# Patient Record
Sex: FEMALE | Race: WHITE | NOT HISPANIC OR LATINO | URBAN - METROPOLITAN AREA
[De-identification: names, ages, dates, MRNs, and addresses within clinical notes are randomized per-mention and may not be internally consistent; named-entity substitution may affect disease eponyms.]

---

## 2018-04-05 ENCOUNTER — INPATIENT (INPATIENT)
Facility: HOSPITAL | Age: 73
LOS: 4 days | Discharge: NURSING, REHAB OR LONG-TERM CARE | DRG: 607 | End: 2018-04-10
Attending: FAMILY MEDICINE | Admitting: INTERNAL MEDICINE
Payer: MEDICARE

## 2018-04-05 VITALS
TEMPERATURE: 98 F | RESPIRATION RATE: 20 BRPM | OXYGEN SATURATION: 98 % | HEART RATE: 61 BPM | DIASTOLIC BLOOD PRESSURE: 87 MMHG | WEIGHT: 274.92 LBS | SYSTOLIC BLOOD PRESSURE: 132 MMHG | HEIGHT: 63 IN

## 2018-04-05 DIAGNOSIS — M54.5 LOW BACK PAIN: ICD-10-CM

## 2018-04-05 DIAGNOSIS — Z29.9 ENCOUNTER FOR PROPHYLACTIC MEASURES, UNSPECIFIED: ICD-10-CM

## 2018-04-05 DIAGNOSIS — I10 ESSENTIAL (PRIMARY) HYPERTENSION: ICD-10-CM

## 2018-04-05 DIAGNOSIS — I89.0 LYMPHEDEMA, NOT ELSEWHERE CLASSIFIED: ICD-10-CM

## 2018-04-05 DIAGNOSIS — E11.8 TYPE 2 DIABETES MELLITUS WITH UNSPECIFIED COMPLICATIONS: ICD-10-CM

## 2018-04-05 DIAGNOSIS — Z98.890 OTHER SPECIFIED POSTPROCEDURAL STATES: Chronic | ICD-10-CM

## 2018-04-05 DIAGNOSIS — E78.5 HYPERLIPIDEMIA, UNSPECIFIED: ICD-10-CM

## 2018-04-05 LAB
ALBUMIN SERPL ELPH-MCNC: 4.2 G/DL — SIGNIFICANT CHANGE UP (ref 3.3–5)
ALP SERPL-CCNC: 92 U/L — SIGNIFICANT CHANGE UP (ref 40–120)
ALT FLD-CCNC: 25 U/L RC — SIGNIFICANT CHANGE UP (ref 10–45)
ANION GAP SERPL CALC-SCNC: 14 MMOL/L — SIGNIFICANT CHANGE UP (ref 5–17)
ANION GAP SERPL CALC-SCNC: 15 MMOL/L — SIGNIFICANT CHANGE UP (ref 5–17)
AST SERPL-CCNC: 38 U/L — SIGNIFICANT CHANGE UP (ref 10–40)
BASOPHILS # BLD AUTO: 0.1 K/UL — SIGNIFICANT CHANGE UP (ref 0–0.2)
BASOPHILS NFR BLD AUTO: 0.9 % — SIGNIFICANT CHANGE UP (ref 0–2)
BILIRUB SERPL-MCNC: 0.5 MG/DL — SIGNIFICANT CHANGE UP (ref 0.2–1.2)
BUN SERPL-MCNC: 28 MG/DL — HIGH (ref 7–23)
BUN SERPL-MCNC: 30 MG/DL — HIGH (ref 7–23)
CALCIUM SERPL-MCNC: 10.3 MG/DL — SIGNIFICANT CHANGE UP (ref 8.4–10.5)
CALCIUM SERPL-MCNC: 10.5 MG/DL — SIGNIFICANT CHANGE UP (ref 8.4–10.5)
CHLORIDE SERPL-SCNC: 101 MMOL/L — SIGNIFICANT CHANGE UP (ref 96–108)
CHLORIDE SERPL-SCNC: 105 MMOL/L — SIGNIFICANT CHANGE UP (ref 96–108)
CO2 SERPL-SCNC: 22 MMOL/L — SIGNIFICANT CHANGE UP (ref 22–31)
CO2 SERPL-SCNC: 24 MMOL/L — SIGNIFICANT CHANGE UP (ref 22–31)
CREAT SERPL-MCNC: 1.09 MG/DL — SIGNIFICANT CHANGE UP (ref 0.5–1.3)
CREAT SERPL-MCNC: 1.18 MG/DL — SIGNIFICANT CHANGE UP (ref 0.5–1.3)
EOSINOPHIL # BLD AUTO: 0.1 K/UL — SIGNIFICANT CHANGE UP (ref 0–0.5)
EOSINOPHIL NFR BLD AUTO: 0.9 % — SIGNIFICANT CHANGE UP (ref 0–6)
GLUCOSE BLDC GLUCOMTR-MCNC: 104 MG/DL — HIGH (ref 70–99)
GLUCOSE SERPL-MCNC: 123 MG/DL — HIGH (ref 70–99)
GLUCOSE SERPL-MCNC: 144 MG/DL — HIGH (ref 70–99)
HCT VFR BLD CALC: 39.7 % — SIGNIFICANT CHANGE UP (ref 34.5–45)
HGB BLD-MCNC: 13.1 G/DL — SIGNIFICANT CHANGE UP (ref 11.5–15.5)
LYMPHOCYTES # BLD AUTO: 2.4 K/UL — SIGNIFICANT CHANGE UP (ref 1–3.3)
LYMPHOCYTES # BLD AUTO: 27 % — SIGNIFICANT CHANGE UP (ref 13–44)
MCHC RBC-ENTMCNC: 30.9 PG — SIGNIFICANT CHANGE UP (ref 27–34)
MCHC RBC-ENTMCNC: 32.9 GM/DL — SIGNIFICANT CHANGE UP (ref 32–36)
MCV RBC AUTO: 93.9 FL — SIGNIFICANT CHANGE UP (ref 80–100)
MONOCYTES # BLD AUTO: 0.7 K/UL — SIGNIFICANT CHANGE UP (ref 0–0.9)
MONOCYTES NFR BLD AUTO: 7.5 % — SIGNIFICANT CHANGE UP (ref 2–14)
NEUTROPHILS # BLD AUTO: 5.6 K/UL — SIGNIFICANT CHANGE UP (ref 1.8–7.4)
NEUTROPHILS NFR BLD AUTO: 63.7 % — SIGNIFICANT CHANGE UP (ref 43–77)
NT-PROBNP SERPL-SCNC: 920 PG/ML — HIGH (ref 0–300)
PLATELET # BLD AUTO: 208 K/UL — SIGNIFICANT CHANGE UP (ref 150–400)
POTASSIUM SERPL-MCNC: 4 MMOL/L — SIGNIFICANT CHANGE UP (ref 3.5–5.3)
POTASSIUM SERPL-MCNC: 7.5 MMOL/L — CRITICAL HIGH (ref 3.5–5.3)
POTASSIUM SERPL-SCNC: 4 MMOL/L — SIGNIFICANT CHANGE UP (ref 3.5–5.3)
POTASSIUM SERPL-SCNC: 7.5 MMOL/L — CRITICAL HIGH (ref 3.5–5.3)
PROT SERPL-MCNC: 9 G/DL — HIGH (ref 6–8.3)
RBC # BLD: 4.23 M/UL — SIGNIFICANT CHANGE UP (ref 3.8–5.2)
RBC # FLD: 13 % — SIGNIFICANT CHANGE UP (ref 10.3–14.5)
SODIUM SERPL-SCNC: 138 MMOL/L — SIGNIFICANT CHANGE UP (ref 135–145)
SODIUM SERPL-SCNC: 143 MMOL/L — SIGNIFICANT CHANGE UP (ref 135–145)
WBC # BLD: 8.9 K/UL — SIGNIFICANT CHANGE UP (ref 3.8–10.5)
WBC # FLD AUTO: 8.9 K/UL — SIGNIFICANT CHANGE UP (ref 3.8–10.5)

## 2018-04-05 PROCEDURE — 71045 X-RAY EXAM CHEST 1 VIEW: CPT | Mod: 26

## 2018-04-05 PROCEDURE — 93010 ELECTROCARDIOGRAM REPORT: CPT

## 2018-04-05 PROCEDURE — 99284 EMERGENCY DEPT VISIT MOD MDM: CPT | Mod: 25

## 2018-04-05 PROCEDURE — 99223 1ST HOSP IP/OBS HIGH 75: CPT

## 2018-04-05 RX ORDER — SODIUM CHLORIDE 9 MG/ML
1000 INJECTION, SOLUTION INTRAVENOUS
Qty: 0 | Refills: 0 | Status: DISCONTINUED | OUTPATIENT
Start: 2018-04-05 | End: 2018-04-10

## 2018-04-05 RX ORDER — TRAMADOL HYDROCHLORIDE 50 MG/1
50 TABLET ORAL
Qty: 0 | Refills: 0 | Status: DISCONTINUED | OUTPATIENT
Start: 2018-04-05 | End: 2018-04-10

## 2018-04-05 RX ORDER — ACETAMINOPHEN 500 MG
500 TABLET ORAL EVERY 8 HOURS
Qty: 0 | Refills: 0 | Status: DISCONTINUED | OUTPATIENT
Start: 2018-04-05 | End: 2018-04-10

## 2018-04-05 RX ORDER — MULTIVIT-MIN/FERROUS GLUCONATE 9 MG/15 ML
1 LIQUID (ML) ORAL DAILY
Qty: 0 | Refills: 0 | Status: DISCONTINUED | OUTPATIENT
Start: 2018-04-05 | End: 2018-04-10

## 2018-04-05 RX ORDER — ATORVASTATIN CALCIUM 80 MG/1
20 TABLET, FILM COATED ORAL AT BEDTIME
Qty: 0 | Refills: 0 | Status: DISCONTINUED | OUTPATIENT
Start: 2018-04-05 | End: 2018-04-10

## 2018-04-05 RX ORDER — INSULIN LISPRO 100/ML
VIAL (ML) SUBCUTANEOUS
Qty: 0 | Refills: 0 | Status: DISCONTINUED | OUTPATIENT
Start: 2018-04-05 | End: 2018-04-10

## 2018-04-05 RX ORDER — DEXTROSE 50 % IN WATER 50 %
1 SYRINGE (ML) INTRAVENOUS ONCE
Qty: 0 | Refills: 0 | Status: DISCONTINUED | OUTPATIENT
Start: 2018-04-05 | End: 2018-04-10

## 2018-04-05 RX ORDER — METFORMIN HYDROCHLORIDE 850 MG/1
0 TABLET ORAL
Qty: 0 | Refills: 0 | COMMUNITY

## 2018-04-05 RX ORDER — DEXTROSE 50 % IN WATER 50 %
25 SYRINGE (ML) INTRAVENOUS ONCE
Qty: 0 | Refills: 0 | Status: DISCONTINUED | OUTPATIENT
Start: 2018-04-05 | End: 2018-04-10

## 2018-04-05 RX ORDER — HYDROCHLOROTHIAZIDE 25 MG
12.5 TABLET ORAL DAILY
Qty: 0 | Refills: 0 | Status: DISCONTINUED | OUTPATIENT
Start: 2018-04-05 | End: 2018-04-06

## 2018-04-05 RX ORDER — DEXTROSE 50 % IN WATER 50 %
12.5 SYRINGE (ML) INTRAVENOUS ONCE
Qty: 0 | Refills: 0 | Status: DISCONTINUED | OUTPATIENT
Start: 2018-04-05 | End: 2018-04-10

## 2018-04-05 RX ORDER — GLUCAGON INJECTION, SOLUTION 0.5 MG/.1ML
1 INJECTION, SOLUTION SUBCUTANEOUS ONCE
Qty: 0 | Refills: 0 | Status: DISCONTINUED | OUTPATIENT
Start: 2018-04-05 | End: 2018-04-10

## 2018-04-05 RX ORDER — OMEGA-3 ACID ETHYL ESTERS 1 G
4 CAPSULE ORAL DAILY
Qty: 0 | Refills: 0 | Status: DISCONTINUED | OUTPATIENT
Start: 2018-04-05 | End: 2018-04-07

## 2018-04-05 RX ORDER — BISOPROLOL FUMARATE 10 MG/1
20 TABLET, FILM COATED ORAL DAILY
Qty: 0 | Refills: 0 | Status: DISCONTINUED | OUTPATIENT
Start: 2018-04-05 | End: 2018-04-10

## 2018-04-05 RX ORDER — TRAMADOL HYDROCHLORIDE 50 MG/1
0 TABLET ORAL
Qty: 0 | Refills: 0 | COMMUNITY

## 2018-04-05 RX ORDER — IBUPROFEN 200 MG
0 TABLET ORAL
Qty: 0 | Refills: 0 | COMMUNITY

## 2018-04-05 RX ORDER — INFLUENZA VIRUS VACCINE 15; 15; 15; 15 UG/.5ML; UG/.5ML; UG/.5ML; UG/.5ML
0.5 SUSPENSION INTRAMUSCULAR ONCE
Qty: 0 | Refills: 0 | Status: DISCONTINUED | OUTPATIENT
Start: 2018-04-05 | End: 2018-04-10

## 2018-04-05 RX ORDER — ATORVASTATIN CALCIUM 80 MG/1
1 TABLET, FILM COATED ORAL
Qty: 0 | Refills: 0 | COMMUNITY

## 2018-04-05 RX ORDER — HEPARIN SODIUM 5000 [USP'U]/ML
5000 INJECTION INTRAVENOUS; SUBCUTANEOUS EVERY 8 HOURS
Qty: 0 | Refills: 0 | Status: DISCONTINUED | OUTPATIENT
Start: 2018-04-05 | End: 2018-04-06

## 2018-04-05 RX ADMIN — HEPARIN SODIUM 5000 UNIT(S): 5000 INJECTION INTRAVENOUS; SUBCUTANEOUS at 21:54

## 2018-04-05 RX ADMIN — ATORVASTATIN CALCIUM 20 MILLIGRAM(S): 80 TABLET, FILM COATED ORAL at 21:54

## 2018-04-05 NOTE — H&P ADULT - PROBLEM SELECTOR PLAN 4
EXAM:

CT ABDOMEN AND PELVIS

 

EXAM DATE: 7/22/2017 02:20 PM.

 

CLINICAL HISTORY: Diffuse abdominal pain.

 

COMPARISONS: None.

 

TECHNIQUE: Routine helical CT imaging was performed through the abdomen and pelvis. IV contrast: 100 
cc Isovue 300. Enteric contrast: No. Reconstructions: Coronal and sagittal.

 

In accordance with CT protocol optimization, one or more of the following dose reduction techniques w
ere utilized for this exam: automated exposure control, adjustment of mA and/or KV based on patient s
ize, or use of iterative reconstructive technique.

 

FINDINGS: 

Lung Bases: Bibasilar small dependent opacity and small left pleural effusion visualized. Coronary ar
anita calcification, aortic valve calcification without cardiomegaly are seen. There is tiny hiatal he
rnia.

 

Liver: No contour changes or masses.

 

Gallbladder/Bile Ducts: Multiple small gallbladder stones without gallbladder wall thickening, abnorm
al enhancement or bile duct dilatation visualized.

 

Spleen: 13.7 cm in the longest dimension without splenic masses.

 

Pancreas: Normal.

 

Adrenal Glands: Normal.

 

Kidneys: There is a stone in the left lower renal pelvis, 3 mm and there is also right main renal pel
vis, 8 x 10 x 7 mm without  hydronephrosis on either side. There are multiple bilateral small renal s
imple cortical cysts. There is a focal cortical indentation containing a tiny calcification in the po
sterior mid cortex of the right kidney, probable chronic scarring versus a tiny obstructing stone in 
the calyx with atrophic parenchyma.

 

Peritoneal Cavity/Bowel: There is colon diverticulosis; there is borderline or minimal colon wall thi
ckening with small davy colon fat stranding in the proximal mid sigmoid, otherwise, No discrete colon
 diverticulitis or pericolon abscess formation seen. No free fluid, free air or adenopathy. No small 
bowel dilatation or acute inflammatory process. The appendix is well visualized and normal.

 

Pelvic Organs: There is Gupta catheter in the bladder; otherwise, the bladder and visualized pelvic o
rgans are within normal limits.

 

Vasculature: No aneurysms or other significant abnormality.

 

Bones: No bony destructive abnormality. Multilevel moderate to severe degenerative disk disease in th
e spine, predominantly lower lumbar spine.

 

Other: There is a small to moderately-sized right inguinal indirect hernia containing fat with minima
l fat stranding.

 

IMPRESSION: 

1. Colon diverticulosis and probable mild diverticulitis in the proximal mid sigmoid colon without dr
ainable abscess. Normal appendix. Negative for bowel obstruction. 

2. Bilateral intrarenal stones, greater in the right tibia without hydronephrosis. 

3. Cholelithiasis without acute cholecystitis or bile duct dilatation. 

4. Borderline to mild splenomegaly without cirrhosis or adenopathy, nonspecific finding. 

5. There is a small to moderately-sized right inguinal indirect hernia containing fat with minimal fa
t stranding. 

6. Small dependent pulmonary opacity in the bilateral lung bases, greater left base with small left p
leural effusion, infiltrate process or pneumonia in the left lower lobe may have similar appearance.

 

[facility]

Referring Provider Line: [phone]

 

SITE ID: [id number]
EXAM:

CT HEAD

 

EXAM DATE: 7/24/2017 02:30 PM.

 

CLINICAL HISTORY: 81-year-old with altered mental status

 

COMPARISON: CT head 1/12/2017.

 

TECHNIQUE: Multiaxial CT images were obtained from the foramen magnum to the vertex. IV contrast: Non
e. Reformats: Coronal.

 

In accordance with CT protocol optimization, one or more of the following dose reduction techniques w
ere utilized for this exam: automated exposure control, adjustment of mA and/or KV based on patient s
ize, or use of iterative reconstructive technique.

 

FINDINGS:

Parenchyma: No definite acute parenchymal hemorrhage, mass, or midline shift. There is mild to modera
te bilateral areas of white matter hypoattenuation that appears similar to 1/12/2017. There is no def
inite new white matter hypoattenuation or loss of gray-white differentiation.

 

Extraaxial Spaces: No abnormal extra axial fluid collection/mass seen.

 

Ventricles: The ventricles and sulci appear prominent but appropriate for extent of volume loss. Vent
ricular size and configuration are unchanged from prior study. Cisterns are patent

 

Sinuses: There is large volume left maxillary sinus and right sphenoid sinus air-fluid levels that ma
y represent acute sinusitis in the proper clinical setting. There is moderate mucosal thickening the 
ethmoid air cells. Mastoid air cells and middle ear cavities are clear.

 

Orbits: Changes of bilateral lens replacement.

 

Bones: No evidence of fracture or calvarial defect.

 

Other: Extensive vascular calcifications of the cavernous ICA segments. Vascular calcifications of th
e intradural vertebral arteries.

 

IMPRESSION: 

 

1. No definite acute infarct, intracranial hemorrhage, mass, or hydrocephalus.

 

2. Mild-to-moderate white matter changes that appear similar to CT 1/12/2017 and may represent sequel
a of chronic vessel ischemic disease. If there is clinical concern for acute stroke or clinical sympt
oms persist an MR brain without contrast can be considered to evaluate for small or subtle infarct.

 

3. Large volume left maxillary sinus and right sphenoid sinus air-fluid levels that may represent acu
te sinusitis in the proper clinical setting. 

 

[facility]

 

The call report notification system was initiated by Dr. Adam Garcia at 14:46 hrs on 7/2
4/17.

 

The above findings  were discussed with Ed Cortez by Dr. Adam Garcia at 14:52 hrs on 7/
24/17.

Referring Provider Line: [phone]

 

SITE ID: [id number]
c/w Lipitor 20mg PO qhs

## 2018-04-05 NOTE — ED ADULT NURSE NOTE - NS ED NURSE REPORT GIVEN TO FT
[facility] 453W report given to nurse geovany. Understands pmh, medications given and plan of care for patient. Patient in stable condition, vital signs updated, has no complaints at this time and has been updated on care plan. Explained to patient that it is change of shift and new nurse is taking over, pt verbalized understanding.

## 2018-04-05 NOTE — H&P ADULT - ASSESSMENT
73F with h/o DMT2, HTN, HLD, Obesity, low back pain, Chronic LE lymphedema x  10 yrs who presents with c/o worsening b/l LE swelling x 2 weeks. Pt reports difficulty with ambulation on account of worsening LE edema. She denies fever/chills, LE tenderness or redness. Physical exam is notable for +4 b/l LE non- pitting edema, non tender to palpation and no warmth or erythema. No calf tenderness. Labs are unremarkable. EKG is NSR and CXR shows no acute process.

## 2018-04-05 NOTE — H&P ADULT - NSHPLABSRESULTS_GEN_ALL_CORE
Labs reviewed : no leukocytosis, no anemia, bnp is wnl, BMP is wnl    CXR  personally reviewed : clear lungs    EKG personally reviewed : sinus with first degree block, LVH. HR 61 bpm

## 2018-04-05 NOTE — ED PROVIDER NOTE - OBJECTIVE STATEMENT
Private Physician Ruslan Heaton  73y female pmh HTN,HLD, DMT2, no habits,cancer,cad,cva, Pt comes to ed complains of "my lymphedema is out of control" Has had judith lower extr/calf lymphedema for 10 years. Treated with  compression stockings, massage. Pt has worsening symptoms over past few weeks now not able to ambulate. No fever chills shortness of breath chest pain.  Spoke to pmd and referred to ed.

## 2018-04-05 NOTE — ED PROVIDER NOTE - PMH
Back pain    Diabetes    HLD (hyperlipidemia)    Hypertension    Lymphedema of both lower extremities

## 2018-04-05 NOTE — H&P ADULT - HISTORY OF PRESENT ILLNESS
73F with h/o DMT2, HTN, HLD, Obesity,  low back pain,, Chronic LE lymphedema x  10 yrs who presents with c/o worsening b/l LE swelling. She reports that leg swelling has gradually worsened over the past 2 weeks such that she is no longer able  to ambulate. She ambulates with a walker at baseline. She denies LE pain, redness and warmth. She denies fever/chills, nausea/vomiting. No CP or SOB. She denies prolonged immobilization or recent travel. She reports that she is no longer able to put on compression stockings.  Of note she used to follow up at a lymphedema clinic ( several years ago) at which time she was managed with massage therapy and ACE wrapping. She reports improvement in symptoms for the past several years  and has not followed up since.    ED course  VS : 132/87  61  20  O2 98% room air T 98.4F  Labs : wbc 8.9 h/h 13/39  plr 208  bnp 920  bun/cr 30/1.0 73F with h/o DMT2, HTN, HLD, Obesity,  low back pain,, Chronic LE lymphedema x  10 yrs who presents with c/o worsening b/l LE swelling. She reports that leg swelling has gradually worsened over the past 2 weeks such that she is no longer able  to ambulate. She ambulates with a walker at baseline. She denies LE pain, redness and warmth. She denies fever/chills, nausea/vomiting. No CP or SOB. She denies prolonged immobilization or recent travel. She reports that she is no longer able to put on compression stockings.  Of note she used to follow up at a lymphedema clinic ( several years ago) at which time she was managed with massage therapy and compression bandaging. She reports improvement in symptoms for the past several years  and has not followed up since.    ED course  VS : 132/87  61  20  O2 98% room air T 98.4F  Labs : wbc 8.9 h/h 13/39  plr 208  bnp 920  bun/cr 30/1.0

## 2018-04-05 NOTE — H&P ADULT - PROBLEM SELECTOR PLAN 1
worsening LE swelling over the past 2 weeks  nontender, no erythema or warmth, no calef tenderness  will check b/l LE doppler US to r/o DVT  ACE wrapping worsening LE swelling over the past 2 weeks  nontender, no erythema or warmth, no calf tenderness  will check b/l LE doppler US to r/o DVT  Compression bandaging with ACE wrap  Vascular surgery consult in AM

## 2018-04-05 NOTE — ED ADULT NURSE NOTE - OBJECTIVE STATEMENT
74 y/o female presents to ed c/o lymphoderma. Pt states she has a hx and for the past two weeks it has become significantly worse. C/o associated weakness. Denies chest pain, sob, ha, n/v/d, abdominal pain, f/c, urinary symptoms, hematuria. A&Ox4, vss, skin warm dry and intact, MAEx4, lungs CTA, abd soft obese and nontender. Pt resting comfortably with VSS, no complaints at this time. Patient's bed in the lowest position, explained plan of care to patient and family members. Will continue to reassess.

## 2018-04-05 NOTE — H&P ADULT - PROBLEM SELECTOR PLAN 3
well controlled   will c/w home medication well controlled on Ziac 10- 6.25 2 tabs qd  will c/w HCTZ 12.5 mg qd and Bisoprolol 20 mg qd   monitor BP

## 2018-04-05 NOTE — ED PROVIDER NOTE - PROGRESS NOTE DETAILS
Discussed with [facility] office. Referred to Marija  Discussed with Dr Dutton will accept  Dr Dutton re [facility] for iniital h&p  Malachi Ramos MD, Facep Discussed with hosptial[facility] tba tonja Ramos MD, Facep

## 2018-04-06 ENCOUNTER — TRANSCRIPTION ENCOUNTER (OUTPATIENT)
Age: 73
End: 2018-04-06

## 2018-04-06 LAB
GLUCOSE BLDC GLUCOMTR-MCNC: 132 MG/DL — HIGH (ref 70–99)
GLUCOSE BLDC GLUCOMTR-MCNC: 137 MG/DL — HIGH (ref 70–99)
GLUCOSE BLDC GLUCOMTR-MCNC: 150 MG/DL — HIGH (ref 70–99)
HBA1C BLD-MCNC: 6.9 % — HIGH (ref 4–5.6)

## 2018-04-06 RX ORDER — HEPARIN SODIUM 5000 [USP'U]/ML
7500 INJECTION INTRAVENOUS; SUBCUTANEOUS EVERY 8 HOURS
Qty: 0 | Refills: 0 | Status: DISCONTINUED | OUTPATIENT
Start: 2018-04-06 | End: 2018-04-08

## 2018-04-06 RX ORDER — HYDRALAZINE HCL 50 MG
10 TABLET ORAL ONCE
Qty: 0 | Refills: 0 | Status: COMPLETED | OUTPATIENT
Start: 2018-04-06 | End: 2018-04-06

## 2018-04-06 RX ORDER — HYDROCHLOROTHIAZIDE 25 MG
25 TABLET ORAL
Qty: 0 | Refills: 0 | Status: DISCONTINUED | OUTPATIENT
Start: 2018-04-06 | End: 2018-04-10

## 2018-04-06 RX ORDER — HYDRALAZINE HCL 50 MG
10 TABLET ORAL EVERY 8 HOURS
Qty: 0 | Refills: 0 | Status: DISCONTINUED | OUTPATIENT
Start: 2018-04-06 | End: 2018-04-06

## 2018-04-06 RX ORDER — HYDRALAZINE HCL 50 MG
5 TABLET ORAL ONCE
Qty: 0 | Refills: 0 | Status: COMPLETED | OUTPATIENT
Start: 2018-04-06 | End: 2018-04-07

## 2018-04-06 RX ORDER — HYDRALAZINE HCL 50 MG
25 TABLET ORAL EVERY 8 HOURS
Qty: 0 | Refills: 0 | Status: DISCONTINUED | OUTPATIENT
Start: 2018-04-06 | End: 2018-04-06

## 2018-04-06 RX ORDER — HYDRALAZINE HCL 50 MG
25 TABLET ORAL EVERY 8 HOURS
Qty: 0 | Refills: 0 | Status: DISCONTINUED | OUTPATIENT
Start: 2018-04-06 | End: 2018-04-07

## 2018-04-06 RX ORDER — IBUPROFEN 200 MG
400 TABLET ORAL ONCE
Qty: 0 | Refills: 0 | Status: COMPLETED | OUTPATIENT
Start: 2018-04-06 | End: 2018-04-06

## 2018-04-06 RX ADMIN — Medication 12.5 MILLIGRAM(S): at 04:52

## 2018-04-06 RX ADMIN — TRAMADOL HYDROCHLORIDE 50 MILLIGRAM(S): 50 TABLET ORAL at 12:09

## 2018-04-06 RX ADMIN — BISOPROLOL FUMARATE 20 MILLIGRAM(S): 10 TABLET, FILM COATED ORAL at 04:52

## 2018-04-06 RX ADMIN — HEPARIN SODIUM 7500 UNIT(S): 5000 INJECTION INTRAVENOUS; SUBCUTANEOUS at 12:13

## 2018-04-06 RX ADMIN — HEPARIN SODIUM 5000 UNIT(S): 5000 INJECTION INTRAVENOUS; SUBCUTANEOUS at 05:50

## 2018-04-06 RX ADMIN — TRAMADOL HYDROCHLORIDE 50 MILLIGRAM(S): 50 TABLET ORAL at 17:12

## 2018-04-06 RX ADMIN — Medication 10 MILLIGRAM(S): at 16:26

## 2018-04-06 RX ADMIN — ATORVASTATIN CALCIUM 20 MILLIGRAM(S): 80 TABLET, FILM COATED ORAL at 22:00

## 2018-04-06 RX ADMIN — Medication 400 MILLIGRAM(S): at 18:54

## 2018-04-06 RX ADMIN — Medication 500 MILLIGRAM(S): at 23:45

## 2018-04-06 RX ADMIN — Medication 25 MILLIGRAM(S): at 18:54

## 2018-04-06 RX ADMIN — Medication 500 MILLIGRAM(S): at 23:17

## 2018-04-06 RX ADMIN — Medication 1 TABLET(S): at 12:08

## 2018-04-06 RX ADMIN — HEPARIN SODIUM 7500 UNIT(S): 5000 INJECTION INTRAVENOUS; SUBCUTANEOUS at 22:00

## 2018-04-06 NOTE — DISCHARGE NOTE ADULT - PATIENT PORTAL LINK FT
You can access the [facility], offered by [facility], by registering with the following website: [email or web address]

## 2018-04-06 NOTE — DISCHARGE NOTE ADULT - ADDITIONAL INSTRUCTIONS
Follow-up with primary care physician and outpatient  Wound Care /Lymphedema Clinic within 1 to 2 weeks. Call for appointment . Follow-up with primary care physician and outpatient  Wound Care /Lymphedema Clinic within 1  week after discharge from  rehab. Call for appointment . Follow-up with primary care physician within 1 week after rehab.   Follow-up with outpatient Lymphedema Clinic [phone]  within 1  week after discharge from  rehab. Call for appointment .

## 2018-04-06 NOTE — DISCHARGE NOTE ADULT - CARE PLAN
Principal Discharge DX:	Lymphedema of both lower extremities  Goal:	Less swelling and pain  Assessment and plan of treatment:	Continue with compression with ace wraps, elevation.  Follow-up with primary care physician and outpatient  Wound Care /Lymphedema Clinic within 1 to 2 weeks. Call for appointment .  Secondary Diagnosis:	Essential hypertension  Assessment and plan of treatment:	Low salt diet  Activity as tolerated.  Take all medication as prescribed.  Follow up with your medical doctor for routine blood pressure monitoring at your next visit.  Notify your doctor if you have any of the following symptoms:   Dizziness, Lightheadedness, Blurry vision, Headache, Chest pain, Shortness of breath  Secondary Diagnosis:	Chronic midline low back pain, with sciatica presence unspecified  Assessment and plan of treatment:	Do not stay in bed. Resting more than 1 or 2 days can delay your recovery.  Do not avoid exercise or work. Your body is made to move. It is not dangerous to be active, even though your back may hurt. Your back will likely heal faster if you return to being active before your pain is gone.   Only take over-the-counter or prescription medicines as directed by your caregiver. Over-the-counter medicines to reduce pain and inflammation are often the most helpful.  Your caregiver may prescribe muscle relaxant drugs. These medicines help dull your pain so you can more quickly return to your normal activities and healthy exercise.  Avoid feeling anxious or stressed. Stress increases muscle tension and can worsen back pain. It is important to recognize when you are anxious or stressed and learn ways to manage it. Exercise is a great option.  SEEK MEDICAL CARE IF:You have pain that is not relieved with rest or medicine.  You have pain that does not improve in 1 week.  You have new symptoms.  You are generally not feeling well.  You have pain that radiates from your back into your legs.  You develop new bowel or bladder control problems.  You have unusual weakness or numbness in your arms or legs.  You develop nausea or vomiting.  You develop abdominal pain.  You feel faint.  Secondary Diagnosis:	Type 2 diabetes mellitus with complication, without long-term current use of insulin  Assessment and plan of treatment:	HgA1C this admission 6.9  Make sure you get your HgA1c checked every three months.  If you take oral diabetes medications, check your blood glucose two times a day.  If you take insulin, check your blood glucose before meals and at bedtime.  It's important not to skip any meals.  Keep a log of your blood glucose results and always take it with you to your doctor appointments.  Keep a list of your current medications including injectables and over the counter medications and bring this medication list with you to all your doctor appointments.  If you have not seen your ophthalmologist this year call for appointment.  Check your feet daily for redness, sores, or openings. Do not self treat. If no improvement in two days call your primary care physician for an appointment.  Low blood sugar (hypoglycemia) is a blood sugar below 70mg/dl. Check your blood sugar if you feel signs/symptoms of hypoglycemia. If your blood sugar is below 70 take 15 grams of carbohydrates (ex 4 oz of apple juice, 3-4 glucose tablets, or 4-6 oz of regular soda) wait 15 minutes and repeat blood sugar to make sure it comes up above 70.  If your blood sugar is above 70 and you are due for a meal, have a meal.  If you are not due for a meal have a snack.  This snack helps keeps your blood sugar at a safe range.  Secondary Diagnosis:	Hyperlipidemia, unspecified hyperlipidemia type  Assessment and plan of treatment:	Continue with your cholesterol medications. Eat a heart healthy diet that is low in saturated fats and salt, and includes whole grains, fruits, vegetables and lean protein; exercise regularly (consult with your physician or cardiologist first); maintain a heart healthy weight; if you smoke - quit (A resource to help you stop smoking is the [facility] – phone number 259-799-9032.). Continue to follow with your primary physician or cardiologist. Principal Discharge DX:	Lymphedema of both lower extremities  Goal:	Less swelling and pain  Assessment and plan of treatment:	Continue with compression with ace wraps, elevation.  Follow-up with primary care physician and outpatient  Wound Care /Lymphedema Clinic within 1 to 2 weeks. Call for appointment .  Secondary Diagnosis:	Essential hypertension  Assessment and plan of treatment:	Low salt diet  Activity as tolerated.  Take all medication as prescribed.  Follow up with your medical doctor for routine blood pressure monitoring at your next visit.  Notify your doctor if you have any of the following symptoms:   Dizziness, Lightheadedness, Blurry vision, Headache, Chest pain, Shortness of breath  Secondary Diagnosis:	Chronic midline low back pain, with sciatica presence unspecified  Assessment and plan of treatment:	Do not stay in bed. Resting more than 1 or 2 days can delay your recovery.  Do not avoid exercise or work. Your body is made to move. It is not dangerous to be active, even though your back may hurt. Your back will likely heal faster if you return to being active before your pain is gone.   Only take over-the-counter or prescription medicines as directed by your caregiver. Over-the-counter medicines to reduce pain and inflammation are often the most helpful.  Your caregiver may prescribe muscle relaxant drugs. These medicines help dull your pain so you can more quickly return to your normal activities and healthy exercise.  Avoid feeling anxious or stressed. Stress increases muscle tension and can worsen back pain. It is important to recognize when you are anxious or stressed and learn ways to manage it. Exercise is a great option.  SEEK MEDICAL CARE IF:You have pain that is not relieved with rest or medicine.  You have pain that does not improve in 1 week.  You have new symptoms.  You are generally not feeling well.  You have pain that radiates from your back into your legs.  You develop new bowel or bladder control problems.  You have unusual weakness or numbness in your arms or legs.  You develop nausea or vomiting.  You develop abdominal pain.  You feel faint.  Secondary Diagnosis:	Type 2 diabetes mellitus with complication, without long-term current use of insulin  Assessment and plan of treatment:	HgA1C this admission 6.9  Make sure you get your HgA1c checked every three months.  If you take oral diabetes medications, check your blood glucose two times a day.  If you take insulin, check your blood glucose before meals and at bedtime.  It's important not to skip any meals.  Keep a log of your blood glucose results and always take it with you to your doctor appointments.  Keep a list of your current medications including injectables and over the counter medications and bring this medication list with you to all your doctor appointments.  If you have not seen your ophthalmologist this year call for appointment.  Check your feet daily for redness, sores, or openings. Do not self treat. If no improvement in two days call your primary care physician for an appointment.  Low blood sugar (hypoglycemia) is a blood sugar below 70mg/dl. Check your blood sugar if you feel signs/symptoms of hypoglycemia. If your blood sugar is below 70 take 15 grams of carbohydrates (ex 4 oz of apple juice, 3-4 glucose tablets, or 4-6 oz of regular soda) wait 15 minutes and repeat blood sugar to make sure it comes up above 70.  If your blood sugar is above 70 and you are due for a meal, have a meal.  If you are not due for a meal have a snack.  This snack helps keeps your blood sugar at a safe range.  Secondary Diagnosis:	Hyperlipidemia, unspecified hyperlipidemia type  Assessment and plan of treatment:	Continue with your cholesterol medications. Eat a heart healthy diet that is low in saturated fats and salt, and includes whole grains, fruits, vegetables and lean protein; exercise regularly (consult with your physician or cardiologist first); maintain a heart healthy weight; if you smoke - quit (A resource to help you stop smoking is the [facility] – phone number 305-171-1145.). Continue to follow with your primary physician or cardiologist.  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  Follow-up with your primary care physician in 1 week after discharge from [facility].

## 2018-04-06 NOTE — DISCHARGE NOTE ADULT - MEDICATION SUMMARY - MEDICATIONS TO STOP TAKING
I will STOP taking the medications listed below when I get home from the hospital:    ibuprofen 600 mg oral tablet  -- 1 tab(s) by mouth 2 times a day    Ziac 10 mg-6.25 mg oral tablet  -- 2 tab(s) by mouth once a day (in the morning)

## 2018-04-06 NOTE — DISCHARGE NOTE ADULT - CARE PROVIDER_API CALL
Ruslan Heaton (DO), Family Medicine  [address] [place] [address]  Phone: [phone]  Fax: [phone] Ruslan Heaton (DO), Family Medicine  [address] [place] [address]  Phone: [phone]  Fax: [phone]    [facility],   [address] [place] [address], [address] Floor  [place], [place] [address]  Phone: [phone]  Fax: (       -

## 2018-04-06 NOTE — DISCHARGE NOTE ADULT - PROVIDER TOKENS
TAVO:'[id number]:MIIS:[id number]' TOKEN:'[id number]:MIIS:[id number]',FREE:[LAST:[[facility]],PHONE:[phone]],FAX:[phone]   [phone]    -],ADDRESS:[[address] [place] [address]]]

## 2018-04-06 NOTE — PROGRESS NOTE ADULT - SUBJECTIVE AND OBJECTIVE BOX
INTERVAL HPI/OVERNIGHT EVENTS:  Pt seen and examined at bedside.     Allergies/Intolerance: No Known Allergies      MEDICATIONS  (STANDING):  atorvastatin 20 milliGRAM(s) Oral at bedtime  bisoprolol   Tablet 20 milliGRAM(s) Oral daily  dextrose 5%. 1000 milliLiter(s) (50 mL/Hr) IV Continuous <Continuous>  dextrose 50% Injectable 12.5 Gram(s) IV Push once  dextrose 50% Injectable 25 Gram(s) IV Push once  dextrose 50% Injectable 25 Gram(s) IV Push once  heparin  Injectable 5000 Unit(s) SubCutaneous every 8 hours  hydrochlorothiazide 12.5 milliGRAM(s) Oral daily  influenza   Vaccine 0.5 milliLiter(s) IntraMuscular once  insulin lispro (HumaLOG) corrective regimen sliding scale   SubCutaneous three times a day before meals  multivitamin/minerals 1 Tablet(s) Oral daily  omega-3-Acid Ethyl Esters 4 Gram(s) Oral daily    MEDICATIONS  (PRN):  acetaminophen   Tablet. 500 milliGRAM(s) Oral every 8 hours PRN Moderate Pain (4 - 6)  dextrose Gel 1 Dose(s) Oral once PRN Blood Glucose LESS THAN 70 milliGRAM(s)/deciliter  glucagon  Injectable 1 milliGRAM(s) IntraMuscular once PRN Glucose LESS THAN 70 milligrams/deciliter  traMADol 50 milliGRAM(s) Oral two times a day PRN Severe Pain (7 - 10)        ROS: all systems reviewed and wnl      PHYSICAL EXAMINATION:  Vital Signs Last 24 Hrs  T(C): 37 (06 Apr 2018 04:26), Max: 37 (05 Apr 2018 17:10)  T(F): 98.6 (06 Apr 2018 04:26), Max: 98.6 (05 Apr 2018 17:10)  HR: 66 (06 Apr 2018 04:26) (61 - 84)  BP: 152/76 (06 Apr 2018 06:00) (132/84 - 191/62)  BP(mean): 101 (05 Apr 2018 18:48) (101 - 101)  RR: 17 (06 Apr 2018 04:26) (17 - 20)  SpO2: 97% (06 Apr 2018 04:26) (97% - 98%)  CAPILLARY BLOOD GLUCOSE      POCT Blood Glucose.: 104 mg/dL (05 Apr 2018 20:56)      04-05 @ 07:01  -  04-06 @ 07:00  --------------------------------------------------------  IN: 0 mL / OUT: 550 mL / NET: -550 mL        GENERAL:   NECK: supple, No JVD  CHEST/LUNG: clear to auscultation bilaterally; no rales, rhonchi, or wheezing b/l  HEART: normal S1, S2  ABDOMEN: BS+, soft, ND, NT   EXTREMITIES:  pulses palpable; no clubbing, cyanosis, or edema b/l LEs  SKIN: no rashes or lesions      LABS:                        13.1   8.9   )-----------( 208      ( 05 Apr 2018 17:26 )             39.7     04-05    143  |  105  |  28<H>  ----------------------------<  123<H>  4.0   |  24  |  1.18    Ca    10.3      05 Apr 2018 19:02    TPro  9.0<H>  /  Alb  4.2  /  TBili  0.5  /  DBili  x   /  AST  38  /  ALT  25  /  AlkPhos  92  04-05 INTERVAL HPI/OVERNIGHT EVENTS:  Pt seen and examined at bedside.     Allergies/Intolerance: No Known Allergies      MEDICATIONS  (STANDING):  atorvastatin 20 milliGRAM(s) Oral at bedtime  bisoprolol   Tablet 20 milliGRAM(s) Oral daily  dextrose 5%. 1000 milliLiter(s) (50 mL/Hr) IV Continuous <Continuous>  dextrose 50% Injectable 12.5 Gram(s) IV Push once  dextrose 50% Injectable 25 Gram(s) IV Push once  dextrose 50% Injectable 25 Gram(s) IV Push once  heparin  Injectable 5000 Unit(s) SubCutaneous every 8 hours  hydrochlorothiazide 12.5 milliGRAM(s) Oral daily  influenza   Vaccine 0.5 milliLiter(s) IntraMuscular once  insulin lispro (HumaLOG) corrective regimen sliding scale   SubCutaneous three times a day before meals  multivitamin/minerals 1 Tablet(s) Oral daily  omega-3-Acid Ethyl Esters 4 Gram(s) Oral daily    MEDICATIONS  (PRN):  acetaminophen   Tablet. 500 milliGRAM(s) Oral every 8 hours PRN Moderate Pain (4 - 6)  dextrose Gel 1 Dose(s) Oral once PRN Blood Glucose LESS THAN 70 milliGRAM(s)/deciliter  glucagon  Injectable 1 milliGRAM(s) IntraMuscular once PRN Glucose LESS THAN 70 milligrams/deciliter  traMADol 50 milliGRAM(s) Oral two times a day PRN Severe Pain (7 - 10)        ROS: all systems reviewed and wnl      PHYSICAL EXAMINATION:  Vital Signs Last 24 Hrs  T(C): 37 (06 Apr 2018 04:26), Max: 37 (05 Apr 2018 17:10)  T(F): 98.6 (06 Apr 2018 04:26), Max: 98.6 (05 Apr 2018 17:10)  HR: 66 (06 Apr 2018 04:26) (61 - 84)  BP: 152/76 (06 Apr 2018 06:00) (132/84 - 191/62)  BP(mean): 101 (05 Apr 2018 18:48) (101 - 101)  RR: 17 (06 Apr 2018 04:26) (17 - 20)  SpO2: 97% (06 Apr 2018 04:26) (97% - 98%)  CAPILLARY BLOOD GLUCOSE      POCT Blood Glucose.: 104 mg/dL (05 Apr 2018 20:56)      04-05 @ 07:01  -  04-06 @ 07:00  --------------------------------------------------------  IN: 0 mL / OUT: 550 mL / NET: -550 mL        GENERAL: Alert, NAD, no fevers, SOB or CP  NECK: supple, No JVD  CHEST/LUNG: clear to auscultation bilaterally; no rales, rhonchi, or wheezing b/l  HEART: normal S1, S2  ABDOMEN: BS+, soft, ND, NT   EXTREMITIES:  pulses palpable; no clubbing, cyanosis, or edema b/l LEs  SKIN: no rashes or lesions      LABS:                        13.1   8.9   )-----------( 208      ( 05 Apr 2018 17:26 )             39.7     04-05    143  |  105  |  28<H>  ----------------------------<  123<H>  4.0   |  24  |  1.18    Ca    10.3      05 Apr 2018 19:02    TPro  9.0<H>  /  Alb  4.2  /  TBili  0.5  /  DBili  x   /  AST  38  /  ALT  25  /  AlkPhos  92  04-05

## 2018-04-06 NOTE — DISCHARGE NOTE ADULT - HOSPITAL COURSE
74 yo female with PMH HTN, HLD, Type 2 DM, CAD, CVA, cancer, chronic midline 74 yo female with PMH HTN, HLD, Type 2 DM, CAD, CVA, cancer, chronic midline backpain who pesented with worsening edema and pain of  lymphedema to  her bilateral lower extremities. US of BLE shows:      . Continue with :    . Uncontrolled HTN , now better controlled after addition of Norvasc and Hydralazine, and increased HCTZ.   Pt is stable for discharge to [facility]. She will follow-up her PMD and outpatient Lymphedema Clinic 72 yo female with PMH HTN, HLD, Type 2 DM, CAD, CVA, cancer, chronic midline backpain who pesented with worsening edema and pain of  lymphedema to  her bilateral lower extremities. US of BLE shows:      No evidence of deep vein thrombosis of either lower extremity.Superficial thrombophlebitis affects the right small saphenous vein in the calf. . Continue with :    .   Uncontrolled HTN , now better controlled after addition of Norvasc and Hydralazine, and increased HCTZ.   Pt is stable for discharge to [facility]. She will follow-up her PMD and outpatient Lymphedema Clinic 74 yo female with PMH HTN, HLD, Type 2 DM, CAD, CVA, cancer, chronic midline backpain who pesented with worsening edema and pain of  lymphedema to  her bilateral lower extremities. US of BLE shows:      No evidence of deep vein thrombosis of either lower extremity.Superficial thrombophlebitis affects the right small saphenous vein in the calf. . Continue with Xarelto for total 3 month course.   Uncontrolled HTN , now better controlled after addition of Norvasc and Hydralazine, and increased HCTZ.   Pt is stable for discharge to [facility]. She will follow-up her PMD and outpatient Lymphedema Clinic.

## 2018-04-06 NOTE — PROGRESS NOTE ADULT - ASSESSMENT
73 year old female PMH DM(II), HTN, HLD, Obesity, low back pain, Chronic LE lymphedema x  10 yrs who presents with c/o worsening b/l LE swelling. She reports that leg swelling has gradually worsened over the past 2 weeks such that she is no longer able  to ambulate. She ambulates with a walker at baseline. She denies LE pain, redness and warmth. She denies fever/chills, nausea/vomiting. No CP or SOB. She denies prolonged immobilization or recent travel. She reports that she is no longer able to put on compression stockings.  Of note she used to follow up at a lymphedema clinic ( several years ago) at which time she was managed with massage therapy and compression bandaging. 73 year old female PMH DM(II), HTN, HLD, Obesity, low back pain, Chronic LE lymphedema x  10 yrs who presents with c/o worsening b/l LE swelling. She reports that leg swelling has gradually worsened over the past 2 weeks such that she is no longer able  to ambulate. She ambulates with a walker at baseline. She denies LE pain, redness and warmth. She denies fever/chills, nausea/vomiting. No CP or SOB. She denies prolonged immobilization or recent travel. She reports that she is no longer able to put on compression stockings.  Of note she used to follow up at a lymphedema clinic ( several years ago) at which time she was managed with massage therapy and compression bandaging.     Needs PT eval, Vascular eval and outpatient referral to [facility]. Continue Lipitor 20 mg/day, Zabeta 20   mg/day and HCTZ 12.5 mg/dy. Leg dopplers pending.

## 2018-04-06 NOTE — DISCHARGE NOTE ADULT - PLAN OF CARE
Less swelling and pain Continue with compression with ace wraps, elevation.  Follow-up with primary care physician and outpatient  Wound Care /Lymphedema Clinic within 1 to 2 weeks. Call for appointment . Low salt diet  Activity as tolerated.  Take all medication as prescribed.  Follow up with your medical doctor for routine blood pressure monitoring at your next visit.  Notify your doctor if you have any of the following symptoms:   Dizziness, Lightheadedness, Blurry vision, Headache, Chest pain, Shortness of breath Do not stay in bed. Resting more than 1 or 2 days can delay your recovery.  Do not avoid exercise or work. Your body is made to move. It is not dangerous to be active, even though your back may hurt. Your back will likely heal faster if you return to being active before your pain is gone.   Only take over-the-counter or prescription medicines as directed by your caregiver. Over-the-counter medicines to reduce pain and inflammation are often the most helpful.  Your caregiver may prescribe muscle relaxant drugs. These medicines help dull your pain so you can more quickly return to your normal activities and healthy exercise.  Avoid feeling anxious or stressed. Stress increases muscle tension and can worsen back pain. It is important to recognize when you are anxious or stressed and learn ways to manage it. Exercise is a great option.  SEEK MEDICAL CARE IF:You have pain that is not relieved with rest or medicine.  You have pain that does not improve in 1 week.  You have new symptoms.  You are generally not feeling well.  You have pain that radiates from your back into your legs.  You develop new bowel or bladder control problems.  You have unusual weakness or numbness in your arms or legs.  You develop nausea or vomiting.  You develop abdominal pain.  You feel faint. HgA1C this admission 6.9  Make sure you get your HgA1c checked every three months.  If you take oral diabetes medications, check your blood glucose two times a day.  If you take insulin, check your blood glucose before meals and at bedtime.  It's important not to skip any meals.  Keep a log of your blood glucose results and always take it with you to your doctor appointments.  Keep a list of your current medications including injectables and over the counter medications and bring this medication list with you to all your doctor appointments.  If you have not seen your ophthalmologist this year call for appointment.  Check your feet daily for redness, sores, or openings. Do not self treat. If no improvement in two days call your primary care physician for an appointment.  Low blood sugar (hypoglycemia) is a blood sugar below 70mg/dl. Check your blood sugar if you feel signs/symptoms of hypoglycemia. If your blood sugar is below 70 take 15 grams of carbohydrates (ex 4 oz of apple juice, 3-4 glucose tablets, or 4-6 oz of regular soda) wait 15 minutes and repeat blood sugar to make sure it comes up above 70.  If your blood sugar is above 70 and you are due for a meal, have a meal.  If you are not due for a meal have a snack.  This snack helps keeps your blood sugar at a safe range. Continue with your cholesterol medications. Eat a heart healthy diet that is low in saturated fats and salt, and includes whole grains, fruits, vegetables and lean protein; exercise regularly (consult with your physician or cardiologist first); maintain a heart healthy weight; if you smoke - quit (A resource to help you stop smoking is the [facility] – phone number 858-870-2372.). Continue to follow with your primary physician or cardiologist. Continue with Xarelto as prescribed for total course of 3 months.  Follow-up with your primary care physician in 1 week after discharge from [facility].

## 2018-04-06 NOTE — DISCHARGE NOTE ADULT - MEDICATION SUMMARY - MEDICATIONS TO TAKE
I will START or STAY ON the medications listed below when I get home from the hospital:    traMADol 50 mg oral tablet  -- 1 tab(s) by mouth 2 times a day  -- Indication: For Chronic midline low back pain, with sciatica presence unspecified    Tylenol Extra Strength 500 mg oral tablet  -- 2 tab(s) by mouth once a day, As Needed  -- Indication: For Chronic midline low back pain, with sciatica presence unspecified    rivaroxaban 15 mg oral tablet  -- 1 tab(s) by mouth 2 times a day until April 29th.   -- Indication: For Thrombophlebitis of superficial veins of right lower extremity    rivaroxaban 20 mg oral tablet  -- 1 tab(s) by mouth every 24 hours starting April 30th. Continue for total 3 month course.   -- Indication: For Thrombophlebitis of superficial veins of right lower extremity    metFORMIN 500 mg oral tablet  -- 1 tab(s) by mouth once a day (in the morning)  -- Indication: For Type 2 diabetes mellitus with complication, without long-term current use of insulin    Lipitor 20 mg oral tablet  -- 1 tab(s) by mouth once a day (at bedtime)  -- Indication: For Hyperlipidemia, unspecified hyperlipidemia type    bisoprolol 5 mg oral tablet  -- 4 tab(s) by mouth once a day  -- Indication: For Essential hypertension    amLODIPine 5 mg oral tablet  -- 1 tab(s) by mouth once a day  -- Indication: For Essential hypertension    diclofenac 2% topical solution  -- Apply on skin to affected area once to 2 times a day, As Needed  -- Indication: For Chronic midline low back pain, with sciatica presence unspecified    hydroCHLOROthiazide 25 mg oral tablet  -- 1 tab(s) by mouth   -- Indication: For Essential hypertension    Omega-3 oral capsule  -- 1 cap(s) by mouth once a day  -- Indication: For Supplement    hydrALAZINE 50 mg oral tablet  -- 1 tab(s) by mouth every 8 hours  -- Indication: For Essential hypertension    Centrum oral tablet  -- 1 tab(s) by mouth once a day  -- Indication: For supplement    Vitamin C 250 mg oral tablet  -- 1 tab(s) by mouth once a day  -- Indication: For supplement

## 2018-04-06 NOTE — DISCHARGE NOTE ADULT - SECONDARY DIAGNOSIS.
Essential hypertension Chronic midline low back pain, with sciatica presence unspecified Type 2 diabetes mellitus with complication, without long-term current use of insulin Hyperlipidemia, unspecified hyperlipidemia type Thrombophlebitis of superficial veins of right lower extremity

## 2018-04-07 LAB
ANION GAP SERPL CALC-SCNC: 13 MMOL/L — SIGNIFICANT CHANGE UP (ref 5–17)
BUN SERPL-MCNC: 17 MG/DL — SIGNIFICANT CHANGE UP (ref 7–23)
CALCIUM SERPL-MCNC: 9.5 MG/DL — SIGNIFICANT CHANGE UP (ref 8.4–10.5)
CHLORIDE SERPL-SCNC: 104 MMOL/L — SIGNIFICANT CHANGE UP (ref 96–108)
CO2 SERPL-SCNC: 25 MMOL/L — SIGNIFICANT CHANGE UP (ref 22–31)
CREAT SERPL-MCNC: 0.97 MG/DL — SIGNIFICANT CHANGE UP (ref 0.5–1.3)
GLUCOSE BLDC GLUCOMTR-MCNC: 128 MG/DL — HIGH (ref 70–99)
GLUCOSE BLDC GLUCOMTR-MCNC: 135 MG/DL — HIGH (ref 70–99)
GLUCOSE BLDC GLUCOMTR-MCNC: 142 MG/DL — HIGH (ref 70–99)
GLUCOSE BLDC GLUCOMTR-MCNC: 148 MG/DL — HIGH (ref 70–99)
GLUCOSE SERPL-MCNC: 164 MG/DL — HIGH (ref 70–99)
MAGNESIUM SERPL-MCNC: 1.6 MG/DL — SIGNIFICANT CHANGE UP (ref 1.6–2.6)
POTASSIUM SERPL-MCNC: 3.4 MMOL/L — LOW (ref 3.5–5.3)
POTASSIUM SERPL-SCNC: 3.4 MMOL/L — LOW (ref 3.5–5.3)
SODIUM SERPL-SCNC: 142 MMOL/L — SIGNIFICANT CHANGE UP (ref 135–145)

## 2018-04-07 RX ORDER — POTASSIUM CHLORIDE 20 MEQ
40 PACKET (EA) ORAL ONCE
Qty: 0 | Refills: 0 | Status: COMPLETED | OUTPATIENT
Start: 2018-04-07 | End: 2018-04-07

## 2018-04-07 RX ORDER — HYDRALAZINE HCL 50 MG
50 TABLET ORAL EVERY 8 HOURS
Qty: 0 | Refills: 0 | Status: DISCONTINUED | OUTPATIENT
Start: 2018-04-07 | End: 2018-04-10

## 2018-04-07 RX ORDER — OMEGA-3 ACID ETHYL ESTERS 1 G
1 CAPSULE ORAL DAILY
Qty: 0 | Refills: 0 | Status: DISCONTINUED | OUTPATIENT
Start: 2018-04-07 | End: 2018-04-10

## 2018-04-07 RX ADMIN — HEPARIN SODIUM 7500 UNIT(S): 5000 INJECTION INTRAVENOUS; SUBCUTANEOUS at 14:17

## 2018-04-07 RX ADMIN — Medication 50 MILLIGRAM(S): at 14:16

## 2018-04-07 RX ADMIN — Medication 5 MILLIGRAM(S): at 04:12

## 2018-04-07 RX ADMIN — Medication 50 MILLIGRAM(S): at 21:28

## 2018-04-07 RX ADMIN — HEPARIN SODIUM 7500 UNIT(S): 5000 INJECTION INTRAVENOUS; SUBCUTANEOUS at 21:29

## 2018-04-07 RX ADMIN — BISOPROLOL FUMARATE 20 MILLIGRAM(S): 10 TABLET, FILM COATED ORAL at 05:30

## 2018-04-07 RX ADMIN — Medication 500 MILLIGRAM(S): at 21:31

## 2018-04-07 RX ADMIN — Medication 25 MILLIGRAM(S): at 05:30

## 2018-04-07 RX ADMIN — Medication 500 MILLIGRAM(S): at 22:30

## 2018-04-07 RX ADMIN — ATORVASTATIN CALCIUM 20 MILLIGRAM(S): 80 TABLET, FILM COATED ORAL at 21:28

## 2018-04-07 RX ADMIN — Medication 1 GRAM(S): at 11:24

## 2018-04-07 RX ADMIN — Medication 40 MILLIEQUIVALENT(S): at 08:46

## 2018-04-07 RX ADMIN — Medication 1 TABLET(S): at 11:24

## 2018-04-07 RX ADMIN — HEPARIN SODIUM 7500 UNIT(S): 5000 INJECTION INTRAVENOUS; SUBCUTANEOUS at 05:30

## 2018-04-07 NOTE — PHYSICAL THERAPY INITIAL EVALUATION ADULT - ACTIVE RANGE OF MOTION EXAMINATION, REHAB EVAL
Right LE Active ROM was WFL (within functional limits)/Right UE Active ROM was WFL (within functional limits)/Left LE Active ROM was WFL (within functional limits)/BUE and BLE WFL, hx lymphedema BLE/Left UE Active ROM was WFL (within functional limits)

## 2018-04-07 NOTE — PHYSICAL THERAPY INITIAL EVALUATION ADULT - PERTINENT HX OF CURRENT PROBLEM, REHAB EVAL
Pt is a 73F admitted to [facility] on 4/5/18 for worsening BL LE swelling. She denies LE pain, redness and warmth. She denies fever/chills, nausea/vomiting. No CP or SOB. She denies prolonged immobilization or recent travel. She reports that she is no longer able to put on compression stockings. Of note she used to follow up at a lymphedema clinic ( several years ago) at which time she was managed with massage therapy and compression bandaging.

## 2018-04-07 NOTE — PHYSICAL THERAPY INITIAL EVALUATION ADULT - PRECAUTIONS/LIMITATIONS, REHAB EVAL
She reports improvement in symptoms for the past several years  and has not followed up since. Pt with h/o DMT2, HTN, HLD, Obesity,  low back pain,, Chronic LE lymphedema x  10 yrs fall precautions/She reports improvement in symptoms for the past several years  and has not followed up since. Pt with h/o DMT2, HTN, HLD, Obesity,  low back pain,, Chronic LE lymphedema x  10 yrs

## 2018-04-07 NOTE — PROGRESS NOTE ADULT - SUBJECTIVE AND OBJECTIVE BOX
no  cp/sob    MEDICATIONS  (STANDING):  atorvastatin 20 milliGRAM(s) Oral at bedtime  bisoprolol   Tablet 20 milliGRAM(s) Oral daily  dextrose 5%. 1000 milliLiter(s) (50 mL/Hr) IV Continuous <Continuous>  dextrose 50% Injectable 12.5 Gram(s) IV Push once  dextrose 50% Injectable 25 Gram(s) IV Push once  dextrose 50% Injectable 25 Gram(s) IV Push once  heparin  Injectable 7500 Unit(s) SubCutaneous every 8 hours  hydrALAZINE 50 milliGRAM(s) Oral every 8 hours  hydrochlorothiazide 25 milliGRAM(s) Oral <User Schedule>  influenza   Vaccine 0.5 milliLiter(s) IntraMuscular once  insulin lispro (HumaLOG) corrective regimen sliding scale   SubCutaneous three times a day before meals  multivitamin/minerals 1 Tablet(s) Oral daily  omega-3-Acid Ethyl Esters 4 Gram(s) Oral daily    MEDICATIONS  (PRN):  acetaminophen   Tablet. 500 milliGRAM(s) Oral every 8 hours PRN Moderate Pain (4 - 6)  dextrose Gel 1 Dose(s) Oral once PRN Blood Glucose LESS THAN 70 milliGRAM(s)/deciliter  glucagon  Injectable 1 milliGRAM(s) IntraMuscular once PRN Glucose LESS THAN 70 milligrams/deciliter  traMADol 50 milliGRAM(s) Oral two times a day PRN Severe Pain (7 - 10)      Vital Signs Last 24 Hrs  T(C): 36.8 (07 Apr 2018 04:06), Max: 36.8 (06 Apr 2018 12:18)  T(F): 98.2 (07 Apr 2018 04:06), Max: 98.2 (06 Apr 2018 12:18)  HR: 78 (07 Apr 2018 05:29) (60 - 78)  BP: 189/89 (07 Apr 2018 05:29) (154/83 - 220/84)  BP(mean): --  RR: 18 (07 Apr 2018 04:06) (17 - 18)  SpO2: 96% (07 Apr 2018 04:06) (93% - 96%)  CAPILLARY BLOOD GLUCOSE      POCT Blood Glucose.: 137 mg/dL (06 Apr 2018 16:58)  POCT Blood Glucose.: 150 mg/dL (06 Apr 2018 11:58)  POCT Blood Glucose.: 132 mg/dL (06 Apr 2018 08:00)    I&O's Summary    06 Apr 2018 07:01  -  07 Apr 2018 07:00  --------------------------------------------------------  IN: 350 mL / OUT: 650 mL / NET: -300 mL        PHYSICAL EXAM:  HEAD:  Atraumatic, Normocephalic  NECK: Supple, No JVD  CHEST/LUNG:   no     rales,     no,    rhonchi  HEART: Regular rate and rhythm;         murmur  ABDOMEN: Soft, Nontender, ;   EXTREMITIES:   lymph   edema  NEUROLOGY:  alert    LABS:                        13.1   8.9   )-----------( 208      ( 05 Apr 2018 17:26 )             39.7     04-07    142  |  104  |  17  ----------------------------<  164<H>  3.4<L>   |  25  |  0.97    Ca    9.5      07 Apr 2018 06:12  Mg     1.6     04-07    TPro  9.0<H>  /  Alb  4.2  /  TBili  0.5  /  DBili  x   /  AST  38  /  ALT  25  /  AlkPhos  92  04-05                  Hemoglobin A1C, Whole Blood: 6.9 % (04-06 @ 07:33)            Consultant(s) Notes Reviewed:      Care Discussed with Consultants/Other Providers:

## 2018-04-07 NOTE — PROGRESS NOTE ADULT - ASSESSMENT
73 year old female PMH DM(II), HTN, HLD, Obesity,   low back pain, Chronic LE lymphedema x  10 yrs   who presents with c/o worsening b/l LE swelling.   She reports that leg swelling has gradually worsened over the past 2 weeks such that she is no longer able  to ambulate.   She ambulates with a walker at baseline.    Needs PT eval,   Vascular eval   on  Lipitor 20 mg/day, Zabeta 20   mg/day and HCTZ 12.5 mg/dy.   Leg dopplers pending.  high bp, meds  adjusted

## 2018-04-07 NOTE — PHYSICAL THERAPY INITIAL EVALUATION ADULT - GENERAL OBSERVATIONS, REHAB EVAL
Pt received supine, +IVL, +hx lymphedema, +bl LE ace bandages for compression. +fearful. Pt is A&Ox4, follows commands, willing to attempt mobility, reports feeling "nervous."

## 2018-04-07 NOTE — PHYSICAL THERAPY INITIAL EVALUATION ADULT - ADDITIONAL COMMENTS
Pt lives at home with spouse, +elevator in home, no stairs to negotiate, PTA amb with rolling walker, incr difficult with ADLs, spouse assists as needed. Went to lymphedema clinic years ago. +fearful

## 2018-04-08 LAB
ANION GAP SERPL CALC-SCNC: 15 MMOL/L — SIGNIFICANT CHANGE UP (ref 5–17)
BUN SERPL-MCNC: 14 MG/DL — SIGNIFICANT CHANGE UP (ref 7–23)
CALCIUM SERPL-MCNC: 10.1 MG/DL — SIGNIFICANT CHANGE UP (ref 8.4–10.5)
CHLORIDE SERPL-SCNC: 102 MMOL/L — SIGNIFICANT CHANGE UP (ref 96–108)
CO2 SERPL-SCNC: 25 MMOL/L — SIGNIFICANT CHANGE UP (ref 22–31)
CREAT SERPL-MCNC: 1.01 MG/DL — SIGNIFICANT CHANGE UP (ref 0.5–1.3)
GLUCOSE BLDC GLUCOMTR-MCNC: 159 MG/DL — HIGH (ref 70–99)
GLUCOSE BLDC GLUCOMTR-MCNC: 160 MG/DL — HIGH (ref 70–99)
GLUCOSE BLDC GLUCOMTR-MCNC: 161 MG/DL — HIGH (ref 70–99)
GLUCOSE SERPL-MCNC: 176 MG/DL — HIGH (ref 70–99)
HCT VFR BLD CALC: 38.6 % — SIGNIFICANT CHANGE UP (ref 34.5–45)
HGB BLD-MCNC: 12.7 G/DL — SIGNIFICANT CHANGE UP (ref 11.5–15.5)
MAGNESIUM SERPL-MCNC: 1.6 MG/DL — SIGNIFICANT CHANGE UP (ref 1.6–2.6)
MCHC RBC-ENTMCNC: 29.5 PG — SIGNIFICANT CHANGE UP (ref 27–34)
MCHC RBC-ENTMCNC: 32.9 GM/DL — SIGNIFICANT CHANGE UP (ref 32–36)
MCV RBC AUTO: 89.8 FL — SIGNIFICANT CHANGE UP (ref 80–100)
PLATELET # BLD AUTO: 213 K/UL — SIGNIFICANT CHANGE UP (ref 150–400)
POTASSIUM SERPL-MCNC: 3.7 MMOL/L — SIGNIFICANT CHANGE UP (ref 3.5–5.3)
POTASSIUM SERPL-SCNC: 3.7 MMOL/L — SIGNIFICANT CHANGE UP (ref 3.5–5.3)
RBC # BLD: 4.3 M/UL — SIGNIFICANT CHANGE UP (ref 3.8–5.2)
RBC # FLD: 15.2 % — HIGH (ref 10.3–14.5)
SODIUM SERPL-SCNC: 142 MMOL/L — SIGNIFICANT CHANGE UP (ref 135–145)
WBC # BLD: 9.33 K/UL — SIGNIFICANT CHANGE UP (ref 3.8–10.5)
WBC # FLD AUTO: 9.33 K/UL — SIGNIFICANT CHANGE UP (ref 3.8–10.5)

## 2018-04-08 RX ORDER — AMLODIPINE BESYLATE 2.5 MG/1
5 TABLET ORAL DAILY
Qty: 0 | Refills: 0 | Status: DISCONTINUED | OUTPATIENT
Start: 2018-04-08 | End: 2018-04-10

## 2018-04-08 RX ORDER — HEPARIN SODIUM 5000 [USP'U]/ML
5000 INJECTION INTRAVENOUS; SUBCUTANEOUS EVERY 8 HOURS
Qty: 0 | Refills: 0 | Status: DISCONTINUED | OUTPATIENT
Start: 2018-04-08 | End: 2018-04-09

## 2018-04-08 RX ADMIN — Medication 50 MILLIGRAM(S): at 05:13

## 2018-04-08 RX ADMIN — BISOPROLOL FUMARATE 20 MILLIGRAM(S): 10 TABLET, FILM COATED ORAL at 05:13

## 2018-04-08 RX ADMIN — Medication 1 TABLET(S): at 12:11

## 2018-04-08 RX ADMIN — Medication 500 MILLIGRAM(S): at 21:54

## 2018-04-08 RX ADMIN — ATORVASTATIN CALCIUM 20 MILLIGRAM(S): 80 TABLET, FILM COATED ORAL at 21:40

## 2018-04-08 RX ADMIN — Medication 50 MILLIGRAM(S): at 13:33

## 2018-04-08 RX ADMIN — Medication 1: at 08:42

## 2018-04-08 RX ADMIN — HEPARIN SODIUM 5000 UNIT(S): 5000 INJECTION INTRAVENOUS; SUBCUTANEOUS at 21:40

## 2018-04-08 RX ADMIN — Medication 50 MILLIGRAM(S): at 21:40

## 2018-04-08 RX ADMIN — HEPARIN SODIUM 5000 UNIT(S): 5000 INJECTION INTRAVENOUS; SUBCUTANEOUS at 13:32

## 2018-04-08 RX ADMIN — HEPARIN SODIUM 7500 UNIT(S): 5000 INJECTION INTRAVENOUS; SUBCUTANEOUS at 05:13

## 2018-04-08 RX ADMIN — Medication 25 MILLIGRAM(S): at 05:13

## 2018-04-08 RX ADMIN — Medication 1 GRAM(S): at 12:11

## 2018-04-08 RX ADMIN — Medication 1: at 12:11

## 2018-04-08 RX ADMIN — Medication 1: at 17:31

## 2018-04-08 RX ADMIN — AMLODIPINE BESYLATE 5 MILLIGRAM(S): 2.5 TABLET ORAL at 10:07

## 2018-04-08 NOTE — PROGRESS NOTE ADULT - SUBJECTIVE AND OBJECTIVE BOX
INTERVAL HPI/OVERNIGHT EVENTS:  Pt seen and examined at bedside.     Allergies/Intolerance: No Known Allergies      MEDICATIONS  (STANDING):  atorvastatin 20 milliGRAM(s) Oral at bedtime  bisoprolol   Tablet 20 milliGRAM(s) Oral daily  dextrose 5%. 1000 milliLiter(s) (50 mL/Hr) IV Continuous <Continuous>  dextrose 50% Injectable 12.5 Gram(s) IV Push once  dextrose 50% Injectable 25 Gram(s) IV Push once  dextrose 50% Injectable 25 Gram(s) IV Push once  heparin  Injectable 7500 Unit(s) SubCutaneous every 8 hours  hydrALAZINE 50 milliGRAM(s) Oral every 8 hours  hydrochlorothiazide 25 milliGRAM(s) Oral <User Schedule>  influenza   Vaccine 0.5 milliLiter(s) IntraMuscular once  insulin lispro (HumaLOG) corrective regimen sliding scale   SubCutaneous three times a day before meals  multivitamin/minerals 1 Tablet(s) Oral daily  omega-3-Acid Ethyl Esters 1 Gram(s) Oral daily    MEDICATIONS  (PRN):  acetaminophen   Tablet. 500 milliGRAM(s) Oral every 8 hours PRN Moderate Pain (4 - 6)  dextrose Gel 1 Dose(s) Oral once PRN Blood Glucose LESS THAN 70 milliGRAM(s)/deciliter  glucagon  Injectable 1 milliGRAM(s) IntraMuscular once PRN Glucose LESS THAN 70 milligrams/deciliter  traMADol 50 milliGRAM(s) Oral two times a day PRN Severe Pain (7 - 10)        ROS: all systems reviewed and wnl      PHYSICAL EXAMINATION:  Vital Signs Last 24 Hrs  T(C): 37 (08 Apr 2018 05:01), Max: 37.1 (07 Apr 2018 12:07)  T(F): 98.6 (08 Apr 2018 05:01), Max: 98.7 (07 Apr 2018 12:07)  HR: 84 (08 Apr 2018 05:01) (70 - 84)  BP: 178/78 (08 Apr 2018 05:01) (158/72 - 183/93)  BP(mean): --  RR: 18 (08 Apr 2018 05:01) (18 - 18)  SpO2: 98% (08 Apr 2018 05:01) (94% - 98%)  CAPILLARY BLOOD GLUCOSE      POCT Blood Glucose.: 160 mg/dL (08 Apr 2018 08:19)  POCT Blood Glucose.: 135 mg/dL (07 Apr 2018 22:15)  POCT Blood Glucose.: 128 mg/dL (07 Apr 2018 16:48)  POCT Blood Glucose.: 142 mg/dL (07 Apr 2018 12:05)      04-07 @ 07:01  -  04-08 @ 07:00  --------------------------------------------------------  IN: 830 mL / OUT: 750 mL / NET: 80 mL        GENERAL:   NECK: supple, No JVD  CHEST/LUNG: clear to auscultation bilaterally; no rales, rhonchi, or wheezing b/l  HEART: normal S1, S2  ABDOMEN: BS+, soft, ND, NT   EXTREMITIES:  pulses palpable; no clubbing, cyanosis, or edema b/l LEs  SKIN: no rashes or lesions      LABS:    04-08    142  |  102  |  14  ----------------------------<  176<H>  3.7   |  25  |  1.01    Ca    10.1      08 Apr 2018 06:41  Mg     1.6     04-08 INTERVAL HPI/OVERNIGHT EVENTS:  Pt seen and examined at bedside.     Allergies/Intolerance: No Known Allergies      MEDICATIONS  (STANDING):  atorvastatin 20 milliGRAM(s) Oral at bedtime  bisoprolol   Tablet 20 milliGRAM(s) Oral daily  dextrose 5%. 1000 milliLiter(s) (50 mL/Hr) IV Continuous <Continuous>  dextrose 50% Injectable 12.5 Gram(s) IV Push once  dextrose 50% Injectable 25 Gram(s) IV Push once  dextrose 50% Injectable 25 Gram(s) IV Push once  heparin  Injectable 7500 Unit(s) SubCutaneous every 8 hours  hydrALAZINE 50 milliGRAM(s) Oral every 8 hours  hydrochlorothiazide 25 milliGRAM(s) Oral <User Schedule>  influenza   Vaccine 0.5 milliLiter(s) IntraMuscular once  insulin lispro (HumaLOG) corrective regimen sliding scale   SubCutaneous three times a day before meals  multivitamin/minerals 1 Tablet(s) Oral daily  omega-3-Acid Ethyl Esters 1 Gram(s) Oral daily    MEDICATIONS  (PRN):  acetaminophen   Tablet. 500 milliGRAM(s) Oral every 8 hours PRN Moderate Pain (4 - 6)  dextrose Gel 1 Dose(s) Oral once PRN Blood Glucose LESS THAN 70 milliGRAM(s)/deciliter  glucagon  Injectable 1 milliGRAM(s) IntraMuscular once PRN Glucose LESS THAN 70 milligrams/deciliter  traMADol 50 milliGRAM(s) Oral two times a day PRN Severe Pain (7 - 10)        ROS: all systems reviewed and wnl      PHYSICAL EXAMINATION:  Vital Signs Last 24 Hrs  T(C): 37 (08 Apr 2018 05:01), Max: 37.1 (07 Apr 2018 12:07)  T(F): 98.6 (08 Apr 2018 05:01), Max: 98.7 (07 Apr 2018 12:07)  HR: 84 (08 Apr 2018 05:01) (70 - 84)  BP: 178/78 (08 Apr 2018 05:01) (158/72 - 183/93)  BP(mean): --  RR: 18 (08 Apr 2018 05:01) (18 - 18)  SpO2: 98% (08 Apr 2018 05:01) (94% - 98%)  CAPILLARY BLOOD GLUCOSE      POCT Blood Glucose.: 160 mg/dL (08 Apr 2018 08:19)  POCT Blood Glucose.: 135 mg/dL (07 Apr 2018 22:15)  POCT Blood Glucose.: 128 mg/dL (07 Apr 2018 16:48)  POCT Blood Glucose.: 142 mg/dL (07 Apr 2018 12:05)      04-07 @ 07:01  -  04-08 @ 07:00  --------------------------------------------------------  IN: 830 mL / OUT: 750 mL / NET: 80 mL        GENERAL: Alert, pleasant, NAD, afebrile, no CP  NECK: supple, No JVD  CHEST/LUNG: clear to auscultation bilaterally; no rales, rhonchi, or wheezing b/l  HEART: normal S1, S2  ABDOMEN: BS+, soft, ND, NT   EXTREMITIES:  pulses palpable; no clubbing, cyanosis, both legs wrapped below the knees  SKIN: no rashes or lesions      LABS:    04-08    142  |  102  |  14  ----------------------------<  176<H>  3.7   |  25  |  1.01    Ca    10.1      08 Apr 2018 06:41  Mg     1.6     04-08

## 2018-04-08 NOTE — PROGRESS NOTE ADULT - ASSESSMENT
73 year old female PMH DM(II), HTN, HLD, Obesity, low back pain, Chronic LE lymphedema x  10 yrs who presents with c/o worsening b/l LE swelling. She reports that leg swelling has gradually worsened over the past 2 weeks such that she is no longer able  to ambulate. She ambulates with a walker at baseline. She denies LE pain, redness and warmth. She denies fever/chills, nausea/vomiting. No CP or SOB. She denies prolonged immobilization or recent travel. She reports that she is no longer able to put on compression stockings.  Of note she used to follow up at a lymphedema clinic ( several years ago) at which time she was managed with massage therapy and compression bandaging.       Plan: Needs PT eval for inpatient rehab. Vascular eval and outpatient referral to [facility] after discharge from rehab. Continue Lipitor 20 mg/day, Zabeta 20 mg/day and HCTZ 12.5 mg/dy. Leg venous dopplers pending.     Added Hydralazine 50 mg tid here and Norvasc 5 mg/day for better BP control.

## 2018-04-09 LAB
GLUCOSE BLDC GLUCOMTR-MCNC: 144 MG/DL — HIGH (ref 70–99)
GLUCOSE BLDC GLUCOMTR-MCNC: 162 MG/DL — HIGH (ref 70–99)
GLUCOSE BLDC GLUCOMTR-MCNC: 181 MG/DL — HIGH (ref 70–99)

## 2018-04-09 PROCEDURE — 93970 EXTREMITY STUDY: CPT | Mod: 26

## 2018-04-09 RX ORDER — HEPARIN SODIUM 5000 [USP'U]/ML
5000 INJECTION INTRAVENOUS; SUBCUTANEOUS
Qty: 0 | Refills: 0 | COMMUNITY
Start: 2018-04-09

## 2018-04-09 RX ORDER — RIVAROXABAN 15 MG-20MG
20 KIT ORAL EVERY 24 HOURS
Qty: 0 | Refills: 0 | Status: CANCELLED | OUTPATIENT
Start: 2018-04-30 | End: 2018-04-10

## 2018-04-09 RX ORDER — RIVAROXABAN 15 MG-20MG
15 KIT ORAL
Qty: 0 | Refills: 0 | Status: DISCONTINUED | OUTPATIENT
Start: 2018-04-09 | End: 2018-04-10

## 2018-04-09 RX ORDER — ASCORBIC ACID 60 MG
0 TABLET,CHEWABLE ORAL
Qty: 0 | Refills: 0 | COMMUNITY

## 2018-04-09 RX ORDER — MULTIVIT-MIN/FERROUS GLUCONATE 9 MG/15 ML
0 LIQUID (ML) ORAL
Qty: 0 | Refills: 0 | COMMUNITY

## 2018-04-09 RX ADMIN — RIVAROXABAN 15 MILLIGRAM(S): KIT at 19:10

## 2018-04-09 RX ADMIN — Medication 50 MILLIGRAM(S): at 07:06

## 2018-04-09 RX ADMIN — Medication 25 MILLIGRAM(S): at 07:06

## 2018-04-09 RX ADMIN — AMLODIPINE BESYLATE 5 MILLIGRAM(S): 2.5 TABLET ORAL at 07:12

## 2018-04-09 RX ADMIN — Medication 1 TABLET(S): at 12:16

## 2018-04-09 RX ADMIN — Medication 500 MILLIGRAM(S): at 22:00

## 2018-04-09 RX ADMIN — Medication 1: at 17:09

## 2018-04-09 RX ADMIN — Medication 1: at 08:09

## 2018-04-09 RX ADMIN — Medication 50 MILLIGRAM(S): at 14:16

## 2018-04-09 RX ADMIN — HEPARIN SODIUM 5000 UNIT(S): 5000 INJECTION INTRAVENOUS; SUBCUTANEOUS at 07:12

## 2018-04-09 RX ADMIN — BISOPROLOL FUMARATE 20 MILLIGRAM(S): 10 TABLET, FILM COATED ORAL at 07:06

## 2018-04-09 RX ADMIN — Medication 1 GRAM(S): at 12:16

## 2018-04-09 RX ADMIN — Medication 50 MILLIGRAM(S): at 21:15

## 2018-04-09 RX ADMIN — Medication 500 MILLIGRAM(S): at 21:16

## 2018-04-09 RX ADMIN — ATORVASTATIN CALCIUM 20 MILLIGRAM(S): 80 TABLET, FILM COATED ORAL at 21:15

## 2018-04-09 RX ADMIN — HEPARIN SODIUM 5000 UNIT(S): 5000 INJECTION INTRAVENOUS; SUBCUTANEOUS at 14:16

## 2018-04-09 NOTE — PROGRESS NOTE ADULT - SUBJECTIVE AND OBJECTIVE BOX
INTERVAL HPI/OVERNIGHT EVENTS:  Pt seen and examined at bedside.     Allergies/Intolerance: No Known Allergies      MEDICATIONS  (STANDING):  amLODIPine   Tablet 5 milliGRAM(s) Oral daily  atorvastatin 20 milliGRAM(s) Oral at bedtime  bisoprolol   Tablet 20 milliGRAM(s) Oral daily  dextrose 5%. 1000 milliLiter(s) (50 mL/Hr) IV Continuous <Continuous>  dextrose 50% Injectable 12.5 Gram(s) IV Push once  dextrose 50% Injectable 25 Gram(s) IV Push once  dextrose 50% Injectable 25 Gram(s) IV Push once  heparin  Injectable 5000 Unit(s) SubCutaneous every 8 hours  hydrALAZINE 50 milliGRAM(s) Oral every 8 hours  hydrochlorothiazide 25 milliGRAM(s) Oral <User Schedule>  influenza   Vaccine 0.5 milliLiter(s) IntraMuscular once  insulin lispro (HumaLOG) corrective regimen sliding scale   SubCutaneous three times a day before meals  multivitamin/minerals 1 Tablet(s) Oral daily  omega-3-Acid Ethyl Esters 1 Gram(s) Oral daily    MEDICATIONS  (PRN):  acetaminophen   Tablet. 500 milliGRAM(s) Oral every 8 hours PRN Moderate Pain (4 - 6)  dextrose Gel 1 Dose(s) Oral once PRN Blood Glucose LESS THAN 70 milliGRAM(s)/deciliter  glucagon  Injectable 1 milliGRAM(s) IntraMuscular once PRN Glucose LESS THAN 70 milligrams/deciliter  traMADol 50 milliGRAM(s) Oral two times a day PRN Severe Pain (7 - 10)        ROS: all systems reviewed and wnl      PHYSICAL EXAMINATION:  Vital Signs Last 24 Hrs  T(C): 37.6 (09 Apr 2018 04:23), Max: 37.6 (09 Apr 2018 04:23)  T(F): 99.7 (09 Apr 2018 04:23), Max: 99.7 (09 Apr 2018 04:23)  HR: 84 (09 Apr 2018 04:23) (82 - 88)  BP: 141/80 (09 Apr 2018 04:23) (141/80 - 180/76)  BP(mean): --  RR: 17 (09 Apr 2018 04:23) (17 - 18)  SpO2: 96% (09 Apr 2018 04:23) (92% - 98%)  CAPILLARY BLOOD GLUCOSE      POCT Blood Glucose.: 162 mg/dL (09 Apr 2018 08:08)  POCT Blood Glucose.: 161 mg/dL (08 Apr 2018 17:00)  POCT Blood Glucose.: 159 mg/dL (08 Apr 2018 12:06)      04-08 @ 07:01  -  04-09 @ 07:00  --------------------------------------------------------  IN: 840 mL / OUT: 950 mL / NET: -110 mL    04-09 @ 07:01  -  04-09 @ 09:50  --------------------------------------------------------  IN: 240 mL / OUT: 0 mL / NET: 240 mL        GENERAL:   NECK: supple, No JVD  CHEST/LUNG: clear to auscultation bilaterally; no rales, rhonchi, or wheezing b/l  HEART: normal S1, S2  ABDOMEN: BS+, soft, ND, NT   EXTREMITIES:  pulses palpable; no clubbing, cyanosis, or edema b/l LEs  SKIN: no rashes or lesions      LABS:                        12.7   9.33  )-----------( 213      ( 08 Apr 2018 09:36 )             38.6     04-08    142  |  102  |  14  ----------------------------<  176<H>  3.7   |  25  |  1.01    Ca    10.1      08 Apr 2018 06:41  Mg     1.6     04-08 INTERVAL HPI/OVERNIGHT EVENTS:  Pt seen and examined at bedside.     Allergies/Intolerance: No Known Allergies      MEDICATIONS  (STANDING):  amLODIPine   Tablet 5 milliGRAM(s) Oral daily  atorvastatin 20 milliGRAM(s) Oral at bedtime  bisoprolol   Tablet 20 milliGRAM(s) Oral daily  dextrose 5%. 1000 milliLiter(s) (50 mL/Hr) IV Continuous <Continuous>  dextrose 50% Injectable 12.5 Gram(s) IV Push once  dextrose 50% Injectable 25 Gram(s) IV Push once  dextrose 50% Injectable 25 Gram(s) IV Push once  heparin  Injectable 5000 Unit(s) SubCutaneous every 8 hours  hydrALAZINE 50 milliGRAM(s) Oral every 8 hours  hydrochlorothiazide 25 milliGRAM(s) Oral <User Schedule>  influenza   Vaccine 0.5 milliLiter(s) IntraMuscular once  insulin lispro (HumaLOG) corrective regimen sliding scale   SubCutaneous three times a day before meals  multivitamin/minerals 1 Tablet(s) Oral daily  omega-3-Acid Ethyl Esters 1 Gram(s) Oral daily    MEDICATIONS  (PRN):  acetaminophen   Tablet. 500 milliGRAM(s) Oral every 8 hours PRN Moderate Pain (4 - 6)  dextrose Gel 1 Dose(s) Oral once PRN Blood Glucose LESS THAN 70 milliGRAM(s)/deciliter  glucagon  Injectable 1 milliGRAM(s) IntraMuscular once PRN Glucose LESS THAN 70 milligrams/deciliter  traMADol 50 milliGRAM(s) Oral two times a day PRN Severe Pain (7 - 10)        ROS: all systems reviewed and wnl      PHYSICAL EXAMINATION:  Vital Signs Last 24 Hrs  T(C): 37.6 (09 Apr 2018 04:23), Max: 37.6 (09 Apr 2018 04:23)  T(F): 99.7 (09 Apr 2018 04:23), Max: 99.7 (09 Apr 2018 04:23)  HR: 84 (09 Apr 2018 04:23) (82 - 88)  BP: 141/80 (09 Apr 2018 04:23) (141/80 - 180/76)  BP(mean): --  RR: 17 (09 Apr 2018 04:23) (17 - 18)  SpO2: 96% (09 Apr 2018 04:23) (92% - 98%)  CAPILLARY BLOOD GLUCOSE      POCT Blood Glucose.: 162 mg/dL (09 Apr 2018 08:08)  POCT Blood Glucose.: 161 mg/dL (08 Apr 2018 17:00)  POCT Blood Glucose.: 159 mg/dL (08 Apr 2018 12:06)      04-08 @ 07:01  -  04-09 @ 07:00  --------------------------------------------------------  IN: 840 mL / OUT: 950 mL / NET: -110 mL    04-09 @ 07:01  -  04-09 @ 09:50  --------------------------------------------------------  IN: 240 mL / OUT: 0 mL / NET: 240 mL        GENERAL: Alert, comfortable, no fevers, SOB or CP.   NECK: supple, No JVD  CHEST/LUNG: clear to auscultation bilaterally; no rales, rhonchi, or wheezing b/l  HEART: normal S1, S2  ABDOMEN: BS+, soft, ND, NT   EXTREMITIES:  pulses palpable; no clubbing, cyanosis, or edema b/l LEs  SKIN: no rashes or lesions      LABS:                        12.7   9.33  )-----------( 213      ( 08 Apr 2018 09:36 )             38.6     04-08    142  |  102  |  14  ----------------------------<  176<H>  3.7   |  25  |  1.01    Ca    10.1      08 Apr 2018 06:41  Mg     1.6     04-08

## 2018-04-09 NOTE — PROGRESS NOTE ADULT - ASSESSMENT
73 year old female PMH DM(II), HTN, HLD, Obesity, low back pain, Chronic LE lymphedema x  10 yrs who presents with c/o worsening b/l LE swelling. She reports that leg swelling has gradually worsened over the past 2 weeks such that she is no longer able  to ambulate. She ambulates with a walker at baseline. She denies LE pain, redness and warmth. She denies fever/chills, nausea/vomiting. No CP or SOB. She denies prolonged immobilization or recent travel. She reports that she is no longer able to put on compression stockings.  Of note she used to follow up at a lymphedema clinic ( several years ago) at which time she was managed with massage therapy and compression bandaging.     Plan: Needs PT eval for inpatient rehab. Vascular eval and outpatient referral to [facility] after discharge from rehab. Continue Lipitor 20 mg/day, Zabeta 20 mg/day and HCTZ 25 mg/dy. Leg venous dopplers pending. Added Hydralazine 50 mg tid here and Norvasc 5 mg/day for better BP control.    BP is controlled today 140/80.     Discharge to rehab AM Tuesday.

## 2018-04-09 NOTE — CHART NOTE - NSCHARTNOTEFT_GEN_A_CORE
Pt feels less pain in RLE, but c/o tenderness when touched at right calf. US doppler results reviewed with attending: No evidence of deep vein thrombosis of either lower extremity.  Superficial thrombophlebitis affects the right small saphenous vein in the calf. Per attending: Start on Xarelto 15mg po BID x 3 weeks, then 20mg po daily for 3 months.      [facility] [id number].

## 2018-04-10 VITALS — HEART RATE: 82 BPM | DIASTOLIC BLOOD PRESSURE: 80 MMHG | SYSTOLIC BLOOD PRESSURE: 126 MMHG

## 2018-04-10 PROBLEM — I89.0 LYMPHEDEMA, NOT ELSEWHERE CLASSIFIED: Chronic | Status: ACTIVE | Noted: 2018-04-05

## 2018-04-10 PROBLEM — E11.9 TYPE 2 DIABETES MELLITUS WITHOUT COMPLICATIONS: Chronic | Status: ACTIVE | Noted: 2018-04-05

## 2018-04-10 PROBLEM — I10 ESSENTIAL (PRIMARY) HYPERTENSION: Chronic | Status: ACTIVE | Noted: 2018-04-05

## 2018-04-10 PROBLEM — M54.9 DORSALGIA, UNSPECIFIED: Chronic | Status: ACTIVE | Noted: 2018-04-05

## 2018-04-10 PROBLEM — E78.5 HYPERLIPIDEMIA, UNSPECIFIED: Chronic | Status: ACTIVE | Noted: 2018-04-05

## 2018-04-10 LAB
ANION GAP SERPL CALC-SCNC: 13 MMOL/L — SIGNIFICANT CHANGE UP (ref 5–17)
BUN SERPL-MCNC: 30 MG/DL — HIGH (ref 7–23)
CALCIUM SERPL-MCNC: 9.7 MG/DL — SIGNIFICANT CHANGE UP (ref 8.4–10.5)
CHLORIDE SERPL-SCNC: 101 MMOL/L — SIGNIFICANT CHANGE UP (ref 96–108)
CO2 SERPL-SCNC: 26 MMOL/L — SIGNIFICANT CHANGE UP (ref 22–31)
CREAT SERPL-MCNC: 1.25 MG/DL — SIGNIFICANT CHANGE UP (ref 0.5–1.3)
GLUCOSE BLDC GLUCOMTR-MCNC: 191 MG/DL — HIGH (ref 70–99)
GLUCOSE BLDC GLUCOMTR-MCNC: 204 MG/DL — HIGH (ref 70–99)
GLUCOSE SERPL-MCNC: 173 MG/DL — HIGH (ref 70–99)
HCT VFR BLD CALC: 37.8 % — SIGNIFICANT CHANGE UP (ref 34.5–45)
HGB BLD-MCNC: 12.4 G/DL — SIGNIFICANT CHANGE UP (ref 11.5–15.5)
MAGNESIUM SERPL-MCNC: 1.8 MG/DL — SIGNIFICANT CHANGE UP (ref 1.6–2.6)
MCHC RBC-ENTMCNC: 30.5 PG — SIGNIFICANT CHANGE UP (ref 27–34)
MCHC RBC-ENTMCNC: 32.8 GM/DL — SIGNIFICANT CHANGE UP (ref 32–36)
MCV RBC AUTO: 93 FL — SIGNIFICANT CHANGE UP (ref 80–100)
PLATELET # BLD AUTO: 185 K/UL — SIGNIFICANT CHANGE UP (ref 150–400)
POTASSIUM SERPL-MCNC: 3.6 MMOL/L — SIGNIFICANT CHANGE UP (ref 3.5–5.3)
POTASSIUM SERPL-SCNC: 3.6 MMOL/L — SIGNIFICANT CHANGE UP (ref 3.5–5.3)
RBC # BLD: 4.06 M/UL — SIGNIFICANT CHANGE UP (ref 3.8–5.2)
RBC # FLD: 13.1 % — SIGNIFICANT CHANGE UP (ref 10.3–14.5)
SODIUM SERPL-SCNC: 140 MMOL/L — SIGNIFICANT CHANGE UP (ref 135–145)
WBC # BLD: 7.8 K/UL — SIGNIFICANT CHANGE UP (ref 3.8–10.5)
WBC # FLD AUTO: 7.8 K/UL — SIGNIFICANT CHANGE UP (ref 3.8–10.5)

## 2018-04-10 PROCEDURE — 82962 GLUCOSE BLOOD TEST: CPT

## 2018-04-10 PROCEDURE — 83036 HEMOGLOBIN GLYCOSYLATED A1C: CPT

## 2018-04-10 PROCEDURE — 99285 EMERGENCY DEPT VISIT HI MDM: CPT

## 2018-04-10 PROCEDURE — 93005 ELECTROCARDIOGRAM TRACING: CPT

## 2018-04-10 PROCEDURE — 71045 X-RAY EXAM CHEST 1 VIEW: CPT

## 2018-04-10 PROCEDURE — 97162 PT EVAL MOD COMPLEX 30 MIN: CPT

## 2018-04-10 PROCEDURE — 83880 ASSAY OF NATRIURETIC PEPTIDE: CPT

## 2018-04-10 PROCEDURE — 93970 EXTREMITY STUDY: CPT

## 2018-04-10 PROCEDURE — 80053 COMPREHEN METABOLIC PANEL: CPT

## 2018-04-10 PROCEDURE — 85027 COMPLETE CBC AUTOMATED: CPT

## 2018-04-10 PROCEDURE — 97530 THERAPEUTIC ACTIVITIES: CPT

## 2018-04-10 PROCEDURE — 83735 ASSAY OF MAGNESIUM: CPT

## 2018-04-10 PROCEDURE — 80048 BASIC METABOLIC PNL TOTAL CA: CPT

## 2018-04-10 RX ORDER — BISOPROLOL FUMARATE AND HYDROCHLOROTHIAZIDE 5; 6.25 MG/1; MG/1
2 TABLET ORAL
Qty: 0 | Refills: 0 | COMMUNITY

## 2018-04-10 RX ORDER — HYDRALAZINE HCL 50 MG
1 TABLET ORAL
Qty: 0 | Refills: 0 | COMMUNITY
Start: 2018-04-10

## 2018-04-10 RX ORDER — IBUPROFEN 200 MG
1 TABLET ORAL
Qty: 0 | Refills: 0 | COMMUNITY

## 2018-04-10 RX ORDER — BISOPROLOL FUMARATE 10 MG/1
4 TABLET, FILM COATED ORAL
Qty: 0 | Refills: 0 | COMMUNITY
Start: 2018-04-10

## 2018-04-10 RX ORDER — RIVAROXABAN 15 MG-20MG
1 KIT ORAL
Qty: 0 | Refills: 0 | COMMUNITY
Start: 2018-04-10

## 2018-04-10 RX ADMIN — Medication 1: at 08:24

## 2018-04-10 RX ADMIN — RIVAROXABAN 15 MILLIGRAM(S): KIT at 06:22

## 2018-04-10 RX ADMIN — Medication 1 GRAM(S): at 12:23

## 2018-04-10 RX ADMIN — Medication 1 TABLET(S): at 12:23

## 2018-04-10 RX ADMIN — BISOPROLOL FUMARATE 20 MILLIGRAM(S): 10 TABLET, FILM COATED ORAL at 06:22

## 2018-04-10 RX ADMIN — AMLODIPINE BESYLATE 5 MILLIGRAM(S): 2.5 TABLET ORAL at 06:22

## 2018-04-10 RX ADMIN — Medication 50 MILLIGRAM(S): at 06:22

## 2018-04-10 RX ADMIN — Medication 2: at 12:23

## 2018-04-10 RX ADMIN — Medication 25 MILLIGRAM(S): at 06:22

## 2018-04-10 NOTE — PROGRESS NOTE ADULT - SUBJECTIVE AND OBJECTIVE BOX
INTERVAL HPI/OVERNIGHT EVENTS:  Pt seen and examined at bedside.     Allergies/Intolerance: No Known Allergies      MEDICATIONS  (STANDING):  amLODIPine   Tablet 5 milliGRAM(s) Oral daily  atorvastatin 20 milliGRAM(s) Oral at bedtime  bisoprolol   Tablet 20 milliGRAM(s) Oral daily  dextrose 5%. 1000 milliLiter(s) (50 mL/Hr) IV Continuous <Continuous>  dextrose 50% Injectable 12.5 Gram(s) IV Push once  dextrose 50% Injectable 25 Gram(s) IV Push once  dextrose 50% Injectable 25 Gram(s) IV Push once  hydrALAZINE 50 milliGRAM(s) Oral every 8 hours  hydrochlorothiazide 25 milliGRAM(s) Oral <User Schedule>  influenza   Vaccine 0.5 milliLiter(s) IntraMuscular once  insulin lispro (HumaLOG) corrective regimen sliding scale   SubCutaneous three times a day before meals  multivitamin/minerals 1 Tablet(s) Oral daily  omega-3-Acid Ethyl Esters 1 Gram(s) Oral daily  rivaroxaban 15 milliGRAM(s) Oral two times a day    MEDICATIONS  (PRN):  acetaminophen   Tablet. 500 milliGRAM(s) Oral every 8 hours PRN Moderate Pain (4 - 6)  dextrose Gel 1 Dose(s) Oral once PRN Blood Glucose LESS THAN 70 milliGRAM(s)/deciliter  glucagon  Injectable 1 milliGRAM(s) IntraMuscular once PRN Glucose LESS THAN 70 milligrams/deciliter  traMADol 50 milliGRAM(s) Oral two times a day PRN Severe Pain (7 - 10)        ROS: all systems reviewed and wnl      PHYSICAL EXAMINATION:  Vital Signs Last 24 Hrs  T(C): 37.4 (10 Apr 2018 04:08), Max: 37.4 (10 Apr 2018 04:08)  T(F): 99.4 (10 Apr 2018 04:08), Max: 99.4 (10 Apr 2018 04:08)  HR: 78 (10 Apr 2018 04:08) (77 - 82)  BP: 140/63 (10 Apr 2018 04:08) (131/59 - 150/60)  BP(mean): --  RR: 17 (10 Apr 2018 04:08) (17 - 18)  SpO2: 96% (10 Apr 2018 04:08) (96% - 96%)  CAPILLARY BLOOD GLUCOSE      POCT Blood Glucose.: 191 mg/dL (10 Apr 2018 08:17)  POCT Blood Glucose.: 181 mg/dL (09 Apr 2018 16:51)  POCT Blood Glucose.: 144 mg/dL (09 Apr 2018 11:57)      04-09 @ 07:01  -  04-10 @ 07:00  --------------------------------------------------------  IN: 720 mL / OUT: 300 mL / NET: 420 mL        GENERAL:   NECK: supple, No JVD  CHEST/LUNG: clear to auscultation bilaterally; no rales, rhonchi, or wheezing b/l  HEART: normal S1, S2  ABDOMEN: BS+, soft, ND, NT   EXTREMITIES:  pulses palpable; no clubbing, cyanosis, or edema b/l LEs  SKIN: no rashes or lesions      LABS:                        12.4   7.8   )-----------( 185      ( 10 Apr 2018 08:30 )             37.8     04-10    140  |  101  |  30<H>  ----------------------------<  173<H>  3.6   |  26  |  1.25    Ca    9.7      10 Apr 2018 08:30  Mg     1.8     04-10 INTERVAL HPI/OVERNIGHT EVENTS:  Pt seen and examined at bedside.     Allergies/Intolerance: No Known Allergies      MEDICATIONS  (STANDING):  amLODIPine   Tablet 5 milliGRAM(s) Oral daily  atorvastatin 20 milliGRAM(s) Oral at bedtime  bisoprolol   Tablet 20 milliGRAM(s) Oral daily  dextrose 5%. 1000 milliLiter(s) (50 mL/Hr) IV Continuous <Continuous>  dextrose 50% Injectable 12.5 Gram(s) IV Push once  dextrose 50% Injectable 25 Gram(s) IV Push once  dextrose 50% Injectable 25 Gram(s) IV Push once  hydrALAZINE 50 milliGRAM(s) Oral every 8 hours  hydrochlorothiazide 25 milliGRAM(s) Oral <User Schedule>  influenza   Vaccine 0.5 milliLiter(s) IntraMuscular once  insulin lispro (HumaLOG) corrective regimen sliding scale   SubCutaneous three times a day before meals  multivitamin/minerals 1 Tablet(s) Oral daily  omega-3-Acid Ethyl Esters 1 Gram(s) Oral daily  rivaroxaban 15 milliGRAM(s) Oral two times a day    MEDICATIONS  (PRN):  acetaminophen   Tablet. 500 milliGRAM(s) Oral every 8 hours PRN Moderate Pain (4 - 6)  dextrose Gel 1 Dose(s) Oral once PRN Blood Glucose LESS THAN 70 milliGRAM(s)/deciliter  glucagon  Injectable 1 milliGRAM(s) IntraMuscular once PRN Glucose LESS THAN 70 milligrams/deciliter  traMADol 50 milliGRAM(s) Oral two times a day PRN Severe Pain (7 - 10)        ROS: all systems reviewed and wnl      PHYSICAL EXAMINATION:  Vital Signs Last 24 Hrs  T(C): 37.4 (10 Apr 2018 04:08), Max: 37.4 (10 Apr 2018 04:08)  T(F): 99.4 (10 Apr 2018 04:08), Max: 99.4 (10 Apr 2018 04:08)  HR: 78 (10 Apr 2018 04:08) (77 - 82)  BP: 140/63 (10 Apr 2018 04:08) (131/59 - 150/60)  BP(mean): --  RR: 17 (10 Apr 2018 04:08) (17 - 18)  SpO2: 96% (10 Apr 2018 04:08) (96% - 96%)  CAPILLARY BLOOD GLUCOSE      POCT Blood Glucose.: 191 mg/dL (10 Apr 2018 08:17)  POCT Blood Glucose.: 181 mg/dL (09 Apr 2018 16:51)  POCT Blood Glucose.: 144 mg/dL (09 Apr 2018 11:57)      04-09 @ 07:01  -  04-10 @ 07:00  --------------------------------------------------------  IN: 720 mL / OUT: 300 mL / NET: 420 mL        GENERAL: no new complaints  NECK: supple, No JVD  CHEST/LUNG: clear to auscultation bilaterally; no rales, rhonchi, or wheezing b/l  HEART: normal S1, S2  ABDOMEN: BS+, soft, ND, NT   EXTREMITIES:  pulses palpable; no clubbing, cyanosis, or edema b/l LEs  SKIN: no rashes or lesions      LABS:                        12.4   7.8   )-----------( 185      ( 10 Apr 2018 08:30 )             37.8     04-10    140  |  101  |  30<H>  ----------------------------<  173<H>  3.6   |  26  |  1.25    Ca    9.7      10 Apr 2018 08:30  Mg     1.8     04-10

## 2018-04-27 ENCOUNTER — INPATIENT (INPATIENT)
Facility: HOSPITAL | Age: 73
LOS: 5 days | Discharge: HOME | DRG: 607 | End: 2018-05-03
Attending: FAMILY MEDICINE | Admitting: FAMILY MEDICINE
Payer: MEDICARE

## 2018-04-27 VITALS
HEIGHT: 63 IN | WEIGHT: 250 LBS | HEART RATE: 70 BPM | SYSTOLIC BLOOD PRESSURE: 170 MMHG | TEMPERATURE: 98 F | OXYGEN SATURATION: 96 % | DIASTOLIC BLOOD PRESSURE: 62 MMHG | RESPIRATION RATE: 20 BRPM

## 2018-04-27 DIAGNOSIS — E11.65 TYPE 2 DIABETES MELLITUS WITH HYPERGLYCEMIA: ICD-10-CM

## 2018-04-27 DIAGNOSIS — I89.0 LYMPHEDEMA, NOT ELSEWHERE CLASSIFIED: ICD-10-CM

## 2018-04-27 DIAGNOSIS — Z98.890 OTHER SPECIFIED POSTPROCEDURAL STATES: Chronic | ICD-10-CM

## 2018-04-27 DIAGNOSIS — R26.2 DIFFICULTY IN WALKING, NOT ELSEWHERE CLASSIFIED: ICD-10-CM

## 2018-04-27 DIAGNOSIS — I80.13 PHLEBITIS AND THROMBOPHLEBITIS OF FEMORAL VEIN, BILATERAL: ICD-10-CM

## 2018-04-27 DIAGNOSIS — I10 ESSENTIAL (PRIMARY) HYPERTENSION: ICD-10-CM

## 2018-04-27 LAB
ALBUMIN SERPL ELPH-MCNC: 3.7 G/DL — SIGNIFICANT CHANGE UP (ref 3.3–5)
ALP SERPL-CCNC: 633 U/L — HIGH (ref 40–120)
ALT FLD-CCNC: 311 U/L — HIGH (ref 10–45)
ANION GAP SERPL CALC-SCNC: 15 MMOL/L — SIGNIFICANT CHANGE UP (ref 5–17)
APPEARANCE UR: ABNORMAL
AST SERPL-CCNC: 82 U/L — HIGH (ref 10–40)
BACTERIA # UR AUTO: ABNORMAL /HPF
BILIRUB SERPL-MCNC: 0.8 MG/DL — SIGNIFICANT CHANGE UP (ref 0.2–1.2)
BILIRUB UR-MCNC: NEGATIVE — SIGNIFICANT CHANGE UP
BUN SERPL-MCNC: 37 MG/DL — HIGH (ref 7–23)
CALCIUM SERPL-MCNC: 10.2 MG/DL — SIGNIFICANT CHANGE UP (ref 8.4–10.5)
CHLORIDE SERPL-SCNC: 89 MMOL/L — LOW (ref 96–108)
CO2 SERPL-SCNC: 28 MMOL/L — SIGNIFICANT CHANGE UP (ref 22–31)
COLOR SPEC: YELLOW — SIGNIFICANT CHANGE UP
CREAT SERPL-MCNC: 1.11 MG/DL — SIGNIFICANT CHANGE UP (ref 0.5–1.3)
DIFF PNL FLD: NEGATIVE — SIGNIFICANT CHANGE UP
EPI CELLS # UR: SIGNIFICANT CHANGE UP /HPF
GAS PNL BLDV: SIGNIFICANT CHANGE UP
GLUCOSE SERPL-MCNC: 163 MG/DL — HIGH (ref 70–99)
GLUCOSE UR QL: NEGATIVE — SIGNIFICANT CHANGE UP
HCT VFR BLD CALC: 39 % — SIGNIFICANT CHANGE UP (ref 34.5–45)
HGB BLD-MCNC: 13.1 G/DL — SIGNIFICANT CHANGE UP (ref 11.5–15.5)
KETONES UR-MCNC: NEGATIVE — SIGNIFICANT CHANGE UP
LEUKOCYTE ESTERASE UR-ACNC: ABNORMAL
MAGNESIUM SERPL-MCNC: 1.9 MG/DL — SIGNIFICANT CHANGE UP (ref 1.6–2.6)
MCHC RBC-ENTMCNC: 30.7 PG — SIGNIFICANT CHANGE UP (ref 27–34)
MCHC RBC-ENTMCNC: 33.6 GM/DL — SIGNIFICANT CHANGE UP (ref 32–36)
MCV RBC AUTO: 91.4 FL — SIGNIFICANT CHANGE UP (ref 80–100)
NITRITE UR-MCNC: POSITIVE
PH UR: 7.5 — SIGNIFICANT CHANGE UP (ref 5–8)
PHOSPHATE SERPL-MCNC: 2.7 MG/DL — SIGNIFICANT CHANGE UP (ref 2.5–4.5)
PLATELET # BLD AUTO: 300 K/UL — SIGNIFICANT CHANGE UP (ref 150–400)
POTASSIUM SERPL-MCNC: 3.2 MMOL/L — LOW (ref 3.5–5.3)
POTASSIUM SERPL-SCNC: 3.2 MMOL/L — LOW (ref 3.5–5.3)
PROT SERPL-MCNC: 7.8 G/DL — SIGNIFICANT CHANGE UP (ref 6–8.3)
PROT UR-MCNC: SIGNIFICANT CHANGE UP
RBC # BLD: 4.26 M/UL — SIGNIFICANT CHANGE UP (ref 3.8–5.2)
RBC # FLD: 13 % — SIGNIFICANT CHANGE UP (ref 10.3–14.5)
RBC CASTS # UR COMP ASSIST: SIGNIFICANT CHANGE UP /HPF (ref 0–2)
SODIUM SERPL-SCNC: 132 MMOL/L — LOW (ref 135–145)
SP GR SPEC: 1.01 — SIGNIFICANT CHANGE UP (ref 1.01–1.02)
TRI-PHOS CRY UR QL COMP ASSIST: ABNORMAL
UROBILINOGEN FLD QL: NEGATIVE — SIGNIFICANT CHANGE UP
WBC # BLD: 10.1 K/UL — SIGNIFICANT CHANGE UP (ref 3.8–10.5)
WBC # FLD AUTO: 10.1 K/UL — SIGNIFICANT CHANGE UP (ref 3.8–10.5)
WBC UR QL: ABNORMAL /HPF (ref 0–5)

## 2018-04-27 PROCEDURE — 99223 1ST HOSP IP/OBS HIGH 75: CPT

## 2018-04-27 PROCEDURE — 93010 ELECTROCARDIOGRAM REPORT: CPT

## 2018-04-27 PROCEDURE — 71045 X-RAY EXAM CHEST 1 VIEW: CPT | Mod: 26

## 2018-04-27 PROCEDURE — 99285 EMERGENCY DEPT VISIT HI MDM: CPT | Mod: 25

## 2018-04-27 PROCEDURE — 76705 ECHO EXAM OF ABDOMEN: CPT | Mod: 26,RT

## 2018-04-27 RX ORDER — DEXTROSE 50 % IN WATER 50 %
25 SYRINGE (ML) INTRAVENOUS ONCE
Qty: 0 | Refills: 0 | Status: DISCONTINUED | OUTPATIENT
Start: 2018-04-27 | End: 2018-05-03

## 2018-04-27 RX ORDER — BISOPROLOL FUMARATE 10 MG/1
20 TABLET, FILM COATED ORAL DAILY
Qty: 0 | Refills: 0 | Status: DISCONTINUED | OUTPATIENT
Start: 2018-04-27 | End: 2018-05-03

## 2018-04-27 RX ORDER — DEXTROSE 50 % IN WATER 50 %
1 SYRINGE (ML) INTRAVENOUS ONCE
Qty: 0 | Refills: 0 | Status: DISCONTINUED | OUTPATIENT
Start: 2018-04-27 | End: 2018-05-03

## 2018-04-27 RX ORDER — AMLODIPINE BESYLATE 2.5 MG/1
5 TABLET ORAL DAILY
Qty: 0 | Refills: 0 | Status: DISCONTINUED | OUTPATIENT
Start: 2018-04-27 | End: 2018-05-03

## 2018-04-27 RX ORDER — DEXTROSE 50 % IN WATER 50 %
12.5 SYRINGE (ML) INTRAVENOUS ONCE
Qty: 0 | Refills: 0 | Status: DISCONTINUED | OUTPATIENT
Start: 2018-04-27 | End: 2018-05-03

## 2018-04-27 RX ORDER — MULTIVIT-MIN/FERROUS GLUCONATE 9 MG/15 ML
1 LIQUID (ML) ORAL DAILY
Qty: 0 | Refills: 0 | Status: DISCONTINUED | OUTPATIENT
Start: 2018-04-27 | End: 2018-05-03

## 2018-04-27 RX ORDER — SODIUM CHLORIDE 9 MG/ML
1000 INJECTION, SOLUTION INTRAVENOUS
Qty: 0 | Refills: 0 | Status: DISCONTINUED | OUTPATIENT
Start: 2018-04-27 | End: 2018-05-03

## 2018-04-27 RX ORDER — TRAMADOL HYDROCHLORIDE 50 MG/1
50 TABLET ORAL EVERY 6 HOURS
Qty: 0 | Refills: 0 | Status: DISCONTINUED | OUTPATIENT
Start: 2018-04-27 | End: 2018-05-03

## 2018-04-27 RX ORDER — ATORVASTATIN CALCIUM 80 MG/1
20 TABLET, FILM COATED ORAL AT BEDTIME
Qty: 0 | Refills: 0 | Status: DISCONTINUED | OUTPATIENT
Start: 2018-04-27 | End: 2018-05-03

## 2018-04-27 RX ORDER — INSULIN LISPRO 100/ML
VIAL (ML) SUBCUTANEOUS
Qty: 0 | Refills: 0 | Status: DISCONTINUED | OUTPATIENT
Start: 2018-04-27 | End: 2018-05-03

## 2018-04-27 RX ORDER — ASCORBIC ACID 60 MG
250 TABLET,CHEWABLE ORAL DAILY
Qty: 0 | Refills: 0 | Status: DISCONTINUED | OUTPATIENT
Start: 2018-04-27 | End: 2018-05-03

## 2018-04-27 RX ORDER — SODIUM CHLORIDE 9 MG/ML
500 INJECTION INTRAMUSCULAR; INTRAVENOUS; SUBCUTANEOUS ONCE
Qty: 0 | Refills: 0 | Status: COMPLETED | OUTPATIENT
Start: 2018-04-27 | End: 2018-04-27

## 2018-04-27 RX ORDER — GLUCAGON INJECTION, SOLUTION 0.5 MG/.1ML
1 INJECTION, SOLUTION SUBCUTANEOUS ONCE
Qty: 0 | Refills: 0 | Status: DISCONTINUED | OUTPATIENT
Start: 2018-04-27 | End: 2018-05-03

## 2018-04-27 RX ORDER — POTASSIUM CHLORIDE 20 MEQ
40 PACKET (EA) ORAL ONCE
Qty: 0 | Refills: 0 | Status: COMPLETED | OUTPATIENT
Start: 2018-04-27 | End: 2018-04-27

## 2018-04-27 RX ORDER — HYDRALAZINE HCL 50 MG
50 TABLET ORAL EVERY 8 HOURS
Qty: 0 | Refills: 0 | Status: DISCONTINUED | OUTPATIENT
Start: 2018-04-27 | End: 2018-05-03

## 2018-04-27 RX ORDER — RIVAROXABAN 15 MG-20MG
15 KIT ORAL
Qty: 0 | Refills: 0 | Status: COMPLETED | OUTPATIENT
Start: 2018-04-27 | End: 2018-04-30

## 2018-04-27 RX ORDER — HYDROCHLOROTHIAZIDE 25 MG
25 TABLET ORAL DAILY
Qty: 0 | Refills: 0 | Status: DISCONTINUED | OUTPATIENT
Start: 2018-04-27 | End: 2018-05-03

## 2018-04-27 RX ORDER — OMEGA-3 ACID ETHYL ESTERS 1 G
4 CAPSULE ORAL DAILY
Qty: 0 | Refills: 0 | Status: DISCONTINUED | OUTPATIENT
Start: 2018-04-27 | End: 2018-05-03

## 2018-04-27 RX ORDER — INSULIN GLARGINE 100 [IU]/ML
22 INJECTION, SOLUTION SUBCUTANEOUS AT BEDTIME
Qty: 0 | Refills: 0 | Status: DISCONTINUED | OUTPATIENT
Start: 2018-04-28 | End: 2018-05-03

## 2018-04-27 RX ORDER — INSULIN LISPRO 100/ML
VIAL (ML) SUBCUTANEOUS AT BEDTIME
Qty: 0 | Refills: 0 | Status: DISCONTINUED | OUTPATIENT
Start: 2018-04-27 | End: 2018-05-03

## 2018-04-27 RX ADMIN — SODIUM CHLORIDE 500 MILLILITER(S): 9 INJECTION INTRAMUSCULAR; INTRAVENOUS; SUBCUTANEOUS at 18:35

## 2018-04-27 RX ADMIN — Medication 40 MILLIEQUIVALENT(S): at 19:17

## 2018-04-27 NOTE — ED ADULT NURSE NOTE - OBJECTIVE STATEMENT
74 y/o female with PMH of lymphedema, chronic back pain, HDL, HTN, & DM presenting to ED complaining of weakness of her lower extremities and inability to ambulate at all today. Pt. is usually able to ambulate <10 steps at rehab with walker. Pt fell yesterday while moving from her wheelchair to her bed. Pt is A&Ox3 gross neuro intact, PEERLA, lungs cta bilaterally, no difficulty speaking in complete sentences, s1s2 heart sounds heard, pulses x 4, abdomen soft nontender nondistended, skin intact. Pt has bilateral lower extremity swelling, equal size, strong positive pedal pulses x2, left foot cooler than right, cap refill <2 seconds on both leg. Pt denies tingling in extremities, chest pain, sob, ha, n/v/d, abdominal pain, f/c, urinary symptoms, hematuria 72 y/o female with PMH of lymphedema, chronic back pain, HDL, HTN, & DM presenting to ED complaining of weakness of her lower extremities x 3 weeks and inability to ambulate at all today. Pt. is usually able to ambulate <10 steps at rehab with walker. Pt fell yesterday while moving from her wheelchair to her bed. Pt is A&Ox3 gross neuro intact, PEERLA, lungs cta bilaterally, no difficulty speaking in complete sentences, s1s2 heart sounds heard, pulses x 4, abdomen soft nontender nondistended, skin intact. Pt has bilateral lower extremity swelling, equal size, strong positive pedal pulses x2, left foot cooler than right, cap refill <2 seconds on both leg. Pt denies tingling in extremities, chest pain, sob, ha, n/v/d, abdominal pain, f/c, urinary symptoms, hematuria

## 2018-04-27 NOTE — H&P ADULT - NSHPLABSRESULTS_GEN_ALL_CORE
WBC 10.1.  Hgb 13.1.  Platelets of 300K.  K+ 3.2.>>supplemented in the ER.  Random glucose of 163.  Cr 1.1.  HCO3 28.  Alb 3.7.  Alk phos of 633.  AST 82  ALT  311.  Chest radiograph reviewed with no infiltrate or effusion.  EKG tracing reviewed with sinus at 76 with 1AV with LVH.  Doppler from 4/9/18 with RIGHT superficial saphenous calf vein thrombophlebitis.

## 2018-04-27 NOTE — H&P ADULT - ATTENDING COMMENTS
NIGHT HOSPITALIST:  Patient aware of course and agree with plan/care as above.  Care reviewed with covering NP.  Care assumed by Dr. Heaton.

## 2018-04-27 NOTE — ED PROVIDER NOTE - PROGRESS NOTE DETAILS
VBG K+ elevated, likely hemolyzed. no nspecific EKG changes, likely lead placement, will send trop but very low suspicion for ACS based on history. to be admitted. d/w Dr. Heaton, will admit. Benign abdomen. K is mildly low on blood gas. Small IV bolus. We will order abdominal ultrasound, Dr. Heaton said ok to admit pending study. We will monitor make sure no surgical findings.

## 2018-04-27 NOTE — ED ADULT NURSE NOTE - CHPI ED SYMPTOMS NEG
no vomiting/no fever/no blurred vision/no change in level of consciousness/no numbness/no dizziness/no confusion/no loss of consciousness/no nausea

## 2018-04-27 NOTE — H&P ADULT - ASSESSMENT
NIGHT HOSPITALIST:  Presentation of poor ADL in the setting of patient with apparent chronic idiopathic lymphedema in this patient with type 2 DM, essential HTN, CAD (?) with patient agrees to continue with full AC with Xarelto for patient's superficial thrombophlebitis per Dr. Heaton with planned conversion to Xarelto 20 daily on April 30 2018>>will continue with patient's doses until 4/30/18 with Dr. Heaton to review.  Patient at present does not manifest any evidence of cellulitis>>will obtain U/A.  Will check K+ past supplementation.  RUQ US ordered and will obtain an acute hepatitis panel.  Given patient's nonspecific EKG changes in this patient with HTN and DM patient upgraded to telemetry to exclude a cardiac equivalent.

## 2018-04-27 NOTE — H&P ADULT - HISTORY OF PRESENT ILLNESS
NIGHT HOSPITALIST:  Patient UNKNOWN to me previously, assigned to me at this point via the ER and by Dr. Heaton to admit this 74 y/o F--patient with a history of type 2 DM on metformin, essential HTN, CAD, morbid obesity with chronic idiopathic lymphedema with patient with a recent admission to [place] and referral to [facility], with patient just discharged 3 days ago from [facility] but patient with 2 weeks of generalized weakness with patient with difficulty with ambulation and ADL.  Patient with a presumed mechanical fall whereby patient slid to her buttocks at home.  No head trauma, no focal weakness.  No fever, no chills, no rigors.  No back pain, no tearing back pain.  No HA.  No chest pain/pressure.  NO dyspnea.  No abdominal pain, no red blood per rectum.  No hematuria, no dysuria.  No weight loss or anorexia.  Patient has been taking one dose of Tylenol daily for her OA and notes relief with patient's Tramadol.  Remaining review of systems not contributory.

## 2018-04-27 NOTE — ED PROVIDER NOTE - NS ED ROS FT
Review of Systems:     CONSTITUTIONAL: No fever, weight loss, or fatigue  EYES: No eye pain, visual disturbances, or discharge  ENMT:  No difficulty hearing  NECK: No pain or stiffness  RESPIRATORY: No cough, wheezing, chills or hemoptysis; No shortness of breath  CARDIOVASCULAR: No chest pain, palpitations. +Chronic lymphedema B/L LE  GASTROINTESTINAL: No abdominal or epigastric pain. No nausea, vomiting, or hematemesis; No diarrhea or constipation. No melena or hematochezia.  GENITOURINARY: No dysuria, frequency, hematuria, or incontinence  NEUROLOGICAL: No headaches, memory loss, loss of strength, numbness, or tremors  SKIN: No itching, burning, rashes, or lesions   MUSCULOSKELETAL: +chronic back pain  PSYCHIATRIC: No depression, anxiety, mood swings, or difficulty sleeping Review of Systems:     CONSTITUTIONAL: No fever, weight loss, or fatigue  EYES: No eye pain, visual disturbances, or discharge  ENMT:  No difficulty hearing  NECK: No pain or stiffness  RESPIRATORY: No cough, wheezing, chills or hemoptysis; No shortness of breath  CARDIOVASCULAR: No chest pain, palpitations. +Chronic lymphedema B/L LE  GASTROINTESTINAL: No abdominal or epigastric pain. No nausea, vomiting, or hematemesis; No diarrhea or constipation. No melena or hematochezia.  GENITOURINARY: No dysuria, frequency, hematuria, or incontinence  NEUROLOGICAL: No headaches, memory loss, numbness, or tremors, chronic weakness as per HPI  SKIN: No itching, burning, rashes, or lesions, chronic lymphedema  MUSCULOSKELETAL: +chronic back pain unchanged from baseline, no acute extremity, neck or back pain. no pelvic or hip pain  PSYCHIATRIC: No depression, anxiety, mood swings, or difficulty sleeping

## 2018-04-27 NOTE — H&P ADULT - PROBLEM SELECTOR PLAN 2
ON Xarelto to treatment to 4/29/18 then conversion to daily Xarelto 20 daily>>will defer to the PRIMARY TEAM in the AM.

## 2018-04-27 NOTE — ED PROVIDER NOTE - OBJECTIVE STATEMENT
This is a 73F with PMHx HTN, HLD, Idiopathic lymphedema B/L LE, DM2, CAD, CVA, chronic back pain who presents from home for weakness and fall. Patient was recently admitted for worsening LE edema and pain and discharged to [facility] rehab 4/10/18 and discharged home 2 days ago. Yesterday, patient had a fall while transferring from her wheelchair to her bed and fell backward onto her butt. She was down for approximately 1 hr before her son was able to come and help transfer her to her bed. Denies any pain, head trauma or LOC. Patient states she was physically too weak to hold herself up. Currently denies any complaints of F/C/NS, CP, SOB, abdominal pain/N/V.

## 2018-04-27 NOTE — ED PROVIDER NOTE - ABNORMAL RHYTHM
sinus rhythm, 1st degree ABG, normal axis, otherwise normal intervals, Q waves anteriorly similar to prior, nonspecific TWI isolated in V6 otherwise similar to prior, ? positional, does not appear ischemic

## 2018-04-27 NOTE — ED PROVIDER NOTE - ATTENDING CONTRIBUTION TO CARE
ATTENDING MD:  I, Mikey David, personally have seen and examined this patient.  I have discussed all aspects of care with the resident physician. Resident note reviewed and agree on plan of care and except where noted.  See HPI, PE, and MDM for details.

## 2018-04-27 NOTE — H&P ADULT - NSHPPHYSICALEXAM_GEN_ALL_CORE
Physical exam with a nontoxic morbidly obese F. -170/62-83  HR  72  Afebrile.  RR 14.  95% on RA.  HEENT< PERRL< EOMI, neck supple, chest clear, cor s1 s2, declined breast exam.  Abdomen soft, obese, normal bowel sounds, nontender, no rebound.  No ecchymoses (per patient's RN in the ER--patient did NOT wish to be turned in the gurney by examiner).  Ext with 4+++ b/L lymphedema with poor nail hygiene but no ulcers.  Focal area of erythema RIGHT anterior leg with NO warmth.  Neurologic exam AxOx3.  Speech fluent.  Cognition intact.  UE/LE 5/5.  Gait not tested in the ER.

## 2018-04-27 NOTE — ED PROVIDER NOTE - MEDICAL DECISION MAKING DETAILS
ATTENDING MD Frankie: generalized weakness, sent in for rehab. 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 fell onto buttock from seated position, no pain, no risk factors for major neurologic injury. no acute medical complaints. will send basic labs. no indication for imaging at this time.

## 2018-04-28 LAB
ALBUMIN SERPL ELPH-MCNC: 3.2 G/DL — LOW (ref 3.3–5)
ALP SERPL-CCNC: 513 U/L — HIGH (ref 40–120)
ALT FLD-CCNC: 222 U/L — HIGH (ref 10–45)
ANION GAP SERPL CALC-SCNC: 11 MMOL/L — SIGNIFICANT CHANGE UP (ref 5–17)
APTT BLD: 30.3 SEC — SIGNIFICANT CHANGE UP (ref 27.5–37.4)
AST SERPL-CCNC: 53 U/L — HIGH (ref 10–40)
BASOPHILS # BLD AUTO: 0.1 K/UL — SIGNIFICANT CHANGE UP (ref 0–0.2)
BASOPHILS # BLD AUTO: 0.15 K/UL — SIGNIFICANT CHANGE UP (ref 0–0.2)
BASOPHILS NFR BLD AUTO: 0.9 % — SIGNIFICANT CHANGE UP (ref 0–2)
BASOPHILS NFR BLD AUTO: 1.8 % — SIGNIFICANT CHANGE UP (ref 0–2)
BILIRUB DIRECT SERPL-MCNC: 0.2 MG/DL — SIGNIFICANT CHANGE UP (ref 0–0.2)
BILIRUB INDIRECT FLD-MCNC: 0.5 MG/DL — SIGNIFICANT CHANGE UP (ref 0.2–1)
BILIRUB SERPL-MCNC: 0.7 MG/DL — SIGNIFICANT CHANGE UP (ref 0.2–1.2)
BUN SERPL-MCNC: 30 MG/DL — HIGH (ref 7–23)
CALCIUM SERPL-MCNC: 9.9 MG/DL — SIGNIFICANT CHANGE UP (ref 8.4–10.5)
CHLORIDE SERPL-SCNC: 96 MMOL/L — SIGNIFICANT CHANGE UP (ref 96–108)
CO2 SERPL-SCNC: 30 MMOL/L — SIGNIFICANT CHANGE UP (ref 22–31)
CREAT ?TM UR-MCNC: 46 MG/DL — SIGNIFICANT CHANGE UP
CREAT SERPL-MCNC: 0.95 MG/DL — SIGNIFICANT CHANGE UP (ref 0.5–1.3)
EOSINOPHIL # BLD AUTO: 0.3 K/UL — SIGNIFICANT CHANGE UP (ref 0–0.5)
EOSINOPHIL # BLD AUTO: 0.31 K/UL — SIGNIFICANT CHANGE UP (ref 0–0.5)
EOSINOPHIL NFR BLD AUTO: 2.9 % — SIGNIFICANT CHANGE UP (ref 0–6)
EOSINOPHIL NFR BLD AUTO: 3.8 % — SIGNIFICANT CHANGE UP (ref 0–6)
GLUCOSE BLDC GLUCOMTR-MCNC: 159 MG/DL — HIGH (ref 70–99)
GLUCOSE BLDC GLUCOMTR-MCNC: 162 MG/DL — HIGH (ref 70–99)
GLUCOSE BLDC GLUCOMTR-MCNC: 167 MG/DL — HIGH (ref 70–99)
GLUCOSE BLDC GLUCOMTR-MCNC: 176 MG/DL — HIGH (ref 70–99)
GLUCOSE BLDC GLUCOMTR-MCNC: 179 MG/DL — HIGH (ref 70–99)
GLUCOSE BLDC GLUCOMTR-MCNC: 180 MG/DL — HIGH (ref 70–99)
GLUCOSE SERPL-MCNC: 155 MG/DL — HIGH (ref 70–99)
HAV IGM SER-ACNC: SIGNIFICANT CHANGE UP
HBV CORE IGM SER-ACNC: SIGNIFICANT CHANGE UP
HBV SURFACE AG SER-ACNC: SIGNIFICANT CHANGE UP
HCT VFR BLD CALC: 35.3 % — SIGNIFICANT CHANGE UP (ref 34.5–45)
HCT VFR BLD CALC: 35.4 % — SIGNIFICANT CHANGE UP (ref 34.5–45)
HCV AB S/CO SERPL IA: 0.16 S/CO — SIGNIFICANT CHANGE UP
HCV AB SERPL-IMP: SIGNIFICANT CHANGE UP
HGB BLD-MCNC: 11.2 G/DL — LOW (ref 11.5–15.5)
HGB BLD-MCNC: 11.8 G/DL — SIGNIFICANT CHANGE UP (ref 11.5–15.5)
IMM GRANULOCYTES NFR BLD AUTO: 0.4 % — SIGNIFICANT CHANGE UP (ref 0–1.5)
INR BLD: 1.47 RATIO — HIGH (ref 0.88–1.16)
LYMPHOCYTES # BLD AUTO: 1.49 K/UL — SIGNIFICANT CHANGE UP (ref 1–3.3)
LYMPHOCYTES # BLD AUTO: 1.6 K/UL — SIGNIFICANT CHANGE UP (ref 1–3.3)
LYMPHOCYTES # BLD AUTO: 17.7 % — SIGNIFICANT CHANGE UP (ref 13–44)
LYMPHOCYTES # BLD AUTO: 18 % — SIGNIFICANT CHANGE UP (ref 13–44)
MCHC RBC-ENTMCNC: 28.7 PG — SIGNIFICANT CHANGE UP (ref 27–34)
MCHC RBC-ENTMCNC: 30.6 PG — SIGNIFICANT CHANGE UP (ref 27–34)
MCHC RBC-ENTMCNC: 31.7 GM/DL — LOW (ref 32–36)
MCHC RBC-ENTMCNC: 33.4 GM/DL — SIGNIFICANT CHANGE UP (ref 32–36)
MCV RBC AUTO: 90.5 FL — SIGNIFICANT CHANGE UP (ref 80–100)
MCV RBC AUTO: 91.7 FL — SIGNIFICANT CHANGE UP (ref 80–100)
MICROALBUMIN UR-MCNC: 1.7 MG/DL — SIGNIFICANT CHANGE UP
MICROALBUMIN/CREAT UR-RTO: 37 MG/G — HIGH (ref 0–30)
MONOCYTES # BLD AUTO: 1.1 K/UL — HIGH (ref 0–0.9)
MONOCYTES # BLD AUTO: 1.18 K/UL — HIGH (ref 0–0.9)
MONOCYTES NFR BLD AUTO: 11.9 % — SIGNIFICANT CHANGE UP (ref 2–14)
MONOCYTES NFR BLD AUTO: 14.3 % — HIGH (ref 2–14)
NEUTROPHILS # BLD AUTO: 5.1 K/UL — SIGNIFICANT CHANGE UP (ref 1.8–7.4)
NEUTROPHILS # BLD AUTO: 6.1 K/UL — SIGNIFICANT CHANGE UP (ref 1.8–7.4)
NEUTROPHILS NFR BLD AUTO: 61.7 % — SIGNIFICANT CHANGE UP (ref 43–77)
NEUTROPHILS NFR BLD AUTO: 66.5 % — SIGNIFICANT CHANGE UP (ref 43–77)
PLATELET # BLD AUTO: 273 K/UL — SIGNIFICANT CHANGE UP (ref 150–400)
PLATELET # BLD AUTO: 274 K/UL — SIGNIFICANT CHANGE UP (ref 150–400)
POTASSIUM SERPL-MCNC: 3.7 MMOL/L — SIGNIFICANT CHANGE UP (ref 3.5–5.3)
POTASSIUM SERPL-MCNC: 3.7 MMOL/L — SIGNIFICANT CHANGE UP (ref 3.5–5.3)
POTASSIUM SERPL-SCNC: 3.7 MMOL/L — SIGNIFICANT CHANGE UP (ref 3.5–5.3)
POTASSIUM SERPL-SCNC: 3.7 MMOL/L — SIGNIFICANT CHANGE UP (ref 3.5–5.3)
PROT SERPL-MCNC: 6.7 G/DL — SIGNIFICANT CHANGE UP (ref 6–8.3)
PROTHROM AB SERPL-ACNC: 16 SEC — HIGH (ref 9.8–12.7)
RBC # BLD: 3.86 M/UL — SIGNIFICANT CHANGE UP (ref 3.8–5.2)
RBC # BLD: 3.9 M/UL — SIGNIFICANT CHANGE UP (ref 3.8–5.2)
RBC # FLD: 12.9 % — SIGNIFICANT CHANGE UP (ref 10.3–14.5)
RBC # FLD: 14.6 % — HIGH (ref 10.3–14.5)
SODIUM SERPL-SCNC: 137 MMOL/L — SIGNIFICANT CHANGE UP (ref 135–145)
TROPONIN T SERPL-MCNC: 0.01 NG/ML — SIGNIFICANT CHANGE UP (ref 0–0.06)
WBC # BLD: 8.26 K/UL — SIGNIFICANT CHANGE UP (ref 3.8–10.5)
WBC # BLD: 9.1 K/UL — SIGNIFICANT CHANGE UP (ref 3.8–10.5)
WBC # FLD AUTO: 8.26 K/UL — SIGNIFICANT CHANGE UP (ref 3.8–10.5)
WBC # FLD AUTO: 9.1 K/UL — SIGNIFICANT CHANGE UP (ref 3.8–10.5)

## 2018-04-28 RX ORDER — ATORVASTATIN CALCIUM 80 MG/1
1 TABLET, FILM COATED ORAL
Qty: 0 | Refills: 0 | COMMUNITY

## 2018-04-28 RX ORDER — OMEGA-3 ACID ETHYL ESTERS 1 G
1 CAPSULE ORAL
Qty: 0 | Refills: 0 | COMMUNITY

## 2018-04-28 RX ORDER — ASCORBIC ACID 60 MG
1 TABLET,CHEWABLE ORAL
Qty: 0 | Refills: 0 | COMMUNITY

## 2018-04-28 RX ORDER — ACETAMINOPHEN 500 MG
2 TABLET ORAL
Qty: 0 | Refills: 0 | COMMUNITY

## 2018-04-28 RX ORDER — DICLOFENAC SODIUM 30 MG/G
1 GEL TOPICAL
Qty: 0 | Refills: 0 | COMMUNITY

## 2018-04-28 RX ORDER — AMLODIPINE BESYLATE 2.5 MG/1
1 TABLET ORAL
Qty: 0 | Refills: 0 | COMMUNITY

## 2018-04-28 RX ORDER — MULTIVIT-MIN/FERROUS GLUCONATE 9 MG/15 ML
1 LIQUID (ML) ORAL
Qty: 0 | Refills: 0 | COMMUNITY

## 2018-04-28 RX ORDER — ACETAMINOPHEN 500 MG
650 TABLET ORAL EVERY 6 HOURS
Qty: 0 | Refills: 0 | Status: DISCONTINUED | OUTPATIENT
Start: 2018-04-28 | End: 2018-05-03

## 2018-04-28 RX ORDER — HYDRALAZINE HCL 50 MG
1 TABLET ORAL
Qty: 0 | Refills: 0 | COMMUNITY

## 2018-04-28 RX ADMIN — ATORVASTATIN CALCIUM 20 MILLIGRAM(S): 80 TABLET, FILM COATED ORAL at 21:39

## 2018-04-28 RX ADMIN — Medication 50 MILLIGRAM(S): at 21:39

## 2018-04-28 RX ADMIN — Medication 50 MILLIGRAM(S): at 00:16

## 2018-04-28 RX ADMIN — Medication 1: at 17:45

## 2018-04-28 RX ADMIN — TRAMADOL HYDROCHLORIDE 50 MILLIGRAM(S): 50 TABLET ORAL at 00:16

## 2018-04-28 RX ADMIN — Medication 1: at 13:59

## 2018-04-28 RX ADMIN — Medication 25 MILLIGRAM(S): at 10:16

## 2018-04-28 RX ADMIN — Medication 650 MILLIGRAM(S): at 17:45

## 2018-04-28 RX ADMIN — ATORVASTATIN CALCIUM 20 MILLIGRAM(S): 80 TABLET, FILM COATED ORAL at 00:16

## 2018-04-28 RX ADMIN — Medication 50 MILLIGRAM(S): at 13:29

## 2018-04-28 RX ADMIN — RIVAROXABAN 15 MILLIGRAM(S): KIT at 17:52

## 2018-04-28 RX ADMIN — RIVAROXABAN 15 MILLIGRAM(S): KIT at 00:21

## 2018-04-28 RX ADMIN — BISOPROLOL FUMARATE 20 MILLIGRAM(S): 10 TABLET, FILM COATED ORAL at 10:20

## 2018-04-28 RX ADMIN — AMLODIPINE BESYLATE 5 MILLIGRAM(S): 2.5 TABLET ORAL at 10:16

## 2018-04-28 RX ADMIN — TRAMADOL HYDROCHLORIDE 50 MILLIGRAM(S): 50 TABLET ORAL at 20:16

## 2018-04-28 RX ADMIN — TRAMADOL HYDROCHLORIDE 50 MILLIGRAM(S): 50 TABLET ORAL at 21:46

## 2018-04-28 RX ADMIN — INSULIN GLARGINE 22 UNIT(S): 100 INJECTION, SOLUTION SUBCUTANEOUS at 21:39

## 2018-04-28 RX ADMIN — Medication 1: at 08:54

## 2018-04-28 NOTE — PROGRESS NOTE ADULT - MS EXT PE MLT D E PC
lymphedema- erythema  rle - distallateral  aspect lymphedema- erythema  rle - distal lateral  aspect

## 2018-04-29 LAB
ALBUMIN SERPL ELPH-MCNC: 3 G/DL — LOW (ref 3.3–5)
ALP SERPL-CCNC: 413 U/L — HIGH (ref 40–120)
ALT FLD-CCNC: 148 U/L — HIGH (ref 10–45)
ANION GAP SERPL CALC-SCNC: 13 MMOL/L — SIGNIFICANT CHANGE UP (ref 5–17)
AST SERPL-CCNC: 34 U/L — SIGNIFICANT CHANGE UP (ref 10–40)
BILIRUB SERPL-MCNC: 0.6 MG/DL — SIGNIFICANT CHANGE UP (ref 0.2–1.2)
BUN SERPL-MCNC: 22 MG/DL — SIGNIFICANT CHANGE UP (ref 7–23)
CALCIUM SERPL-MCNC: 9.3 MG/DL — SIGNIFICANT CHANGE UP (ref 8.4–10.5)
CHLORIDE SERPL-SCNC: 95 MMOL/L — LOW (ref 96–108)
CO2 SERPL-SCNC: 27 MMOL/L — SIGNIFICANT CHANGE UP (ref 22–31)
CREAT SERPL-MCNC: 0.85 MG/DL — SIGNIFICANT CHANGE UP (ref 0.5–1.3)
GLUCOSE BLDC GLUCOMTR-MCNC: 121 MG/DL — HIGH (ref 70–99)
GLUCOSE BLDC GLUCOMTR-MCNC: 145 MG/DL — HIGH (ref 70–99)
GLUCOSE BLDC GLUCOMTR-MCNC: 161 MG/DL — HIGH (ref 70–99)
GLUCOSE BLDC GLUCOMTR-MCNC: 168 MG/DL — HIGH (ref 70–99)
GLUCOSE SERPL-MCNC: 169 MG/DL — HIGH (ref 70–99)
HCT VFR BLD CALC: 34.4 % — LOW (ref 34.5–45)
HGB BLD-MCNC: 11.3 G/DL — LOW (ref 11.5–15.5)
MCHC RBC-ENTMCNC: 30.3 PG — SIGNIFICANT CHANGE UP (ref 27–34)
MCHC RBC-ENTMCNC: 33 GM/DL — SIGNIFICANT CHANGE UP (ref 32–36)
MCV RBC AUTO: 91.8 FL — SIGNIFICANT CHANGE UP (ref 80–100)
PLATELET # BLD AUTO: 271 K/UL — SIGNIFICANT CHANGE UP (ref 150–400)
POTASSIUM SERPL-MCNC: 3.4 MMOL/L — LOW (ref 3.5–5.3)
POTASSIUM SERPL-SCNC: 3.4 MMOL/L — LOW (ref 3.5–5.3)
PROT SERPL-MCNC: 6.5 G/DL — SIGNIFICANT CHANGE UP (ref 6–8.3)
RBC # BLD: 3.74 M/UL — LOW (ref 3.8–5.2)
RBC # FLD: 12.8 % — SIGNIFICANT CHANGE UP (ref 10.3–14.5)
SODIUM SERPL-SCNC: 135 MMOL/L — SIGNIFICANT CHANGE UP (ref 135–145)
WBC # BLD: 9.4 K/UL — SIGNIFICANT CHANGE UP (ref 3.8–10.5)
WBC # FLD AUTO: 9.4 K/UL — SIGNIFICANT CHANGE UP (ref 3.8–10.5)

## 2018-04-29 RX ORDER — POLYETHYLENE GLYCOL 3350 17 G/17G
17 POWDER, FOR SOLUTION ORAL DAILY
Qty: 0 | Refills: 0 | Status: DISCONTINUED | OUTPATIENT
Start: 2018-04-29 | End: 2018-05-03

## 2018-04-29 RX ORDER — POTASSIUM CHLORIDE 20 MEQ
40 PACKET (EA) ORAL ONCE
Qty: 0 | Refills: 0 | Status: COMPLETED | OUTPATIENT
Start: 2018-04-29 | End: 2018-04-29

## 2018-04-29 RX ADMIN — Medication 1: at 17:22

## 2018-04-29 RX ADMIN — Medication 250 MILLIGRAM(S): at 13:02

## 2018-04-29 RX ADMIN — Medication 50 MILLIGRAM(S): at 21:38

## 2018-04-29 RX ADMIN — INSULIN GLARGINE 22 UNIT(S): 100 INJECTION, SOLUTION SUBCUTANEOUS at 21:38

## 2018-04-29 RX ADMIN — Medication 1: at 08:09

## 2018-04-29 RX ADMIN — Medication 40 MILLIEQUIVALENT(S): at 08:17

## 2018-04-29 RX ADMIN — Medication 50 MILLIGRAM(S): at 13:03

## 2018-04-29 RX ADMIN — ATORVASTATIN CALCIUM 20 MILLIGRAM(S): 80 TABLET, FILM COATED ORAL at 21:38

## 2018-04-29 RX ADMIN — Medication 50 MILLIGRAM(S): at 08:13

## 2018-04-29 RX ADMIN — Medication 25 MILLIGRAM(S): at 08:12

## 2018-04-29 RX ADMIN — RIVAROXABAN 15 MILLIGRAM(S): KIT at 17:23

## 2018-04-29 RX ADMIN — AMLODIPINE BESYLATE 5 MILLIGRAM(S): 2.5 TABLET ORAL at 13:02

## 2018-04-29 RX ADMIN — POLYETHYLENE GLYCOL 3350 17 GRAM(S): 17 POWDER, FOR SOLUTION ORAL at 17:27

## 2018-04-29 RX ADMIN — Medication 650 MILLIGRAM(S): at 21:39

## 2018-04-29 RX ADMIN — Medication 650 MILLIGRAM(S): at 03:11

## 2018-04-29 RX ADMIN — Medication 650 MILLIGRAM(S): at 03:41

## 2018-04-29 RX ADMIN — Medication 650 MILLIGRAM(S): at 22:10

## 2018-04-29 RX ADMIN — BISOPROLOL FUMARATE 20 MILLIGRAM(S): 10 TABLET, FILM COATED ORAL at 08:13

## 2018-04-29 RX ADMIN — RIVAROXABAN 15 MILLIGRAM(S): KIT at 08:13

## 2018-04-29 NOTE — DIETITIAN INITIAL EVALUATION ADULT. - ENERGY NEEDS
Ht: 63“, Wt: 250lbs-dosing, BMI: 44.3kg/m2, IBW: 115 lbs (+/-10%)  Pertinent Information: Pt admitted with generalized weakness S/P fall, chronic idiopathic  lymphedema- dm- htn-obesity-elevated  liver enzymes  4+ judith. leg edema, lymphedema, no pressure injury

## 2018-04-29 NOTE — DIETITIAN INITIAL EVALUATION ADULT. - NS AS NUTRI INTERV COLLABORAT
Morbid Obesity notification electronically placed in documents section in [place], nutrition intervention and pt's report of constipation discussed with medical provider.

## 2018-04-29 NOTE — DIETITIAN INITIAL EVALUATION ADULT. - NS AS NUTRI INTERV ED CONTENT
Provided nutrition education for Type 2 Diabetes. Discussed keeping carbohydrate intake consistent, maintaining consistent meal patterns and not skipping meals, controlling portion sizes, consuming protein with carbohydrates and avoiding concentrated sweets. Also discussed limiting saturated fats, avoiding high sodium foods and choosing whole grains. Provided written materials.

## 2018-04-29 NOTE — CONSULT NOTE ADULT - ASSESSMENT
Impression:  1)Abnormal liver tests- cholestatic pattern with improvement over the past 48 hours.     Plan:  -check HEV IgM, EBV serologies, CMV serologies, HSV serologies, VZV serologies, EBV serologies, Leptospirosis AB  -check CK levels Impression:  1)Abnormal liver tests- cholestatic pattern with improvement over the past 48 hours. Differential includes DILI (possible acetaminophen), rhabdomyolysis, ischemic hepatopathy, viral induced hepatitis, autoimmune hepatitis, PBC, passed gallstone.     Plan:  -check HEV IgM, EBV serologies, CMV serologies, HSV serologies, VZV serologies, EBV serologies, Leptospirosis AB  -repeat CK levels  -check AMA, ASMA, AMA, Ig levels, IgG4 levels   -trend CMP daily, monitor INR daily  -if enzymes continue to improve would recommend outpatient f/u with hepatology on discharge [phone].     Please call with questions  Agatha Colvin  GI Fellow  Pager: [phone] Impression:  1)Abnormal liver tests- cholestatic pattern with improvement over the past 48 hours. Differential includes DILI (possible acetaminophen), rhabdomyolysis, ischemic hepatopathy, viral induced hepatitis, autoimmune hepatitis, PBC, passed gallstone.     Plan:  -check HEV IgM, EBV serologies, CMV serologies, HSV serologies, VZV serologies, EBV serologies, Leptospirosis AB  -repeat CK levels  -check AMA, ASMA, AMA, Ig levels, IgG4 levels   -trend CMP daily, monitor INR daily  -avoid hepatotoxic medications   -if enzymes continue to improve would recommend outpatient f/u with hepatology on discharge [phone].     Please call with questions  Agatha Colvin  GI Fellow  Pager: [phone]

## 2018-04-29 NOTE — PHYSICAL THERAPY INITIAL EVALUATION ADULT - ADDITIONAL COMMENTS
Pt recently d/c'd from [facility]. Pt lives at home with spouse, +elevator in home, no stairs to negotiate, Prior to last admission pt amb with rolling walker, incr difficult with ADLs, spouse assists as needed. Went to lymphedema clinic years ago. +fearful Pt recently d/c'd from [facility]. Pt lives at home with spouse, +elevator in home, no stairs to negotiate, Prior to last admission pt amb with rolling walker, incr difficult with ADLs, spouse assists as needed

## 2018-04-29 NOTE — PROGRESS NOTE ADULT - ASSESSMENT
generalized weakness-  chronic idiopathic  lymphedema- dm- htn-obesity generalized weakness-  chronic idiopathic  lymphedema- dm- htn-obesity-elevated  liver enzymes  dw  np- pt and gi  consults

## 2018-04-29 NOTE — DIETITIAN INITIAL EVALUATION ADULT. - OTHER INFO
Nutrition consult received and appreciated. Pt reports her appetite remains good and reports fairly good po intake but not eating as much as usual since she is not moving around. Noted 80% of breakfast consumed per flow sheets. Pt awaiting lunch at time of visit. No N+V. Pt reports constipation, states last BM was about 1 week ago, noted 4/26 per flow sheets- pt questions accuracy. Pt willing to try stool softener and RD encouraged adequate fiber and fluids to help with constipation. No chewing or swallowing difficulties at this time. No known food allergies.

## 2018-04-29 NOTE — DIETITIAN INITIAL EVALUATION ADULT. - ADHERENCE
Pt reports she was recently diagnosed with T2DM and has been careful with carbs and sugar. Pt reports taking Metformin and reports her doctor checks her Hba1c level every 3 months and was not instructed to check fingersticks. Hba1c 6.9%

## 2018-04-29 NOTE — PHYSICAL THERAPY INITIAL EVALUATION ADULT - PERTINENT HX OF CURRENT PROBLEM, REHAB EVAL
72 y/o F--patient with a history of type 2 DM on metformin, essential HTN, CAD, morbid obesity with chronic idiopathic lymphedema with patient with a recent admission to [facility] and referral to [facility], with patient just discharged 3 days ago from [facility] but patient with 2 weeks of generalized weakness with patient with difficulty with ambulation and ADL.  Patient with a presumed mechanical fall whereby patient slid to her buttocks at home.  No head trauma, no focal weakness.

## 2018-04-29 NOTE — DIETITIAN INITIAL EVALUATION ADULT. - NS FNS WEIGHT CHANGE REASON
Pt reports due to making diet changes she was able to lose wt from 280-300 pounds to about 240-250 pounds./intentional

## 2018-04-29 NOTE — CONSULT NOTE ADULT - ATTENDING COMMENTS
Patient with abnormal liver tests associated with acute fall.  Abnormalities are improving and there is no indication of prior nor progressive liver injury.  Continue to follow

## 2018-04-29 NOTE — DIETITIAN INITIAL EVALUATION ADULT. - ORAL INTAKE PTA
good/typical intake: whole wheat toast with peanut butter and coffee with a little milk for breakfast; skips lunch or has fruit for a snack; chicken or steak with vegetables or salad, sometimes roasted potato; snack: fruit. Pt reports taking centrum silver, omega 3 fatty acids and Vitamin C PTA.

## 2018-04-29 NOTE — DIETITIAN INITIAL EVALUATION ADULT. - NS AS NUTRI INTERV MEALS SNACK
Continue Consistent Carbohydrate, DASH diet. Monitor po intake, GI tolerance, weight and lab values. RD to remain available for further nutritional interventions as indicated.

## 2018-04-29 NOTE — CONSULT NOTE ADULT - SUBJECTIVE AND OBJECTIVE BOX
HEPATOLOGY CONSULT NOTE:     Chief Complaint:  Patient is a 73y old  Female who presents with a chief complaint of Generalized weakness for 2 weeks with patient with fall at home (27 Apr 2018 20:29)      HPI:  74 y/o F with a history of type 2 DM on metformin, essential HTN, CAD, morbid obesity with chronic idiopathic lymphedema presenting from home after mechanical fall at home. Patient had previous recent admission for weakness, was discharged to [facility] and then discharged home 3 days PTA. Hepatology was called to evaluate the patient due to abnormal liver tests.     Allergies:  No Known Allergies      Home Medications:    Hospital Medications:  acetaminophen   Tablet. 650 milliGRAM(s) Oral every 6 hours PRN  amLODIPine   Tablet 5 milliGRAM(s) Oral daily  ascorbic acid 250 milliGRAM(s) Oral daily  atorvastatin 20 milliGRAM(s) Oral at bedtime  bisoprolol   Tablet 20 milliGRAM(s) Oral daily  dextrose 5%. 1000 milliLiter(s) IV Continuous <Continuous>  dextrose 50% Injectable 12.5 Gram(s) IV Push once  dextrose 50% Injectable 25 Gram(s) IV Push once  dextrose 50% Injectable 25 Gram(s) IV Push once  dextrose Gel 1 Dose(s) Oral once PRN  glucagon  Injectable 1 milliGRAM(s) IntraMuscular once PRN  hydrALAZINE 50 milliGRAM(s) Oral every 8 hours  hydrochlorothiazide 25 milliGRAM(s) Oral daily  insulin glargine Injectable (LANTUS) 22 Unit(s) SubCutaneous at bedtime  insulin lispro (HumaLOG) corrective regimen sliding scale   SubCutaneous three times a day before meals  insulin lispro (HumaLOG) corrective regimen sliding scale   SubCutaneous at bedtime  multivitamin/minerals 1 Tablet(s) Oral daily  omega-3-Acid Ethyl Esters 4 Gram(s) Oral daily  polyethylene glycol 3350 17 Gram(s) Oral daily PRN  rivaroxaban 15 milliGRAM(s) Oral two times a day  traMADol 50 milliGRAM(s) Oral every 6 hours PRN      PMHX/PSHX:  HLD (hyperlipidemia)  Lymphedema of both lower extremities  Back pain  Diabetes  Hypertension  History of cholecystectomy      Family history:  No pertinent family history in first degree relatives      Social History: non-contributory     ROS:     General:  No wt loss, fevers, chills, night sweats, fatigue,   Eyes:  Good vision, no reported pain  ENT:  No sore throat, pain, runny nose, dysphagia  CV:  No pain, palpitations, hypo/hypertension  Resp:  No dyspnea, cough, tachypnea, wheezing  GI:  No pain, No nausea, No vomiting, No diarrhea, No constipation, No weight loss, No fever, No pruritis, No rectal bleeding, No tarry stools, No dysphagia,  :  No pain, bleeding, incontinence, nocturia  Muscle:  No pain, weakness  Neuro:  No weakness, tingling, memory problems  Psych:  No fatigue, insomnia, mood problems, depression  Endocrine:  No polyuria, polydipsia, cold/heat intolerance  Heme:  No petechiae, ecchymosis, easy bruisability  Skin:  No rash, tattoos, scars, edema      PHYSICAL EXAM:     GENERAL:  Appears stated age, well-groomed, well-nourished, no distress  HEENT:  NC/AT,  conjunctivae clear and pink, no thyromegaly, nodules, adenopathy, no JVD, sclera -anicteric  CHEST:  Full & symmetric excursion, no increased effort, breath sounds clear  HEART:  Regular rhythm, S1, S2, no murmur/rub/S3/S4, no abdominal bruit, no edema  ABDOMEN:  Soft, non-tender, non-distended, normoactive bowel sounds,  no masses ,no hepato-splenomegaly, no signs of chronic liver disease  EXTEREMITIES:  no cyanosis,clubbing or edema  SKIN:  No rash/erythema/ecchymoses/petechiae/wounds/abscess/warm/dry  NEURO:  Alert, oriented, no asterixis, no tremor, no encephalopathy    Vital Signs:  Vital Signs Last 24 Hrs  T(C): 37.1 (29 Apr 2018 13:37), Max: 37.2 (28 Apr 2018 21:16)  T(F): 98.8 (29 Apr 2018 13:37), Max: 98.9 (28 Apr 2018 21:16)  HR: 77 (29 Apr 2018 13:37) (74 - 79)  BP: 144/75 (29 Apr 2018 13:37) (118/77 - 155/72)  BP(mean): --  RR: 16 (29 Apr 2018 13:37) (16 - 18)  SpO2: 94% (29 Apr 2018 13:37) (94% - 99%)  Daily     Daily     LABS:                        11.3   9.4   )-----------( 271      ( 29 Apr 2018 06:24 )             34.4     Mean Cell Volume: 91.8 fl (04-29-18 @ 06:24)    04-29    135  |  95<L>  |  22  ----------------------------<  169<H>  3.4<L>   |  27  |  0.85    Ca    9.3      29 Apr 2018 06:23  Phos  2.7     04-27  Mg     1.9     04-27    TPro  6.5  /  Alb  3.0<L>  /  TBili  0.6  /  DBili  x   /  AST  34  /  ALT  148<H>  /  AlkPhos  413<H>  04-29    LIVER FUNCTIONS - ( 29 Apr 2018 06:23 )  Alb: 3.0 g/dL / Pro: 6.5 g/dL / ALK PHOS: 413 U/L / ALT: 148 U/L / AST: 34 U/L / GGT: x           PT/INR - ( 28 Apr 2018 00:21 )   PT: 16.0 sec;   INR: 1.47 ratio         PTT - ( 28 Apr 2018 00:21 )  PTT:30.3 sec  Urinalysis Basic - ( 27 Apr 2018 22:05 )    Bilirubin Total, Serum: 0.6 mg/dL (04-29-18 @ 06:23)  Bilirubin Total, Serum: 0.7 mg/dL (04-28-18 @ 05:38)  Bilirubin Total, Serum: 0.8 mg/dL (04-27-18 @ 17:31)      Alkaline Phosphatase, Serum: 413 U/L (04.29.18 @ 06:23)  Alkaline Phosphatase, Serum: 513 U/L (04.28.18 @ 05:38)  Alkaline Phosphatase, Serum: 633 U/L (04.27.18 @ 17:31)  Alkaline Phosphatase, Serum: 92: Gross Hemolysis, results may be falsely decreased U/L (04.05.18 @ 17:26)    Aspartate Aminotransferase (AST/SGOT): 34 U/L (04.29.18 @ 06:23)  Aspartate Aminotransferase (AST/SGOT): 53 U/L (04.28.18 @ 05:38)  Aspartate Aminotransferase (AST/SGOT): 82 U/L (04.27.18 @ 17:31)  Aspartate Aminotransferase (AST/SGOT): 38: Gross Hemolysis, results may be falsely elevated  Mild Lipemia interference  results may be affected U/L (04.05.18 @ 17:26)    Alanine Aminotransferase (ALT/SGPT): 148 U/L (04.29.18 @ 06:23)  Alanine Aminotransferase (ALT/SGPT): 222 U/L (04.28.18 @ 05:38)  Alanine Aminotransferase (ALT/SGPT): 311 U/L (04.27.18 @ 17:31)  Alanine Aminotransferase (ALT/SGPT): 25: Gross Hemolysis, results may be falsely elevated  Mild Lipemia interference results may be affected U/L RC (04.05.18 @ 17:26)          Color: x / Appearance: x / SG: x / pH: x  Gluc: x / Ketone: x  / Bili: x / Urobili: x   Blood: x / Protein: x / Nitrite: x   Leuk Esterase: x / RBC: 0-2 /HPF / WBC 5-10 /HPF   Sq Epi: x / Non Sq Epi: Occasional /HPF / Bacteria: Many /HPF                              11.3   9.4   )-----------( 271      ( 29 Apr 2018 06:24 )             34.4                         11.2   8.26  )-----------( 274      ( 28 Apr 2018 07:23 )             35.3                         11.8   9.1   )-----------( 273      ( 28 Apr 2018 00:21 )             35.4                         13.1   10.1  )-----------( 300      ( 27 Apr 2018 17:30 )             39.0     Acute Hepatitis Panel (04.28.18 @ 01:56)    Hepatitis C Virus Interpretation: Nonreact: Hepatitis C AB  S/CO Ratio                        Interpretation  < 1.0                                     Non-Reactive  1.0 - 4.9                           Weakly-Reactive  > 5.0                                 Reactive  Non-Reactive: A person witha non-reactive HCV antibody result is  considered uninfected.  No further action is needed unless recent  infection is suspected.  In these cases, consider repeat testing later to  detect seroconversion..  Weakly-Reactive: HCV antibody test is abnormal, HCV RNA Qualitative test  will follow.  Reactive: HCV antibody test is abnormal, HCV RNA Qualitative test will  follow.  Note: HCV antibody testing is performed on the Abbott  system.    Hepatitis C Virus S/CO Ratio: 0.16 S/CO    Hepatitis B Core IgM Antibody: Nonreact    Hepatitis B Surface Antigen: Nonreact    Hepatitis A IgM Antibody: Nonreact        Imaging:    < from: US Abdomen Upper Quadrant Right (04.27.18 @ 21:33) >  EXAM:  US ABDOMEN RT UPR QUADRANT                            PROCEDURE DATE:  04/27/2018            INTERPRETATION:  CLINICAL INFORMATION: Increased LFTs    COMPARISON: None available.    TECHNIQUE: Sonography of the right upper quadrant.     FINDINGS:    Liver: Within normal limits and measures 15.7 cm.    Bile ducts: Normal caliber. Common bile duct measures 5 mm.     Gallbladder: Status post cholecystectomy    Pancreas: Not well visualized    Right kidney: 10.7 cm. 1.9 cm simple right renal cyst. No hydronephrosis.     Ascites: None.    IVC: Visualized portions are within normal limits.    IMPRESSION:     Unremarkable right upper quadrant ultrasound.    < end of copied text > HEPATOLOGY CONSULT NOTE:     Chief Complaint:  Patient is a 73y old  Female who presents with a chief complaint of Generalized weakness for 2 weeks with patient with fall at home (27 Apr 2018 20:29)      HPI:  74 y/o F with a history of type 2 DM on metformin, essential HTN, CAD, morbid obesity with chronic idiopathic lymphedema presenting from home after mechanical fall at home. Patient had previous recent admission for weakness, was discharged to [facility] and then discharged home 3 days PTA. Hepatology was called to evaluate the patient due to abnormal liver tests. The patient denies any abdominal pain, nausea, vomiting, constipation or diarrhea. She denies any jaundice or pruritis. She was never told of any liver problems. She denies any new medications but does state she takes up to 3 tablets of acetaminophen daily when she has joint pain. She denies any new medications, herbal remedies or drugs. She denies any recent illness, sick contacts or recent travel. She does not use any alcohol.     Allergies:  No Known Allergies      Home Medications: reviewed     Hospital Medications:  acetaminophen   Tablet. 650 milliGRAM(s) Oral every 6 hours PRN  amLODIPine   Tablet 5 milliGRAM(s) Oral daily  ascorbic acid 250 milliGRAM(s) Oral daily  atorvastatin 20 milliGRAM(s) Oral at bedtime  bisoprolol   Tablet 20 milliGRAM(s) Oral daily  dextrose 5%. 1000 milliLiter(s) IV Continuous <Continuous>  dextrose 50% Injectable 12.5 Gram(s) IV Push once  dextrose 50% Injectable 25 Gram(s) IV Push once  dextrose 50% Injectable 25 Gram(s) IV Push once  dextrose Gel 1 Dose(s) Oral once PRN  glucagon  Injectable 1 milliGRAM(s) IntraMuscular once PRN  hydrALAZINE 50 milliGRAM(s) Oral every 8 hours  hydrochlorothiazide 25 milliGRAM(s) Oral daily  insulin glargine Injectable (LANTUS) 22 Unit(s) SubCutaneous at bedtime  insulin lispro (HumaLOG) corrective regimen sliding scale   SubCutaneous three times a day before meals  insulin lispro (HumaLOG) corrective regimen sliding scale   SubCutaneous at bedtime  multivitamin/minerals 1 Tablet(s) Oral daily  omega-3-Acid Ethyl Esters 4 Gram(s) Oral daily  polyethylene glycol 3350 17 Gram(s) Oral daily PRN  rivaroxaban 15 milliGRAM(s) Oral two times a day  traMADol 50 milliGRAM(s) Oral every 6 hours PRN      PMHX/PSHX:  HLD (hyperlipidemia)  Lymphedema of both lower extremities  Back pain  Diabetes  Hypertension  History of cholecystectomy      Family history:  No pertinent family history in first degree relatives      Social History: non-contributory     ROS:     General:  No wt loss, fevers, chills, night sweats, fatigue,   Eyes:  Good vision, no reported pain  ENT:  No sore throat, pain, runny nose, dysphagia  CV:  No pain, palpitations, hypo/hypertension  Resp:  No dyspnea, cough, tachypnea, wheezing  GI:  No pain, No nausea, No vomiting, No diarrhea, No constipation, No weight loss, No fever, No pruritis, No rectal bleeding, No tarry stools, No dysphagia,  :  No pain, bleeding, incontinence, nocturia  Muscle:  No pain, weakness  Neuro:  No weakness, tingling, memory problems  Psych:  No fatigue, insomnia, mood problems, depression  Endocrine:  No polyuria, polydipsia, cold/heat intolerance  Heme:  No petechiae, ecchymosis, easy bruisability  Skin:  No rash, tattoos, scars, edema      PHYSICAL EXAM:     GENERAL:  Appears stated age, well-groomed, well-nourished, no distress  HEENT:  NC/AT,  conjunctivae clear and pink, no thyromegaly, nodules, adenopathy, no JVD, sclera -anicteric  CHEST:  Full & symmetric excursion, no increased effort, breath sounds clear  HEART:  Regular rhythm, S1, S2, no murmur/rub/S3/S4, no abdominal bruit, no edema  ABDOMEN:  Soft, non-tender, non-distended, normoactive bowel sounds,  no masses ,no hepato-splenomegaly, no signs of chronic liver disease  EXTEREMITIES:  no cyanosis,clubbing or edema  SKIN:  No rash/erythema/ecchymoses/petechiae/wounds/abscess/warm/dry  NEURO:  Alert, oriented, no asterixis, no tremor, no encephalopathy    Vital Signs:  Vital Signs Last 24 Hrs  T(C): 37.1 (29 Apr 2018 13:37), Max: 37.2 (28 Apr 2018 21:16)  T(F): 98.8 (29 Apr 2018 13:37), Max: 98.9 (28 Apr 2018 21:16)  HR: 77 (29 Apr 2018 13:37) (74 - 79)  BP: 144/75 (29 Apr 2018 13:37) (118/77 - 155/72)  BP(mean): --  RR: 16 (29 Apr 2018 13:37) (16 - 18)  SpO2: 94% (29 Apr 2018 13:37) (94% - 99%)  Daily     Daily     LABS:                        11.3   9.4   )-----------( 271      ( 29 Apr 2018 06:24 )             34.4     Mean Cell Volume: 91.8 fl (04-29-18 @ 06:24)    04-29    135  |  95<L>  |  22  ----------------------------<  169<H>  3.4<L>   |  27  |  0.85    Ca    9.3      29 Apr 2018 06:23  Phos  2.7     04-27  Mg     1.9     04-27    TPro  6.5  /  Alb  3.0<L>  /  TBili  0.6  /  DBili  x   /  AST  34  /  ALT  148<H>  /  AlkPhos  413<H>  04-29    LIVER FUNCTIONS - ( 29 Apr 2018 06:23 )  Alb: 3.0 g/dL / Pro: 6.5 g/dL / ALK PHOS: 413 U/L / ALT: 148 U/L / AST: 34 U/L / GGT: x           PT/INR - ( 28 Apr 2018 00:21 )   PT: 16.0 sec;   INR: 1.47 ratio         PTT - ( 28 Apr 2018 00:21 )  PTT:30.3 sec  Urinalysis Basic - ( 27 Apr 2018 22:05 )    Bilirubin Total, Serum: 0.6 mg/dL (04-29-18 @ 06:23)  Bilirubin Total, Serum: 0.7 mg/dL (04-28-18 @ 05:38)  Bilirubin Total, Serum: 0.8 mg/dL (04-27-18 @ 17:31)      Alkaline Phosphatase, Serum: 413 U/L (04.29.18 @ 06:23)  Alkaline Phosphatase, Serum: 513 U/L (04.28.18 @ 05:38)  Alkaline Phosphatase, Serum: 633 U/L (04.27.18 @ 17:31)  Alkaline Phosphatase, Serum: 92: Gross Hemolysis, results may be falsely decreased U/L (04.05.18 @ 17:26)    Aspartate Aminotransferase (AST/SGOT): 34 U/L (04.29.18 @ 06:23)  Aspartate Aminotransferase (AST/SGOT): 53 U/L (04.28.18 @ 05:38)  Aspartate Aminotransferase (AST/SGOT): 82 U/L (04.27.18 @ 17:31)  Aspartate Aminotransferase (AST/SGOT): 38: Gross Hemolysis, results may be falsely elevated  Mild Lipemia interference  results may be affected U/L (04.05.18 @ 17:26)    Alanine Aminotransferase (ALT/SGPT): 148 U/L (04.29.18 @ 06:23)  Alanine Aminotransferase (ALT/SGPT): 222 U/L (04.28.18 @ 05:38)  Alanine Aminotransferase (ALT/SGPT): 311 U/L (04.27.18 @ 17:31)  Alanine Aminotransferase (ALT/SGPT): 25: Gross Hemolysis, results may be falsely elevated  Mild Lipemia interference results may be affected U/L RC (04.05.18 @ 17:26)    Creatine Kinase, Serum: 77 U/L (04.27.18 @ 17:31)      Color: x / Appearance: x / SG: x / pH: x  Gluc: x / Ketone: x  / Bili: x / Urobili: x   Blood: x / Protein: x / Nitrite: x   Leuk Esterase: x / RBC: 0-2 /HPF / WBC 5-10 /HPF   Sq Epi: x / Non Sq Epi: Occasional /HPF / Bacteria: Many /HPF                              11.3   9.4   )-----------( 271      ( 29 Apr 2018 06:24 )             34.4                         11.2   8.26  )-----------( 274      ( 28 Apr 2018 07:23 )             35.3                         11.8   9.1   )-----------( 273      ( 28 Apr 2018 00:21 )             35.4                         13.1   10.1  )-----------( 300      ( 27 Apr 2018 17:30 )             39.0     Acute Hepatitis Panel (04.28.18 @ 01:56)    Hepatitis C Virus Interpretation: Nonreact: Hepatitis C AB  S/CO Ratio                        Interpretation  < 1.0                                     Non-Reactive  1.0 - 4.9                           Weakly-Reactive  > 5.0                                 Reactive  Non-Reactive: A person witha non-reactive HCV antibody result is  considered uninfected.  No further action is needed unless recent  infection is suspected.  In these cases, consider repeat testing later to  detect seroconversion..  Weakly-Reactive: HCV antibody test is abnormal, HCV RNA Qualitative test  will follow.  Reactive: HCV antibody test is abnormal, HCV RNA Qualitative test will  follow.  Note: HCV antibody testing is performed on the Abbott  system.    Hepatitis C Virus S/CO Ratio: 0.16 S/CO    Hepatitis B Core IgM Antibody: Nonreact    Hepatitis B Surface Antigen: Nonreact    Hepatitis A IgM Antibody: Nonreact        Imaging:    < from: US Abdomen Upper Quadrant Right (04.27.18 @ 21:33) >  EXAM:  US ABDOMEN RT UPR QUADRANT                            PROCEDURE DATE:  04/27/2018            INTERPRETATION:  CLINICAL INFORMATION: Increased LFTs    COMPARISON: None available.    TECHNIQUE: Sonography of the right upper quadrant.     FINDINGS:    Liver: Within normal limits and measures 15.7 cm.    Bile ducts: Normal caliber. Common bile duct measures 5 mm.     Gallbladder: Status post cholecystectomy    Pancreas: Not well visualized    Right kidney: 10.7 cm. 1.9 cm simple right renal cyst. No hydronephrosis.     Ascites: None.    IVC: Visualized portions are within normal limits.    IMPRESSION:     Unremarkable right upper quadrant ultrasound.    < end of copied text > HEPATOLOGY CONSULT NOTE:     Chief Complaint:  Patient is a 73y old  Female who presents with a chief complaint of Generalized weakness for 2 weeks with patient with fall at home (27 Apr 2018 20:29)      HPI:  72 y/o F with a history of type 2 DM on metformin, essential HTN, CAD, morbid obesity with chronic idiopathic lymphedema presenting from home after mechanical fall at home. Patient had previous recent admission for weakness, was discharged to [facility] and then discharged home 3 days PTA. Hepatology was called to evaluate the patient due to abnormal liver tests. The patient denies any abdominal pain, nausea, vomiting, constipation or diarrhea. She denies any jaundice or pruritis. She was never told of any liver problems. She denies any new medications but does state she takes up to 3 tablets of acetaminophen daily when she has joint pain. She denies any new medications, herbal remedies or drugs. She denies any recent illness, sick contacts or recent travel. She does not use any alcohol. Liver tests normal 4/5/18.     Allergies:  No Known Allergies      Home Medications: reviewed     Hospital Medications:  acetaminophen   Tablet. 650 milliGRAM(s) Oral every 6 hours PRN  amLODIPine   Tablet 5 milliGRAM(s) Oral daily  ascorbic acid 250 milliGRAM(s) Oral daily  atorvastatin 20 milliGRAM(s) Oral at bedtime  bisoprolol   Tablet 20 milliGRAM(s) Oral daily  dextrose 5%. 1000 milliLiter(s) IV Continuous <Continuous>  dextrose 50% Injectable 12.5 Gram(s) IV Push once  dextrose 50% Injectable 25 Gram(s) IV Push once  dextrose 50% Injectable 25 Gram(s) IV Push once  dextrose Gel 1 Dose(s) Oral once PRN  glucagon  Injectable 1 milliGRAM(s) IntraMuscular once PRN  hydrALAZINE 50 milliGRAM(s) Oral every 8 hours  hydrochlorothiazide 25 milliGRAM(s) Oral daily  insulin glargine Injectable (LANTUS) 22 Unit(s) SubCutaneous at bedtime  insulin lispro (HumaLOG) corrective regimen sliding scale   SubCutaneous three times a day before meals  insulin lispro (HumaLOG) corrective regimen sliding scale   SubCutaneous at bedtime  multivitamin/minerals 1 Tablet(s) Oral daily  omega-3-Acid Ethyl Esters 4 Gram(s) Oral daily  polyethylene glycol 3350 17 Gram(s) Oral daily PRN  rivaroxaban 15 milliGRAM(s) Oral two times a day  traMADol 50 milliGRAM(s) Oral every 6 hours PRN      PMHX/PSHX:  HLD (hyperlipidemia)  Lymphedema of both lower extremities  Back pain  Diabetes  Hypertension  History of cholecystectomy      Family history:  No pertinent family history in first degree relatives      Social History: non-contributory     ROS:     General:  No wt loss, fevers, chills, night sweats, fatigue,   Eyes:  Good vision, no reported pain  ENT:  No sore throat, pain, runny nose, dysphagia  CV:  No pain, palpitations, hypo/hypertension  Resp:  No dyspnea, cough, tachypnea, wheezing  GI:  No pain, No nausea, No vomiting, No diarrhea, No constipation, No weight loss, No fever, No pruritis, No rectal bleeding, No tarry stools, No dysphagia,  :  No pain, bleeding, incontinence, nocturia  Muscle:  No pain, weakness  Neuro:  No weakness, tingling, memory problems  Psych:  No fatigue, insomnia, mood problems, depression  Endocrine:  No polyuria, polydipsia, cold/heat intolerance  Heme:  No petechiae, ecchymosis, easy bruisability  Skin:  No rash, tattoos, scars, edema      PHYSICAL EXAM:     GENERAL:  Appears stated age, well-groomed, well-nourished, no distress  HEENT:  NC/AT,  conjunctivae clear and pink, no thyromegaly, nodules, adenopathy, no JVD, sclera -anicteric  CHEST:  Full & symmetric excursion, no increased effort, breath sounds clear  HEART:  Regular rhythm, S1, S2, no murmur/rub/S3/S4, no abdominal bruit, no edema  ABDOMEN:  Soft, non-tender, non-distended, normoactive bowel sounds,  no masses ,no hepato-splenomegaly, no signs of chronic liver disease  EXTEREMITIES:  no cyanosis,clubbing or edema  SKIN:  No rash/erythema/ecchymoses/petechiae/wounds/abscess/warm/dry  NEURO:  Alert, oriented, no asterixis, no tremor, no encephalopathy    Vital Signs:  Vital Signs Last 24 Hrs  T(C): 37.1 (29 Apr 2018 13:37), Max: 37.2 (28 Apr 2018 21:16)  T(F): 98.8 (29 Apr 2018 13:37), Max: 98.9 (28 Apr 2018 21:16)  HR: 77 (29 Apr 2018 13:37) (74 - 79)  BP: 144/75 (29 Apr 2018 13:37) (118/77 - 155/72)  BP(mean): --  RR: 16 (29 Apr 2018 13:37) (16 - 18)  SpO2: 94% (29 Apr 2018 13:37) (94% - 99%)  Daily     Daily     LABS:                        11.3   9.4   )-----------( 271      ( 29 Apr 2018 06:24 )             34.4     Mean Cell Volume: 91.8 fl (04-29-18 @ 06:24)    04-29    135  |  95<L>  |  22  ----------------------------<  169<H>  3.4<L>   |  27  |  0.85    Ca    9.3      29 Apr 2018 06:23  Phos  2.7     04-27  Mg     1.9     04-27    TPro  6.5  /  Alb  3.0<L>  /  TBili  0.6  /  DBili  x   /  AST  34  /  ALT  148<H>  /  AlkPhos  413<H>  04-29    LIVER FUNCTIONS - ( 29 Apr 2018 06:23 )  Alb: 3.0 g/dL / Pro: 6.5 g/dL / ALK PHOS: 413 U/L / ALT: 148 U/L / AST: 34 U/L / GGT: x           PT/INR - ( 28 Apr 2018 00:21 )   PT: 16.0 sec;   INR: 1.47 ratio         PTT - ( 28 Apr 2018 00:21 )  PTT:30.3 sec  Urinalysis Basic - ( 27 Apr 2018 22:05 )    Bilirubin Total, Serum: 0.6 mg/dL (04-29-18 @ 06:23)  Bilirubin Total, Serum: 0.7 mg/dL (04-28-18 @ 05:38)  Bilirubin Total, Serum: 0.8 mg/dL (04-27-18 @ 17:31)      Alkaline Phosphatase, Serum: 413 U/L (04.29.18 @ 06:23)  Alkaline Phosphatase, Serum: 513 U/L (04.28.18 @ 05:38)  Alkaline Phosphatase, Serum: 633 U/L (04.27.18 @ 17:31)  Alkaline Phosphatase, Serum: 92: Gross Hemolysis, results may be falsely decreased U/L (04.05.18 @ 17:26)    Aspartate Aminotransferase (AST/SGOT): 34 U/L (04.29.18 @ 06:23)  Aspartate Aminotransferase (AST/SGOT): 53 U/L (04.28.18 @ 05:38)  Aspartate Aminotransferase (AST/SGOT): 82 U/L (04.27.18 @ 17:31)  Aspartate Aminotransferase (AST/SGOT): 38: Gross Hemolysis, results may be falsely elevated  Mild Lipemia interference  results may be affected U/L (04.05.18 @ 17:26)    Alanine Aminotransferase (ALT/SGPT): 148 U/L (04.29.18 @ 06:23)  Alanine Aminotransferase (ALT/SGPT): 222 U/L (04.28.18 @ 05:38)  Alanine Aminotransferase (ALT/SGPT): 311 U/L (04.27.18 @ 17:31)  Alanine Aminotransferase (ALT/SGPT): 25: Gross Hemolysis, results may be falsely elevated  Mild Lipemia interference results may be affected U/L RC (04.05.18 @ 17:26)    Creatine Kinase, Serum: 77 U/L (04.27.18 @ 17:31)      Color: x / Appearance: x / SG: x / pH: x  Gluc: x / Ketone: x  / Bili: x / Urobili: x   Blood: x / Protein: x / Nitrite: x   Leuk Esterase: x / RBC: 0-2 /HPF / WBC 5-10 /HPF   Sq Epi: x / Non Sq Epi: Occasional /HPF / Bacteria: Many /HPF                              11.3   9.4   )-----------( 271      ( 29 Apr 2018 06:24 )             34.4                         11.2   8.26  )-----------( 274      ( 28 Apr 2018 07:23 )             35.3                         11.8   9.1   )-----------( 273      ( 28 Apr 2018 00:21 )             35.4                         13.1   10.1  )-----------( 300      ( 27 Apr 2018 17:30 )             39.0     Acute Hepatitis Panel (04.28.18 @ 01:56)    Hepatitis C Virus Interpretation: Nonreact: Hepatitis C AB  S/CO Ratio                        Interpretation  < 1.0                                     Non-Reactive  1.0 - 4.9                           Weakly-Reactive  > 5.0                                 Reactive  Non-Reactive: A person witha non-reactive HCV antibody result is  considered uninfected.  No further action is needed unless recent  infection is suspected.  In these cases, consider repeat testing later to  detect seroconversion..  Weakly-Reactive: HCV antibody test is abnormal, HCV RNA Qualitative test  will follow.  Reactive: HCV antibody test is abnormal, HCV RNA Qualitative test will  follow.  Note: HCV antibody testing is performed on the [employer]  system.    Hepatitis C Virus S/CO Ratio: 0.16 S/CO    Hepatitis B Core IgM Antibody: Nonreact    Hepatitis B Surface Antigen: Nonreact    Hepatitis A IgM Antibody: Nonreact        Imaging:    < from: US Abdomen Upper Quadrant Right (04.27.18 @ 21:33) >  EXAM:  US ABDOMEN RT UPR QUADRANT                            PROCEDURE DATE:  04/27/2018            INTERPRETATION:  CLINICAL INFORMATION: Increased LFTs    COMPARISON: None available.    TECHNIQUE: Sonography of the right upper quadrant.     FINDINGS:    Liver: Within normal limits and measures 15.7 cm.    Bile ducts: Normal caliber. Common bile duct measures 5 mm.     Gallbladder: Status post cholecystectomy    Pancreas: Not well visualized    Right kidney: 10.7 cm. 1.9 cm simple right renal cyst. No hydronephrosis.     Ascites: None.    IVC: Visualized portions are within normal limits.    IMPRESSION:     Unremarkable right upper quadrant ultrasound.    < end of copied text >

## 2018-04-29 NOTE — CHART NOTE - NSCHARTNOTEFT_GEN_A_CORE
Upon Nutritional Assessment by the Registered Dietitian your patient was determined to meet criteria / has evidence of the following diagnosis/diagnoses:          [ ]  Mild Protein Calorie Malnutrition        [ ]  Moderate Protein Calorie Malnutrition        [ ] Severe Protein Calorie Malnutrition        [ ] Unspecified Protein Calorie Malnutrition        [ ] Underweight / BMI <19        [x] Morbid Obesity / BMI > 40      Findings as based on:  [x] Comprehensive nutrition assessment   [ ] Nutrition Focused Physical Exam  [ ] Other:       Nutrition Plan/Recommendations:    1) In-depth nutrition education provided, Continue to provide nutrition education/reinforcement as able.   2) Continue Consistent Carbohydrate, DASH diet.   3) Continue to monitor and replete electrolytes prn.   4) Consider stool softener for pt's reports of constipation.       PROVIDER Section:     By signing this assessment you are acknowledging and agree with the diagnosis/diagnoses assigned by the Registered Dietitian    Comments:

## 2018-04-29 NOTE — PHYSICAL THERAPY INITIAL EVALUATION ADULT - BALANCE TRAINING, PT EVAL
Pt will demonstrate improved static/dynamic balance by 1/2 grade, in order to improve stability, decrease fall risk and increase independence with ADLs within 2 weeks

## 2018-04-30 LAB
ALBUMIN SERPL ELPH-MCNC: 2.8 G/DL — LOW (ref 3.3–5)
ALP SERPL-CCNC: 380 U/L — HIGH (ref 40–120)
ALT FLD-CCNC: 113 U/L — HIGH (ref 10–45)
ANION GAP SERPL CALC-SCNC: 12 MMOL/L — SIGNIFICANT CHANGE UP (ref 5–17)
AST SERPL-CCNC: 33 U/L — SIGNIFICANT CHANGE UP (ref 10–40)
BILIRUB SERPL-MCNC: 0.7 MG/DL — SIGNIFICANT CHANGE UP (ref 0.2–1.2)
BUN SERPL-MCNC: 16 MG/DL — SIGNIFICANT CHANGE UP (ref 7–23)
CALCIUM SERPL-MCNC: 9.9 MG/DL — SIGNIFICANT CHANGE UP (ref 8.4–10.5)
CHLORIDE SERPL-SCNC: 97 MMOL/L — SIGNIFICANT CHANGE UP (ref 96–108)
CK SERPL-CCNC: 25 U/L — SIGNIFICANT CHANGE UP (ref 25–170)
CO2 SERPL-SCNC: 28 MMOL/L — SIGNIFICANT CHANGE UP (ref 22–31)
CREAT SERPL-MCNC: 0.86 MG/DL — SIGNIFICANT CHANGE UP (ref 0.5–1.3)
GLUCOSE BLDC GLUCOMTR-MCNC: 138 MG/DL — HIGH (ref 70–99)
GLUCOSE BLDC GLUCOMTR-MCNC: 161 MG/DL — HIGH (ref 70–99)
GLUCOSE BLDC GLUCOMTR-MCNC: 164 MG/DL — HIGH (ref 70–99)
GLUCOSE BLDC GLUCOMTR-MCNC: 171 MG/DL — HIGH (ref 70–99)
GLUCOSE BLDC GLUCOMTR-MCNC: 173 MG/DL — HIGH (ref 70–99)
GLUCOSE BLDC GLUCOMTR-MCNC: 86 MG/DL — SIGNIFICANT CHANGE UP (ref 70–99)
GLUCOSE SERPL-MCNC: 86 MG/DL — SIGNIFICANT CHANGE UP (ref 70–99)
POTASSIUM SERPL-MCNC: 3.8 MMOL/L — SIGNIFICANT CHANGE UP (ref 3.5–5.3)
POTASSIUM SERPL-SCNC: 3.8 MMOL/L — SIGNIFICANT CHANGE UP (ref 3.5–5.3)
PROT SERPL-MCNC: 6.7 G/DL — SIGNIFICANT CHANGE UP (ref 6–8.3)
SODIUM SERPL-SCNC: 137 MMOL/L — SIGNIFICANT CHANGE UP (ref 135–145)

## 2018-04-30 PROCEDURE — 99222 1ST HOSP IP/OBS MODERATE 55: CPT | Mod: GC

## 2018-04-30 PROCEDURE — 93970 EXTREMITY STUDY: CPT | Mod: 26

## 2018-04-30 RX ORDER — RIVAROXABAN 15 MG-20MG
20 KIT ORAL EVERY 24 HOURS
Qty: 0 | Refills: 0 | Status: DISCONTINUED | OUTPATIENT
Start: 2018-05-01 | End: 2018-05-03

## 2018-04-30 RX ADMIN — Medication 650 MILLIGRAM(S): at 21:19

## 2018-04-30 RX ADMIN — ATORVASTATIN CALCIUM 20 MILLIGRAM(S): 80 TABLET, FILM COATED ORAL at 21:19

## 2018-04-30 RX ADMIN — INSULIN GLARGINE 22 UNIT(S): 100 INJECTION, SOLUTION SUBCUTANEOUS at 21:54

## 2018-04-30 RX ADMIN — Medication 650 MILLIGRAM(S): at 21:49

## 2018-04-30 RX ADMIN — Medication 25 MILLIGRAM(S): at 11:10

## 2018-04-30 RX ADMIN — RIVAROXABAN 15 MILLIGRAM(S): KIT at 09:20

## 2018-04-30 RX ADMIN — Medication 50 MILLIGRAM(S): at 16:15

## 2018-04-30 RX ADMIN — BISOPROLOL FUMARATE 20 MILLIGRAM(S): 10 TABLET, FILM COATED ORAL at 11:10

## 2018-04-30 RX ADMIN — AMLODIPINE BESYLATE 5 MILLIGRAM(S): 2.5 TABLET ORAL at 08:49

## 2018-04-30 RX ADMIN — Medication 1: at 18:29

## 2018-04-30 RX ADMIN — Medication 50 MILLIGRAM(S): at 08:49

## 2018-05-01 LAB
ALBUMIN SERPL ELPH-MCNC: 2.9 G/DL — LOW (ref 3.3–5)
ALP SERPL-CCNC: 345 U/L — HIGH (ref 40–120)
ALT FLD-CCNC: 87 U/L — HIGH (ref 10–45)
ANION GAP SERPL CALC-SCNC: 12 MMOL/L — SIGNIFICANT CHANGE UP (ref 5–17)
AST SERPL-CCNC: 30 U/L — SIGNIFICANT CHANGE UP (ref 10–40)
BILIRUB SERPL-MCNC: 0.6 MG/DL — SIGNIFICANT CHANGE UP (ref 0.2–1.2)
BUN SERPL-MCNC: 18 MG/DL — SIGNIFICANT CHANGE UP (ref 7–23)
CALCIUM SERPL-MCNC: 9.5 MG/DL — SIGNIFICANT CHANGE UP (ref 8.4–10.5)
CHLORIDE SERPL-SCNC: 96 MMOL/L — SIGNIFICANT CHANGE UP (ref 96–108)
CMV DNA CSF QL NAA+PROBE: SIGNIFICANT CHANGE UP
CMV DNA SPEC NAA+PROBE-LOG#: SIGNIFICANT CHANGE UP LOGIU/ML
CO2 SERPL-SCNC: 28 MMOL/L — SIGNIFICANT CHANGE UP (ref 22–31)
CREAT SERPL-MCNC: 0.91 MG/DL — SIGNIFICANT CHANGE UP (ref 0.5–1.3)
EBV EA AB SER IA-ACNC: <5 U/ML — SIGNIFICANT CHANGE UP
EBV EA AB TITR SER IF: NEGATIVE — SIGNIFICANT CHANGE UP
EBV EA IGG SER-ACNC: NEGATIVE — SIGNIFICANT CHANGE UP
EBV NA IGG SER IA-ACNC: 11.8 U/ML — SIGNIFICANT CHANGE UP
EBV PATRN SPEC IB-IMP: SIGNIFICANT CHANGE UP
EBV VCA IGG AVIDITY SER QL IA: POSITIVE
EBV VCA IGM SER IA-ACNC: 401 U/ML — HIGH
EBV VCA IGM SER IA-ACNC: <10 U/ML — SIGNIFICANT CHANGE UP
EBV VCA IGM TITR FLD: NEGATIVE — SIGNIFICANT CHANGE UP
GLUCOSE BLDC GLUCOMTR-MCNC: 137 MG/DL — HIGH (ref 70–99)
GLUCOSE BLDC GLUCOMTR-MCNC: 145 MG/DL — HIGH (ref 70–99)
GLUCOSE BLDC GLUCOMTR-MCNC: 154 MG/DL — HIGH (ref 70–99)
GLUCOSE BLDC GLUCOMTR-MCNC: 174 MG/DL — HIGH (ref 70–99)
GLUCOSE SERPL-MCNC: 243 MG/DL — HIGH (ref 70–99)
HCT VFR BLD CALC: 36.1 % — SIGNIFICANT CHANGE UP (ref 34.5–45)
HGB BLD-MCNC: 12 G/DL — SIGNIFICANT CHANGE UP (ref 11.5–15.5)
HSV1 IGG SER-ACNC: 59.7 INDEX — HIGH
HSV1 IGG SERPL QL IA: POSITIVE
IGG SERPL-MCNC: 1005 MG/DL — SIGNIFICANT CHANGE UP (ref 700–1600)
IGG1 SER-MCNC: 600 MG/DL — SIGNIFICANT CHANGE UP (ref 248–810)
IGG2 SER-MCNC: 372 MG/DL — SIGNIFICANT CHANGE UP (ref 130–555)
IGG3 SER-MCNC: 28 MG/DL — SIGNIFICANT CHANGE UP (ref 15–102)
IGG4 SER-MCNC: 12 MG/DL — SIGNIFICANT CHANGE UP (ref 2–96)
MCHC RBC-ENTMCNC: 30.7 PG — SIGNIFICANT CHANGE UP (ref 27–34)
MCHC RBC-ENTMCNC: 33.3 GM/DL — SIGNIFICANT CHANGE UP (ref 32–36)
MCV RBC AUTO: 92.3 FL — SIGNIFICANT CHANGE UP (ref 80–100)
MITOCHONDRIA AB SER-ACNC: SIGNIFICANT CHANGE UP
PLATELET # BLD AUTO: 302 K/UL — SIGNIFICANT CHANGE UP (ref 150–400)
POTASSIUM SERPL-MCNC: 3.7 MMOL/L — SIGNIFICANT CHANGE UP (ref 3.5–5.3)
POTASSIUM SERPL-SCNC: 3.7 MMOL/L — SIGNIFICANT CHANGE UP (ref 3.5–5.3)
PROT SERPL-MCNC: 6.7 G/DL — SIGNIFICANT CHANGE UP (ref 6–8.3)
RBC # BLD: 3.91 M/UL — SIGNIFICANT CHANGE UP (ref 3.8–5.2)
RBC # FLD: 13 % — SIGNIFICANT CHANGE UP (ref 10.3–14.5)
SMOOTH MUSCLE AB SER-ACNC: ABNORMAL
SODIUM SERPL-SCNC: 136 MMOL/L — SIGNIFICANT CHANGE UP (ref 135–145)
VZV IGG FLD QL IA: 313.4 INDEX — SIGNIFICANT CHANGE UP
VZV IGG FLD QL IA: POSITIVE — SIGNIFICANT CHANGE UP
WBC # BLD: 10.5 K/UL — SIGNIFICANT CHANGE UP (ref 3.8–10.5)
WBC # FLD AUTO: 10.5 K/UL — SIGNIFICANT CHANGE UP (ref 3.8–10.5)

## 2018-05-01 PROCEDURE — 99232 SBSQ HOSP IP/OBS MODERATE 35: CPT | Mod: GC

## 2018-05-01 RX ORDER — LANOLIN ALCOHOL/MO/W.PET/CERES
3 CREAM (GRAM) TOPICAL ONCE
Qty: 0 | Refills: 0 | Status: COMPLETED | OUTPATIENT
Start: 2018-05-01 | End: 2018-05-01

## 2018-05-01 RX ADMIN — ATORVASTATIN CALCIUM 20 MILLIGRAM(S): 80 TABLET, FILM COATED ORAL at 21:35

## 2018-05-01 RX ADMIN — Medication 50 MILLIGRAM(S): at 17:31

## 2018-05-01 RX ADMIN — Medication 3 MILLIGRAM(S): at 23:45

## 2018-05-01 RX ADMIN — Medication 1: at 17:30

## 2018-05-01 RX ADMIN — RIVAROXABAN 20 MILLIGRAM(S): KIT at 17:31

## 2018-05-01 RX ADMIN — Medication 650 MILLIGRAM(S): at 22:00

## 2018-05-01 RX ADMIN — Medication 650 MILLIGRAM(S): at 21:37

## 2018-05-01 RX ADMIN — AMLODIPINE BESYLATE 5 MILLIGRAM(S): 2.5 TABLET ORAL at 08:15

## 2018-05-01 RX ADMIN — INSULIN GLARGINE 22 UNIT(S): 100 INJECTION, SOLUTION SUBCUTANEOUS at 21:35

## 2018-05-01 RX ADMIN — BISOPROLOL FUMARATE 20 MILLIGRAM(S): 10 TABLET, FILM COATED ORAL at 11:47

## 2018-05-01 RX ADMIN — Medication 50 MILLIGRAM(S): at 00:22

## 2018-05-01 RX ADMIN — Medication 25 MILLIGRAM(S): at 14:50

## 2018-05-01 RX ADMIN — Medication 50 MILLIGRAM(S): at 08:15

## 2018-05-01 NOTE — PROGRESS NOTE ADULT - ASSESSMENT
leg  edema- chronic lymphedema- improved  superficial  phlebitis rle- obesity-elevated   alk phos- followup with gi

## 2018-05-01 NOTE — PROGRESS NOTE ADULT - ATTENDING COMMENTS
Patient with improving liver tests likely secondary to acute event.  Follow results until resolution.

## 2018-05-01 NOTE — PROGRESS NOTE ADULT - ASSESSMENT
Impression:  1)Abnormal liver tests- cholestatic pattern with improvement over the past 48 hours. Differential includes DILI (possible acetaminophen), rhabdomyolysis, ischemic hepatopathy, viral induced hepatitis, autoimmune hepatitis, PBC, passed gallstone.     Plan:  -trend CMP daily, monitor INR daily  -avoid hepatotoxic medications   -no hepatology contraindication to discharge, recommend outpatient f/u with hepatology on discharge [phone].     Please call with questions  Agatha Colvin  GI Fellow  Pager: [phone] Impression:  1)Abnormal liver tests- cholestatic pattern with improvement over the past 48 hours. Differential includes DILI (possible acetaminophen), rhabdomyolysis, ischemic hepatopathy, viral induced hepatitis, autoimmune hepatitis, PBC, passed gallstone.     Plan:  -trend CMP daily, monitor INR daily  -avoid hepatotoxic medications   -no hepatology contraindication to discharge, patient can f/u with PCP for blood work as outpatient to ensure liver enzymes continue to improve.     Please call with questions  Agatha Colvin  GI Fellow  Pager: [phone]

## 2018-05-02 LAB
ANION GAP SERPL CALC-SCNC: 13 MMOL/L — SIGNIFICANT CHANGE UP (ref 5–17)
BUN SERPL-MCNC: 20 MG/DL — SIGNIFICANT CHANGE UP (ref 7–23)
CALCIUM SERPL-MCNC: 9.3 MG/DL — SIGNIFICANT CHANGE UP (ref 8.4–10.5)
CHLORIDE SERPL-SCNC: 96 MMOL/L — SIGNIFICANT CHANGE UP (ref 96–108)
CO2 SERPL-SCNC: 27 MMOL/L — SIGNIFICANT CHANGE UP (ref 22–31)
CREAT SERPL-MCNC: 0.91 MG/DL — SIGNIFICANT CHANGE UP (ref 0.5–1.3)
GLUCOSE BLDC GLUCOMTR-MCNC: 137 MG/DL — HIGH (ref 70–99)
GLUCOSE BLDC GLUCOMTR-MCNC: 139 MG/DL — HIGH (ref 70–99)
GLUCOSE BLDC GLUCOMTR-MCNC: 148 MG/DL — HIGH (ref 70–99)
GLUCOSE BLDC GLUCOMTR-MCNC: 239 MG/DL — HIGH (ref 70–99)
GLUCOSE SERPL-MCNC: 149 MG/DL — HIGH (ref 70–99)
LEPTOSPIRA AB TITR SER: NEGATIVE — SIGNIFICANT CHANGE UP
POTASSIUM SERPL-MCNC: 3.4 MMOL/L — LOW (ref 3.5–5.3)
POTASSIUM SERPL-SCNC: 3.4 MMOL/L — LOW (ref 3.5–5.3)
SODIUM SERPL-SCNC: 136 MMOL/L — SIGNIFICANT CHANGE UP (ref 135–145)

## 2018-05-02 PROCEDURE — 93306 TTE W/DOPPLER COMPLETE: CPT | Mod: 26

## 2018-05-02 RX ORDER — POTASSIUM CHLORIDE 20 MEQ
20 PACKET (EA) ORAL
Qty: 0 | Refills: 0 | Status: COMPLETED | OUTPATIENT
Start: 2018-05-02 | End: 2018-05-02

## 2018-05-02 RX ADMIN — INSULIN GLARGINE 22 UNIT(S): 100 INJECTION, SOLUTION SUBCUTANEOUS at 22:00

## 2018-05-02 RX ADMIN — Medication 20 MILLIEQUIVALENT(S): at 12:04

## 2018-05-02 RX ADMIN — AMLODIPINE BESYLATE 5 MILLIGRAM(S): 2.5 TABLET ORAL at 09:06

## 2018-05-02 RX ADMIN — BISOPROLOL FUMARATE 20 MILLIGRAM(S): 10 TABLET, FILM COATED ORAL at 12:07

## 2018-05-02 RX ADMIN — Medication 650 MILLIGRAM(S): at 22:20

## 2018-05-02 RX ADMIN — RIVAROXABAN 20 MILLIGRAM(S): KIT at 18:16

## 2018-05-02 RX ADMIN — Medication 50 MILLIGRAM(S): at 09:05

## 2018-05-02 RX ADMIN — Medication 250 MILLIGRAM(S): at 12:05

## 2018-05-02 RX ADMIN — ATORVASTATIN CALCIUM 20 MILLIGRAM(S): 80 TABLET, FILM COATED ORAL at 21:50

## 2018-05-02 RX ADMIN — Medication 25 MILLIGRAM(S): at 12:05

## 2018-05-02 RX ADMIN — Medication 50 MILLIGRAM(S): at 18:16

## 2018-05-02 RX ADMIN — Medication 650 MILLIGRAM(S): at 21:50

## 2018-05-02 RX ADMIN — Medication 20 MILLIEQUIVALENT(S): at 18:16

## 2018-05-02 NOTE — PROGRESS NOTE ADULT - ASSESSMENT
weakness-  difficulty  walking -chronic lymphedema- superficial  [id number]- elevated  lft's- gi  followup  appreciated  case  dw   NP-  dc planning-followup  echo

## 2018-05-03 ENCOUNTER — TRANSCRIPTION ENCOUNTER (OUTPATIENT)
Age: 73
End: 2018-05-03

## 2018-05-03 VITALS
RESPIRATION RATE: 18 BRPM | TEMPERATURE: 99 F | SYSTOLIC BLOOD PRESSURE: 120 MMHG | DIASTOLIC BLOOD PRESSURE: 64 MMHG | HEART RATE: 84 BPM | OXYGEN SATURATION: 92 %

## 2018-05-03 LAB
ANION GAP SERPL CALC-SCNC: 9 MMOL/L — SIGNIFICANT CHANGE UP (ref 5–17)
BUN SERPL-MCNC: 22 MG/DL — SIGNIFICANT CHANGE UP (ref 7–23)
CALCIUM SERPL-MCNC: 9.5 MG/DL — SIGNIFICANT CHANGE UP (ref 8.4–10.5)
CHLORIDE SERPL-SCNC: 97 MMOL/L — SIGNIFICANT CHANGE UP (ref 96–108)
CO2 SERPL-SCNC: 29 MMOL/L — SIGNIFICANT CHANGE UP (ref 22–31)
CREAT SERPL-MCNC: 0.99 MG/DL — SIGNIFICANT CHANGE UP (ref 0.5–1.3)
GLUCOSE BLDC GLUCOMTR-MCNC: 142 MG/DL — HIGH (ref 70–99)
GLUCOSE BLDC GLUCOMTR-MCNC: 184 MG/DL — HIGH (ref 70–99)
GLUCOSE SERPL-MCNC: 143 MG/DL — HIGH (ref 70–99)
POTASSIUM SERPL-MCNC: 3.9 MMOL/L — SIGNIFICANT CHANGE UP (ref 3.5–5.3)
POTASSIUM SERPL-SCNC: 3.9 MMOL/L — SIGNIFICANT CHANGE UP (ref 3.5–5.3)
SODIUM SERPL-SCNC: 135 MMOL/L — SIGNIFICANT CHANGE UP (ref 135–145)

## 2018-05-03 PROCEDURE — 82550 ASSAY OF CK (CPK): CPT

## 2018-05-03 PROCEDURE — 82435 ASSAY OF BLOOD CHLORIDE: CPT

## 2018-05-03 PROCEDURE — 86665 EPSTEIN-BARR CAPSID VCA: CPT

## 2018-05-03 PROCEDURE — 82962 GLUCOSE BLOOD TEST: CPT

## 2018-05-03 PROCEDURE — 80076 HEPATIC FUNCTION PANEL: CPT

## 2018-05-03 PROCEDURE — 99285 EMERGENCY DEPT VISIT HI MDM: CPT | Mod: 25

## 2018-05-03 PROCEDURE — 80074 ACUTE HEPATITIS PANEL: CPT

## 2018-05-03 PROCEDURE — 86381 MITOCHONDRIAL ANTIBODY EACH: CPT

## 2018-05-03 PROCEDURE — 86663 EPSTEIN-BARR ANTIBODY: CPT

## 2018-05-03 PROCEDURE — 83605 ASSAY OF LACTIC ACID: CPT

## 2018-05-03 PROCEDURE — 83735 ASSAY OF MAGNESIUM: CPT

## 2018-05-03 PROCEDURE — 82330 ASSAY OF CALCIUM: CPT

## 2018-05-03 PROCEDURE — 84132 ASSAY OF SERUM POTASSIUM: CPT

## 2018-05-03 PROCEDURE — 82787 IGG 1 2 3 OR 4 EACH: CPT

## 2018-05-03 PROCEDURE — 84484 ASSAY OF TROPONIN QUANT: CPT

## 2018-05-03 PROCEDURE — 97162 PT EVAL MOD COMPLEX 30 MIN: CPT

## 2018-05-03 PROCEDURE — 71045 X-RAY EXAM CHEST 1 VIEW: CPT

## 2018-05-03 PROCEDURE — 86790 VIRUS ANTIBODY NOS: CPT

## 2018-05-03 PROCEDURE — G0378: CPT

## 2018-05-03 PROCEDURE — 84100 ASSAY OF PHOSPHORUS: CPT

## 2018-05-03 PROCEDURE — 86664 EPSTEIN-BARR NUCLEAR ANTIGEN: CPT

## 2018-05-03 PROCEDURE — 97116 GAIT TRAINING THERAPY: CPT

## 2018-05-03 PROCEDURE — 81001 URINALYSIS AUTO W/SCOPE: CPT

## 2018-05-03 PROCEDURE — 82043 UR ALBUMIN QUANTITATIVE: CPT

## 2018-05-03 PROCEDURE — 93970 EXTREMITY STUDY: CPT

## 2018-05-03 PROCEDURE — 97530 THERAPEUTIC ACTIVITIES: CPT

## 2018-05-03 PROCEDURE — 76705 ECHO EXAM OF ABDOMEN: CPT

## 2018-05-03 PROCEDURE — 86695 HERPES SIMPLEX TYPE 1 TEST: CPT

## 2018-05-03 PROCEDURE — 86720 LEPTOSPIRA ANTIBODY: CPT

## 2018-05-03 PROCEDURE — 86255 FLUORESCENT ANTIBODY SCREEN: CPT

## 2018-05-03 PROCEDURE — 80048 BASIC METABOLIC PNL TOTAL CA: CPT

## 2018-05-03 PROCEDURE — 82947 ASSAY GLUCOSE BLOOD QUANT: CPT

## 2018-05-03 PROCEDURE — 80053 COMPREHEN METABOLIC PANEL: CPT

## 2018-05-03 PROCEDURE — 85610 PROTHROMBIN TIME: CPT

## 2018-05-03 PROCEDURE — 85014 HEMATOCRIT: CPT

## 2018-05-03 PROCEDURE — 84295 ASSAY OF SERUM SODIUM: CPT

## 2018-05-03 PROCEDURE — 85027 COMPLETE CBC AUTOMATED: CPT

## 2018-05-03 PROCEDURE — 93005 ELECTROCARDIOGRAM TRACING: CPT

## 2018-05-03 PROCEDURE — 86787 VARICELLA-ZOSTER ANTIBODY: CPT

## 2018-05-03 PROCEDURE — 93306 TTE W/DOPPLER COMPLETE: CPT

## 2018-05-03 PROCEDURE — 85730 THROMBOPLASTIN TIME PARTIAL: CPT

## 2018-05-03 PROCEDURE — 82803 BLOOD GASES ANY COMBINATION: CPT

## 2018-05-03 RX ORDER — POLYETHYLENE GLYCOL 3350 17 G/17G
17 POWDER, FOR SOLUTION ORAL
Qty: 0 | Refills: 0 | COMMUNITY
Start: 2018-05-03

## 2018-05-03 RX ORDER — MULTIVIT-MIN/FERROUS GLUCONATE 9 MG/15 ML
1 LIQUID (ML) ORAL
Qty: 0 | Refills: 0 | COMMUNITY
Start: 2018-05-03

## 2018-05-03 RX ORDER — INSULIN GLARGINE 100 [IU]/ML
22 INJECTION, SOLUTION SUBCUTANEOUS
Qty: 0 | Refills: 0 | COMMUNITY
Start: 2018-05-03

## 2018-05-03 RX ORDER — DICLOFENAC SODIUM 30 MG/G
0 GEL TOPICAL
Qty: 0 | Refills: 0 | COMMUNITY

## 2018-05-03 RX ORDER — BISOPROLOL FUMARATE 10 MG/1
4 TABLET, FILM COATED ORAL
Qty: 0 | Refills: 0 | COMMUNITY
Start: 2018-05-03

## 2018-05-03 RX ORDER — RIVAROXABAN 15 MG-20MG
1 KIT ORAL
Qty: 0 | Refills: 0 | COMMUNITY

## 2018-05-03 RX ORDER — OMEGA-3 ACID ETHYL ESTERS 1 G
4 CAPSULE ORAL
Qty: 0 | Refills: 0 | COMMUNITY
Start: 2018-05-03

## 2018-05-03 RX ORDER — RIVAROXABAN 15 MG-20MG
1 KIT ORAL
Qty: 0 | Refills: 0 | COMMUNITY
Start: 2018-05-03

## 2018-05-03 RX ORDER — METFORMIN HYDROCHLORIDE 850 MG/1
1 TABLET ORAL
Qty: 0 | Refills: 0 | COMMUNITY

## 2018-05-03 RX ORDER — TRAMADOL HYDROCHLORIDE 50 MG/1
1 TABLET ORAL
Qty: 0 | Refills: 0 | COMMUNITY
Start: 2018-05-03

## 2018-05-03 RX ORDER — TRAMADOL HYDROCHLORIDE 50 MG/1
1 TABLET ORAL
Qty: 0 | Refills: 0 | COMMUNITY

## 2018-05-03 RX ORDER — ASCORBIC ACID 60 MG
1 TABLET,CHEWABLE ORAL
Qty: 0 | Refills: 0 | COMMUNITY
Start: 2018-05-03

## 2018-05-03 RX ORDER — BISOPROLOL FUMARATE AND HYDROCHLOROTHIAZIDE 5; 6.25 MG/1; MG/1
2 TABLET ORAL
Qty: 0 | Refills: 0 | COMMUNITY

## 2018-05-03 RX ORDER — METFORMIN HYDROCHLORIDE 850 MG/1
1 TABLET ORAL
Qty: 30 | Refills: 0 | OUTPATIENT
Start: 2018-05-03 | End: 2018-06-01

## 2018-05-03 RX ADMIN — BISOPROLOL FUMARATE 20 MILLIGRAM(S): 10 TABLET, FILM COATED ORAL at 10:35

## 2018-05-03 RX ADMIN — AMLODIPINE BESYLATE 5 MILLIGRAM(S): 2.5 TABLET ORAL at 12:27

## 2018-05-03 RX ADMIN — Medication 1: at 12:27

## 2018-05-03 RX ADMIN — Medication 50 MILLIGRAM(S): at 10:36

## 2018-05-03 RX ADMIN — RIVAROXABAN 20 MILLIGRAM(S): KIT at 17:09

## 2018-05-03 RX ADMIN — Medication 50 MILLIGRAM(S): at 17:09

## 2018-05-03 RX ADMIN — Medication 25 MILLIGRAM(S): at 10:36

## 2018-05-03 NOTE — DISCHARGE NOTE ADULT - CARE PROVIDER_API CALL
Ruslan Heaton (DO), Family Medicine  [address] [place] [address]  Phone: [phone]  Fax: [phone]

## 2018-05-03 NOTE — PROVIDER CONTACT NOTE (MEDICATION) - ASSESSMENT
Patient is ANOx4, vital signs stable. Last neuro check completed at 2200, patient remained at baseline. Pt is currently refusing Q4 assessment. Educated on importance of neuro checks. Will continue to monitor for any acute changes. Pt ordered for hydralazine, BP stable, pt refusing medication.

## 2018-05-03 NOTE — DISCHARGE NOTE ADULT - PATIENT PORTAL LINK FT
You can access the [facility] Portal, offered by [facility], by registering with the following website: [email or web address]

## 2018-05-03 NOTE — PROGRESS NOTE ADULT - ASSESSMENT
chronic leg  edema-  weakness-  elevated liver enzymes-  superficial  phlebitis -morbid obesity  for rehab chronic leg  edema-  weakness-  elevated liver enzymes-  superficial  phlebitis -morbid obesity-echo  noted-  ef  65 -70%  for rehab

## 2018-05-03 NOTE — DISCHARGE NOTE ADULT - MEDICATION SUMMARY - MEDICATIONS TO STOP TAKING
I will STOP taking the medications listed below when I get home from the hospital:    traMADol 50 mg oral tablet  -- 1 tab(s) by mouth 2 times a day, As Needed    diclofenac 2% topical solution  -- Apply on skin to affected area once to 2 times a day, As Needed    rivaroxaban 15 mg oral tablet  -- 1 tab(s) by mouth 2 times a day until April 29th - will be increased to 20mg twice daily from April 30th onward    Voltaren 1% topical gel  -- Apply on skin to affected area 2 times a day, As Needed    bisoprolol-hydrochlorothiazide 10 mg-6.25 mg oral tablet  -- 2 tab(s) by mouth once a day

## 2018-05-03 NOTE — PROGRESS NOTE ADULT - PROVIDER SPECIALTY LIST ADULT
Family Medicine
Gastroenterology
Family Medicine

## 2018-05-03 NOTE — PROGRESS NOTE ADULT - GASTROINTESTINAL
Soft, non-tender, no hepatosplenomegaly, normal bowel sounds
Soft, non-tender, no hepatosplenomegaly, normal bowel sounds
detailed exam
Soft, non-tender, no hepatosplenomegaly, normal bowel sounds
detailed exam
Soft, non-tender, no hepatosplenomegaly, normal bowel sounds

## 2018-05-03 NOTE — DISCHARGE NOTE ADULT - SECONDARY DIAGNOSIS.
Essential hypertension Type 2 diabetes mellitus with hyperglycemia, without long-term current use of insulin Transaminitis Thrombophlebitis femoral vein, bilateral Lymphedema

## 2018-05-03 NOTE — PROGRESS NOTE ADULT - SUBJECTIVE AND OBJECTIVE BOX
HEPATOLOGY FOLLOW UP:     Chief Complaint:  Patient is a 73y old  Female who presents with a chief complaint of Generalized weakness for 2 weeks with patient with fall at home (27 Apr 2018 20:29)    Interval Events:  -no acute overnight events  -liver enzymes continue to improve  -no abdominal pain, nausea or vomiting       HPI:  74 y/o F with a history of type 2 DM on metformin, essential HTN, CAD, morbid obesity with chronic idiopathic lymphedema presenting from home after mechanical fall at home. Patient had previous recent admission for weakness, was discharged to [facility] and then discharged home 3 days PTA. Hepatology was called to evaluate the patient due to abnormal liver tests. The patient denies any abdominal pain, nausea, vomiting, constipation or diarrhea. She denies any jaundice or pruritis. She was never told of any liver problems. She denies any new medications but does state she takes up to 3 tablets of acetaminophen daily when she has joint pain. She denies any new medications, herbal remedies or drugs. She denies any recent illness, sick contacts or recent travel. She does not use any alcohol. Liver tests normal 4/5/18.     Allergies:  No Known Allergies      Home Medications: reviewed     Hospital Medications:  acetaminophen   Tablet. 650 milliGRAM(s) Oral every 6 hours PRN  amLODIPine   Tablet 5 milliGRAM(s) Oral daily  ascorbic acid 250 milliGRAM(s) Oral daily  atorvastatin 20 milliGRAM(s) Oral at bedtime  bisoprolol   Tablet 20 milliGRAM(s) Oral daily  dextrose 5%. 1000 milliLiter(s) IV Continuous <Continuous>  dextrose 50% Injectable 12.5 Gram(s) IV Push once  dextrose 50% Injectable 25 Gram(s) IV Push once  dextrose 50% Injectable 25 Gram(s) IV Push once  dextrose Gel 1 Dose(s) Oral once PRN  glucagon  Injectable 1 milliGRAM(s) IntraMuscular once PRN  hydrALAZINE 50 milliGRAM(s) Oral every 8 hours  hydrochlorothiazide 25 milliGRAM(s) Oral daily  insulin glargine Injectable (LANTUS) 22 Unit(s) SubCutaneous at bedtime  insulin lispro (HumaLOG) corrective regimen sliding scale   SubCutaneous three times a day before meals  insulin lispro (HumaLOG) corrective regimen sliding scale   SubCutaneous at bedtime  multivitamin/minerals 1 Tablet(s) Oral daily  omega-3-Acid Ethyl Esters 4 Gram(s) Oral daily  polyethylene glycol 3350 17 Gram(s) Oral daily PRN  rivaroxaban 15 milliGRAM(s) Oral two times a day  traMADol 50 milliGRAM(s) Oral every 6 hours PRN      PMHX/PSHX:  HLD (hyperlipidemia)  Lymphedema of both lower extremities  Back pain  Diabetes  Hypertension  History of cholecystectomy      Family history:  No pertinent family history in first degree relatives      Social History: non-contributory     ROS:     General:  No wt loss, fevers, chills, night sweats, fatigue,   Eyes:  Good vision, no reported pain  ENT:  No sore throat, pain, runny nose, dysphagia  CV:  No pain, palpitations, hypo/hypertension  Resp:  No dyspnea, cough, tachypnea, wheezing  GI:  No pain, No nausea, No vomiting, No diarrhea, No constipation, No weight loss, No fever, No pruritis, No rectal bleeding, No tarry stools, No dysphagia,  :  No pain, bleeding, incontinence, nocturia  Muscle:  No pain, weakness  Neuro:  No weakness, tingling, memory problems  Psych:  No fatigue, insomnia, mood problems, depression  Endocrine:  No polyuria, polydipsia, cold/heat intolerance  Heme:  No petechiae, ecchymosis, easy bruisability  Skin:  No rash, tattoos, scars, edema      PHYSICAL EXAM:     GENERAL:  Appears stated age, well-groomed, well-nourished, no distress  HEENT:  NC/AT,  conjunctivae clear and pink, no thyromegaly, nodules, adenopathy, no JVD, sclera -anicteric  CHEST:  Full & symmetric excursion, no increased effort, breath sounds clear  HEART:  Regular rhythm, S1, S2, no murmur/rub/S3/S4, no abdominal bruit, no edema  ABDOMEN:  Soft, non-tender, non-distended, normoactive bowel sounds,  no masses ,no hepato-splenomegaly, no signs of chronic liver disease  EXTEREMITIES:  no cyanosis,clubbing or edema  SKIN:  No rash/erythema/ecchymoses/petechiae/wounds/abscess/warm/dry  NEURO:  Alert, oriented, no asterixis, no tremor, no encephalopathy      Vital Signs:  Vital Signs Last 24 Hrs  T(C): 37.3 (01 May 2018 08:22), Max: 37.4 (30 Apr 2018 16:16)  T(F): 99.2 (01 May 2018 08:22), Max: 99.3 (30 Apr 2018 16:16)  HR: 85 (01 May 2018 08:22) (76 - 85)  BP: 107/69 (01 May 2018 08:22) (106/63 - 133/70)  BP(mean): --  RR: 18 (01 May 2018 08:22) (16 - 18)  SpO2: 95% (01 May 2018 08:22) (92% - 95%)  Daily     Daily     LABS:                        12.0   10.5  )-----------( 302      ( 01 May 2018 08:51 )             36.1     05-01    136  |  96  |  18  ----------------------------<  243<H>  3.7   |  28  |  0.91    Ca    9.5      01 May 2018 08:51    TPro  6.7  /  Alb  2.9<L>  /  TBili  0.6  /  DBili  x   /  AST  30  /  ALT  87<H>  /  AlkPhos  345<H>  05-01    LIVER FUNCTIONS - ( 01 May 2018 08:51 )  Alb: 2.9 g/dL / Pro: 6.7 g/dL / ALK PHOS: 345 U/L / ALT: 87 U/L / AST: 30 U/L / GGT: x                               Bilirubin Total, Serum: 0.6 mg/dL (05.01.18 @ 08:51)  Bilirubin Total, Serum: 0.6 mg/dL (04-29-18 @ 06:23)  Bilirubin Total, Serum: 0.7 mg/dL (04-28-18 @ 05:38)  Bilirubin Total, Serum: 0.8 mg/dL (04-27-18 @ 17:31)      Alkaline Phosphatase, Serum: 345 U/L (05.01.18 @ 08:51)  Alkaline Phosphatase, Serum: 413 U/L (04.29.18 @ 06:23)  Alkaline Phosphatase, Serum: 513 U/L (04.28.18 @ 05:38)  Alkaline Phosphatase, Serum: 633 U/L (04.27.18 @ 17:31)  Alkaline Phosphatase, Serum: 92: Gross Hemolysis, results may be falsely decreased U/L (04.05.18 @ 17:26)    Aspartate Aminotransferase (AST/SGOT): 30 U/L (05.01.18 @ 08:51)  Aspartate Aminotransferase (AST/SGOT): 34 U/L (04.29.18 @ 06:23)  Aspartate Aminotransferase (AST/SGOT): 53 U/L (04.28.18 @ 05:38)  Aspartate Aminotransferase (AST/SGOT): 82 U/L (04.27.18 @ 17:31)  Aspartate Aminotransferase (AST/SGOT): 38: Gross Hemolysis, results may be falsely elevated  Mild Lipemia interference  results may be affected U/L (04.05.18 @ 17:26)      Alanine Aminotransferase (ALT/SGPT): 87 U/L (05.01.18 @ 08:51)  Alanine Aminotransferase (ALT/SGPT): 148 U/L (04.29.18 @ 06:23)  Alanine Aminotransferase (ALT/SGPT): 222 U/L (04.28.18 @ 05:38)  Alanine Aminotransferase (ALT/SGPT): 311 U/L (04.27.18 @ 17:31)  Alanine Aminotransferase (ALT/SGPT): 25: Gross Hemolysis, results may be falsely elevated  Mild Lipemia interference results may be affected U/L RC (04.05.18 @ 17:26)    Creatine Kinase, Serum: 77 U/L (04.27.18 @ 17:31)      Color: x / Appearance: x / SG: x / pH: x  Gluc: x / Ketone: x  / Bili: x / Urobili: x   Blood: x / Protein: x / Nitrite: x   Leuk Esterase: x / RBC: 0-2 /HPF / WBC 5-10 /HPF   Sq Epi: x / Non Sq Epi: Occasional /HPF / Bacteria: Many /HPF                              11.3   9.4   )-----------( 271      ( 29 Apr 2018 06:24 )             34.4                         11.2   8.26  )-----------( 274      ( 28 Apr 2018 07:23 )             35.3                         11.8   9.1   )-----------( 273      ( 28 Apr 2018 00:21 )             35.4                         13.1   10.1  )-----------( 300      ( 27 Apr 2018 17:30 )             39.0     Acute Hepatitis Panel (04.28.18 @ 01:56)    Hepatitis C Virus Interpretation: Nonreact: Hepatitis C AB  S/CO Ratio                        Interpretation  < 1.0                                     Non-Reactive  1.0 - 4.9                           Weakly-Reactive  > 5.0                                 Reactive  Non-Reactive: A person witha non-reactive HCV antibody result is  considered uninfected.  No further action is needed unless recent  infection is suspected.  In these cases, consider repeat testing later to  detect seroconversion..  Weakly-Reactive: HCV antibody test is abnormal, HCV RNA Qualitative test  will follow.  Reactive: HCV antibody test is abnormal, HCV RNA Qualitative test will  follow.  Note: HCV antibody testing is performed on the Abbott  system.    Hepatitis C Virus S/CO Ratio: 0.16 S/CO    Hepatitis B Core IgM Antibody: Nonreact    Hepatitis B Surface Antigen: Nonreact    Hepatitis A IgM Antibody: Nonreact        Imaging:    < from: US Abdomen Upper Quadrant Right (04.27.18 @ 21:33) >  EXAM:  US ABDOMEN RT UPR QUADRANT                            PROCEDURE DATE:  04/27/2018            INTERPRETATION:  CLINICAL INFORMATION: Increased LFTs    COMPARISON: None available.    TECHNIQUE: Sonography of the right upper quadrant.     FINDINGS:    Liver: Within normal limits and measures 15.7 cm.    Bile ducts: Normal caliber. Common bile duct measures 5 mm.     Gallbladder: Status post cholecystectomy    Pancreas: Not well visualized    Right kidney: 10.7 cm. 1.9 cm simple right renal cyst. No hydronephrosis.     Ascites: None.    IVC: Visualized portions are within normal limits.    IMPRESSION:     Unremarkable right upper quadrant ultrasound.    < end of copied text >
Patient is a 73y old  Female who presents with a chief complaint of Generalized weakness for 2 weeks with patient with fall at home (27 Apr 2018 20:29)      INTERVAL HPI/OVERNIGHT EVENTS:    MEDICATIONS  (STANDING):  amLODIPine   Tablet 5 milliGRAM(s) Oral daily  ascorbic acid 250 milliGRAM(s) Oral daily  atorvastatin 20 milliGRAM(s) Oral at bedtime  bisoprolol   Tablet 20 milliGRAM(s) Oral daily  dextrose 5%. 1000 milliLiter(s) (50 mL/Hr) IV Continuous <Continuous>  dextrose 50% Injectable 12.5 Gram(s) IV Push once  dextrose 50% Injectable 25 Gram(s) IV Push once  dextrose 50% Injectable 25 Gram(s) IV Push once  hydrALAZINE 50 milliGRAM(s) Oral every 8 hours  hydrochlorothiazide 25 milliGRAM(s) Oral daily  insulin glargine Injectable (LANTUS) 22 Unit(s) SubCutaneous at bedtime  insulin lispro (HumaLOG) corrective regimen sliding scale   SubCutaneous three times a day before meals  insulin lispro (HumaLOG) corrective regimen sliding scale   SubCutaneous at bedtime  multivitamin/minerals 1 Tablet(s) Oral daily  omega-3-Acid Ethyl Esters 4 Gram(s) Oral daily  rivaroxaban 15 milliGRAM(s) Oral two times a day    MEDICATIONS  (PRN):  acetaminophen   Tablet. 650 milliGRAM(s) Oral every 6 hours PRN Mild Pain (1 - 3)  dextrose Gel 1 Dose(s) Oral once PRN Blood Glucose LESS THAN 70 milliGRAM(s)/deciliter  glucagon  Injectable 1 milliGRAM(s) IntraMuscular once PRN Glucose LESS THAN 70 milligrams/deciliter  polyethylene glycol 3350 17 Gram(s) Oral daily PRN Constipation  traMADol 50 milliGRAM(s) Oral every 6 hours PRN Moderate Pain (4 - 6)      Allergies    No Known Allergies    Intolerances        Vital Signs Last 24 Hrs  T(C): 36.6 (30 Apr 2018 08:46), Max: 37.2 (29 Apr 2018 21:23)  T(F): 97.9 (30 Apr 2018 08:46), Max: 98.9 (29 Apr 2018 21:23)  HR: 85 (30 Apr 2018 08:46) (74 - 87)  BP: 130/67 (30 Apr 2018 08:46) (119/57 - 147/69)  BP(mean): --  RR: 17 (30 Apr 2018 08:46) (16 - 18)  SpO2: 97% (30 Apr 2018 08:46) (94% - 97%)    LABS:                        11.3   9.4   )-----------( 271      ( 29 Apr 2018 06:24 )             34.4     04-30    137  |  97  |  16  ----------------------------<  86  3.8   |  28  |  0.86    Ca    9.9      30 Apr 2018 06:16    TPro  6.7  /  Alb  2.8<L>  /  TBili  0.7  /  DBili  x   /  AST  33  /  ALT  113<H>  /  AlkPhos  380<H>  04-30          RADIOLOGY & ADDITIONAL TESTS:        Dr Cramer [phone]
Patient is a 73y old  Female who presents with a chief complaint of Generalized weakness for 2 weeks with patient with fall at home (27 Apr 2018 20:29)      INTERVAL HPI/OVERNIGHT EVENTS:    MEDICATIONS  (STANDING):  amLODIPine   Tablet 5 milliGRAM(s) Oral daily  ascorbic acid 250 milliGRAM(s) Oral daily  atorvastatin 20 milliGRAM(s) Oral at bedtime  bisoprolol   Tablet 20 milliGRAM(s) Oral daily  dextrose 5%. 1000 milliLiter(s) (50 mL/Hr) IV Continuous <Continuous>  dextrose 50% Injectable 12.5 Gram(s) IV Push once  dextrose 50% Injectable 25 Gram(s) IV Push once  dextrose 50% Injectable 25 Gram(s) IV Push once  hydrALAZINE 50 milliGRAM(s) Oral every 8 hours  hydrochlorothiazide 25 milliGRAM(s) Oral daily  insulin glargine Injectable (LANTUS) 22 Unit(s) SubCutaneous at bedtime  insulin lispro (HumaLOG) corrective regimen sliding scale   SubCutaneous three times a day before meals  insulin lispro (HumaLOG) corrective regimen sliding scale   SubCutaneous at bedtime  multivitamin/minerals 1 Tablet(s) Oral daily  omega-3-Acid Ethyl Esters 4 Gram(s) Oral daily  rivaroxaban 15 milliGRAM(s) Oral two times a day    MEDICATIONS  (PRN):  acetaminophen   Tablet. 650 milliGRAM(s) Oral every 6 hours PRN Mild Pain (1 - 3)  dextrose Gel 1 Dose(s) Oral once PRN Blood Glucose LESS THAN 70 milliGRAM(s)/deciliter  glucagon  Injectable 1 milliGRAM(s) IntraMuscular once PRN Glucose LESS THAN 70 milligrams/deciliter  traMADol 50 milliGRAM(s) Oral every 6 hours PRN Moderate Pain (4 - 6)      Allergies    No Known Allergies    Intolerances        Vital Signs Last 24 Hrs  T(C): 36.8 (29 Apr 2018 09:10), Max: 37.2 (28 Apr 2018 21:16)  T(F): 98.3 (29 Apr 2018 09:10), Max: 98.9 (28 Apr 2018 21:16)  HR: 74 (29 Apr 2018 09:10) (73 - 83)  BP: 119/57 (29 Apr 2018 09:10) (118/77 - 155/72)  BP(mean): --  RR: 16 (29 Apr 2018 09:10) (16 - 18)  SpO2: 96% (29 Apr 2018 09:10) (94% - 99%)    LABS:                        11.3   9.4   )-----------( 271      ( 29 Apr 2018 06:24 )             34.4     04-29    135  |  95<L>  |  22  ----------------------------<  169<H>  3.4<L>   |  27  |  0.85    Ca    9.3      29 Apr 2018 06:23  Phos  2.7     04-27  Mg     1.9     04-27    TPro  6.5  /  Alb  3.0<L>  /  TBili  0.6  /  DBili  x   /  AST  34  /  ALT  148<H>  /  AlkPhos  413<H>  04-29    PT/INR - ( 28 Apr 2018 00:21 )   PT: 16.0 sec;   INR: 1.47 ratio         PTT - ( 28 Apr 2018 00:21 )  PTT:30.3 sec  Urinalysis Basic - ( 27 Apr 2018 22:05 )    Color: x / Appearance: x / SG: x / pH: x  Gluc: x / Ketone: x  / Bili: x / Urobili: x   Blood: x / Protein: x / Nitrite: x   Leuk Esterase: x / RBC: 0-2 /HPF / WBC 5-10 /HPF   Sq Epi: x / Non Sq Epi: Occasional /HPF / Bacteria: Many /HPF        RADIOLOGY & ADDITIONAL TESTS:        Dr Cramer [phone]
Patient is a 73y old  Female who presents with a chief complaint of Generalized weakness for 2 weeks with patient with fall at home (27 Apr 2018 20:29)      INTERVAL HPI/OVERNIGHT EVENTS:    MEDICATIONS  (STANDING):  amLODIPine   Tablet 5 milliGRAM(s) Oral daily  ascorbic acid 250 milliGRAM(s) Oral daily  atorvastatin 20 milliGRAM(s) Oral at bedtime  bisoprolol   Tablet 20 milliGRAM(s) Oral daily  dextrose 5%. 1000 milliLiter(s) (50 mL/Hr) IV Continuous <Continuous>  dextrose 50% Injectable 12.5 Gram(s) IV Push once  dextrose 50% Injectable 25 Gram(s) IV Push once  dextrose 50% Injectable 25 Gram(s) IV Push once  hydrALAZINE 50 milliGRAM(s) Oral every 8 hours  hydrochlorothiazide 25 milliGRAM(s) Oral daily  insulin glargine Injectable (LANTUS) 22 Unit(s) SubCutaneous at bedtime  insulin lispro (HumaLOG) corrective regimen sliding scale   SubCutaneous three times a day before meals  insulin lispro (HumaLOG) corrective regimen sliding scale   SubCutaneous at bedtime  multivitamin/minerals 1 Tablet(s) Oral daily  omega-3-Acid Ethyl Esters 4 Gram(s) Oral daily  rivaroxaban 20 milliGRAM(s) Oral every 24 hours    MEDICATIONS  (PRN):  acetaminophen   Tablet. 650 milliGRAM(s) Oral every 6 hours PRN Mild Pain (1 - 3)  dextrose Gel 1 Dose(s) Oral once PRN Blood Glucose LESS THAN 70 milliGRAM(s)/deciliter  glucagon  Injectable 1 milliGRAM(s) IntraMuscular once PRN Glucose LESS THAN 70 milligrams/deciliter  polyethylene glycol 3350 17 Gram(s) Oral daily PRN Constipation  traMADol 50 milliGRAM(s) Oral every 6 hours PRN Moderate Pain (4 - 6)      Allergies    No Known Allergies    Intolerances        Vital Signs Last 24 Hrs  T(C): 36.6 (03 May 2018 05:04), Max: 37.6 (02 May 2018 22:06)  T(F): 97.8 (03 May 2018 05:04), Max: 99.6 (02 May 2018 22:06)  HR: 72 (03 May 2018 05:04) (72 - 79)  BP: 138/67 (03 May 2018 05:04) (118/67 - 138/67)  BP(mean): --  RR: 18 (03 May 2018 05:04) (16 - 18)  SpO2: 94% (03 May 2018 05:04) (93% - 98%)    LABS:                        12.0   10.5  )-----------( 302      ( 01 May 2018 08:51 )             36.1     05-02    136  |  96  |  20  ----------------------------<  149<H>  3.4<L>   |  27  |  0.91    Ca    9.3      02 May 2018 06:39    TPro  6.7  /  Alb  2.9<L>  /  TBili  0.6  /  DBili  x   /  AST  30  /  ALT  87<H>  /  AlkPhos  345<H>  05-01          RADIOLOGY & ADDITIONAL TESTS:        Dr Cramer [phone]
Patient is a 73y old  Female who presents with a chief complaint of Generalized weakness for 2 weeks with patient with fall at home (27 Apr 2018 20:29)      INTERVAL HPI/OVERNIGHT EVENTS:    MEDICATIONS  (STANDING):  amLODIPine   Tablet 5 milliGRAM(s) Oral daily  ascorbic acid 250 milliGRAM(s) Oral daily  atorvastatin 20 milliGRAM(s) Oral at bedtime  bisoprolol   Tablet 20 milliGRAM(s) Oral daily  dextrose 5%. 1000 milliLiter(s) (50 mL/Hr) IV Continuous <Continuous>  dextrose 50% Injectable 12.5 Gram(s) IV Push once  dextrose 50% Injectable 25 Gram(s) IV Push once  dextrose 50% Injectable 25 Gram(s) IV Push once  hydrALAZINE 50 milliGRAM(s) Oral every 8 hours  hydrochlorothiazide 25 milliGRAM(s) Oral daily  insulin glargine Injectable (LANTUS) 22 Unit(s) SubCutaneous at bedtime  insulin lispro (HumaLOG) corrective regimen sliding scale   SubCutaneous three times a day before meals  insulin lispro (HumaLOG) corrective regimen sliding scale   SubCutaneous at bedtime  multivitamin/minerals 1 Tablet(s) Oral daily  omega-3-Acid Ethyl Esters 4 Gram(s) Oral daily  rivaroxaban 20 milliGRAM(s) Oral every 24 hours    MEDICATIONS  (PRN):  acetaminophen   Tablet. 650 milliGRAM(s) Oral every 6 hours PRN Mild Pain (1 - 3)  dextrose Gel 1 Dose(s) Oral once PRN Blood Glucose LESS THAN 70 milliGRAM(s)/deciliter  glucagon  Injectable 1 milliGRAM(s) IntraMuscular once PRN Glucose LESS THAN 70 milligrams/deciliter  polyethylene glycol 3350 17 Gram(s) Oral daily PRN Constipation  traMADol 50 milliGRAM(s) Oral every 6 hours PRN Moderate Pain (4 - 6)      Allergies    No Known Allergies    Intolerances        Vital Signs Last 24 Hrs  T(C): 37.3 (01 May 2018 08:22), Max: 37.4 (30 Apr 2018 16:16)  T(F): 99.2 (01 May 2018 08:22), Max: 99.3 (30 Apr 2018 16:16)  HR: 82 (01 May 2018 11:50) (76 - 85)  BP: 111/66 (01 May 2018 11:50) (106/63 - 133/70)  BP(mean): --  RR: 18 (01 May 2018 08:22) (16 - 18)  SpO2: 95% (01 May 2018 08:22) (92% - 95%)    LABS:                        12.0   10.5  )-----------( 302      ( 01 May 2018 08:51 )             36.1     05-01    136  |  96  |  18  ----------------------------<  243<H>  3.7   |  28  |  0.91    Ca    9.5      01 May 2018 08:51    TPro  6.7  /  Alb  2.9<L>  /  TBili  0.6  /  DBili  x   /  AST  30  /  ALT  87<H>  /  AlkPhos  345<H>  05-01          RADIOLOGY & ADDITIONAL TESTS:        Dr Cramer [phone]
Patient is a 73y old  Female who presents with a chief complaint of Generalized weakness for 2 weeks with patient with fall at home (27 Apr 2018 20:29)  pt resting - seen  at  bedside  with  PT  -  able   to stand - takes  a few steps- seems anxious - fear  of  falling?    INTERVAL HPI/OVERNIGHT EVENTS:    MEDICATIONS  (STANDING):  amLODIPine   Tablet 5 milliGRAM(s) Oral daily  ascorbic acid 250 milliGRAM(s) Oral daily  atorvastatin 20 milliGRAM(s) Oral at bedtime  bisoprolol   Tablet 20 milliGRAM(s) Oral daily  dextrose 5%. 1000 milliLiter(s) (50 mL/Hr) IV Continuous <Continuous>  dextrose 50% Injectable 12.5 Gram(s) IV Push once  dextrose 50% Injectable 25 Gram(s) IV Push once  dextrose 50% Injectable 25 Gram(s) IV Push once  hydrALAZINE 50 milliGRAM(s) Oral every 8 hours  hydrochlorothiazide 25 milliGRAM(s) Oral daily  insulin glargine Injectable (LANTUS) 22 Unit(s) SubCutaneous at bedtime  insulin lispro (HumaLOG) corrective regimen sliding scale   SubCutaneous three times a day before meals  insulin lispro (HumaLOG) corrective regimen sliding scale   SubCutaneous at bedtime  multivitamin/minerals 1 Tablet(s) Oral daily  omega-3-Acid Ethyl Esters 4 Gram(s) Oral daily  potassium chloride    Tablet ER 20 milliEquivalent(s) Oral every 2 hours  rivaroxaban 20 milliGRAM(s) Oral every 24 hours    MEDICATIONS  (PRN):  acetaminophen   Tablet. 650 milliGRAM(s) Oral every 6 hours PRN Mild Pain (1 - 3)  dextrose Gel 1 Dose(s) Oral once PRN Blood Glucose LESS THAN 70 milliGRAM(s)/deciliter  glucagon  Injectable 1 milliGRAM(s) IntraMuscular once PRN Glucose LESS THAN 70 milligrams/deciliter  polyethylene glycol 3350 17 Gram(s) Oral daily PRN Constipation  traMADol 50 milliGRAM(s) Oral every 6 hours PRN Moderate Pain (4 - 6)      Allergies    No Known Allergies    Intolerances        Vital Signs Last 24 Hrs  T(C): 36.7 (02 May 2018 09:00), Max: 37.2 (01 May 2018 13:26)  T(F): 98.1 (02 May 2018 09:00), Max: 99 (01 May 2018 13:26)  HR: 76 (02 May 2018 09:00) (71 - 89)  BP: 130/77 (02 May 2018 09:00) (98/60 - 130/77)  BP(mean): --  RR: 16 (02 May 2018 09:00) (16 - 18)  SpO2: 98% (02 May 2018 09:00) (93% - 98%)    LABS:                        12.0   10.5  )-----------( 302      ( 01 May 2018 08:51 )             36.1     05-02    136  |  96  |  20  ----------------------------<  149<H>  3.4<L>   |  27  |  0.91    Ca    9.3      02 May 2018 06:39    TPro  6.7  /  Alb  2.9<L>  /  TBili  0.6  /  DBili  x   /  AST  30  /  ALT  87<H>  /  AlkPhos  345<H>  05-01          RADIOLOGY & ADDITIONAL TESTS:        Dr Cramer [phone]
Patient is a 73y old  Female who presents with a chief complaint of Generalized weakness for 2 weeks with patient with fall at home (27 Apr 2018 20:29)      INTERVAL HPI/OVERNIGHT EVENTS:    MEDICATIONS  (STANDING):  amLODIPine   Tablet 5 milliGRAM(s) Oral daily  ascorbic acid 250 milliGRAM(s) Oral daily  atorvastatin 20 milliGRAM(s) Oral at bedtime  bisoprolol   Tablet 20 milliGRAM(s) Oral daily  dextrose 5%. 1000 milliLiter(s) (50 mL/Hr) IV Continuous <Continuous>  dextrose 50% Injectable 12.5 Gram(s) IV Push once  dextrose 50% Injectable 25 Gram(s) IV Push once  dextrose 50% Injectable 25 Gram(s) IV Push once  hydrALAZINE 50 milliGRAM(s) Oral every 8 hours  hydrochlorothiazide 25 milliGRAM(s) Oral daily  insulin glargine Injectable (LANTUS) 22 Unit(s) SubCutaneous at bedtime  insulin lispro (HumaLOG) corrective regimen sliding scale   SubCutaneous three times a day before meals  insulin lispro (HumaLOG) corrective regimen sliding scale   SubCutaneous at bedtime  multivitamin/minerals 1 Tablet(s) Oral daily  omega-3-Acid Ethyl Esters 4 Gram(s) Oral daily  rivaroxaban 15 milliGRAM(s) Oral two times a day    MEDICATIONS  (PRN):  dextrose Gel 1 Dose(s) Oral once PRN Blood Glucose LESS THAN 70 milliGRAM(s)/deciliter  glucagon  Injectable 1 milliGRAM(s) IntraMuscular once PRN Glucose LESS THAN 70 milligrams/deciliter  traMADol 50 milliGRAM(s) Oral every 6 hours PRN Moderate Pain (4 - 6)      Allergies    No Known Allergies    Intolerances        Vital Signs Last 24 Hrs  T(C): 36.9 (28 Apr 2018 08:00), Max: 37.2 (28 Apr 2018 01:55)  T(F): 98.5 (28 Apr 2018 08:00), Max: 98.9 (28 Apr 2018 01:55)  HR: 73 (28 Apr 2018 08:00) (70 - 77)  BP: 124/66 (28 Apr 2018 08:00) (106/60 - 170/62)  BP(mean): --  RR: 20 (28 Apr 2018 08:00) (18 - 20)  SpO2: 96% (28 Apr 2018 08:00) (93% - 97%)    LABS:                        11.2   8.26  )-----------( 274      ( 28 Apr 2018 07:23 )             35.3     04-28    137  |  96  |  30<H>  ----------------------------<  155<H>  3.7   |  30  |  0.95    Ca    9.9      28 Apr 2018 05:38  Phos  2.7     04-27  Mg     1.9     04-27    TPro  6.7  /  Alb  3.2<L>  /  TBili  0.7  /  DBili  0.2  /  AST  53<H>  /  ALT  222<H>  /  AlkPhos  513<H>  04-28    PT/INR - ( 28 Apr 2018 00:21 )   PT: 16.0 sec;   INR: 1.47 ratio         PTT - ( 28 Apr 2018 00:21 )  PTT:30.3 sec  Urinalysis Basic - ( 27 Apr 2018 22:05 )    Color: x / Appearance: x / SG: x / pH: x  Gluc: x / Ketone: x  / Bili: x / Urobili: x   Blood: x / Protein: x / Nitrite: x   Leuk Esterase: x / RBC: 0-2 /HPF / WBC 5-10 /HPF   Sq Epi: x / Non Sq Epi: Occasional /HPF / Bacteria: Many /HPF        RADIOLOGY & ADDITIONAL TESTS:        Dr Cramer [phone]

## 2018-05-03 NOTE — DISCHARGE NOTE ADULT - PLAN OF CARE
follow up with PCP and Lymphedema clinic /80 continue meds above and follow up with your physician  return to emergency for dizziness, chest pain, difficulty breathing or worsens HA1C<7 Your HA1c was 6.9  continue the above medication and follow up with your physician and at rehab improved follow up with your physician improvement Take Xarelto as ordered and follow up with your physician fall precaution rehab for PT fall precautions

## 2018-05-03 NOTE — DISCHARGE NOTE ADULT - CARE PLAN
Principal Discharge DX:	Ambulatory dysfunction  Goal:	fall precaution  Assessment and plan of treatment:	rehab for PT  Secondary Diagnosis:	Lymphedema  Goal:	fall precautions  Assessment and plan of treatment:	follow up with PCP and Lymphedema clinic  Secondary Diagnosis:	Essential hypertension  Goal:	/80  Assessment and plan of treatment:	continue meds above and follow up with your physician  return to emergency for dizziness, chest pain, difficulty breathing or worsens  Secondary Diagnosis:	Type 2 diabetes mellitus with hyperglycemia, without long-term current use of insulin  Goal:	HA1C<7  Assessment and plan of treatment:	Your HA1c was 6.9  continue the above medication and follow up with your physician and at rehab  Secondary Diagnosis:	Transaminitis  Goal:	improved  Assessment and plan of treatment:	follow up with your physician  Secondary Diagnosis:	Thrombophlebitis femoral vein, bilateral  Goal:	improvement  Assessment and plan of treatment:	Take Xarelto as ordered and follow up with your physician

## 2018-05-03 NOTE — DISCHARGE NOTE ADULT - MEDICATION SUMMARY - MEDICATIONS TO TAKE
I will START or STAY ON the medications listed below when I get home from the hospital:    traMADol 50 mg oral tablet  -- 1 tab(s) by mouth every 6 hours, As needed, Moderate Pain (4 - 6)  -- Indication: For pain    Tylenol Extra Strength 500 mg oral tablet  -- 2 tab(s) by mouth once a day, As Needed  -- Indication: For pain    rivaroxaban 20 mg oral tablet  -- 1 tab(s) by mouth every 24 hours  -- Indication: For PE/ DVT    insulin glargine  -- 22 unit(s) subcutaneous once a day (at bedtime)  -- Indication: For Type 2 diabetes mellitus with hyperglycemia, without long-term current use of insulin    metFORMIN 500 mg oral tablet  -- 1 tab(s) by mouth once a day (in the morning)  -- Indication: For Type 2 diabetes mellitus with hyperglycemia, without long-term current use of insulin    Lipitor 20 mg oral tablet  -- 1 tab(s) by mouth once a day (at bedtime)  -- Indication: For hyperlipidemia    bisoprolol 5 mg oral tablet  -- 4 tab(s) by mouth once a day  -- Indication: For Essential hypertension    amLODIPine 5 mg oral tablet  -- 1 tab(s) by mouth once a day  -- Indication: For Essential hypertension    hydroCHLOROthiazide 25 mg oral tablet  -- 1 tab(s) by mouth once a day  -- Indication: For Essential hypertension    polyethylene glycol 3350 oral powder for reconstitution  -- 17 gram(s) by mouth once a day, As needed, Constipation  -- Indication: For constipation    omega-3 polyunsaturated fatty acids ethyl esters 1000 mg oral capsule  -- 4 cap(s) by mouth once a day  -- Indication: For hyperlipidemia    hydrALAZINE 50 mg oral tablet  -- 1 tab(s) by mouth 3 times a day  -- Indication: For Essential hypertension    Multiple Vitamins with Minerals oral tablet  -- 1 tab(s) by mouth once a day  -- Indication: For supplement    ascorbic acid 250 mg oral tablet  -- 1 tab(s) by mouth once a day  -- Indication: For supplement

## 2018-05-03 NOTE — DISCHARGE NOTE ADULT - HOSPITAL COURSE
73 year old Female patient with a history of T2DM, HTN, CAD, morbid obesity with chronic idiopathic lymphedema, with patient with a recent admission to [place] and referral to [facility], with patient just discharged 3 days ago from [facility] but patient with 2 weeks of generalized weakness with patient with difficulty with ambulation and ADL.  Patient with a presumed mechanical fall whereby patient slid to her buttocks at home.  No head trauma, no focal weakness. Patient admitted with weakness, gait impairment.  PT consulted and recommended [facility].  GI consulted for transaminitis which improved  Discharge to [facility]

## 2018-05-05 LAB — HEV IGM SER QL: SIGNIFICANT CHANGE UP

## 2018-07-10 ENCOUNTER — APPOINTMENT (OUTPATIENT)
Dept: ORTHOPEDIC SURGERY | Facility: CLINIC | Age: 73
End: 2018-07-10
Payer: MEDICARE

## 2018-07-10 VITALS — WEIGHT: 292 LBS | HEIGHT: 63 IN | BODY MASS INDEX: 51.74 KG/M2

## 2018-07-10 PROCEDURE — 99214 OFFICE O/P EST MOD 30 MIN: CPT | Mod: 25

## 2018-07-10 PROCEDURE — 20610 DRAIN/INJ JOINT/BURSA W/O US: CPT | Mod: 50

## 2018-07-10 RX ORDER — DICLOFENAC SODIUM 20 MG/G
2 SOLUTION TOPICAL
Qty: 2 | Refills: 0 | Status: ACTIVE | COMMUNITY
Start: 2018-07-10 | End: 1900-01-01

## 2018-09-07 ENCOUNTER — APPOINTMENT (OUTPATIENT)
Dept: ORTHOPEDIC SURGERY | Facility: CLINIC | Age: 73
End: 2018-09-07

## 2018-10-09 ENCOUNTER — APPOINTMENT (OUTPATIENT)
Dept: ORTHOPEDIC SURGERY | Facility: CLINIC | Age: 73
End: 2018-10-09
Payer: MEDICARE

## 2018-10-09 VITALS — WEIGHT: 285 LBS | BODY MASS INDEX: 50.5 KG/M2 | HEIGHT: 63 IN

## 2018-10-09 PROCEDURE — 20610 DRAIN/INJ JOINT/BURSA W/O US: CPT | Mod: 79,LT

## 2018-10-09 PROCEDURE — 99213 OFFICE O/P EST LOW 20 MIN: CPT | Mod: 25

## 2018-11-19 ENCOUNTER — RX RENEWAL (OUTPATIENT)
Age: 73
End: 2018-11-19

## 2018-11-28 ENCOUNTER — APPOINTMENT (OUTPATIENT)
Dept: DERMATOLOGY | Facility: CLINIC | Age: 73
End: 2018-11-28
Payer: MEDICARE

## 2018-11-28 DIAGNOSIS — L30.9 DERMATITIS, UNSPECIFIED: ICD-10-CM

## 2018-11-28 DIAGNOSIS — Z86.39 PERSONAL HISTORY OF OTHER ENDOCRINE, NUTRITIONAL AND METABOLIC DISEASE: ICD-10-CM

## 2018-11-28 DIAGNOSIS — I89.0 LYMPHEDEMA, NOT ELSEWHERE CLASSIFIED: ICD-10-CM

## 2018-11-28 DIAGNOSIS — L28.0 LICHEN SIMPLEX CHRONICUS: ICD-10-CM

## 2018-11-28 DIAGNOSIS — Z86.79 PERSONAL HISTORY OF OTHER DISEASES OF THE CIRCULATORY SYSTEM: ICD-10-CM

## 2018-11-28 PROCEDURE — 99202 OFFICE O/P NEW SF 15 MIN: CPT

## 2018-11-28 RX ORDER — TRAMADOL HYDROCHLORIDE 50 MG/1
TABLET, COATED ORAL
Refills: 0 | Status: ACTIVE | COMMUNITY

## 2018-11-28 RX ORDER — ATORVASTATIN CALCIUM 10 MG/1
10 TABLET, FILM COATED ORAL
Refills: 0 | Status: ACTIVE | COMMUNITY

## 2018-11-28 RX ORDER — TRIAMCINOLONE ACETONIDE 1 MG/G
0.1 CREAM TOPICAL
Qty: 1 | Refills: 0 | Status: ACTIVE | COMMUNITY
Start: 2018-11-28 | End: 1900-01-01

## 2018-12-14 ENCOUNTER — RX RENEWAL (OUTPATIENT)
Age: 73
End: 2018-12-14

## 2019-02-02 ENCOUNTER — RX RENEWAL (OUTPATIENT)
Age: 74
End: 2019-02-02

## 2019-03-04 ENCOUNTER — APPOINTMENT (OUTPATIENT)
Dept: ORTHOPEDIC SURGERY | Facility: CLINIC | Age: 74
End: 2019-03-04
Payer: MEDICARE

## 2019-03-04 PROCEDURE — 99213 OFFICE O/P EST LOW 20 MIN: CPT | Mod: 25

## 2019-03-04 PROCEDURE — 20610 DRAIN/INJ JOINT/BURSA W/O US: CPT | Mod: 50

## 2019-03-04 NOTE — DISCUSSION/SUMMARY
[de-identified] : The underlying pathophysiology was reviewed in great detail with the patient as well as the various treatment options, including ice, analgesics, NSAIDs, Physical therapy, steroid injections.\par \par The patient wishes to proceed with INJECTIONS of the knees bilaterally. \par \par A prescription for Tramadol was provided.\par \par FU PRN.

## 2019-03-04 NOTE — PROCEDURE
[de-identified] : At this point I recommended a therapeutic injection and under sterile precautions an injection of 5 cc 1% lidocaine with 0.5 cc of Kenalog and 0.5 cc of Dexamethasone - was placed into the joint of the Right knee without complication, and after several minutes, the patient felt significant relief. \par \par At this point I recommended a therapeutic injection and under sterile precautions an injection of 5 cc 1% lidocaine with 0.5 cc of Kenalog and 0.5 cc of Dexamethasone - was placed into the joint of the Left knee without complication, and after several minutes, the patient felt significant relief.

## 2019-03-04 NOTE — PHYSICAL EXAM
[Other: ___] : [unfilled] [Normal RLE] : Right Lower Extremity: No scars, rashes, lesions, ulcers, skin intact [Normal LLE] : Left Lower Extremity: No scars, rashes, lesions, ulcers, skin intact [Normal Touch] : sensation intact for touch [Normal] : No swelling, no edema, normal pedal pulses and normal temperature [Obese] : obese [Poor Appearance] : well-appearing [Acute Distress] : not in acute distress [de-identified] : Right Lower Extremity\par o Knee :\par ¦ Inspection/Palpation : marked medial joint line tenderness, no swelling, varus alignment\par ¦ Range of Motion : 10 -100 degrees\par ¦ Stability : no valgus or varus instability present on provocative testing, Lachman’s Test (-)\par ¦ Strength : flexion and extension 4/5\par o Muscle Bulk : normal muscle bulk present\par o Skin : no erythema, no ecchymosis \par o Sensation : sensation to pin intact\par o Vascular Exam : 3+ edema, no cyanosis, dorsalis pedis artery pulse 2+, posterior tibial artery pulse 2+\par \par Left Lower Extremity\par o Knee :\par ¦ Inspection/Palpation : moderate medial joint line tenderness, no swelling, varus alignment\par ¦ Range of Motion : 5 - 100 degrees\par ¦ Stability : no valgus or varus instability present on provocative testing, Lachman’s Test (-)\par ¦ Strength : flexion and extension 4/5\par o Muscle Bulk : normal muscle bulk present\par o Skin : no erythema, no ecchymosis \par o Sensation : sensation to pin intact\par o Vascular Exam : 3+ edema, no cyanosis, dorsalis pedis artery pulse 2+, posterior tibial artery pulse 2+

## 2019-03-04 NOTE — ADDENDUM
[FreeTextEntry1] : I, Savanna Reyes, acted solely as a scribe for Dr. Gopi Cerna on this date 03/04/2019.

## 2019-03-04 NOTE — END OF VISIT
[FreeTextEntry3] : All medical record entries made by the Carolynibe were at my, Dr. Gopi Cerna, direction and personally dictated by me on 03/04/2019. I have reviewed the chart and agree that the record accurately reflects my personal performance of the history, physical exam, assessment and plan. I have also personally directed, reviewed, and agreed with the chart.

## 2019-03-04 NOTE — HISTORY OF PRESENT ILLNESS
[de-identified] : 74 year old female presents for an evaluation of bilateral knee pain, she is accompanied by her  today. She is using the assistance of an electric scooter due to ambulatory dysfunction. She says that her pain is comparable to her last visit, she reports pain localized to the medial aspect of her knees that is exacerbated with walking and weight bearing. She has been treated with corticosteroid injections in the past and reports that they help to alleviate her symptoms, she is interested in proceeding with corticosteroid injections of both knees at this time. Of note, the patient has lymphedema of bilateral lower extremities.

## 2019-04-30 ENCOUNTER — RX RENEWAL (OUTPATIENT)
Age: 74
End: 2019-04-30

## 2019-05-28 ENCOUNTER — APPOINTMENT (OUTPATIENT)
Dept: ORTHOPEDIC SURGERY | Facility: CLINIC | Age: 74
End: 2019-05-28
Payer: MEDICARE

## 2019-05-28 VITALS — HEIGHT: 63 IN | BODY MASS INDEX: 50.5 KG/M2 | WEIGHT: 285 LBS

## 2019-05-28 VITALS — WEIGHT: 285 LBS | BODY MASS INDEX: 50.5 KG/M2 | HEIGHT: 63 IN

## 2019-05-28 DIAGNOSIS — M19.012 PRIMARY OSTEOARTHRITIS, RIGHT SHOULDER: ICD-10-CM

## 2019-05-28 DIAGNOSIS — M19.011 PRIMARY OSTEOARTHRITIS, RIGHT SHOULDER: ICD-10-CM

## 2019-05-28 PROCEDURE — 99214 OFFICE O/P EST MOD 30 MIN: CPT | Mod: 25

## 2019-05-28 PROCEDURE — 20610 DRAIN/INJ JOINT/BURSA W/O US: CPT | Mod: LT

## 2019-05-28 NOTE — PHYSICAL EXAM
[Normal RUE] : Right Upper Extremity: No scars, rashes, lesions, ulcers, skin intact [Normal LUE] : Left Upper Extremity: No scars, rashes, lesions, ulcers, skin intact [Normal RLE] : Right Lower Extremity: No scars, rashes, lesions, ulcers, skin intact [Normal LLE] : Left Lower Extremity: No scars, rashes, lesions, ulcers, skin intact [Normal Touch] : sensation intact for touch [Normal] : No swelling, no edema, normal pedal pulses and normal temperature [Obese] : obese [Walker] : ambulates with walker [Poor Appearance] : well-appearing [Acute Distress] : not in acute distress [de-identified] : Right Upper Extremity\par o Shoulder :\par ¦ Inspection/Palpation : no tenderness, no swelling, no deformity, crepitus with ROM.\par ¦ Range of Motion : ACTIVE FORWARD ELEVATION: Measured at 95 degrees, ACTIVE EXTERNAL ROTATION: Measured at 20 degrees, ACTIVE INTERNAL ROTATION: Measured at L5\par ¦ Strength : external rotation 5/5, internal rotation 5/5, supraspinatus 5/5 \par ¦ Stability : no joint instability on provocative testing\par o Upper Arm : no tenderness, no swelling, no deformities\par o Muscle Bulk : no atrophy \par o Sensation : sensation intact to light touch \par o Skin : no skin rash, no discoloration \par o Vascular Exam : no edema, no cyanosis, radial and ulnar pulses normal \par \par Left Lower Extremity\par o Knee :\par ¦ Inspection/Palpation : medial joint line tenderness, no swelling, varus alignment\par ¦ Range of Motion : 5 - 105 degrees\par ¦ Stability : no valgus or varus instability present on provocative testing, Lachman’s Test (-)\par ¦ Strength : flexion and extension 4-/5\par o Muscle Bulk : normal muscle bulk present\par o Skin : no erythema, no ecchymosis \par o Sensation : sensation to pin intact\par o Vascular Exam : 3+ edema, no cyanosis, dorsalis pedis artery pulse 2+, posterior tibial artery pulse 2+

## 2019-05-28 NOTE — HISTORY OF PRESENT ILLNESS
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 At her last visit on 3/4/2019 she received corticosteroid injection of both knees, which greatly alleviated her symptoms. Today she describes a moderate aching pain located along the medial aspect of her left knee that is exacerbated with walking and standing, she ambulates with the assistance of a walker. She also reports right shoulder pain that she describes as a constant aching pain about her shoulder that is exacerbated with us of her right arm. She would like to proceed with corticosteroid injections of her right shoulder and left knee at this time. Of note, the patient has lymphedema of bilateral lower extremities.

## 2019-05-28 NOTE — END OF VISIT
[FreeTextEntry3] : All medical record entries made by the Carolynibe were at my, Dr. Gopi Cerna, direction and personally dictated by me on 05/28/2019. I have reviewed the chart and agree that the record accurately reflects my personal performance of the history, physical exam, assessment and plan. I have also personally directed, reviewed, and agreed with the chart.

## 2019-05-28 NOTE — DISCUSSION/SUMMARY
[de-identified] : The underlying pathophysiology was reviewed in great detail with the patient as well as the various treatment options, including ice, analgesics, NSAIDs, Physical therapy, steroid injections.\par \par The patient wishes to proceed with INJECTIONS of the right shoulder and left knee.\par \par FU PRN.

## 2019-05-28 NOTE — PROCEDURE
[de-identified] : At this point I recommended a therapeutic injection and under sterile precautions an injection of 4 cc 1% lidocaine with 0.5 cc of Kenalog and 0.5 cc of Dexamethasone- was placed into the glenohumeral joint of the Right shoulder without complication, and after several minutes, the patient felt significant relief. \par \par At this point I recommended a therapeutic injection and under sterile precautions an injection of 5 cc 1% lidocaine with 0.5 cc of Kenalog and 0.5 cc of Dexamethasone - was placed into the joint of the Left knee without complication, and after several minutes, the patient felt significant relief.

## 2019-06-27 ENCOUNTER — RX RENEWAL (OUTPATIENT)
Age: 74
End: 2019-06-27

## 2019-08-27 ENCOUNTER — RX RENEWAL (OUTPATIENT)
Age: 74
End: 2019-08-27

## 2019-09-17 ENCOUNTER — APPOINTMENT (OUTPATIENT)
Dept: ORTHOPEDIC SURGERY | Facility: CLINIC | Age: 74
End: 2019-09-17
Payer: MEDICARE

## 2019-09-17 VITALS — HEIGHT: 63 IN | WEIGHT: 285 LBS | BODY MASS INDEX: 50.5 KG/M2

## 2019-09-17 PROCEDURE — 99213 OFFICE O/P EST LOW 20 MIN: CPT | Mod: 25

## 2019-09-17 PROCEDURE — 20610 DRAIN/INJ JOINT/BURSA W/O US: CPT | Mod: 50

## 2019-09-17 NOTE — HISTORY OF PRESENT ILLNESS
[de-identified] : 74 year old female presents for an evaluation of bilateral knee pain. She utilizes an electric scooter due to ambulatory dysfunction. At her last visit on 5/28/2019 she received corticosteroid injections of her bilateral knees which greatly alleviated her symptoms. She notes that her pain has since returned and she describes an aching pain located along the medial aspect of her bilateral knees that is exacerbated with extended periods of walking and weight bearing. The patient would like to proceed with corticosteroid injections of her bilateral knees at this time. Of note, she has lymphedema of bilateral lower extremities.

## 2019-09-17 NOTE — END OF VISIT
[FreeTextEntry3] : All medical record entries made by the Carolynibe were at my, Dr. Gopi Cerna, direction and personally dictated by me on 09/17/2019. I have reviewed the chart and agree that the record accurately reflects my personal performance of the history, physical exam, assessment and plan. I have also personally directed, reviewed, and agreed with the chart.

## 2019-09-17 NOTE — DISCUSSION/SUMMARY
[de-identified] : The underlying pathophysiology was reviewed in great detail with the patient as well as the various treatment options, including ice, analgesics, NSAIDs, Physical therapy, steroid injections.\par \par The patient wishes to proceed with an INJECTION of her bilateral knees.\par \par A prescription was provided for Celebrex.\par \par FU PRN.

## 2019-09-17 NOTE — ADDENDUM
[FreeTextEntry1] : I, Elise Gardiner, acted solely as a scribe for Dr. Gopi Cerna on this date 09/17/2019.

## 2019-09-17 NOTE — PROCEDURE
[de-identified] : At this point I recommended a therapeutic injection and under sterile precautions an injection of 5 cc 1% lidocaine with 0.5 cc of Kenalog and 0.5 cc of Dexamethasone - was placed into the joint of the Right knee without complication, and after several minutes, the patient felt significant relief. \par \par At this point I recommended a therapeutic injection and under sterile precautions an injection of 5 cc 1% lidocaine with 0.5 cc of Kenalog and 0.5 cc of Dexamethasone - was placed into the joint of the Left knee without complication, and after several minutes, the patient felt significant relief.

## 2019-09-17 NOTE — PHYSICAL EXAM
[Normal RLE] : Right Lower Extremity: No scars, rashes, lesions, ulcers, skin intact [Normal LLE] : Left Lower Extremity: No scars, rashes, lesions, ulcers, skin intact [Normal Touch] : sensation intact for touch [Normal] : No swelling, no edema, normal pedal pulses and normal temperature [Obese] : obese [Other: ___] : [unfilled] [Poor Appearance] : well-appearing [Acute Distress] : not in acute distress [de-identified] : Right Lower Extremity\par o Knee :\par ¦ Inspection/Palpation : diffuse medial tenderness, no swelling, varus alignment\par ¦ Range of Motion : 10 -100 degrees\par ¦ Stability : no valgus or varus instability present on provocative testing, Lachman’s Test (-)\par ¦ Strength : flexion and extension 4/5\par o Muscle Bulk : normal muscle bulk present\par o Skin : no erythema, no ecchymosis \par o Sensation : sensation to pin intact\par o Vascular Exam : 3+ edema, no cyanosis, dorsalis pedis artery pulse 2+, posterior tibial artery pulse 2+\par \par Left Lower Extremity\par o Knee :\par ¦ Inspection/Palpation : moderate medial joint line tenderness, no swelling, varus alignment\par ¦ Range of Motion : 5 - 100 degrees\par ¦ Stability : no valgus or varus instability present on provocative testing, Lachman’s Test (-)\par ¦ Strength : flexion and extension 4/5\par o Muscle Bulk : normal muscle bulk present\par o Skin : no erythema, no ecchymosis \par o Sensation : sensation to pin intact\par o Vascular Exam : 3+ edema, no cyanosis, dorsalis pedis artery pulse 2+, posterior tibial artery pulse 2+

## 2019-09-25 NOTE — PATIENT PROFILE ADULT. - NS PRO OT REFERRAL QUES 1 YN
1  Obtain labs and will call with results- you may need to be on your medications  2  Mammogram - schedule  3   Immunizations today
no

## 2019-11-07 NOTE — ED PROVIDER NOTE - BOWEL SOUNDS
[Potential consequences of obesity discussed] : Potential consequences of obesity discussed [Benefits of weight loss discussed] : Benefits of weight loss discussed [Encouraged to increase physical activity] : Encouraged to increase physical activity [Encouraged to use exercise tracking device] : Encouraged to use exercise tracking device [Needs reinforcement, provided] : Patient needs reinforcement on understanding of disease, goals and obesity follow-up plan; reinforcement was provided [FreeTextEntry4] : 17 [FreeTextEntry2] : We discussed increasing aerobic exercise, limiting alcohol to 1-2 drinks per sitting both for weight and also for fatty liver.  Really needs to be careful with traveling/holidays as that's usually when he doesn't think about what he's eating/drinking present x 4 quadrants

## 2019-12-10 ENCOUNTER — APPOINTMENT (OUTPATIENT)
Dept: ORTHOPEDIC SURGERY | Facility: CLINIC | Age: 74
End: 2019-12-10
Payer: MEDICARE

## 2019-12-10 VITALS — BODY MASS INDEX: 50.5 KG/M2 | WEIGHT: 285 LBS | HEIGHT: 63 IN

## 2019-12-10 PROCEDURE — 99213 OFFICE O/P EST LOW 20 MIN: CPT | Mod: 25

## 2019-12-10 PROCEDURE — 20610 DRAIN/INJ JOINT/BURSA W/O US: CPT | Mod: 50

## 2019-12-10 NOTE — HISTORY OF PRESENT ILLNESS
[de-identified] : 74 year old female presents for an evaluation of bilateral knee pain, she has been diagnosed with osteoarthritis of her bilateral knees. She utilizes an electric scooter due to ambulatory dysfunction. At her last visit on 9/17/2019 she received corticosteroid injections of her bilateral knees which greatly alleviated her symptoms. Her pain has since returned and she describes an aching pain located along the medial aspect of her bilateral knees that is exacerbated with extended periods of walking and weight bearing. The patient would like to proceed with corticosteroid injections of her bilateral knees at this time. Of note, she has lymphedema of bilateral lower extremities.

## 2019-12-10 NOTE — END OF VISIT
[FreeTextEntry3] : All medical record entries made by the Scribe were at my, Dr. Gopi Cerna, direction and personally dictated by me on 12/10/2019. I have reviewed the chart and agree that the record accurately reflects my personal performance of the history, physical exam, assessment and plan. I have also personally directed, reviewed, and agreed with the chart.

## 2019-12-10 NOTE — PHYSICAL EXAM
[Normal RLE] : Right Lower Extremity: No scars, rashes, lesions, ulcers, skin intact [Normal Touch] : sensation intact for touch [Normal LLE] : Left Lower Extremity: No scars, rashes, lesions, ulcers, skin intact [Normal] : No swelling, no edema, normal pedal pulses and normal temperature [Obese] : obese [Other: ___] : [unfilled] [Poor Appearance] : well-appearing [Acute Distress] : not in acute distress [de-identified] : Right Lower Extremity\par o Knee :\par ¦ Inspection/Palpation : medial joint line tenderness, no swelling, varus alignment\par ¦ Range of Motion : 0  - 90 degrees, no crepitus\par ¦ Stability : no valgus or varus instability present on provocative testing, Lachman’s Test (-)\par ¦ Strength : flexion and extension 4/5\par o Muscle Bulk : normal muscle bulk present\par o Skin : no erythema, no ecchymosis \par o Sensation : sensation to pin intact\par o Vascular Exam : 3+ edema, no cyanosis, dorsalis pedis artery pulse 2+, posterior tibial artery pulse 2+\par \par Left Lower Extremity\par o Knee :\par ¦ Inspection/Palpation : medial joint line tenderness, no swelling, varus alignment\par ¦ Range of Motion : 0 - 90 degrees, no crepitus\par ¦ Stability : no valgus or varus instability present on provocative testing, Lachman’s Test (-)\par ¦ Strength : flexion and extension 4/5\par o Muscle Bulk : normal muscle bulk present\par o Skin : no erythema, no ecchymosis \par o Sensation : sensation to pin intact\par o Vascular Exam : 3+ edema, no cyanosis, dorsalis pedis artery pulse 2+, posterior tibial artery pulse 2+

## 2019-12-10 NOTE — DISCUSSION/SUMMARY
[de-identified] : The underlying pathophysiology was reviewed in great detail with the patient as well as the various treatment options, including ice, analgesics, NSAIDs, Physical therapy, steroid injections.\par \par The patient wishes to proceed with an INJECTION of her bilateral knees.\par \par Prescriptions were provided for Physical Therapy, Celebrex, and Tramadol. \par \par FU PRN.

## 2019-12-10 NOTE — ADDENDUM
[FreeTextEntry1] : I, Elise Gardiner, acted solely as a scribe for Dr. Gopi Cerna on this date 12/10/2019.

## 2019-12-16 ENCOUNTER — RX RENEWAL (OUTPATIENT)
Age: 74
End: 2019-12-16

## 2019-12-19 ENCOUNTER — RX RENEWAL (OUTPATIENT)
Age: 74
End: 2019-12-19

## 2019-12-27 ENCOUNTER — RX CHANGE (OUTPATIENT)
Age: 74
End: 2019-12-27

## 2020-03-03 ENCOUNTER — APPOINTMENT (OUTPATIENT)
Dept: ORTHOPEDIC SURGERY | Facility: CLINIC | Age: 75
End: 2020-03-03
Payer: MEDICARE

## 2020-03-03 PROCEDURE — 99213 OFFICE O/P EST LOW 20 MIN: CPT | Mod: 25

## 2020-03-03 PROCEDURE — 20610 DRAIN/INJ JOINT/BURSA W/O US: CPT | Mod: 50

## 2020-03-03 NOTE — PHYSICAL EXAM
[Other: ___] : [unfilled] [Normal Touch] : sensation intact for touch [Normal LLE] : Left Lower Extremity: No scars, rashes, lesions, ulcers, skin intact [Normal RLE] : Right Lower Extremity: No scars, rashes, lesions, ulcers, skin intact [Normal] : No swelling, no edema, normal pedal pulses and normal temperature [Obese] : obese [Poor Appearance] : well-appearing [Acute Distress] : not in acute distress [de-identified] : Right Lower Extremity\par o Knee :\par ¦ Inspection/Palpation : medial joint line tenderness, no swelling, varus alignment\par ¦ Range of Motion : 0  - 90 degrees, no crepitus\par ¦ Stability : no valgus or varus instability present on provocative testing, Lachman’s Test (-)\par ¦ Strength : flexion and extension 4/5\par o Muscle Bulk : normal muscle bulk present\par o Skin : no erythema, no ecchymosis \par o Sensation : sensation to pin intact\par o Vascular Exam : 3+ edema, no cyanosis, dorsalis pedis artery pulse 2+, posterior tibial artery pulse 2+\par \par Left Lower Extremity\par o Knee :\par ¦ Inspection/Palpation : medial joint line tenderness, no swelling, varus alignment\par ¦ Range of Motion : 0 - 90 degrees, no crepitus\par ¦ Stability : no valgus or varus instability present on provocative testing, Lachman’s Test (-)\par ¦ Strength : flexion and extension 4/5\par o Muscle Bulk : normal muscle bulk present\par o Skin : no erythema, no ecchymosis \par o Sensation : sensation to pin intact\par o Vascular Exam : 3+ edema, no cyanosis, dorsalis pedis artery pulse 2+, posterior tibial artery pulse 2+

## 2020-03-03 NOTE — HISTORY OF PRESENT ILLNESS
[de-identified] : 75 year old female presents for an evaluation of bilateral knee pain, she has been diagnosed with advanced osteoarthritis of her bilateral knees. She utilizes an electric scooter due to ambulatory dysfunction. At her last visit on 12/10/2019 she received corticosteroid injections of her bilateral knees which greatly alleviated her symptoms. Her pain has returned since approximately 1 week ago, noting she was unable to walk secondary to her pain. She describes an aching pain located along the medial aspect of her bilateral knees, noting that her left knee pain is worse. Her symptoms are exacerbated with extended periods of walking and weight bearing. The patient would like to proceed with corticosteroid injections of her bilateral knees at this time. Of note, she has lymphedema of bilateral lower extremities.

## 2020-03-03 NOTE — END OF VISIT
[FreeTextEntry3] : All medical record entries made by the Carolynibmarquise were at my, Dr. Gopi Cerna, direction and personally dictated by me on 03/03/2020. I have reviewed the chart and agree that the record accurately reflects my personal performance of the history, physical exam, assessment and plan. I have also personally directed, reviewed, and agreed with the chart.

## 2020-03-03 NOTE — ADDENDUM
[FreeTextEntry1] : I, Elise Gardiner, acted solely as a scribe for Dr. Gopi Cerna on this date 03/03/2020.

## 2020-03-03 NOTE — PROCEDURE
[de-identified] : At this point I recommended a therapeutic injection and under sterile precautions an injection of 5 cc 1% lidocaine with 0.5 cc of Kenalog and 0.5 cc of Dexamethasone - was placed into the joint of the Right knee without complication, and after several minutes, the patient felt significant relief. \par \par At this point I recommended a therapeutic injection and under sterile precautions an injection of 5 cc 1% lidocaine with 0.5 cc of Kenalog and 0.5 cc of Dexamethasone - was placed into the joint of the Left knee without complication, and after several minutes, the patient felt significant relief.

## 2020-03-03 NOTE — DISCUSSION/SUMMARY
[de-identified] : The underlying pathophysiology was reviewed in great detail with the patient as well as the various treatment options, including ice, analgesics, NSAIDs, Physical therapy, steroid injections.\par \par The patient wishes to proceed with INJECTIONS of her bilateral knees. \par \par FU PRN.

## 2020-04-03 NOTE — DISCHARGE NOTE ADULT - NS MD DC PLAN IMMU FLU REF OTH
L knee; no effusion/ erythema/ warmth;  pt c/o pain with ranging knee/RANGE OF MOTION LIMITED Refused

## 2020-04-05 NOTE — PHYSICAL THERAPY INITIAL EVALUATION ADULT - REHAB POTENTIAL, PT EVAL
abdomen soft, non-tender, and non-distended. Bowel sounds present.
good, to achieve stated therapy goals

## 2020-04-29 ENCOUNTER — APPOINTMENT (OUTPATIENT)
Dept: VASCULAR SURGERY | Facility: CLINIC | Age: 75
End: 2020-04-29

## 2020-06-23 ENCOUNTER — APPOINTMENT (OUTPATIENT)
Dept: ORTHOPEDIC SURGERY | Facility: CLINIC | Age: 75
End: 2020-06-23
Payer: MEDICARE

## 2020-07-07 ENCOUNTER — APPOINTMENT (OUTPATIENT)
Dept: ORTHOPEDIC SURGERY | Facility: CLINIC | Age: 75
End: 2020-07-07
Payer: MEDICARE

## 2020-07-07 PROCEDURE — 20610 DRAIN/INJ JOINT/BURSA W/O US: CPT | Mod: 50

## 2020-07-07 PROCEDURE — 99213 OFFICE O/P EST LOW 20 MIN: CPT | Mod: 25

## 2020-07-07 NOTE — PROCEDURE
[de-identified] : At this point I recommended a therapeutic injection and under sterile precautions an injection of 5 cc 1% lidocaine with 0.5 cc of Kenalog and 0.5 cc of Dexamethasone - was placed into the joint of the Right knee without complication, and after several minutes, the patient felt significant relief. \par \par At this point I recommended a therapeutic injection and under sterile precautions an injection of 5 cc 1% lidocaine with 0.5 cc of Kenalog and 0.5 cc of Dexamethasone - was placed into the joint of the Left knee without complication, and after several minutes, the patient felt significant relief.

## 2020-07-07 NOTE — DISCUSSION/SUMMARY
[de-identified] : The underlying pathophysiology was reviewed in great detail with the patient as well as the various treatment options, including ice, analgesics, NSAIDs, Physical therapy, steroid injections.\par \par The patient wishes to proceed with INJECTIONS of her bilateral knees. \par \par FU PRN.

## 2020-07-07 NOTE — PHYSICAL EXAM
[Poor Appearance] : well-appearing [Acute Distress] : not in acute distress [de-identified] : Right Lower Extremity\par o Knee :\par ¦ Inspection/Palpation : medial joint line tenderness, no swelling, varus alignment\par ¦ Range of Motion : 0  - 90 degrees, no crepitus\par ¦ Stability : no valgus or varus instability present on provocative testing, Lachman’s Test (-)\par ¦ Strength : flexion and extension 4/5\par o Muscle Bulk : normal muscle bulk present\par o Skin : no erythema, no ecchymosis \par o Sensation : sensation to pin intact\par o Vascular Exam : 3+ edema, no cyanosis, dorsalis pedis artery pulse 2+, posterior tibial artery pulse 2+\par \par Left Lower Extremity\par o Knee :\par ¦ Inspection/Palpation : medial joint line tenderness, no swelling, varus alignment\par ¦ Range of Motion : 0 - 90 degrees, no crepitus\par ¦ Stability : no valgus or varus instability present on provocative testing, Lachman’s Test (-)\par ¦ Strength : flexion and extension 4/5\par o Muscle Bulk : normal muscle bulk present\par o Skin : no erythema, no ecchymosis \par o Sensation : sensation to pin intact\par o Vascular Exam : 3+ edema, no cyanosis, dorsalis pedis artery pulse 2+, posterior tibial artery pulse 2+

## 2020-07-07 NOTE — HISTORY OF PRESENT ILLNESS
[de-identified] : 75 year old female presents for an evaluation of bilateral knee pain, she has been diagnosed with advanced osteoarthritis of her bilateral knees. She utilizes an electric scooter due to ambulatory dysfunction. At her last visit on 03/03/2020 she received corticosteroid injections of her bilateral knees which greatly alleviated her symptoms. Her pain has since returned. She describes an aching pain located along the medial aspect of her bilateral knees, noting that her left knee pain is worse. Her symptoms are exacerbated with extended periods of walking and weight bearing. The patient would like to proceed with corticosteroid injections of her bilateral knees at this time. Of note, she has lymphedema of bilateral lower extremities.

## 2020-07-23 NOTE — PATIENT PROFILE ADULT. - URINARY CATHETER
Call your primary care doctor to make the first available appointment.     Keep all your medical appointments.     Take your regular medication as prescribed. Contact your primary care provider before running out of medication, or for any problems obtaining them.    Do not drive or operate heavy machinery while taking opioid or muscle relaxing medications. Examples include norco, percocet, xanax, valium, flexeril.     Overuse or long term use of pain and sedating medication may lead to addiction, dependence, organ failure, family and work problems, legal problems, accidental overdose and death.    If you do not have health insurance, you probably can afford it:  Call [phone] ([facility] hotline) or go to www.[employer].la.gov    Your evaluation in the ED does not suggest any emergent or life threatening medical condition requiring admission or immediate intervention beyond that provided in the ED.   However, the signs of a serious problem sometimes take more time to appear.   RETURN TO THE ER if any of the following occur:    Weakness, dizziness, fainting, or loss of consciousness   Fever of 100.4ºF (38ºC) or higher  Any worse symptoms  Any new or concerning symptoms    To protect yourself and others from COVID19:  Minimize trips outside of home.  Wear a mask whenever outside your home.   Wear a mask whenever indoors with people you do not live with.  Stay at least 6 feet from others you do not live with (inside or outside).  Wash hands frequently for at least 20 seconds at a time.  Avoid crowds or crowded places.  Quarantine for 14 days if you are exposed to someone with cold, flu, or COVID19 symptoms.   Even if you or they had a negative COVID19 test   Even if you have no symptoms   Quarantine for 14 days if you have cold, flu, or COVID19 symptoms (fever, cough, sore throat, breathing troubles, loss of taste/smell, stomach symptoms, etc.)  Disinfect surfaces that are touched a lot (includes your phone, handles,  switches, etc)      Free COVID19 testing:   No cost. No health insurance required. Walk-up.   [facility] Mobile Testing: Anyone is eligible. No ID required. Most days. Hours and locations vary. Call 211 or go to ready.oniel.gov.   [facility]: Must have symptoms (fever, cough, sore throat, breathing troubles, loss of taste/smell, stomach symptoms, etc.). [address], [phone], [email or web address]. Mon-Fri 830am-4pm. Sat 830am-11am.     Food Assistance:  [place] COVID-19 Meal Assistance Program: [facility] will provide meal assistance to any resident 65 or older, adults with high-risk conditions, homeless residents, people who test positive for COVID-19, people recently exposed to COVID19, and children under 18. Call 311 (656-877-0289) or go to oniel.gov/311    Food [place]:   Why Hunger Hotline ([place]/EN): text your zip code to receive a list of food pantries near you: [phone]  [employer] [place]: Saturdays 9am-12pm at [address].  No ID or paperwork required.  [employer] [place]: Locations vary weekly. Call 211 for information about second harvest or visit www.HaveMyShift.oniel.gov. No ID or paperwork required. Your windows do not need to be opened. [employer] may be present.     Food Delivery:  [employer] ([place]):  Delivering on Wednesdays.  Call [phone] or text [phone]  [employer]: ([place]/EN): Delivering groceries and supplies to immigrant families Monday through Friday 9am-4pm. [phone].  [place] [employer] ([place]): Delivering groceries for those 60+ and/or immunocompromised. [phone] or [email or web address]@[employer].[email or web address]    Legal Services and Rent Assistance:  [facility] Legal Service: Free legal services for low income individuals. Includes problems with rent or eviction. [phone]  [facility] [employer] Assembly: For problems with rent, eviction. Call/text 196-150-9866, or email rukhsana@jpnsi.org  [facility]  Justice Center: Guidance for those experiencing family violence, child abuse, and sexual assault. [phone]    Unemployment:  Those who have lost all or some of their work are eligible for state unemployment ($247 weekly before tax). They may also be eligible for federal Pandemic Unemployment Assistance ($600 weekly before tax). This includes independent contractors, gig workers, and those unemployed pre-COVID.   [place] Unemployment Hotline: Mon-Fri 830am-430pm. 428.152.4370, [phone], and [phone]. Due to long phone hold times, we recommend applying online at [email or web address]. If you need help with internet access for the application, call [phone].      no

## 2020-09-15 ENCOUNTER — APPOINTMENT (OUTPATIENT)
Dept: ORTHOPEDIC SURGERY | Facility: CLINIC | Age: 75
End: 2020-09-15
Payer: MEDICARE

## 2020-09-15 PROCEDURE — 99213 OFFICE O/P EST LOW 20 MIN: CPT | Mod: 25

## 2020-09-15 PROCEDURE — 20610 DRAIN/INJ JOINT/BURSA W/O US: CPT | Mod: 50

## 2020-09-15 NOTE — HISTORY OF PRESENT ILLNESS
[de-identified] : 75 year old female presents for an evaluation of bilateral knee pain, she has been diagnosed with advanced osteoarthritis of her bilateral knees. She utilizes an electric scooter due to ambulatory dysfunction. At her last visit on 07/07/2020 patient received corticosteroid injections of her bilateral knees which greatly alleviated her symptoms. Her pain has since returned. She describes an aching pain located along the medial aspect of her bilateral knees, noting that her left knee pain is worse. Her symptoms are exacerbated with extended periods of walking and weight bearing. She has been taking Celebrex for pain relief with moderate relief in symptoms.  Of note, she has lymphedema of bilateral lower extremities.\par \par

## 2020-09-15 NOTE — PHYSICAL EXAM
[Other: ___] : [unfilled] [Normal RLE] : Right Lower Extremity: No scars, rashes, lesions, ulcers, skin intact [Normal LLE] : Left Lower Extremity: No scars, rashes, lesions, ulcers, skin intact [Normal] : No swelling, no edema, normal pedal pulses and normal temperature [Normal Touch] : sensation intact for touch [Obese] : obese [Acute Distress] : not in acute distress [Poor Appearance] : well-appearing [de-identified] : Right Lower Extremity\par o Knee :\par ¦ Inspection/Palpation : medial joint line tenderness, no swelling, varus alignment\par ¦ Range of Motion : 0  - 90 degrees, no crepitus\par ¦ Stability : no valgus or varus instability present on provocative testing, Lachman’s Test (-)\par ¦ Strength : flexion and extension 4/5\par o Muscle Bulk : normal muscle bulk present\par o Skin : no erythema, no ecchymosis \par o Sensation : sensation to pin intact\par o Vascular Exam : 3+ edema, no cyanosis, dorsalis pedis artery pulse 2+, posterior tibial artery pulse 2+\par \par Left Lower Extremity\par o Knee :\par ¦ Inspection/Palpation : medial joint line tenderness, no swelling, varus alignment\par ¦ Range of Motion : 0 - 90 degrees, no crepitus\par ¦ Stability : no valgus or varus instability present on provocative testing, Lachman’s Test (-)\par ¦ Strength : flexion and extension 4/5\par o Muscle Bulk : normal muscle bulk present\par o Skin : no erythema, no ecchymosis \par o Sensation : sensation to pin intact\par o Vascular Exam : 3+ edema, no cyanosis, dorsalis pedis artery pulse 2+, posterior tibial artery pulse 2+

## 2020-09-15 NOTE — DISCUSSION/SUMMARY
[de-identified] : The underlying pathophysiology was reviewed in great detail with the patient as well as the various treatment options, including ice, analgesics, NSAIDs, Physical therapy, steroid injections.\par \par The patient wishes to proceed with INJECTIONS of her bilateral knees. \par \par Prescription were provided for Celebrex and Tramadol. \par \par FU PRN.

## 2020-09-15 NOTE — PROCEDURE
[de-identified] : At this point I recommended a therapeutic injection and under sterile precautions an injection of 5 cc 1% lidocaine with 0.5 cc of Kenalog and 0.5 cc of Dexamethasone - was placed into the joint of the Right knee without complication, and after several minutes, the patient felt significant relief. \par \par At this point I recommended a therapeutic injection and under sterile precautions an injection of 5 cc 1% lidocaine with 0.5 cc of Kenalog and 0.5 cc of Dexamethasone - was placed into the joint of the Left knee without complication, and after several minutes, the patient felt significant relief.

## 2020-12-01 ENCOUNTER — APPOINTMENT (OUTPATIENT)
Dept: ORTHOPEDIC SURGERY | Facility: CLINIC | Age: 75
End: 2020-12-01
Payer: MEDICARE

## 2020-12-01 PROCEDURE — 99213 OFFICE O/P EST LOW 20 MIN: CPT | Mod: 25

## 2020-12-01 PROCEDURE — 99072 ADDL SUPL MATRL&STAF TM PHE: CPT

## 2020-12-01 PROCEDURE — 20610 DRAIN/INJ JOINT/BURSA W/O US: CPT | Mod: 50

## 2020-12-01 RX ORDER — BISOPROLOL FUMARATE AND HYDROCHLOROTHIAZIDE 10; 6.25 MG/1; MG/1
10-6.25 TABLET, FILM COATED ORAL
Qty: 180 | Refills: 0 | Status: ACTIVE | COMMUNITY
Start: 2020-04-13

## 2020-12-01 RX ORDER — CEPHALEXIN 500 MG/1
500 TABLET ORAL
Qty: 28 | Refills: 0 | Status: ACTIVE | COMMUNITY
Start: 2020-07-29

## 2020-12-01 RX ORDER — DICLOFENAC SODIUM 16.05 MG/ML
1.5 SOLUTION TOPICAL
Qty: 450 | Refills: 0 | Status: ACTIVE | COMMUNITY
Start: 2019-10-17

## 2020-12-01 RX ORDER — CEPHALEXIN 500 MG/1
500 CAPSULE ORAL
Qty: 28 | Refills: 0 | Status: ACTIVE | COMMUNITY
Start: 2020-08-06

## 2020-12-01 RX ORDER — RIVAROXABAN 10 MG/1
10 TABLET, FILM COATED ORAL
Qty: 90 | Refills: 0 | Status: ACTIVE | COMMUNITY
Start: 2020-08-13

## 2020-12-01 RX ORDER — ATORVASTATIN CALCIUM 20 MG/1
20 TABLET, FILM COATED ORAL
Qty: 90 | Refills: 0 | Status: ACTIVE | COMMUNITY
Start: 2020-04-23

## 2020-12-01 NOTE — PROCEDURE
[de-identified] : At this point I recommended a therapeutic injection and under sterile precautions an injection of 5 cc 1% lidocaine with 0.5 cc of Kenalog and 0.5 cc of Dexamethasone - was placed into the joint of the Right knee without complication, and after several minutes, the patient felt significant relief. \par \par At this point I recommended a therapeutic injection and under sterile precautions an injection of 5 cc 1% lidocaine with 0.5 cc of Kenalog and 0.5 cc of Dexamethasone - was placed into the joint of the Left knee without complication, and after several minutes, the patient felt significant relief.

## 2020-12-01 NOTE — PHYSICAL EXAM
[Other: ___] : [unfilled] [Normal RLE] : Right Lower Extremity: No scars, rashes, lesions, ulcers, skin intact [Normal LLE] : Left Lower Extremity: No scars, rashes, lesions, ulcers, skin intact [Normal Touch] : sensation intact for touch [Normal] : No swelling, no edema, normal pedal pulses and normal temperature [Obese] : obese [Poor Appearance] : well-appearing [Acute Distress] : not in acute distress [de-identified] : Right Lower Extremity\par o Knee :\par ¦ Inspection/Palpation : medial joint line tenderness, no swelling, varus alignment\par ¦ Range of Motion : 0  - 90 degrees, no crepitus\par ¦ Stability : no valgus or varus instability present on provocative testing, Lachman’s Test (-)\par ¦ Strength : flexion and extension 4/5\par o Muscle Bulk : normal muscle bulk present\par o Skin : no erythema, no ecchymosis \par o Sensation : sensation to pin intact\par o Vascular Exam : 3+ edema, no cyanosis, dorsalis pedis artery pulse 2+, posterior tibial artery pulse 2+\par \par Left Lower Extremity\par o Knee :\par ¦ Inspection/Palpation : medial joint line tenderness, no swelling, varus alignment\par ¦ Range of Motion : 0 - 90 degrees, no crepitus\par ¦ Stability : no valgus or varus instability present on provocative testing, Lachman’s Test (-)\par ¦ Strength : flexion and extension 4/5\par o Muscle Bulk : normal muscle bulk present\par o Skin : no erythema, no ecchymosis \par o Sensation : sensation to pin intact\par o Vascular Exam : 3+ edema, no cyanosis, dorsalis pedis artery pulse 2+, posterior tibial artery pulse 2+

## 2020-12-01 NOTE — HISTORY OF PRESENT ILLNESS
[de-identified] : 75 year old female presents for an evaluation of bilateral knee pain, she has been diagnosed with advanced osteoarthritis of her bilateral knees. She utilizes an electric scooter due to ambulatory dysfunction. At her last visit on 09/15/2020 patient received corticosteroid injections of her bilateral knees which greatly alleviated her symptoms. Her pain has since returned. She describes an aching pain located along the medial aspect of her bilateral knees, noting that her left knee pain is worse. Her symptoms are exacerbated with extended periods of walking and weight bearing. She has been taking Celebrex for pain relief with moderate relief in symptoms.  Of note, she has lymphedema of bilateral lower extremities.\par \par

## 2020-12-01 NOTE — DISCUSSION/SUMMARY
[de-identified] : The underlying pathophysiology was reviewed in great detail with the patient as well as the various treatment options, including ice, analgesics, NSAIDs, Physical therapy, steroid injections.\par \par The patient wishes to proceed with INJECTIONS of her bilateral knees. \par \par Prescription were provided for Celebrex and Tramadol. \par \par FU PRN.

## 2021-01-28 ENCOUNTER — RX RENEWAL (OUTPATIENT)
Age: 76
End: 2021-01-28

## 2021-03-16 ENCOUNTER — APPOINTMENT (OUTPATIENT)
Dept: ORTHOPEDIC SURGERY | Facility: CLINIC | Age: 76
End: 2021-03-16
Payer: MEDICARE

## 2021-03-16 PROCEDURE — 20610 DRAIN/INJ JOINT/BURSA W/O US: CPT | Mod: 50

## 2021-03-16 PROCEDURE — 99213 OFFICE O/P EST LOW 20 MIN: CPT | Mod: 25

## 2021-03-16 PROCEDURE — 99072 ADDL SUPL MATRL&STAF TM PHE: CPT

## 2021-03-16 RX ORDER — DICLOFENAC SODIUM 16.05 MG/ML
1.5 SOLUTION TOPICAL TWICE DAILY
Qty: 1 | Refills: 0 | Status: ACTIVE | COMMUNITY
Start: 2021-03-16

## 2021-03-16 NOTE — PROCEDURE
[de-identified] : At this point I recommended a therapeutic injection and under sterile precautions an injection of 5 cc 1% lidocaine with 0.5 cc of Kenalog and 0.5 cc of Dexamethasone - was placed into the joint of the Right knee without complication, and after several minutes, the patient felt significant relief. \par \par At this point I recommended a therapeutic injection and under sterile precautions an injection of 5 cc 1% lidocaine with 0.5 cc of Kenalog and 0.5 cc of Dexamethasone - was placed into the joint of the Left knee without complication, and after several minutes, the patient felt significant relief.

## 2021-03-16 NOTE — DISCUSSION/SUMMARY
[de-identified] : The underlying pathophysiology was reviewed in great detail with the patient as well as the various treatment options, including ice, analgesics, NSAIDs, Physical therapy, steroid injections.\par \par The patient wishes to proceed with INJECTIONS of her bilateral knees. \par \par Prescription were provided for Diclofenac 1.5 %  Tramadol.  iSTOP was checked and recorded. \par \par Activity modifications and restrictions were discussed. I advised avoiding deep bending, squatting and high intensity activity. \par \par FU prn \par  \par All questions were answered, all alternatives discussed and the patient is in complete agreement with that plan. Follow-up appointment as instructed. Any issues and the patient will call or come in sooner.

## 2021-03-16 NOTE — PHYSICAL EXAM
[Other: ___] : [unfilled] [Normal RLE] : Right Lower Extremity: No scars, rashes, lesions, ulcers, skin intact [Normal LLE] : Left Lower Extremity: No scars, rashes, lesions, ulcers, skin intact [Normal Touch] : sensation intact for touch [Normal] : No swelling, no edema, normal pedal pulses and normal temperature [Obese] : obese [Poor Appearance] : well-appearing [Acute Distress] : not in acute distress [de-identified] : Right Lower Extremity\par o Knee :\par ¦ Inspection/Palpation : medial joint line tenderness, no swelling, varus alignment\par ¦ Range of Motion : 0  - 90 degrees, no crepitus\par ¦ Stability : no valgus or varus instability present on provocative testing, Lachman’s Test (-)\par ¦ Strength : flexion and extension 4/5\par o Muscle Bulk : normal muscle bulk present\par o Skin : no erythema, no ecchymosis \par o Sensation : sensation to pin intact\par o Vascular Exam : 3+ edema, no cyanosis, dorsalis pedis artery pulse 2+, posterior tibial artery pulse 2+\par \par Left Lower Extremity\par o Knee :\par ¦ Inspection/Palpation : medial joint line tenderness, no swelling, varus alignment\par ¦ Range of Motion : 0 - 90 degrees, no crepitus\par ¦ Stability : no valgus or varus instability present on provocative testing, Lachman’s Test (-)\par ¦ Strength : flexion and extension 4/5\par o Muscle Bulk : normal muscle bulk present\par o Skin : no erythema, no ecchymosis \par o Sensation : sensation to pin intact\par o Vascular Exam : 3+ edema, no cyanosis, dorsalis pedis artery pulse 2+, posterior tibial artery pulse 2+

## 2021-03-16 NOTE — HISTORY OF PRESENT ILLNESS
[de-identified] : 76 year old female presents for an evaluation of bilateral knee pain, she has been diagnosed with advanced osteoarthritis of her bilateral knees. She utilizes an electric scooter due to ambulatory dysfunction. At her last visit on 12/1/2020 and 09/15/2020 patient received corticosteroid injections of her bilateral knees which greatly alleviated her symptoms. Her pain has since returned. She describes an aching pain located along the medial aspect of her bilateral knees, noting that her left knee pain is worse. Her symptoms are exacerbated with extended periods of walking and weight bearing. She has been taking Celebrex for pain relief with moderate relief in symptoms.  Of note, she has lymphedema of bilateral lower extremities.\par \par

## 2021-06-15 ENCOUNTER — APPOINTMENT (OUTPATIENT)
Dept: ORTHOPEDIC SURGERY | Facility: CLINIC | Age: 76
End: 2021-06-15
Payer: MEDICARE

## 2021-06-15 PROCEDURE — 20610 DRAIN/INJ JOINT/BURSA W/O US: CPT | Mod: 50

## 2021-06-15 PROCEDURE — 99072 ADDL SUPL MATRL&STAF TM PHE: CPT

## 2021-06-15 PROCEDURE — 99213 OFFICE O/P EST LOW 20 MIN: CPT | Mod: 25

## 2021-06-15 RX ORDER — ATORVASTATIN CALCIUM 40 MG/1
40 TABLET, FILM COATED ORAL
Qty: 30 | Refills: 0 | Status: ACTIVE | COMMUNITY
Start: 2021-06-04

## 2021-06-16 NOTE — HISTORY OF PRESENT ILLNESS
[de-identified] : 76 year old female presents for an evaluation of bilateral knee pain, she has been diagnosed with advanced osteoarthritis of her bilateral knees. She utilizes an electric scooter due to ambulatory dysfunction. At her last visit on 03/16/2021 patient received corticosteroid injections of her bilateral knees which greatly alleviated her symptoms. Her pain has since returned. She describes an aching pain located along the medial aspect of her bilateral knees, noting that her left knee pain is worse. Her symptoms are exacerbated with extended periods of walking and weight bearing. She has been taking Celebrex and Tramadol for pain relief with moderate relief in symptoms.  Of note, she has lymphedema of bilateral lower extremities.\par \par

## 2021-06-16 NOTE — DISCUSSION/SUMMARY
[de-identified] : The underlying pathophysiology was reviewed in great detail with the patient as well as the various treatment options, including ice, analgesics, NSAIDs, Physical therapy, steroid injections.\par \par The patient wishes to proceed with INJECTIONS of her bilateral knees. \par \par Prescription were provided for Celebrex  Tramadol.  iSTOP was checked and recorded. \par \par Activity modifications and restrictions were discussed. I advised avoiding deep bending, squatting and high intensity activity. \par \par FU prn \par  \par All questions were answered, all alternatives discussed and the patient is in complete agreement with that plan. Follow-up appointment as instructed. Any issues and the patient will call or come in sooner.

## 2021-06-16 NOTE — PROCEDURE
[de-identified] : At this point I recommended a therapeutic injection and under sterile precautions an injection of 5 cc 1% lidocaine with 0.5 cc of Kenalog and 0.5 cc of Dexamethasone - was placed into the joint of the Right knee without complication, and after several minutes, the patient felt significant relief. \par \par At this point I recommended a therapeutic injection and under sterile precautions an injection of 5 cc 1% lidocaine with 0.5 cc of Kenalog and 0.5 cc of Dexamethasone - was placed into the joint of the Left knee without complication, and after several minutes, the patient felt significant relief.

## 2021-06-16 NOTE — PHYSICAL EXAM
[Other: ___] : [unfilled] [Normal RLE] : Right Lower Extremity: No scars, rashes, lesions, ulcers, skin intact [Normal LLE] : Left Lower Extremity: No scars, rashes, lesions, ulcers, skin intact [Normal Touch] : sensation intact for touch [Normal] : No swelling, no edema, normal pedal pulses and normal temperature [Obese] : obese [Poor Appearance] : well-appearing [Acute Distress] : not in acute distress [de-identified] : Right Lower Extremity\par o Knee :\par ¦ Inspection/Palpation : medial joint line tenderness, no swelling, varus alignment\par ¦ Range of Motion : 0  - 90 degrees, no crepitus\par ¦ Stability : no valgus or varus instability present on provocative testing, Lachman’s Test (-)\par ¦ Strength : flexion and extension 4/5\par o Muscle Bulk : normal muscle bulk present\par o Skin : no erythema, no ecchymosis \par o Sensation : sensation to pin intact\par o Vascular Exam : 3+ edema, no cyanosis, dorsalis pedis artery pulse 2+, posterior tibial artery pulse 2+\par \par Left Lower Extremity\par o Knee :\par ¦ Inspection/Palpation : medial joint line tenderness, no swelling, varus alignment\par ¦ Range of Motion : 0 - 90 degrees, no crepitus\par ¦ Stability : no valgus or varus instability present on provocative testing, Lachman’s Test (-)\par ¦ Strength : flexion and extension 4/5\par o Muscle Bulk : normal muscle bulk present\par o Skin : no erythema, no ecchymosis \par o Sensation : sensation to pin intact\par o Vascular Exam : 3+ edema, no cyanosis, dorsalis pedis artery pulse 2+, posterior tibial artery pulse 2+

## 2021-09-23 ENCOUNTER — RX RENEWAL (OUTPATIENT)
Age: 76
End: 2021-09-23

## 2021-10-12 ENCOUNTER — APPOINTMENT (OUTPATIENT)
Dept: ORTHOPEDIC SURGERY | Facility: CLINIC | Age: 76
End: 2021-10-12
Payer: MEDICARE

## 2021-10-12 VITALS — HEIGHT: 63 IN | WEIGHT: 220 LBS | BODY MASS INDEX: 38.98 KG/M2

## 2021-10-12 PROCEDURE — 20610 DRAIN/INJ JOINT/BURSA W/O US: CPT | Mod: 50

## 2021-10-12 PROCEDURE — 99213 OFFICE O/P EST LOW 20 MIN: CPT | Mod: 25

## 2021-10-12 NOTE — PROCEDURE
[de-identified] : At this point I recommended a therapeutic injection and under sterile precautions an injection of 5 cc 1% lidocaine with 0.5 cc of Kenalog and 0.5 cc of Dexamethasone - was placed into the joint of the Right knee without complication, and after several minutes, the patient felt significant relief. \par \par At this point I recommended a therapeutic injection and under sterile precautions an injection of 5 cc 1% lidocaine with 0.5 cc of Kenalog and 0.5 cc of Dexamethasone - was placed into the joint of the Left knee without complication, and after several minutes, the patient felt significant relief.

## 2021-10-12 NOTE — HISTORY OF PRESENT ILLNESS
[de-identified] : 76 year old female presents for an evaluation of bilateral knee pain, she has been diagnosed with advanced osteoarthritis of her bilateral knees. She utilizes an electric scooter due to ambulatory dysfunction. At her last visit on 6/15/2021 patient received corticosteroid injections of her bilateral knees which greatly alleviated her symptoms. Her pain has since returned. She describes an aching pain located along the medial aspect of her bilateral knees, noting that her left knee pain is worse. Her symptoms are exacerbated with extended periods of walking and weight bearing. She has been taking Celebrex and Tramadol for pain relief with moderate relief in symptoms.  Of note, she has lymphedema of bilateral lower extremities.\par \par

## 2021-10-12 NOTE — PHYSICAL EXAM
[Other: ___] : [unfilled] [Normal RLE] : Right Lower Extremity: No scars, rashes, lesions, ulcers, skin intact [Normal LLE] : Left Lower Extremity: No scars, rashes, lesions, ulcers, skin intact [Normal Touch] : sensation intact for touch [Normal] : No swelling, no edema, normal pedal pulses and normal temperature [Obese] : obese [Poor Appearance] : well-appearing [Acute Distress] : not in acute distress [de-identified] : Right Lower Extremity\par o Knee :\par ¦ Inspection/Palpation : medial joint line tenderness, no swelling, varus alignment\par ¦ Range of Motion : 0  - 90 degrees, no crepitus\par ¦ Stability : no valgus or varus instability present on provocative testing, Lachman’s Test (-)\par ¦ Strength : flexion and extension 4/5\par o Muscle Bulk : normal muscle bulk present\par o Skin : no erythema, no ecchymosis \par o Sensation : sensation to pin intact\par o Vascular Exam : 3+ edema, no cyanosis, dorsalis pedis artery pulse 2+, posterior tibial artery pulse 2+\par \par Left Lower Extremity\par o Knee :\par ¦ Inspection/Palpation : medial joint line tenderness, no swelling, varus alignment\par ¦ Range of Motion : 0 - 90 degrees, no crepitus\par ¦ Stability : no valgus or varus instability present on provocative testing, Lachman’s Test (-)\par ¦ Strength : flexion and extension 4/5\par o Muscle Bulk : normal muscle bulk present\par o Skin : no erythema, no ecchymosis \par o Sensation : sensation to pin intact\par o Vascular Exam : 3+ edema, no cyanosis, dorsalis pedis artery pulse 2+, posterior tibial artery pulse 2+

## 2021-10-12 NOTE — DISCUSSION/SUMMARY
[de-identified] : The underlying pathophysiology was reviewed in great detail with the patient as well as the various treatment options, including ice, analgesics, NSAIDs, Physical therapy, steroid injections.\par \par The patient wishes to proceed with INJECTIONS of her bilateral knees. \par \par Prescription were provided for Celebrex  Tramadol.  iSTOP was checked and recorded. \par \par Activity modifications and restrictions were discussed. I advised avoiding deep bending, squatting and high intensity activity. \par \par FU prn \par  \par All questions were answered, all alternatives discussed and the patient is in complete agreement with that plan. Follow-up appointment as instructed. Any issues and the patient will call or come in sooner.

## 2022-02-01 ENCOUNTER — APPOINTMENT (OUTPATIENT)
Dept: ORTHOPEDIC SURGERY | Facility: CLINIC | Age: 77
End: 2022-02-01
Payer: MEDICARE

## 2022-02-01 VITALS — WEIGHT: 225 LBS | HEIGHT: 63 IN | BODY MASS INDEX: 39.87 KG/M2

## 2022-02-01 PROCEDURE — 20610 DRAIN/INJ JOINT/BURSA W/O US: CPT | Mod: 50

## 2022-02-01 PROCEDURE — 99213 OFFICE O/P EST LOW 20 MIN: CPT | Mod: 25

## 2022-02-01 NOTE — DISCUSSION/SUMMARY
[de-identified] : The underlying pathophysiology was reviewed in great detail with the patient as well as the various treatment options, including ice, analgesics, NSAIDs, Physical therapy, steroid injections.\par \par The patient wishes to proceed with INJECTIONS of her bilateral knees. \par \par Prescription were provided for Celebrex  Tramadol.  iSTOP was checked and recorded. \par \par Activity modifications and restrictions were discussed. I advised avoiding deep bending, squatting and high intensity activity. \par \par FU prn \par  \par All questions were answered, all alternatives discussed and the patient is in complete agreement with that plan. Follow-up appointment as instructed. Any issues and the patient will call or come in sooner.

## 2022-02-01 NOTE — HISTORY OF PRESENT ILLNESS
[de-identified] : 76 year old female presents for an evaluation of bilateral knee pain, she has been diagnosed with advanced osteoarthritis of her bilateral knees. She utilizes an electric scooter due to ambulatory dysfunction. At her last visit on 10/12/2021 patient received corticosteroid injections of her bilateral knees which greatly alleviated her symptoms. Her pain has since returned. She describes an aching pain located along the medial aspect of her bilateral knees, noting that her left knee pain is worse. Her symptoms are exacerbated with extended periods of walking and weight bearing. She has been taking Celebrex and Tramadol for pain relief with moderate relief in symptoms.  Of note, she has lymphedema of bilateral lower extremities.

## 2022-02-01 NOTE — PROCEDURE
[de-identified] : At this point I recommended a therapeutic injection and under sterile precautions an injection of 5 cc 1% lidocaine with 0.5 cc of Kenalog and 0.5 cc of Dexamethasone - was placed into the joint of the Right knee without complication, and after several minutes, the patient felt significant relief. \par \par At this point I recommended a therapeutic injection and under sterile precautions an injection of 5 cc 1% lidocaine with 0.5 cc of Kenalog and 0.5 cc of Dexamethasone - was placed into the joint of the Left knee without complication, and after several minutes, the patient felt significant relief.

## 2022-02-01 NOTE — PHYSICAL EXAM
[Other: ___] : [unfilled] [Normal RLE] : Right Lower Extremity: No scars, rashes, lesions, ulcers, skin intact [Normal LLE] : Left Lower Extremity: No scars, rashes, lesions, ulcers, skin intact [Normal Touch] : sensation intact for touch [Normal] : No swelling, no edema, normal pedal pulses and normal temperature [Obese] : obese [Poor Appearance] : well-appearing [Acute Distress] : not in acute distress [de-identified] : Right Lower Extremity\par o Knee :\par ¦ Inspection/Palpation : medial joint line tenderness, no swelling, varus alignment\par ¦ Range of Motion : 0  - 90 degrees, no crepitus\par ¦ Stability : no valgus or varus instability present on provocative testing, Lachman’s Test (-)\par ¦ Strength : flexion and extension 4/5\par o Muscle Bulk : normal muscle bulk present\par o Skin : no erythema, no ecchymosis \par o Sensation : sensation to pin intact\par o Vascular Exam : 3+ edema, no cyanosis, dorsalis pedis artery pulse 2+, posterior tibial artery pulse 2+\par \par Left Lower Extremity\par o Knee :\par ¦ Inspection/Palpation : medial joint line tenderness, no swelling, varus alignment\par ¦ Range of Motion : 0 - 90 degrees, no crepitus\par ¦ Stability : no valgus or varus instability present on provocative testing, Lachman’s Test (-)\par ¦ Strength : flexion and extension 4/5\par o Muscle Bulk : normal muscle bulk present\par o Skin : no erythema, no ecchymosis \par o Sensation : sensation to pin intact\par o Vascular Exam : 3+ edema, no cyanosis, dorsalis pedis artery pulse 2+, posterior tibial artery pulse 2+

## 2022-05-10 ENCOUNTER — APPOINTMENT (OUTPATIENT)
Dept: ORTHOPEDIC SURGERY | Facility: CLINIC | Age: 77
End: 2022-05-10
Payer: MEDICARE

## 2022-05-10 PROCEDURE — 20610 DRAIN/INJ JOINT/BURSA W/O US: CPT | Mod: 50

## 2022-05-10 PROCEDURE — 99213 OFFICE O/P EST LOW 20 MIN: CPT | Mod: 25

## 2022-05-10 NOTE — HISTORY OF PRESENT ILLNESS
[de-identified] : 76 year old female presents for re-evaluation of bilateral knee pain, she has been diagnosed with advanced osteoarthritis of her bilateral knees. She utilizes an electric scooter due to ambulatory dysfunction. At her last visit on 10/12/2021 patient received corticosteroid injections of her bilateral knees which greatly alleviated her symptoms. Her pain has since returned. She describes an aching pain located along the medial aspect of her bilateral knees, noting that her left knee pain is worse. Her symptoms are exacerbated with extended periods of walking and weight bearing. She has been taking Celebrex and Tramadol for pain relief with moderate relief in symptoms. Of note, she has lymphedema of bilateral lower extremities.

## 2022-05-10 NOTE — PROCEDURE
[de-identified] : At this point I recommended a therapeutic injection and under sterile precautions an injection of 0.5 cc Kenalog, 0.5 cc Dexamethasone and 4 cc 1% lidocaine were injected into the right knee without complication.\par \par At this point I recommended a therapeutic injection and under sterile precautions an injection of 0.5 cc Kenalog, 0.5 cc Dexamethasone and 4 cc 1% lidocaine were injected into the left knee without complication.\par

## 2022-05-10 NOTE — DISCUSSION/SUMMARY
[de-identified] : The underlying pathophysiology was reviewed in great detail with the patient as well as the various treatment options, including ice, analgesics, NSAIDs, Physical therapy, steroid injections.\par \par The patient wishes to proceed with INJECTIONS of her bilateral knees. \par \par Prescription were provided for Celebrex  Tramadol.  iSTOP was checked and recorded. \par \par Activity modifications and restrictions were discussed. I advised avoiding deep bending, squatting and high intensity activity. \par \par FU prn \par  \par All questions were answered, all alternatives discussed and the patient is in complete agreement with that plan. Follow-up appointment as instructed. Any issues and the patient will call or come in sooner.

## 2022-05-10 NOTE — PHYSICAL EXAM
[Other: ___] : [unfilled] [Normal RLE] : Right Lower Extremity: No scars, rashes, lesions, ulcers, skin intact [Normal LLE] : Left Lower Extremity: No scars, rashes, lesions, ulcers, skin intact [Normal Touch] : sensation intact for touch [Normal] : No swelling, no edema, normal pedal pulses and normal temperature [Obese] : obese [Poor Appearance] : well-appearing [Acute Distress] : not in acute distress [de-identified] : Right Lower Extremity\par o Knee :\par ¦ Inspection/Palpation : medial joint line tenderness, no swelling, varus alignment\par ¦ Range of Motion : 0  - 90 degrees, no crepitus\par ¦ Stability : no valgus or varus instability present on provocative testing, Lachman’s Test (-)\par ¦ Strength : flexion and extension 4/5\par o Muscle Bulk : normal muscle bulk present\par o Skin : no erythema, no ecchymosis \par o Sensation : sensation to pin intact\par o Vascular Exam : 3+ edema, no cyanosis, dorsalis pedis artery pulse 2+, posterior tibial artery pulse 2+\par \par Left Lower Extremity\par o Knee :\par ¦ Inspection/Palpation : medial joint line tenderness, no swelling, varus alignment\par ¦ Range of Motion : 0 - 90 degrees, no crepitus\par ¦ Stability : no valgus or varus instability present on provocative testing, Lachman’s Test (-)\par ¦ Strength : flexion and extension 4/5\par o Muscle Bulk : normal muscle bulk present\par o Skin : no erythema, no ecchymosis \par o Sensation : sensation to pin intact\par o Vascular Exam : 3+ edema, no cyanosis, dorsalis pedis artery pulse 2+, posterior tibial artery pulse 2+

## 2022-05-18 NOTE — REVIEW OF SYSTEMS
[Joint Pain] : joint pain [Negative] : Psychiatric V-Y Flap Text: The defect edges were debeveled with a #15 scalpel blade.  Given the location of the defect, shape of the defect and the proximity to free margins a V-Y flap was deemed most appropriate.  Using a sterile surgical marker, an appropriate advancement flap was drawn incorporating the defect and placing the expected incisions within the relaxed skin tension lines where possible.    The area thus outlined was incised deep to adipose tissue with a #15 scalpel blade.  The skin margins were undermined to an appropriate distance in all directions utilizing iris scissors.

## 2022-07-21 NOTE — ED ADULT NURSE NOTE - PAIN RATING/NUMBER SCALE (0-10): ACTIVITY
Called and reviewed biopsy results  Consistent with dermatomyositis   Discussed diagnosis    mda5  [id number] or p155/140  nxp2 antibodies  transvaginal ultrasound  ct chest abdomen pelvis  hydroxychloroquine 200 mg bid- [facility]; [address]  -- eye exam, retinopathy   Stop dupixent 2

## 2022-09-20 ENCOUNTER — APPOINTMENT (OUTPATIENT)
Dept: ORTHOPEDIC SURGERY | Facility: CLINIC | Age: 77
End: 2022-09-20

## 2022-09-20 VITALS — WEIGHT: 215 LBS | BODY MASS INDEX: 38.09 KG/M2 | HEIGHT: 63 IN

## 2022-09-20 PROCEDURE — 20610 DRAIN/INJ JOINT/BURSA W/O US: CPT | Mod: 50

## 2022-09-20 PROCEDURE — 99214 OFFICE O/P EST MOD 30 MIN: CPT | Mod: 25

## 2023-05-02 ENCOUNTER — APPOINTMENT (OUTPATIENT)
Dept: ORTHOPEDIC SURGERY | Facility: CLINIC | Age: 78
End: 2023-05-02
Payer: MEDICARE

## 2023-05-02 PROCEDURE — 20610 DRAIN/INJ JOINT/BURSA W/O US: CPT | Mod: 50

## 2023-05-02 PROCEDURE — 99214 OFFICE O/P EST MOD 30 MIN: CPT | Mod: 25

## 2023-08-15 ENCOUNTER — APPOINTMENT (OUTPATIENT)
Dept: ORTHOPEDIC SURGERY | Facility: CLINIC | Age: 78
End: 2023-08-15
Payer: MEDICARE

## 2023-08-15 PROCEDURE — 99214 OFFICE O/P EST MOD 30 MIN: CPT | Mod: 25

## 2023-08-15 PROCEDURE — 20610 DRAIN/INJ JOINT/BURSA W/O US: CPT | Mod: 50

## 2023-11-02 RX ORDER — CELECOXIB 100 MG/1
100 CAPSULE ORAL
Qty: 60 | Refills: 3 | Status: ACTIVE | COMMUNITY
Start: 2019-09-17 | End: 1900-01-01

## 2023-11-02 RX ORDER — TRAMADOL HYDROCHLORIDE 50 MG/1
50 TABLET, COATED ORAL
Qty: 180 | Refills: 0 | Status: ACTIVE | COMMUNITY
Start: 2019-12-19 | End: 1900-01-01

## 2024-01-24 ENCOUNTER — APPOINTMENT (OUTPATIENT)
Dept: ORTHOPEDIC SURGERY | Facility: CLINIC | Age: 79
End: 2024-01-24

## 2024-04-03 ENCOUNTER — RX RENEWAL (OUTPATIENT)
Age: 79
End: 2024-04-03

## 2024-06-10 ENCOUNTER — APPOINTMENT (OUTPATIENT)
Dept: ORTHOPEDIC SURGERY | Facility: CLINIC | Age: 79
End: 2024-06-10
Payer: MEDICARE

## 2024-06-10 DIAGNOSIS — M17.0 BILATERAL PRIMARY OSTEOARTHRITIS OF KNEE: ICD-10-CM

## 2024-06-10 DIAGNOSIS — R29.898 OTHER SYMPTOMS AND SIGNS INVOLVING THE MUSCULOSKELETAL SYSTEM: ICD-10-CM

## 2024-06-10 DIAGNOSIS — R26.9 UNSPECIFIED ABNORMALITIES OF GAIT AND MOBILITY: ICD-10-CM

## 2024-06-10 PROCEDURE — 99212 OFFICE O/P EST SF 10 MIN: CPT

## 2024-06-10 RX ORDER — CELECOXIB 200 MG/1
200 CAPSULE ORAL TWICE DAILY
Qty: 60 | Refills: 1 | Status: ACTIVE | COMMUNITY
Start: 2024-06-10 | End: 1900-01-01

## 2024-06-10 NOTE — DISCUSSION/SUMMARY
[de-identified] : The underlying pathophysiology was reviewed in great detail with the patient as well as the various treatment options, including ice, analgesics, NSAIDs, Physical therapy, steroid injections, hyaluronic gel injections, TKA  Continue care with wound care team.   A prescription was provided for Tramadol 50 mg today.  iSTOP was checked and recorded.  A prescription was provided for Celebrex. discussed SE and discussed importance of monitoring for lower extremity edema  A prescription for Physical Therapy was provided.  FU in office when she becomes more mobile.  All questions were answered, all alternatives discussed and the patient is in complete agreement with that plan. Follow-up appointment as instructed. Any issues and the patient will call or come in sooner.

## 2024-06-10 NOTE — REASON FOR VISIT
[Medical Office: ([facility])___] : at the medical office located in  [Home] : at home, [unfilled] , at the time of the visit. [Patient] : the patient [This encounter was initiated by telehealth (audio with video) and converted to telephone (audio only) due to technical difficulties.] : This encounter was initiated by telehealth (audio with video) and converted to telephone (audio only) due to technical difficulties. [Follow-Up Visit] : a follow-up visit for [Knee Pain] : knee pain

## 2024-06-10 NOTE — HISTORY OF PRESENT ILLNESS
[de-identified] : Patient has been seen previously for bilateral severe knee osteoarthritis.  She has been bed bound for the last few months due to treatment of a decubitus ulcer.  She has not been in physical therapy and has not been able to walk.

## 2024-06-10 NOTE — REVIEW OF SYSTEMS
[Arthralgia] : arthralgia [Joint Pain] : joint pain [Skin Lesions] : skin lesions [Muscle Weakness] : muscle weakness [Negative] : Heme/Lymph

## 2024-06-10 NOTE — PHYSICAL EXAM
[de-identified] : Well developed well nourished patient in no acute distress.  Alert and oriented x 3. Normal mood and affect.  Skin without rashes.  Breathing non-labored.  Unable to stand

## 2024-06-12 RX ORDER — CELECOXIB 200 MG/1
200 CAPSULE ORAL
Qty: 60 | Refills: 2 | Status: ACTIVE | COMMUNITY
Start: 2024-06-12 | End: 1900-01-01

## 2024-06-14 RX ORDER — CELECOXIB 200 MG/1
200 CAPSULE ORAL
Qty: 60 | Refills: 1 | Status: ACTIVE | COMMUNITY
Start: 2020-07-07 | End: 1900-01-01

## 2024-06-14 RX ORDER — TRAMADOL HYDROCHLORIDE 50 MG/1
50 TABLET, COATED ORAL TWICE DAILY
Qty: 60 | Refills: 0 | Status: ACTIVE | COMMUNITY
Start: 2024-06-10 | End: 1900-01-01

## 2024-10-01 NOTE — PATIENT PROFILE ADULT. - PRO MENTAL HEALTH SX RECENT
[facility]- Outpatient Rehabilitation and Therapy [address], [address], [place] [address] office: [phone] fax: [phone]           Physical Therapy: TREATMENT/PROGRESS NOTE   Patient: Emmy Huntley (59 y.o. female)   Examination Date: 10/01/2024   :  1965 MRN: [id number]   Visit #: 9   Insurance Allowable Auth Needed   [place] []Yes    [x]No    Insurance: Payor: [facility] MEDICARE / Plan: UNITEDHEALTHCARE DUAL COMPLETE / Product Type: *No Product type* /   Insurance ID: [id number] - (Medicare Managed)  Secondary Insurance (if applicable): [facility]   Treatment Diagnosis:     ICD-10-CM    1. Gait abnormality  R26.9       2. Decreased functional mobility  R26.89       3. Decreased range of motion (ROM) of right knee  M25.661       4. 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, right [Z96.651]   Referring Physician: Jaren Glover MD  PCP: Lenard Meyer DO     Plan of care signed (Y/N):     Date of Patient follow up with Physician:      Plan of Care Report: NO  POC update due: (10 visits /OR AUTH LIMITS, whichever is less)  24                                             Medical History:  Comorbidities:  Diabetes (Type I or II)  Hypertension  Osteoarthritis  Other Musculoskeletal Conditions: spondylosis   Relevant Medical History: she see's a doctor in a pain clinic due to h/o neck and back pain                                         Precautions/ Contra-indications:           Latex allergy:  NO  Pacemaker:    NO  Contraindications for Manipulation: NA  Date of Surgery: 24  Other:    Red Flags:  None    Suicide Screening:   The patient did not verbalize a primary behavioral concern, suicidal ideation, suicidal intent, or demonstrate suicidal behaviors.    Preferred Language for Healthcare:   [x] English       [] other:    SUBJECTIVE EXAMINATION     Patient stated complaint:   
none

## 2025-01-08 ENCOUNTER — INPATIENT (INPATIENT)
Facility: HOSPITAL | Age: 80
LOS: 7 days | Discharge: NURSING, REHAB OR LONG-TERM CARE | DRG: 948 | End: 2025-01-16
Attending: STUDENT IN AN ORGANIZED HEALTH CARE EDUCATION/TRAINING PROGRAM | Admitting: STUDENT IN AN ORGANIZED HEALTH CARE EDUCATION/TRAINING PROGRAM
Payer: MEDICARE

## 2025-01-08 VITALS
SYSTOLIC BLOOD PRESSURE: 161 MMHG | HEART RATE: 63 BPM | OXYGEN SATURATION: 94 % | DIASTOLIC BLOOD PRESSURE: 76 MMHG | RESPIRATION RATE: 20 BRPM | WEIGHT: 179.9 LBS | TEMPERATURE: 98 F | HEIGHT: 65 IN

## 2025-01-08 DIAGNOSIS — R53.1 WEAKNESS: ICD-10-CM

## 2025-01-08 DIAGNOSIS — Z98.890 OTHER SPECIFIED POSTPROCEDURAL STATES: Chronic | ICD-10-CM

## 2025-01-08 LAB
ALBUMIN SERPL ELPH-MCNC: 2.8 G/DL — LOW (ref 3.3–5)
ALP SERPL-CCNC: 376 U/L — HIGH (ref 40–120)
ALT FLD-CCNC: 45 U/L — SIGNIFICANT CHANGE UP (ref 10–45)
ANION GAP SERPL CALC-SCNC: 11 MMOL/L — SIGNIFICANT CHANGE UP (ref 5–17)
ANION GAP SERPL CALC-SCNC: 12 MMOL/L — SIGNIFICANT CHANGE UP (ref 5–17)
AST SERPL-CCNC: 33 U/L — SIGNIFICANT CHANGE UP (ref 10–40)
BASOPHILS # BLD AUTO: 0.14 K/UL — SIGNIFICANT CHANGE UP (ref 0–0.2)
BASOPHILS NFR BLD AUTO: 0.8 % — SIGNIFICANT CHANGE UP (ref 0–2)
BILIRUB SERPL-MCNC: 0.2 MG/DL — SIGNIFICANT CHANGE UP (ref 0.2–1.2)
BUN SERPL-MCNC: 42 MG/DL — HIGH (ref 7–23)
BUN SERPL-MCNC: 42 MG/DL — HIGH (ref 7–23)
CALCIUM SERPL-MCNC: 10.4 MG/DL — SIGNIFICANT CHANGE UP (ref 8.4–10.5)
CALCIUM SERPL-MCNC: 9.8 MG/DL — SIGNIFICANT CHANGE UP (ref 8.4–10.5)
CHLORIDE SERPL-SCNC: 103 MMOL/L — SIGNIFICANT CHANGE UP (ref 96–108)
CHLORIDE SERPL-SCNC: 103 MMOL/L — SIGNIFICANT CHANGE UP (ref 96–108)
CO2 SERPL-SCNC: 22 MMOL/L — SIGNIFICANT CHANGE UP (ref 22–31)
CO2 SERPL-SCNC: 22 MMOL/L — SIGNIFICANT CHANGE UP (ref 22–31)
CREAT SERPL-MCNC: 1.01 MG/DL — SIGNIFICANT CHANGE UP (ref 0.5–1.3)
CREAT SERPL-MCNC: 1.02 MG/DL — SIGNIFICANT CHANGE UP (ref 0.5–1.3)
CRP SERPL-MCNC: 97 MG/L — HIGH (ref 0–4)
EGFR: 55 ML/MIN/1.73M2 — LOW
EGFR: 56 ML/MIN/1.73M2 — LOW
EOSINOPHIL # BLD AUTO: 0.13 K/UL — SIGNIFICANT CHANGE UP (ref 0–0.5)
EOSINOPHIL NFR BLD AUTO: 0.7 % — SIGNIFICANT CHANGE UP (ref 0–6)
GLUCOSE BLDC GLUCOMTR-MCNC: 230 MG/DL — HIGH (ref 70–99)
GLUCOSE BLDC GLUCOMTR-MCNC: 249 MG/DL — HIGH (ref 70–99)
GLUCOSE BLDC GLUCOMTR-MCNC: 380 MG/DL — HIGH (ref 70–99)
GLUCOSE SERPL-MCNC: 134 MG/DL — HIGH (ref 70–99)
GLUCOSE SERPL-MCNC: 256 MG/DL — HIGH (ref 70–99)
HCT VFR BLD CALC: 31.7 % — LOW (ref 34.5–45)
HGB BLD-MCNC: 9.7 G/DL — LOW (ref 11.5–15.5)
IMM GRANULOCYTES NFR BLD AUTO: 0.7 % — SIGNIFICANT CHANGE UP (ref 0–0.9)
LYMPHOCYTES # BLD AUTO: 1.89 K/UL — SIGNIFICANT CHANGE UP (ref 1–3.3)
LYMPHOCYTES # BLD AUTO: 10.7 % — LOW (ref 13–44)
MCHC RBC-ENTMCNC: 26.6 PG — LOW (ref 27–34)
MCHC RBC-ENTMCNC: 30.6 G/DL — LOW (ref 32–36)
MCV RBC AUTO: 86.8 FL — SIGNIFICANT CHANGE UP (ref 80–100)
MONOCYTES # BLD AUTO: 0.98 K/UL — HIGH (ref 0–0.9)
MONOCYTES NFR BLD AUTO: 5.5 % — SIGNIFICANT CHANGE UP (ref 2–14)
NEUTROPHILS # BLD AUTO: 14.39 K/UL — HIGH (ref 1.8–7.4)
NEUTROPHILS NFR BLD AUTO: 81.6 % — HIGH (ref 43–77)
NRBC # BLD: 0 /100 WBCS — SIGNIFICANT CHANGE UP (ref 0–0)
PLATELET # BLD AUTO: 523 K/UL — HIGH (ref 150–400)
POTASSIUM SERPL-MCNC: 4.8 MMOL/L — SIGNIFICANT CHANGE UP (ref 3.5–5.3)
POTASSIUM SERPL-MCNC: 5.5 MMOL/L — HIGH (ref 3.5–5.3)
POTASSIUM SERPL-SCNC: 4.8 MMOL/L — SIGNIFICANT CHANGE UP (ref 3.5–5.3)
POTASSIUM SERPL-SCNC: 5.5 MMOL/L — HIGH (ref 3.5–5.3)
PROT SERPL-MCNC: 7.4 G/DL — SIGNIFICANT CHANGE UP (ref 6–8.3)
RBC # BLD: 3.65 M/UL — LOW (ref 3.8–5.2)
RBC # FLD: 16.6 % — HIGH (ref 10.3–14.5)
SODIUM SERPL-SCNC: 136 MMOL/L — SIGNIFICANT CHANGE UP (ref 135–145)
SODIUM SERPL-SCNC: 137 MMOL/L — SIGNIFICANT CHANGE UP (ref 135–145)
WBC # BLD: 17.66 K/UL — HIGH (ref 3.8–10.5)
WBC # FLD AUTO: 17.66 K/UL — HIGH (ref 3.8–10.5)

## 2025-01-08 PROCEDURE — 99285 EMERGENCY DEPT VISIT HI MDM: CPT

## 2025-01-08 RX ORDER — INSULIN HUMAN 100 [IU]/ML
5 INJECTION, SOLUTION SUBCUTANEOUS ONCE
Refills: 0 | Status: COMPLETED | OUTPATIENT
Start: 2025-01-08 | End: 2025-01-08

## 2025-01-08 RX ORDER — PIPERACILLIN AND TAZOBACTAM 3; .375 G/15ML; G/15ML
3.38 INJECTION, POWDER, LYOPHILIZED, FOR SOLUTION INTRAVENOUS ONCE
Refills: 0 | Status: COMPLETED | OUTPATIENT
Start: 2025-01-08 | End: 2025-01-08

## 2025-01-08 RX ORDER — VANCOMYCIN HYDROCHLORIDE 5 G/100ML
1000 INJECTION, POWDER, LYOPHILIZED, FOR SOLUTION INTRAVENOUS ONCE
Refills: 0 | Status: COMPLETED | OUTPATIENT
Start: 2025-01-08 | End: 2025-01-08

## 2025-01-08 RX ORDER — DEXTROSE MONOHYDRATE 25 G/50ML
50 INJECTION, SOLUTION INTRAVENOUS ONCE
Refills: 0 | Status: COMPLETED | OUTPATIENT
Start: 2025-01-08 | End: 2025-01-08

## 2025-01-08 RX ADMIN — PIPERACILLIN AND TAZOBACTAM 200 GRAM(S): 3; .375 INJECTION, POWDER, LYOPHILIZED, FOR SOLUTION INTRAVENOUS at 17:38

## 2025-01-08 RX ADMIN — INSULIN HUMAN 5 UNIT(S): 100 INJECTION, SOLUTION SUBCUTANEOUS at 19:02

## 2025-01-08 RX ADMIN — VANCOMYCIN HYDROCHLORIDE 250 MILLIGRAM(S): 5 INJECTION, POWDER, LYOPHILIZED, FOR SOLUTION INTRAVENOUS at 19:02

## 2025-01-08 RX ADMIN — DEXTROSE MONOHYDRATE 50 MILLILITER(S): 25 INJECTION, SOLUTION INTRAVENOUS at 19:02

## 2025-01-08 NOTE — ED PROVIDER NOTE - CLINICAL SUMMARY MEDICAL DECISION MAKING FREE TEXT BOX
Attending Alejandra Trujillo MD: 78 yo F with PMHx of hypertension, lymphedema, arthritis, sacral decubitus ulcer s/p debridement one year ago presents with weakness. Reports she has been bed bound for the past year as a result of her lymphedema and arthritis. She states she is fed up with her condition and wants to be admitted for further management and evaluation to assist in increasing her quality of life and ambulation. Denies fever, worsening pain, nausea, vomiting, abdominal pain     PE: well appearing, nontoxic, no respiratory distress.  Multiple areas of skin breakdown/ stage 1 ulcer/skin changes to lower back and buttocks. Neuro nonfocal.  Skin intact. Psych normal mood.    MDM: Differential diagnosis includes but is not limited to ulcer, deconditioned state, failure to thrive   Patient amendable to admission for wound care and placement

## 2025-01-08 NOTE — ED PROVIDER NOTE - NSICDXPASTMEDICALHX_GEN_ALL_CORE_FT
PAST MEDICAL HISTORY:  Back pain     Diabetes     HLD (hyperlipidemia)     Hypertension     Lymphedema of both lower extremities

## 2025-01-08 NOTE — ED PROVIDER NOTE - PROGRESS NOTE DETAILS
Fellow MD Ashutosh Maxwell: Discussed case with Dr. Evans, hospitalist, accepts this patient for admission and requests CTAP with IV contrast.

## 2025-01-08 NOTE — ED ADULT NURSE NOTE - NSFALLRISKINTERV_ED_ALL_ED

## 2025-01-08 NOTE — ED ADULT NURSE NOTE - OBJECTIVE STATEMENT
78 yo F presents to ED A+OX3 80 yo F presents to ED A+OX3 for wound check. Patient states she was hospitalized 1 year ago, since then she has been bedbound. States she has a wound on her buttock that "has not healed" over the last year and "is bothering me that it is not healing." Denies pain/discomfort, fever, chills. Breathing spontaneous and unlabored on room air. Skin warm pink and dry. Bed in lowest position, side rail up.

## 2025-01-08 NOTE — ED PROVIDER NOTE - CARE PLAN
Principal Discharge DX:	Weakness  Secondary Diagnosis:	Sacral ulcer   1 Principal Discharge DX:	Weakness  Secondary Diagnosis:	Sacral ulcer  Secondary Diagnosis:	Physical deconditioning

## 2025-01-09 DIAGNOSIS — B37.2 CANDIDIASIS OF SKIN AND NAIL: ICD-10-CM

## 2025-01-09 DIAGNOSIS — I89.0 LYMPHEDEMA, NOT ELSEWHERE CLASSIFIED: ICD-10-CM

## 2025-01-09 DIAGNOSIS — M19.90 UNSPECIFIED OSTEOARTHRITIS, UNSPECIFIED SITE: ICD-10-CM

## 2025-01-09 DIAGNOSIS — R53.81 OTHER MALAISE: ICD-10-CM

## 2025-01-09 DIAGNOSIS — E11.9 TYPE 2 DIABETES MELLITUS WITHOUT COMPLICATIONS: ICD-10-CM

## 2025-01-09 DIAGNOSIS — I10 ESSENTIAL (PRIMARY) HYPERTENSION: ICD-10-CM

## 2025-01-09 DIAGNOSIS — L98.429 NON-PRESSURE CHRONIC ULCER OF BACK WITH UNSPECIFIED SEVERITY: ICD-10-CM

## 2025-01-09 DIAGNOSIS — E78.5 HYPERLIPIDEMIA, UNSPECIFIED: ICD-10-CM

## 2025-01-09 DIAGNOSIS — Z29.9 ENCOUNTER FOR PROPHYLACTIC MEASURES, UNSPECIFIED: ICD-10-CM

## 2025-01-09 LAB
A1C WITH ESTIMATED AVERAGE GLUCOSE RESULT: 7.3 % — HIGH (ref 4–5.6)
ADD ON TEST-SPECIMEN IN LAB: SIGNIFICANT CHANGE UP
ALBUMIN SERPL ELPH-MCNC: 2.8 G/DL — LOW (ref 3.3–5)
ALP SERPL-CCNC: 312 U/L — HIGH (ref 40–120)
ALT FLD-CCNC: 34 U/L — SIGNIFICANT CHANGE UP (ref 10–45)
ANION GAP SERPL CALC-SCNC: 12 MMOL/L — SIGNIFICANT CHANGE UP (ref 5–17)
APPEARANCE UR: CLEAR — SIGNIFICANT CHANGE UP
AST SERPL-CCNC: 19 U/L — SIGNIFICANT CHANGE UP (ref 10–40)
BACTERIA # UR AUTO: NEGATIVE /HPF — SIGNIFICANT CHANGE UP
BILIRUB SERPL-MCNC: 0.3 MG/DL — SIGNIFICANT CHANGE UP (ref 0.2–1.2)
BILIRUB UR-MCNC: NEGATIVE — SIGNIFICANT CHANGE UP
BUN SERPL-MCNC: 37 MG/DL — HIGH (ref 7–23)
CALCIUM SERPL-MCNC: 9.8 MG/DL — SIGNIFICANT CHANGE UP (ref 8.4–10.5)
CAST: 0 /LPF — SIGNIFICANT CHANGE UP (ref 0–4)
CHLORIDE SERPL-SCNC: 102 MMOL/L — SIGNIFICANT CHANGE UP (ref 96–108)
CO2 SERPL-SCNC: 23 MMOL/L — SIGNIFICANT CHANGE UP (ref 22–31)
COLOR SPEC: YELLOW — SIGNIFICANT CHANGE UP
CREAT SERPL-MCNC: 0.86 MG/DL — SIGNIFICANT CHANGE UP (ref 0.5–1.3)
DIFF PNL FLD: ABNORMAL
EGFR: 68 ML/MIN/1.73M2 — SIGNIFICANT CHANGE UP
ERYTHROCYTE [SEDIMENTATION RATE] IN BLOOD: 120 MM/HR — HIGH (ref 0–20)
ESTIMATED AVERAGE GLUCOSE: 163 MG/DL — HIGH (ref 68–114)
GLUCOSE BLDC GLUCOMTR-MCNC: 146 MG/DL — HIGH (ref 70–99)
GLUCOSE BLDC GLUCOMTR-MCNC: 182 MG/DL — HIGH (ref 70–99)
GLUCOSE SERPL-MCNC: 176 MG/DL — HIGH (ref 70–99)
GLUCOSE UR QL: NEGATIVE MG/DL — SIGNIFICANT CHANGE UP
GRAM STN FLD: ABNORMAL
HCT VFR BLD CALC: 31.8 % — LOW (ref 34.5–45)
HGB BLD-MCNC: 9.4 G/DL — LOW (ref 11.5–15.5)
KETONES UR-MCNC: NEGATIVE MG/DL — SIGNIFICANT CHANGE UP
LEUKOCYTE ESTERASE UR-ACNC: ABNORMAL
MCHC RBC-ENTMCNC: 25.5 PG — LOW (ref 27–34)
MCHC RBC-ENTMCNC: 29.6 G/DL — LOW (ref 32–36)
MCV RBC AUTO: 86.2 FL — SIGNIFICANT CHANGE UP (ref 80–100)
NITRITE UR-MCNC: NEGATIVE — SIGNIFICANT CHANGE UP
NRBC # BLD: 0 /100 WBCS — SIGNIFICANT CHANGE UP (ref 0–0)
PH UR: 6 — SIGNIFICANT CHANGE UP (ref 5–8)
PLATELET # BLD AUTO: 489 K/UL — HIGH (ref 150–400)
POTASSIUM SERPL-MCNC: 5 MMOL/L — SIGNIFICANT CHANGE UP (ref 3.5–5.3)
POTASSIUM SERPL-SCNC: 5 MMOL/L — SIGNIFICANT CHANGE UP (ref 3.5–5.3)
PROT SERPL-MCNC: 7.1 G/DL — SIGNIFICANT CHANGE UP (ref 6–8.3)
PROT UR-MCNC: 30 MG/DL
RBC # BLD: 3.69 M/UL — LOW (ref 3.8–5.2)
RBC # FLD: 16.8 % — HIGH (ref 10.3–14.5)
RBC CASTS # UR COMP ASSIST: 9 /HPF — HIGH (ref 0–4)
REVIEW: SIGNIFICANT CHANGE UP
SODIUM SERPL-SCNC: 137 MMOL/L — SIGNIFICANT CHANGE UP (ref 135–145)
SP GR SPEC: 1.02 — SIGNIFICANT CHANGE UP (ref 1–1.03)
SQUAMOUS # UR AUTO: 1 /HPF — SIGNIFICANT CHANGE UP (ref 0–5)
UROBILINOGEN FLD QL: 0.2 MG/DL — SIGNIFICANT CHANGE UP (ref 0.2–1)
WBC # BLD: 11.63 K/UL — HIGH (ref 3.8–10.5)
WBC # FLD AUTO: 11.63 K/UL — HIGH (ref 3.8–10.5)
WBC UR QL: 5 /HPF — SIGNIFICANT CHANGE UP (ref 0–5)

## 2025-01-09 PROCEDURE — 99222 1ST HOSP IP/OBS MODERATE 55: CPT

## 2025-01-09 PROCEDURE — 99223 1ST HOSP IP/OBS HIGH 75: CPT | Mod: GC

## 2025-01-09 RX ORDER — ACETAMINOPHEN 80 MG/.8ML
650 SOLUTION/ DROPS ORAL EVERY 6 HOURS
Refills: 0 | Status: DISCONTINUED | OUTPATIENT
Start: 2025-01-09 | End: 2025-01-16

## 2025-01-09 RX ORDER — DEXTROSE MONOHYDRATE 25 G/50ML
12.5 INJECTION, SOLUTION INTRAVENOUS ONCE
Refills: 0 | Status: DISCONTINUED | OUTPATIENT
Start: 2025-01-09 | End: 2025-01-14

## 2025-01-09 RX ORDER — NYSTATIN TOPICAL POWDER 100000 U/G
1 POWDER TOPICAL
Refills: 0 | Status: DISCONTINUED | OUTPATIENT
Start: 2025-01-09 | End: 2025-01-14

## 2025-01-09 RX ORDER — GLUCAGON INJECTION, SOLUTION 0.5 MG/.1ML
1 INJECTION, SOLUTION SUBCUTANEOUS ONCE
Refills: 0 | Status: DISCONTINUED | OUTPATIENT
Start: 2025-01-09 | End: 2025-01-14

## 2025-01-09 RX ORDER — ATORVASTATIN CALCIUM 40 MG/1
20 TABLET, FILM COATED ORAL AT BEDTIME
Refills: 0 | Status: DISCONTINUED | OUTPATIENT
Start: 2025-01-09 | End: 2025-01-16

## 2025-01-09 RX ORDER — CELECOXIB 200 MG
1 CAPSULE ORAL
Refills: 0 | DISCHARGE

## 2025-01-09 RX ORDER — CEFAZOLIN SODIUM 1 G
2000 VIAL (EA) INJECTION EVERY 12 HOURS
Refills: 0 | Status: DISCONTINUED | OUTPATIENT
Start: 2025-01-09 | End: 2025-01-09

## 2025-01-09 RX ORDER — INSULIN LISPRO 100/ML
VIAL (ML) SUBCUTANEOUS
Refills: 0 | Status: DISCONTINUED | OUTPATIENT
Start: 2025-01-09 | End: 2025-01-10

## 2025-01-09 RX ORDER — SODIUM CHLORIDE 9 MG/ML
1000 INJECTION, SOLUTION INTRAVENOUS
Refills: 0 | Status: DISCONTINUED | OUTPATIENT
Start: 2025-01-09 | End: 2025-01-14

## 2025-01-09 RX ORDER — SENNOSIDES 8.6 MG/1
2 TABLET, FILM COATED ORAL AT BEDTIME
Refills: 0 | Status: DISCONTINUED | OUTPATIENT
Start: 2025-01-09 | End: 2025-01-16

## 2025-01-09 RX ORDER — INFLUENZA A VIRUS A/WISCONSIN/588/2019 (H1N1) RECOMBINANT HEMAGGLUTININ ANTIGEN, INFLUENZA A VIRUS A/DARWIN/6/2021 (H3N2) RECOMBINANT HEMAGGLUTININ ANTIGEN, INFLUENZA B VIRUS B/AUSTRIA/1359417/2021 RECOMBINANT HEMAGGLUTININ ANTIGEN, AND INFLUENZA B VIRUS B/PHUKET/3073/2013 RECOMBINANT HEMAGGLUTININ ANTIGEN 45; 45; 45; 45 UG/.5ML; UG/.5ML; UG/.5ML; UG/.5ML
0.5 INJECTION INTRAMUSCULAR ONCE
Refills: 0 | Status: DISCONTINUED | OUTPATIENT
Start: 2025-01-09 | End: 2025-01-16

## 2025-01-09 RX ORDER — FLUCONAZOLE 200 MG/1
150 TABLET ORAL
Refills: 0 | Status: DISCONTINUED | OUTPATIENT
Start: 2025-01-09 | End: 2025-01-14

## 2025-01-09 RX ORDER — ENOXAPARIN SODIUM 60 MG/.6ML
40 INJECTION INTRAVENOUS; SUBCUTANEOUS EVERY 24 HOURS
Refills: 0 | Status: DISCONTINUED | OUTPATIENT
Start: 2025-01-09 | End: 2025-01-16

## 2025-01-09 RX ORDER — PIPERACILLIN AND TAZOBACTAM 3; .375 G/15ML; G/15ML
3.38 INJECTION, POWDER, LYOPHILIZED, FOR SOLUTION INTRAVENOUS EVERY 8 HOURS
Refills: 0 | Status: DISCONTINUED | OUTPATIENT
Start: 2025-01-09 | End: 2025-01-09

## 2025-01-09 RX ORDER — IRON/LYS/VIT B COMP/FOLIC ACID 800-1MG/15
1 LIQUID (ML) ORAL DAILY
Refills: 0 | Status: DISCONTINUED | OUTPATIENT
Start: 2025-01-09 | End: 2025-01-13

## 2025-01-09 RX ORDER — DEXTROSE MONOHYDRATE 25 G/50ML
15 INJECTION, SOLUTION INTRAVENOUS ONCE
Refills: 0 | Status: DISCONTINUED | OUTPATIENT
Start: 2025-01-09 | End: 2025-01-14

## 2025-01-09 RX ORDER — CELECOXIB 200 MG
100 CAPSULE ORAL DAILY
Refills: 0 | Status: DISCONTINUED | OUTPATIENT
Start: 2025-01-09 | End: 2025-01-16

## 2025-01-09 RX ORDER — NYSTATIN TOPICAL POWDER 100000 U/G
1 POWDER TOPICAL
Refills: 0 | Status: DISCONTINUED | OUTPATIENT
Start: 2025-01-09 | End: 2025-01-09

## 2025-01-09 RX ORDER — DEXTROSE MONOHYDRATE 25 G/50ML
25 INJECTION, SOLUTION INTRAVENOUS ONCE
Refills: 0 | Status: DISCONTINUED | OUTPATIENT
Start: 2025-01-09 | End: 2025-01-14

## 2025-01-09 RX ORDER — METOPROLOL TARTRATE 50 MG
100 TABLET ORAL EVERY 12 HOURS
Refills: 0 | Status: DISCONTINUED | OUTPATIENT
Start: 2025-01-09 | End: 2025-01-16

## 2025-01-09 RX ORDER — POLYETHYLENE GLYCOL 3350 17 G/DOSE
17 POWDER (GRAM) ORAL DAILY
Refills: 0 | Status: DISCONTINUED | OUTPATIENT
Start: 2025-01-09 | End: 2025-01-10

## 2025-01-09 RX ADMIN — NYSTATIN TOPICAL POWDER 1 APPLICATION(S): 100000 POWDER TOPICAL at 18:07

## 2025-01-09 RX ADMIN — ACETAMINOPHEN 650 MILLIGRAM(S): 80 SOLUTION/ DROPS ORAL at 18:01

## 2025-01-09 RX ADMIN — ENOXAPARIN SODIUM 40 MILLIGRAM(S): 60 INJECTION INTRAVENOUS; SUBCUTANEOUS at 12:26

## 2025-01-09 RX ADMIN — ACETAMINOPHEN 650 MILLIGRAM(S): 80 SOLUTION/ DROPS ORAL at 18:50

## 2025-01-09 RX ADMIN — ATORVASTATIN CALCIUM 20 MILLIGRAM(S): 40 TABLET, FILM COATED ORAL at 21:20

## 2025-01-09 RX ADMIN — SENNOSIDES 2 TABLET(S): 8.6 TABLET, FILM COATED ORAL at 21:21

## 2025-01-09 RX ADMIN — Medication 100 MILLIGRAM(S): at 17:33

## 2025-01-09 RX ADMIN — Medication 1 TABLET(S): at 12:26

## 2025-01-09 RX ADMIN — FLUCONAZOLE 150 MILLIGRAM(S): 200 TABLET ORAL at 12:26

## 2025-01-09 RX ADMIN — Medication 17 GRAM(S): at 17:33

## 2025-01-09 NOTE — PROGRESS NOTE ADULT - PROBLEM SELECTOR PLAN 7
Plan  - moderate ISS  - f/u A1c Per patient; has not previously been on insulin, per outpatient med rec, patient has been on 22U of insulin, refusing POCT   - Will clarify home meds with daughter    Plan  - moderate ISS

## 2025-01-09 NOTE — PROGRESS NOTE ADULT - PROBLEM SELECTOR PLAN 3
Patient with wide spread candidiasis in the lower back    - 150 mg oral Fluconazole 1x per week for 4 weeks  - topical Antifungal cream Fluconazole Patient with wide spread candidiasis in the lower back    Plan  - C/w 150 mg oral Fluconazole 1x per week for 4 weeks  - C/w topical Antifungal cream Fluconazole

## 2025-01-09 NOTE — PHYSICAL THERAPY INITIAL EVALUATION ADULT - ADDITIONAL COMMENTS
Pt resides with her spouse in a private home with +5 steps to enter, first floor setup. PTA, pt was bedbound and non-ambulatory for ~1 year. Pt reports requiring total assistance to transfer out of bed. Pt has HHA 3hrs/3days to assist and is otherwise in bed,  is able to provide limited assistance. Pt owns hospital bed, RW, wheelchair, commode, shower chair. Pt was receiving home PT services ~2 months ago

## 2025-01-09 NOTE — H&P ADULT - PROBLEM SELECTOR PLAN 2
c/w home meds after confirmation with daughter Patient with generalized weakness bed bound for a year after being hospitalize for a month  Plan  - Consults PT  - Fall risk  - Consult social work

## 2025-01-09 NOTE — H&P ADULT - HISTORY OF PRESENT ILLNESS
79 YOF with PMHx of HTN, lymphedema, arthritis, sacral decubitus ulcer s/p debridement one year ago presents with weakness. PT reports she has been bed bound since Jan 2024. Her weakness began  after being hospitalized for one month. Her symptoms improved with PT but were discontinue after 8 sessions. She states that her weakness is complicated by her lymphedema and arthritis. She states she is fed up with her condition and wants to be admitted for further management and possible placement to [facility]. Denies, chest pain, fever, worsening pain, nausea, vomiting, abdominal pain    79 YOF with PMHx of HTN, lymphedema, arthritis, sacral decubitus ulcer s/p debridement one year ago presents with weakness. PT reports she has been bed bound since Jan 2024. Her weakness began  after being hospitalized for one month. Her symptoms improved with PT but were discontinue after 8 sessions. She states that her weakness is complicated by her lymphedema and arthritis. She states she is fed up with her condition and wants to be admitted for further management and possible placement to [facility]. Denies, chest pain, fever, worsening pain, nausea, vomiting, abdominal pain     ED: Patient found to have elevated WBC with a left shift, given 1x zosyn. PE showed multiple areas of skin breakdown/ stage 1 ulcer/skin changes to lower back and buttocks, with super impose candida. Patient admitted for sacral wound management.

## 2025-01-09 NOTE — PROGRESS NOTE ADULT - ATTENDING COMMENTS
79 y.o. F with pmhx of lymphedema, arthritis, sacral decubitus ulcer, DM, HTN, HLD, admitted for inability to care for self at home. Has had persistent sacral ulcer and weakness >1 year, notes bedbound and has VNS and HHA 3x/week, along with 2 daughters in [place] who prepare food for her. Feels inadequate support at home, looking for LTC/[facility] for higher level of care.   - Significant candidal rashes throughout skin folds - c/w nystatin, fluconazole  - BG fluctuating, declines insulin therapy, only takes metformin 500 mg at home. Continue to monitor on SSI, check A1c.   - ESR/CRP likely elevated in setting of chronic wounds, CT imaging pending, sacral wound does not appear to look infected at this time, hold off on further abx.   - Deconditioning d/t bedbound status, significant body habitus, PT/OT eval

## 2025-01-09 NOTE — CONSULT NOTE ADULT - SUBJECTIVE AND OBJECTIVE BOX
Wound SURGERY CONSULT NOTE    HPI:  79 YOF with PMHx of HTN, lymphedema, arthritis, sacral decubitus ulcer s/p debridement one year ago presents with weakness. PT reports she has been bed bound since Jan 2024. Her weakness began  after being hospitalized for one month. Her symptoms improved with PT but were discontinue after 8 sessions. She states that her weakness is complicated by her lymphedema and arthritis. She states she is fed up with her condition and wants to be admitted for further management and possible placement to [facility]. Denies, chest pain, fever, worsening pain, nausea, vomiting, abdominal pain     ED: Patient found to have elevated WBC with a left shift, given 1x zosyn. PE showed multiple areas of skin breakdown/ stage 1 ulcer/skin changes to lower back and buttocks, with super impose candida. Patient admitted for sacral wound management.      Wound consult requested by team to assist w/ management of sacral pressure injury and fungal rash. Pt c/o pain, drainage, but not odor, color change, or worsening swelling. Pt noted VNS 3x/ week, but unclear VNS vs HHA. Pt not sure of tx RN doing.  Pt hasn't seen MD in year since bed ridden- no shirin, denies adaptic equipment.  Offloading and pericare initiated upon admission as pt Increasingly sedentary 2/2 to illness. Pt is Incontinent of urine & stool. No other h/o bites, scratches, falls, trauma. Appetite good w/o weight loss. All questions asked and answered to pt's expressed understanding and satisfaction.    Current Diet: Diet, DASH/TLC:   Sodium & Cholesterol Restricted  Consistent Carbohydrate Evening Snack (CSTCHOSN) (01-09-25 @ 04:44)      PAST MEDICAL & SURGICAL HISTORY:  Hypertension    Diabetes    Back pain    Lymphedema of both lower extremities    HLD (hyperlipidemia)    s/p cholecystectomy      REVIEW OF SYSTEMS: General/ Breast/ Skin/MSK: see HPI  All other systems negative    MEDICATIONS  (STANDING):  atorvastatin 20 milliGRAM(s) Oral at bedtime  celecoxib 100 milliGRAM(s) Oral daily  dextrose 5%. 1000 milliLiter(s) (50 mL/Hr) IV Continuous <Continuous>  dextrose 5%. 1000 milliLiter(s) (100 mL/Hr) IV Continuous <Continuous>  dextrose 50% Injectable 25 Gram(s) IV Push once  dextrose 50% Injectable 12.5 Gram(s) IV Push once  dextrose 50% Injectable 25 Gram(s) IV Push once  enoxaparin Injectable 40 milliGRAM(s) SubCutaneous every 24 hours  fluconAZOLE   Tablet 150 milliGRAM(s) Oral <User Schedule>  glucagon  Injectable 1 milliGRAM(s) IntraMuscular once  influenza  Vaccine (HIGH DOSE) 0.5 milliLiter(s) IntraMuscular once  insulin lispro (ADMELOG) corrective regimen sliding scale   SubCutaneous three times a day before meals  metoprolol tartrate 100 milliGRAM(s) Oral every 12 hours  multivitamin/minerals 1 Tablet(s) Oral daily  nystatin Cream 1 Application(s) Topical two times a day  polyethylene glycol 3350 17 Gram(s) Oral daily  senna 2 Tablet(s) Oral at bedtime    MEDICATIONS  (PRN):  dextrose Oral Gel 15 Gram(s) Oral once PRN Blood Glucose LESS THAN 70 milliGRAM(s)/deciliter    No Known Allergies    SOCIAL HISTORY:  , VNS ?HHA, Denies smoking, ETOH, drugs    FAMILY HISTORY:  no h/o significant problems    PHYSICAL EXAM:  Vital Signs Last 24 Hrs  T(C): 36.8 (09 Jan 2025 12:00), Max: 36.9 (08 Jan 2025 19:36)  T(F): 98.3 (09 Jan 2025 12:00), Max: 98.4 (08 Jan 2025 19:36)  HR: 75 (09 Jan 2025 12:00) (65 - 83)  BP: 119/71 (09 Jan 2025 04:25) (119/71 - 143/66)  BP(mean): 88 (08 Jan 2025 19:36) (88 - 88)  RR: 18 (09 Jan 2025 12:00) (18 - 18)  SpO2: 95% (09 Jan 2025 12:00) (93% - 98%)    Parameters below as of 09 Jan 2025 12:00  Patient On (Oxygen Delivery Method): room air    NAD,   A&Ox3, MO, frail,  WD/ WN/ WG  Versa Care P500 bed   HEENT:  NC/AT, EOMI, sclera clear, mucosa moist, throat clear, trachea midline, neck supple  Respiratory: nonlabored w/ equal chest rise  Gastrointestinal: soft NT/ND   : (+) purewick cath  Neurology:  weakened strength & sensation grossly intact  Psych: appropriate, anxious  Musculoskeletal:FROM, no deformities/ contractures  Vascular: BLE equally warm,  no cyanosis, clubbing, nor acute ischemia         BLE edema equal         no BLE DP/PT pulses palpable  Skin: thin, dry, pale, frail,  ecchymosis w/o hematoma  Sacral Stage 4 pressure injury  moist red granular tissue w/ serosanguinous drainage     no exposed bone     periwound skin up the back into the flanks,down into posterior thighs & anteriorly into thigh/ groin & pannus skin fold regions       w/ moist bright erythema w/ satellite lesions  Under Bilateral breast dull linear erythematous skin changes w/o blistering or weeping  No odor, erythema, increased warmth, tenderness, induration, fluctuance, nor crepitus    LABS/ CULTURES/ RADIOLOGY:                        9.4    11.63 )-----------( 489      ( 09 Jan 2025 10:04 )             31.8       137  |  102  |  37  ----------------------------<  176      [01-09-25 @ 10:04]  5.0   |  23  |  0.86        Ca     9.8     [01-09-25 @ 10:04]    TPro  7.1  /  Alb  2.8  /  TBili  0.3  /  DBili  x   /  AST  19  /  ALT  34  /  AlkPhos  312  [01-09-25 @ 10:04]       Wound SURGERY CONSULT NOTE    HPI:  79 YOF with PMHx of HTN, lymphedema, arthritis, sacral decubitus ulcer s/p debridement one year ago presents with weakness. PT reports she has been bed bound since Jan 2024. Her weakness began  after being hospitalized for one month. Her symptoms improved with PT but were discontinue after 8 sessions. She states that her weakness is complicated by her lymphedema and arthritis. She states she is fed up with her condition and wants to be admitted for further management and possible placement to [facility]. Denies, chest pain, fever, worsening pain, nausea, vomiting, abdominal pain     ED: Patient found to have elevated WBC with a left shift, given 1x zosyn. PE showed multiple areas of skin breakdown/ stage 1 ulcer/skin changes to lower back and buttocks, with super impose candida. Patient admitted for sacral wound management.      Wound consult requested by team to assist w/ management of sacral pressure injury and fungal rash. Pt c/o pain, drainage, but not odor, color change, or worsening swelling. Pt noted VNS 3x/ week, but unclear VNS vs HHA. Pt not sure of tx RN doing.  Pt hasn't seen MD in year since bed ridden- no shirin, denies adaptic equipment.  Offloading and pericare initiated upon admission as pt Increasingly sedentary 2/2 to illness. Pt is Incontinent of urine & stool. No other h/o bites, scratches, falls, trauma. Appetite good w/o weight loss. All questions asked and answered to pt's expressed understanding and satisfaction.    Current Diet: Diet, DASH/TLC:   Sodium & Cholesterol Restricted  Consistent Carbohydrate Evening Snack (CSTCHOSN) (01-09-25 @ 04:44)      PAST MEDICAL & SURGICAL HISTORY:  Hypertension    Diabetes    Back pain    Lymphedema of both lower extremities    HLD (hyperlipidemia)    s/p cholecystectomy      REVIEW OF SYSTEMS: General/ Breast/ Skin/MSK: see HPI  All other systems negative    MEDICATIONS  (STANDING):  atorvastatin 20 milliGRAM(s) Oral at bedtime  celecoxib 100 milliGRAM(s) Oral daily  dextrose 5%. 1000 milliLiter(s) (50 mL/Hr) IV Continuous <Continuous>  dextrose 5%. 1000 milliLiter(s) (100 mL/Hr) IV Continuous <Continuous>  dextrose 50% Injectable 25 Gram(s) IV Push once  dextrose 50% Injectable 12.5 Gram(s) IV Push once  dextrose 50% Injectable 25 Gram(s) IV Push once  enoxaparin Injectable 40 milliGRAM(s) SubCutaneous every 24 hours  fluconAZOLE   Tablet 150 milliGRAM(s) Oral <User Schedule>  glucagon  Injectable 1 milliGRAM(s) IntraMuscular once  influenza  Vaccine (HIGH DOSE) 0.5 milliLiter(s) IntraMuscular once  insulin lispro (ADMELOG) corrective regimen sliding scale   SubCutaneous three times a day before meals  metoprolol tartrate 100 milliGRAM(s) Oral every 12 hours  multivitamin/minerals 1 Tablet(s) Oral daily  nystatin Cream 1 Application(s) Topical two times a day  polyethylene glycol 3350 17 Gram(s) Oral daily  senna 2 Tablet(s) Oral at bedtime    MEDICATIONS  (PRN):  dextrose Oral Gel 15 Gram(s) Oral once PRN Blood Glucose LESS THAN 70 milliGRAM(s)/deciliter    No Known Allergies    SOCIAL HISTORY:  , VNS ?HHA, Denies smoking, ETOH, drugs    FAMILY HISTORY:  no h/o significant problems    PHYSICAL EXAM:  Vital Signs Last 24 Hrs  T(C): 36.8 (09 Jan 2025 12:00), Max: 36.9 (08 Jan 2025 19:36)  T(F): 98.3 (09 Jan 2025 12:00), Max: 98.4 (08 Jan 2025 19:36)  HR: 75 (09 Jan 2025 12:00) (65 - 83)  BP: 119/71 (09 Jan 2025 04:25) (119/71 - 143/66)  BP(mean): 88 (08 Jan 2025 19:36) (88 - 88)  RR: 18 (09 Jan 2025 12:00) (18 - 18)  SpO2: 95% (09 Jan 2025 12:00) (93% - 98%)    Parameters below as of 09 Jan 2025 12:00  Patient On (Oxygen Delivery Method): room air    NAD,   A&Ox3, MO, frail,  WD/ WN/ WG  Versa Care P500 bed   HEENT:  NC/AT, EOMI, sclera clear, mucosa moist, throat clear, trachea midline, neck supple  Respiratory: nonlabored w/ equal chest rise  Gastrointestinal: soft NT/ND   : (+) purewick cath  Neurology:  weakened strength & sensation grossly intact  Psych: appropriate, anxious  Musculoskeletal:FROM, no deformities/ contractures  Vascular: BLE equally warm,  no cyanosis, clubbing, nor acute ischemia         BLE edema equal         BLE DP/PT pulses palpable  Skin: thin, dry, pale, frail,  ecchymosis w/o hematoma  Sacral Stage 4 pressure injury  moist red granular tissue w/ serosanguinous drainage     no exposed bone     periwound skin up the back into the flanks,down into posterior thighs & anteriorly into thigh/ groin & pannus skin fold regions as well as b/l axilla       w/ moist bright erythema w/ satellite lesions  Under Bilateral breast dull linear erythematous skin changes w/o blistering or weeping  No odor, erythema, increased warmth, tenderness, induration, fluctuance, nor crepitus    LABS/ CULTURES/ RADIOLOGY:                        9.4    11.63 )-----------( 489      ( 09 Jan 2025 10:04 )             31.8       137  |  102  |  37  ----------------------------<  176      [01-09-25 @ 10:04]  5.0   |  23  |  0.86        Ca     9.8     [01-09-25 @ 10:04]    TPro  7.1  /  Alb  2.8  /  TBili  0.3  /  DBili  x   /  AST  19  /  ALT  34  /  AlkPhos  312  [01-09-25 @ 10:04]

## 2025-01-09 NOTE — H&P ADULT - PROBLEM SELECTOR PLAN 8
DVT: Lovenox 40  Diet: Diabetic diet  Dispo: [facility]/ PT consult c/w home meds after confirmation with daughter

## 2025-01-09 NOTE — PHYSICAL THERAPY INITIAL EVALUATION ADULT - NSPTDISCHREC_GEN_A_CORE
if home, pt will benefit from home PT, total patient lift device and 24/7 assist/supervision for ALL mobility/ADLs. Transportation into the home recommended/Sub-acute Rehab no wheezing/no dyspnea/no cough

## 2025-01-09 NOTE — PATIENT PROFILE ADULT - FALL HARM RISK - RISK INTERVENTIONS

## 2025-01-09 NOTE — PROGRESS NOTE ADULT - PROBLEM SELECTOR PLAN 2
Patient with generalized weakness bed bound for a year after being hospitalize for a month  Plan  - Consults PT  - Fall risk  - Consult social work Patient baseline bedbound, functional paraplegia 2/2 arthlagias/lymphedema and overall fatigue  - PT consulted   - SW consulted    Plan  - Pending PT/SW recs

## 2025-01-09 NOTE — PROGRESS NOTE ADULT - PROBLEM SELECTOR PLAN 4
c/w home meds after confirmation with daughter Med rec unclear, attempted to reach daughter Ling x2, will try in PM for clear med rec, daughter DOES NOT live with patient, but per daughter, Dorinda has a better understanding of current medications. Email given to follow up current medications    Plan  - C/w celecoxib   - Will attempt to reach daughter in PM

## 2025-01-09 NOTE — PROGRESS NOTE ADULT - PROBLEM SELECTOR PLAN 9
DVT: Lovenox 40  Diet: Diabetic diet  Dispo: [facility]/ PT consult - Fluids: None  - Electrolytes: Will replete to maintain K>4, Phos>3, and Mag>2  - Nutrition:  Consistent Carb  - Activity: As tolerated, pending PT eval  - DVT Prophylaxis: heparin Sub q  - Stress Ulcer/GI Prophylaxis: N/A  - Disposition: Admit to medicine, medically active

## 2025-01-09 NOTE — H&P ADULT - PROBLEM SELECTOR PLAN 3
Patient with wide spread candidiasis in the lower back    - 150 mg oral 1x Fluconazole   - Nystatin Patient with wide spread candidiasis in the lower back    - 150 mg oral Fluconazole 1x per week for 4 weeks  - topical Antifungal cream Fluconazole

## 2025-01-09 NOTE — H&P ADULT - TIME BILLING
Independently obtaining a history, performing a physical examination, discussing the plan with the patient, ordering medications/tests, documenting clinical information, and coordinating care.    78 minutes spent on total encounter which excludes time spent teaching.

## 2025-01-09 NOTE — CONSULT NOTE ADULT - ASSESSMENT
79 YOF with PMHx of HTN, lymphedema, arthritis, sacral decubitus ulcer s/p debridement one year ago presents with weakness.  Pt w/ ulcer/skin changes to lower back and buttocks, with super impose candida. Elevated CRP and WBC concerning for infections cause.       Wound Consult requested to assist w/ management of Sacral Stage 4 pressure injury  Fungal Moisture Associated Dermatitis Back/ Flank/ Buttock/ Thighs/ Groin/ Pannus  Moisture Dermatitis Under Breasts    Sacral wound- Aquacel dressing  Buttocks/ Sacrum Crusting w/ Nystatin + CAVILON BID and prn soiling        Continue w/ attends under pads and Pericare maintenance w/ purewick care as per protocol  Bilateral Groin, Pannus, & Breast skin Folds- after Cleaning= Tuck in INTERDry AG QD  Consider BOB/PVR, A/P CT or MRI  Abx per Medicine/ ID      Pt started on Oral Antifungal- monitor for improvement  Moisturize intact skin w/ SWEEN cream BID  Nutrition Consult for optimization        encourage high quality protein, josephine/ prosource, MVI & Vit C to promote wound healing  Hyperglycemia - ADA diet and FS w/ ISS, consider HgA1c- pt non compliant "diet controlled"  Continue turning and positioning w/ offloading assistive devices as per protocol  Waffle Cushion to chair when oob to chair  Continue w/ low air loss pressure redistribution bed surface   Pt will need Group 2 mattress on hospital bed and ROHO cushion for wheel chair upon discharge home  Care as per medicine, will follow w/ you  Upon discharge f/u as outpatient at [facility] [address] [place] [address] [phone]  Seen w/ attng & RN and D/w team   Thank you for this consult  Yolanda Ramos PA-C [id number] [id number]  Nights/ Weekends/ Holidays please call:  General Surgery Consult pager ([phone]) for emergencies  Wound PT for multilayer leg wrapping or VAC issues (x [phone])   I spent 55minutes face to face w/ this pt of which more than 50% of the time was spent counseling & coordinating care of this pt.  79 YOF with PMHx of HTN, lymphedema, arthritis, sacral decubitus ulcer s/p debridement one year ago presents with weakness.  Pt w/ ulcer/skin changes to lower back and buttocks, with super impose candida. Elevated CRP and WBC concerning for infections cause.       Wound Consult requested to assist w/ management of:  Sacral Stage 4 pressure injury  Fungal Moisture Associated Dermatitis Back/ Flank/ Buttock/ Thighs/ Groin/ Pannus/axilla  Moisture Dermatitis Under Breasts    Sacral wound- Aquacel dressing  Buttocks/ Sacrum Crusting w/ Nystatin + CAVILON BID and prn soiling        Continue w/ attends under pads and Pericare maintenance w/ purewick care as per protocol  Bilateral Groin, Pannus, & Breast skin Folds/axillas- after Cleaning= Tuck in INTERDry AG QD  Consider A/P CT or MRI  Abx per Medicine/ ID      Pt started on Oral Antifungal- monitor for improvement  Moisturize intact skin w/ SWEEN cream BID  Nutrition Consult for optimization        encourage high quality protein, josephine/ prosource, MVI & Vit C to promote wound healing  Hyperglycemia - ADA diet and FS w/ ISS, consider HgA1c- pt non compliant "diet controlled"  Continue turning and positioning w/ offloading assistive devices as per protocol  Waffle Cushion to chair when oob to chair  Continue w/ low air loss pressure redistribution bed surface   Pt will need Group 2 mattress on hospital bed and ROHO cushion for wheel chair upon discharge home  Care as per medicine, will follow w/ you  Upon discharge f/u as outpatient at Wound Center [address] [place] [address] [phone]  Seen w/ attdaniel & RN and D/w team   Thank you for this consult  Yolanda Ramos PA-C CWS [id number]  Nights/ Weekends/ Holidays please call:  General Surgery Consult pager ([phone]) for emergencies  Wound PT for multilayer leg wrapping or VAC issues (x [phone])

## 2025-01-09 NOTE — H&P ADULT - NSHPPHYSICALEXAM_GEN_ALL_CORE
T(C): 36.9 (01-08-25 @ 19:36), Max: 36.9 (01-08-25 @ 19:36)  HR: 65 (01-08-25 @ 19:36) (63 - 77)  BP: 143/66 (01-08-25 @ 19:36) (143/66 - 164/72)  RR: 18 (01-08-25 @ 19:36) (18 - 20)  SpO2: 98% (01-08-25 @ 19:36) (91% - 98%)    CONSTITUTIONAL: Well groomed, no apparent distress  EYES: PERRLA and symmetric, EOMI, No conjunctival or scleral injection  ENMT: Oral mucosa with moist membranes.   RESP: No respiratory distress, no use of accessory muscles;  CV: RRR, +S1S2, ; lymphedema   GI: Soft, NT, ND, no rebound, no guarding; no palpable masses;   MSK: Upper extremities 4/5. lower extremities 0/5 strength.  SKIN: ultiple areas of skin breakdown/ stage 1 ulcer/skin changes to lower back and buttocks, with super impose candida   NEURO: CN II-XII intact; sensation intact in upper and lower extremities b/l to light touch.    PSYCH: Appropriate insight/judgment; A+O x 3, mood and affect appropriate, recent/remote memory intact T(C): 36.9 (01-08-25 @ 19:36), Max: 36.9 (01-08-25 @ 19:36)  HR: 65 (01-08-25 @ 19:36) (63 - 77)  BP: 143/66 (01-08-25 @ 19:36) (143/66 - 164/72)  RR: 18 (01-08-25 @ 19:36) (18 - 20)  SpO2: 98% (01-08-25 @ 19:36) (91% - 98%)    CONSTITUTIONAL: Well groomed, no apparent distress  EYES: PERRLA and symmetric, EOMI, No conjunctival or scleral injection  ENMT: Oral mucosa with moist membranes.   RESP: No respiratory distress, no use of accessory muscles;  CV: RRR, +S1S2, ; lymphedema   GI: Soft, NT, ND, no rebound, no guarding; no palpable masses;   MSK: Upper extremities 4/5. lower extremities 0/5 strength.  SKIN: multiple areas of skin breakdown/ stage 1 ulcer/skin changes to lower back and buttocks, with super impose candida   NEURO: CN II-XII intact; sensation intact in upper and lower extremities b/l to light touch.    PSYCH: Appropriate insight/judgment; A+O x 3, mood and affect appropriate, recent/remote memory intact

## 2025-01-09 NOTE — H&P ADULT - ASSESSMENT
79 YOF with PMHx of HTN, lymphedema, arthritis, sacral decubitus ulcer s/p debridement one year ago presents with weakness.  79 YOF with PMHx of HTN, lymphedema, arthritis, sacral decubitus ulcer s/p debridement one year ago presents with weakness. PE with multiple areas of skin breakdown/ stage 1 ulcer/skin changes to lower back and buttocks, with super impose candida. Elevated CRP and WBC concerning for infections cause.

## 2025-01-09 NOTE — H&P ADULT - ATTENDING COMMENTS
Patient seen and observed at bedside. patient with significant candidal rashes throughout skin folds of body. Significant body habitus and patient is fully deconditioned due to bedbound status. Unstageable sacral decubitus ulcer with surrounding erythema. In setting of leukocytosis and elevated ESR, CRP, will f/u CT to r/o abscess or osteomyelitis, may need follow up bone biopsy or MRI. F/u blood cultures. Will order nystatin cream and fluconazole 150mg qWeek for 4 weeks to treat significant candidal rashes. Will obtain wound care consult. Will get PT for deconditioning.    Could nto reach daughter/ family to confirm home meds. Med rec confirmed meds based on patient alone.

## 2025-01-09 NOTE — PATIENT PROFILE ADULT - LIFE CHALLENGES - DETAILS
PATIENT CALLED TO REQUEST A REFILL ON HER HYDROCODONE. EVERYTHING IS UP TO DATE.    Yes need more PT and OT

## 2025-01-09 NOTE — PHYSICAL THERAPY INITIAL EVALUATION ADULT - PERTINENT HX OF CURRENT PROBLEM, REHAB EVAL
79 YOF with PMHx of HTN, lymphedema, arthritis, sacral decubitus ulcer s/p debridement one year ago presents with weakness. PT reports she has been bed bound since Jan 2024. Her weakness began  after being hospitalized for one month. Her symptoms improved with PT but were discontinue after 8 sessions. She states that her weakness is complicated by her lymphedema and arthritis. She states she is fed up with her condition and wants to be admitted for further management and possible placement to [facility]. Denies, chest pain, fever, worsening pain, nausea, vomiting, abdominal pain

## 2025-01-09 NOTE — PROGRESS NOTE ADULT - PROBLEM SELECTOR PLAN 5
continue to monitor  - PT encourage movement Med rec unclear, attempted to reach daughter Ling x2, will try in PM for clear med rec, daughter DOES NOT live with patient, but per daughter, Dorinda has a better understanding of current medications. Email given to follow up current medications    Plan  - PT encourage movement

## 2025-01-09 NOTE — PROGRESS NOTE ADULT - ASSESSMENT
79 YOF with PMHx of HTN, lymphedema, arthritis, sacral decubitus ulcer s/p debridement one year ago presents with weakness. PE with multiple areas of skin breakdown/ stage 1 ulcer/skin changes to lower back and buttocks, with super impose candida. Elevated CRP and WBC concerning for infections cause.

## 2025-01-09 NOTE — PROGRESS NOTE ADULT - PROBLEM SELECTOR PLAN 1
Patient with elevated WBC 17 and ESR  - CT A/P r/u OM  - wound care consult  - F/u Blood culture - Persistent   Patient with elevated WBC 17 and ESR  - CT A/P r/u OM  - wound care consult  - F/u Blood culture - Persistent sacral ulcer for 1 year, initially presented when patient was ambulating and after bedbound status was unable to care for herself, has home health aid for limited hours who is unable to turn patient, spouse medically unable. Patient and family looking for LTC/[facility] for care  - Elevated WBC and AIM, s/p 1x Zosyn in ED  - pending CT A/P r/u OM  - Wound care following  - Pending Blood culture    Plan  - Start Ancef 2gq12h  - Wound care for eval

## 2025-01-09 NOTE — PROGRESS NOTE ADULT - SUBJECTIVE AND OBJECTIVE BOX
Torres Andrews MD  Available on TEAMS    Patient is a 79y old  Female who presents with a chief complaint of Weakness (09 Jan 2025 01:22)      SUBJECTIVE / OVERNIGHT EVENTS: No acute events overnight. Patient was assessed at bedside.       REVIEW OF SYSTEMS:  CONSTITUTIONAL: No weakness, fevers, or chills  EYES/ENT: No visual changes; No vertigo, throat pain, or dysphagia  NECK: No pain or stiffness  RESPIRATORY: No cough, wheezing, hemoptysis, or shortness of breath  CARDIOVASCULAR: No chest pain or palpitations  GASTROINTESTINAL: No abdominal or epigastric pain. No nausea, vomiting, or hematemesis; No diarrhea or constipation. No melena or hematochezia.  GENITOURINARY: No dysuria, frequency, or hematuria  MUSCULOSKELETAL: No joint or muscle pain or aches  NEUROLOGICAL: No numbness or weakness  SKIN: No itching or rashes      MEDICATIONS  (STANDING):  dextrose 5%. 1000 milliLiter(s) (50 mL/Hr) IV Continuous <Continuous>  dextrose 5%. 1000 milliLiter(s) (100 mL/Hr) IV Continuous <Continuous>  dextrose 50% Injectable 25 Gram(s) IV Push once  dextrose 50% Injectable 12.5 Gram(s) IV Push once  dextrose 50% Injectable 25 Gram(s) IV Push once  enoxaparin Injectable 40 milliGRAM(s) SubCutaneous every 24 hours  fluconAZOLE   Tablet 150 milliGRAM(s) Oral <User Schedule>  glucagon  Injectable 1 milliGRAM(s) IntraMuscular once  influenza  Vaccine (HIGH DOSE) 0.5 milliLiter(s) IntraMuscular once  insulin lispro (ADMELOG) corrective regimen sliding scale   SubCutaneous three times a day before meals  nystatin Cream 1 Application(s) Topical two times a day    MEDICATIONS  (PRN):  dextrose Oral Gel 15 Gram(s) Oral once PRN Blood Glucose LESS THAN 70 milliGRAM(s)/deciliter      CAPILLARY BLOOD GLUCOSE      POCT Blood Glucose.: 380 mg/dL (08 Jan 2025 21:05)  POCT Blood Glucose.: 249 mg/dL (08 Jan 2025 20:05)  POCT Blood Glucose.: 230 mg/dL (08 Jan 2025 19:01)    I&O's Summary      Vital Signs Last 24 Hrs  T(C): 36.3 (09 Jan 2025 04:25), Max: 36.9 (08 Jan 2025 19:36)  T(F): 97.4 (09 Jan 2025 04:25), Max: 98.4 (08 Jan 2025 19:36)  HR: 83 (09 Jan 2025 04:25) (63 - 83)  BP: 119/71 (09 Jan 2025 04:25) (119/71 - 164/72)  BP(mean): 88 (08 Jan 2025 19:36) (88 - 101)  RR: 18 (09 Jan 2025 04:25) (18 - 20)  SpO2: 93% (09 Jan 2025 04:25) (91% - 98%)    Parameters below as of 09 Jan 2025 04:25  Patient On (Oxygen Delivery Method): room air        PHYSICAL EXAM:  GENERAL: NAD, well-developed, well-nourished  HEAD: Atraumatic, Normocephalic  EYES: EOMI, PERRLA, conjunctiva and sclera clear  NECK: Supple, No JVD  CHEST/LUNG: Clear to auscultation bilaterally; No wheezes or crackles  HEART: Normal S1/S2; Regular rate and rhythm; No murmurs, rubs, or gallops  ABDOMEN: Soft, Nontender, Nondistended; Bowel sounds present  EXTREMITIES: 2+ Peripheral Pulses; No clubbing, cyanosis, or edema  PSYCH: A&Ox3  NEUROLOGY: no focal neurologic deficit  SKIN: No rashes or lesions    LABS:                        9.7    17.66 )-----------( 523      ( 08 Jan 2025 16:34 )             31.7      01-08    137  |  103  |  42[H]  ----------------------------<  256[H]  4.8   |  22  |  1.02    Ca    9.8      08 Jan 2025 21:23    TPro  7.4  /  Alb  2.8[L]  /  TBili  0.2  /  DBili  x   /  AST  33  /  ALT  45  /  AlkPhos  376[H]  01-08          Urinalysis Basic - ( 08 Jan 2025 21:23 )    Color: x / Appearance: x / SG: x / pH: x  Gluc: 256 mg/dL / Ketone: x  / Bili: x / Urobili: x   Blood: x / Protein: x / Nitrite: x   Leuk Esterase: x / RBC: x / WBC x   Sq Epi: x / Non Sq Epi: x / Bacteria: x        RADIOLOGY & ADDITIONAL TESTS:    Imaging Personally Reviewed:    Consultant(s) Notes Reviewed:      Care Discussed with Consultants/Other Providers:   Torres Andrews MD  Available on TEAMS    Patient is a 79y old  Female who presents with a chief complaint of Weakness (09 Jan 2025 01:22)      SUBJECTIVE / OVERNIGHT EVENTS: No acute events overnight. Patient was assessed at bedside. No pain appreciated in the back       REVIEW OF SYSTEMS:  CONSTITUTIONAL: No weakness, fevers, or chills  EYES/ENT: No visual changes; No vertigo, throat pain, or dysphagia  NECK: No pain or stiffness  RESPIRATORY: No cough, wheezing, hemoptysis, or shortness of breath  CARDIOVASCULAR: No chest pain or palpitations  GASTROINTESTINAL: No abdominal or epigastric pain. No nausea, vomiting, or hematemesis; No diarrhea or constipation. No melena or hematochezia.  GENITOURINARY: No dysuria, frequency, or hematuria  MUSCULOSKELETAL: No joint or muscle pain or aches  NEUROLOGICAL: No numbness or weakness  SKIN: No itching or rashes      MEDICATIONS  (STANDING):  dextrose 5%. 1000 milliLiter(s) (50 mL/Hr) IV Continuous <Continuous>  dextrose 5%. 1000 milliLiter(s) (100 mL/Hr) IV Continuous <Continuous>  dextrose 50% Injectable 25 Gram(s) IV Push once  dextrose 50% Injectable 12.5 Gram(s) IV Push once  dextrose 50% Injectable 25 Gram(s) IV Push once  enoxaparin Injectable 40 milliGRAM(s) SubCutaneous every 24 hours  fluconAZOLE   Tablet 150 milliGRAM(s) Oral <User Schedule>  glucagon  Injectable 1 milliGRAM(s) IntraMuscular once  influenza  Vaccine (HIGH DOSE) 0.5 milliLiter(s) IntraMuscular once  insulin lispro (ADMELOG) corrective regimen sliding scale   SubCutaneous three times a day before meals  nystatin Cream 1 Application(s) Topical two times a day    MEDICATIONS  (PRN):  dextrose Oral Gel 15 Gram(s) Oral once PRN Blood Glucose LESS THAN 70 milliGRAM(s)/deciliter      CAPILLARY BLOOD GLUCOSE      POCT Blood Glucose.: 380 mg/dL (08 Jan 2025 21:05)  POCT Blood Glucose.: 249 mg/dL (08 Jan 2025 20:05)  POCT Blood Glucose.: 230 mg/dL (08 Jan 2025 19:01)    I&O's Summary      Vital Signs Last 24 Hrs  T(C): 36.3 (09 Jan 2025 04:25), Max: 36.9 (08 Jan 2025 19:36)  T(F): 97.4 (09 Jan 2025 04:25), Max: 98.4 (08 Jan 2025 19:36)  HR: 83 (09 Jan 2025 04:25) (63 - 83)  BP: 119/71 (09 Jan 2025 04:25) (119/71 - 164/72)  BP(mean): 88 (08 Jan 2025 19:36) (88 - 101)  RR: 18 (09 Jan 2025 04:25) (18 - 20)  SpO2: 93% (09 Jan 2025 04:25) (91% - 98%)    Parameters below as of 09 Jan 2025 04:25  Patient On (Oxygen Delivery Method): room air        PHYSICAL EXAM:  GENERAL: NAD, well-developed, well-nourished  HEAD: Atraumatic, Normocephalic  EYES: EOMI, PERRLA, conjunctiva and sclera clear  NECK: Supple, No JVD  CHEST/LUNG: Clear to auscultation bilaterally; No wheezes or crackles  HEART: Normal S1/S2; Regular rate and rhythm; No murmurs, rubs, or gallops  ABDOMEN: Soft, Nontender, Nondistended; Bowel sounds present  EXTREMITIES: 2+ Peripheral Pulses; No clubbing, cyanosis, or edema  PSYCH: A&Ox3  NEUROLOGY: no focal neurologic deficit  SKIN: No rashes or lesions    LABS:                        9.7    17.66 )-----------( 523      ( 08 Jan 2025 16:34 )             31.7      01-08    137  |  103  |  42[H]  ----------------------------<  256[H]  4.8   |  22  |  1.02    Ca    9.8      08 Jan 2025 21:23    TPro  7.4  /  Alb  2.8[L]  /  TBili  0.2  /  DBili  x   /  AST  33  /  ALT  45  /  AlkPhos  376[H]  01-08          Urinalysis Basic - ( 08 Jan 2025 21:23 )    Color: x / Appearance: x / SG: x / pH: x  Gluc: 256 mg/dL / Ketone: x  / Bili: x / Urobili: x   Blood: x / Protein: x / Nitrite: x   Leuk Esterase: x / RBC: x / WBC x   Sq Epi: x / Non Sq Epi: x / Bacteria: x        RADIOLOGY & ADDITIONAL TESTS:    Imaging Personally Reviewed:    Consultant(s) Notes Reviewed:      Care Discussed with Consultants/Other Providers:

## 2025-01-09 NOTE — H&P ADULT - PROBLEM SELECTOR PLAN 1
Patient with generalized weakness bed bound for a year after being hospitalize for a month  Plan Patient with elevated WBC 17 and ESR  - CT A/P r/u OM  - wound care consult  - F/u Blood culture

## 2025-01-09 NOTE — H&P ADULT - NSHPLABSRESULTS_GEN_ALL_CORE
Personally reviewed labs, imaging, ekg                           9.7    17.66 )-----------( 523      ( 08 Jan 2025 16:34 )             31.7       01-08    137  |  103  |  42[H]  ----------------------------<  256[H]  4.8   |  22  |  1.02    Ca    9.8      08 Jan 2025 21:23    TPro  7.4  /  Alb  2.8[L]  /  TBili  0.2  /  DBili  x   /  AST  33  /  ALT  45  /  AlkPhos  376[H]  01-08              Urinalysis Basic - ( 08 Jan 2025 21:23 )    Color: x / Appearance: x / SG: x / pH: x  Gluc: 256 mg/dL / Ketone: x  / Bili: x / Urobili: x   Blood: x / Protein: x / Nitrite: x   Leuk Esterase: x / RBC: x / WBC x   Sq Epi: x / Non Sq Epi: x / Bacteria: x            Lactate Trend            CAPILLARY BLOOD GLUCOSE      POCT Blood Glucose.: 380 mg/dL (08 Jan 2025 21:05)            Personal interpretation EKG:

## 2025-01-10 DIAGNOSIS — K59.00 CONSTIPATION, UNSPECIFIED: ICD-10-CM

## 2025-01-10 DIAGNOSIS — M89.9 DISORDER OF BONE, UNSPECIFIED: ICD-10-CM

## 2025-01-10 LAB
-  STAPHYLOCOCCUS EPIDERMIDIS, METHICILLIN RESISTANT: SIGNIFICANT CHANGE UP
ALBUMIN SERPL ELPH-MCNC: 2.5 G/DL — LOW (ref 3.3–5)
ALP SERPL-CCNC: 318 U/L — HIGH (ref 40–120)
ALT FLD-CCNC: 34 U/L — SIGNIFICANT CHANGE UP (ref 10–45)
ANION GAP SERPL CALC-SCNC: 13 MMOL/L — SIGNIFICANT CHANGE UP (ref 5–17)
AST SERPL-CCNC: 22 U/L — SIGNIFICANT CHANGE UP (ref 10–40)
BILIRUB SERPL-MCNC: 0.3 MG/DL — SIGNIFICANT CHANGE UP (ref 0.2–1.2)
BUN SERPL-MCNC: 39 MG/DL — HIGH (ref 7–23)
CALCIUM SERPL-MCNC: 10.2 MG/DL — SIGNIFICANT CHANGE UP (ref 8.4–10.5)
CHLORIDE SERPL-SCNC: 104 MMOL/L — SIGNIFICANT CHANGE UP (ref 96–108)
CO2 SERPL-SCNC: 20 MMOL/L — LOW (ref 22–31)
CREAT SERPL-MCNC: 0.94 MG/DL — SIGNIFICANT CHANGE UP (ref 0.5–1.3)
CULTURE RESULTS: SIGNIFICANT CHANGE UP
EGFR: 61 ML/MIN/1.73M2 — SIGNIFICANT CHANGE UP
GLUCOSE BLDC GLUCOMTR-MCNC: 158 MG/DL — HIGH (ref 70–99)
GLUCOSE BLDC GLUCOMTR-MCNC: 214 MG/DL — HIGH (ref 70–99)
GLUCOSE SERPL-MCNC: 125 MG/DL — HIGH (ref 70–99)
GRAM STN FLD: ABNORMAL
HCT VFR BLD CALC: 31 % — LOW (ref 34.5–45)
HGB BLD-MCNC: 9.3 G/DL — LOW (ref 11.5–15.5)
MAGNESIUM SERPL-MCNC: 1.9 MG/DL — SIGNIFICANT CHANGE UP (ref 1.6–2.6)
MCHC RBC-ENTMCNC: 26.2 PG — LOW (ref 27–34)
MCHC RBC-ENTMCNC: 30 G/DL — LOW (ref 32–36)
MCV RBC AUTO: 87.3 FL — SIGNIFICANT CHANGE UP (ref 80–100)
METHOD TYPE: SIGNIFICANT CHANGE UP
MRSA PCR RESULT.: SIGNIFICANT CHANGE UP
NRBC # BLD: 0 /100 WBCS — SIGNIFICANT CHANGE UP (ref 0–0)
PHOSPHATE SERPL-MCNC: 3.3 MG/DL — SIGNIFICANT CHANGE UP (ref 2.5–4.5)
PLATELET # BLD AUTO: 478 K/UL — HIGH (ref 150–400)
POTASSIUM SERPL-MCNC: 5.1 MMOL/L — SIGNIFICANT CHANGE UP (ref 3.5–5.3)
POTASSIUM SERPL-SCNC: 5.1 MMOL/L — SIGNIFICANT CHANGE UP (ref 3.5–5.3)
PROCALCITONIN SERPL-MCNC: 0.15 NG/ML — HIGH (ref 0.02–0.1)
PROT SERPL-MCNC: 6.9 G/DL — SIGNIFICANT CHANGE UP (ref 6–8.3)
RBC # BLD: 3.55 M/UL — LOW (ref 3.8–5.2)
RBC # FLD: 17.1 % — HIGH (ref 10.3–14.5)
S AUREUS DNA NOSE QL NAA+PROBE: DETECTED
SODIUM SERPL-SCNC: 137 MMOL/L — SIGNIFICANT CHANGE UP (ref 135–145)
SPECIMEN SOURCE: SIGNIFICANT CHANGE UP
WBC # BLD: 13.28 K/UL — HIGH (ref 3.8–10.5)
WBC # FLD AUTO: 13.28 K/UL — HIGH (ref 3.8–10.5)

## 2025-01-10 PROCEDURE — 74177 CT ABD & PELVIS W/CONTRAST: CPT | Mod: 26

## 2025-01-10 PROCEDURE — 99222 1ST HOSP IP/OBS MODERATE 55: CPT

## 2025-01-10 PROCEDURE — G0545: CPT

## 2025-01-10 PROCEDURE — 99232 SBSQ HOSP IP/OBS MODERATE 35: CPT | Mod: GC

## 2025-01-10 RX ORDER — B COMPLEX, C NO.20/FOLIC ACID 1 MG
1 CAPSULE ORAL DAILY
Refills: 0 | Status: DISCONTINUED | OUTPATIENT
Start: 2025-01-10 | End: 2025-01-16

## 2025-01-10 RX ORDER — ASCORBIC ACID 1000 MG
500 TABLET ORAL DAILY
Refills: 0 | Status: DISCONTINUED | OUTPATIENT
Start: 2025-01-10 | End: 2025-01-16

## 2025-01-10 RX ORDER — BISACODYL 5 MG
5 TABLET, DELAYED RELEASE (ENTERIC COATED) ORAL EVERY 12 HOURS
Refills: 0 | Status: DISCONTINUED | OUTPATIENT
Start: 2025-01-10 | End: 2025-01-16

## 2025-01-10 RX ORDER — PIPERACILLIN AND TAZOBACTAM 3; .375 G/15ML; G/15ML
3.38 INJECTION, POWDER, LYOPHILIZED, FOR SOLUTION INTRAVENOUS ONCE
Refills: 0 | Status: COMPLETED | OUTPATIENT
Start: 2025-01-10 | End: 2025-01-10

## 2025-01-10 RX ORDER — POLYETHYLENE GLYCOL 3350 17 G/DOSE
17 POWDER (GRAM) ORAL
Refills: 0 | Status: DISCONTINUED | OUTPATIENT
Start: 2025-01-10 | End: 2025-01-16

## 2025-01-10 RX ORDER — PIPERACILLIN AND TAZOBACTAM 3; .375 G/15ML; G/15ML
3.38 INJECTION, POWDER, LYOPHILIZED, FOR SOLUTION INTRAVENOUS EVERY 8 HOURS
Refills: 0 | Status: DISCONTINUED | OUTPATIENT
Start: 2025-01-11 | End: 2025-01-16

## 2025-01-10 RX ORDER — PIPERACILLIN AND TAZOBACTAM 3; .375 G/15ML; G/15ML
3.38 INJECTION, POWDER, LYOPHILIZED, FOR SOLUTION INTRAVENOUS ONCE
Refills: 0 | Status: COMPLETED | OUTPATIENT
Start: 2025-01-11 | End: 2025-01-11

## 2025-01-10 RX ORDER — INSULIN LISPRO 100/ML
VIAL (ML) SUBCUTANEOUS
Refills: 0 | Status: DISCONTINUED | OUTPATIENT
Start: 2025-01-10 | End: 2025-01-14

## 2025-01-10 RX ADMIN — Medication 100 MILLIGRAM(S): at 12:23

## 2025-01-10 RX ADMIN — Medication 100 MILLIGRAM(S): at 05:16

## 2025-01-10 RX ADMIN — ENOXAPARIN SODIUM 40 MILLIGRAM(S): 60 INJECTION INTRAVENOUS; SUBCUTANEOUS at 12:24

## 2025-01-10 RX ADMIN — Medication 500 MILLIGRAM(S): at 17:57

## 2025-01-10 RX ADMIN — Medication 100 MILLIGRAM(S): at 13:00

## 2025-01-10 RX ADMIN — PIPERACILLIN AND TAZOBACTAM 200 GRAM(S): 3; .375 INJECTION, POWDER, LYOPHILIZED, FOR SOLUTION INTRAVENOUS at 16:03

## 2025-01-10 RX ADMIN — NYSTATIN TOPICAL POWDER 1 APPLICATION(S): 100000 POWDER TOPICAL at 17:58

## 2025-01-10 RX ADMIN — ATORVASTATIN CALCIUM 20 MILLIGRAM(S): 40 TABLET, FILM COATED ORAL at 21:23

## 2025-01-10 RX ADMIN — PIPERACILLIN AND TAZOBACTAM 25 GRAM(S): 3; .375 INJECTION, POWDER, LYOPHILIZED, FOR SOLUTION INTRAVENOUS at 18:54

## 2025-01-10 RX ADMIN — Medication 100 MILLIGRAM(S): at 17:57

## 2025-01-10 RX ADMIN — Medication 1 TABLET(S): at 12:23

## 2025-01-10 RX ADMIN — Medication 17 GRAM(S): at 12:23

## 2025-01-10 NOTE — OCCUPATIONAL THERAPY INITIAL EVALUATION ADULT - PERTINENT HX OF CURRENT PROBLEM, REHAB EVAL
79 YOF with PMHx of HTN, lymphedema, arthritis, sacral decubitus ulcer s/p debridement one year ago presents with weakness. PT reports she has been bed bound since Jan 2024. Her weakness began  after being hospitalized for one month. Her symptoms improved with PT but were discontinue after 8 sessions. She states that her weakness is complicated by her lymphedema and arthritis. She states she is fed up with her condition and wants to be admitted for further management and possible placement to [facility]. Denies, chest pain, fever, worsening pain, nausea, vomiting, abdominal pain   ED: Patient found to have elevated WBC with a left shift, given 1x zosyn. PE showed multiple areas of skin breakdown/ stage 1 ulcer/skin changes to lower back and buttocks, with super impose candida. Patient admitted for sacral wound management.

## 2025-01-10 NOTE — DIETITIAN INITIAL EVALUATION ADULT - PERTINENT LABORATORY DATA
01-10    137  |  104  |  39[H]  ----------------------------<  125[H]  5.1   |  20[L]  |  0.94    Ca    10.2      10 Onofre 2025 07:45  Phos  3.3     01-10  Mg     1.9     01-10    TPro  6.9  /  Alb  2.5[L]  /  TBili  0.3  /  DBili  x   /  AST  22  /  ALT  34  /  AlkPhos  318[H]  01-10  POCT Blood Glucose.: 158 mg/dL (01-10-25 @ 08:32)  A1C with Estimated Average Glucose Result: 7.3 % (01-09-25 @ 10:04)

## 2025-01-10 NOTE — PROGRESS NOTE ADULT - PROBLEM SELECTOR PLAN 6
c/w home meds after confirmation with daughter Med rec unclear, attempted to reach daughter Ling x2, will try in PM for clear med rec, daughter DOES NOT live with patient, but per daughter, Dorinda has a better understanding of current medications. Email given to follow up current medications  - C/w celecoxib

## 2025-01-10 NOTE — DIETITIAN INITIAL EVALUATION ADULT - OTHER INFO
per H&P: h/o sacral decubitus ulcer s/p debridement one year ago    Weights:  - UBW (per patient): Pt unsure of UBW but reported 25-40 pound weight loss over one year due to decreased physical activity. Noted Pt bedbound since January 2024 per H&P.   - Dosing Weight (per chart): 179.9 pounds (1/8)   -  pounds +/- 10%   - Per [facility] HIE: 215 pounds (9/20/22), 225 pounds (2/1/22)   RD to continue to monitor weights and trends as able.

## 2025-01-10 NOTE — PROGRESS NOTE ADULT - ATTENDING COMMENTS
79 y.o. F with pmhx of lymphedema, arthritis, sacral decubitus ulcer, DM, HTN, HLD, admitted for inability to care for self at home. Has had persistent sacral ulcer and weakness >1 year, notes bedbound and has VNS and HHA 3x/week, along with 2 daughters in [place] who prepare food for her. Feels inadequate support at home, looking for LTC/[facility] for higher level of care.   - Significant candidal rashes throughout skin folds - c/w nystatin, fluconazole  - BG appear more controlled, only takes metformin 500 mg at home. A1c 7.3, c/w SSI therapy  - CT imaging concerning for osteomyelitis/extension of wound to sacrum/coccyx, appreciate ID input, restart on abx therapy  - CT A/P also with high stool burden; pt with longstanding hx of constipation 1 BM weekly - increase bowel regimen,   - R femoral ossific lesion noted on CT as well - pt counseled on findings, defers MRI at this time. Findings explained to daughter who states will discuss with siblings and explain to pt to see if she reconsiders further testing  - Deconditioning d/t bedbound status, significant body habitus, PT/OT recommending [facility] 79 y.o. F with pmhx of lymphedema, arthritis, sacral decubitus ulcer, DM, HTN, HLD, admitted for inability to care for self at home. Has had persistent sacral ulcer and weakness >1 year, notes bedbound and has VNS and HHA 3x/week, along with 2 daughters in [place] who prepare food for her. Feels inadequate support at home, looking for LTC/DUSTIN for higher level of care.   - Significant candidal rashes throughout skin folds - c/w nystatin, fluconazole  - BG appear more controlled, only takes metformin 500 mg at home. A1c 7.3, c/w SSI therapy  - CT imaging concerning for osteomyelitis/extension of wound to sacrum/coccyx, appreciate wound care and ID input, restart on abx therapy  - CT A/P also with high stool burden; pt with longstanding hx of constipation 1 BM weekly - increase bowel regimen,   - R femoral ossific lesion noted on CT as well - pt counseled on findings, defers MRI at this time. Findings explained to daughter who states will discuss with siblings and explain to pt to see if she reconsiders further testing  - Deconditioning d/t bedbound status, significant body habitus, PT/OT recommending [facility] 79 y.o. F with pmhx of lymphedema, arthritis, sacral decubitus ulcer, DM, HTN, HLD, admitted for inability to care for self at home. Has had persistent sacral ulcer and weakness >1 year, notes bedbound and has VNS and HHA 3x/week, along with 2 daughters in [place] who prepare food for her. Feels inadequate support at home, looking for LTC/DUSTIN for higher level of care.   - Significant candidal rashes throughout skin folds - c/w nystatin, fluconazole  - BG appear more controlled, only takes metformin 500 mg at home. A1c 7.3, c/w SSI therapy  - CT imaging concerning for osteomyelitis/extension of wound to sacrum/coccyx, appreciate wound care and ID input, blood cx only 1 bottle with likely contaminant, restart on abx therapy  - CT A/P also with high stool burden; pt with longstanding hx of constipation 1 BM weekly - increase bowel regimen,   - R femoral ossific lesion noted on CT as well - pt counseled on findings, defers MRI at this time. Findings explained to daughter who states will discuss with siblings and explain to pt to see if she reconsiders further testing  - Deconditioning d/t bedbound status, significant body habitus, PT/OT recommending [facility]

## 2025-01-10 NOTE — DIETITIAN INITIAL EVALUATION ADULT - REASON INDICATOR FOR ASSESSMENT
RD consult warranted for pressure injury stage 2 or greater   Source: Patient, Electronic Medical Record  Chart reviewed, events noted.

## 2025-01-10 NOTE — DIETITIAN INITIAL EVALUATION ADULT - ADD RECOMMEND
1. Recommend liberalize current therapeutic diet restrictions to Low Sodium, Consistent Carbohydrate diet to promote PO intake.  2. Recommend adding Multivitamin, ascorbic acid daily pending no medical contraindications, to promote wound healing.  3. Recommend Teofilo 2 packets daily and LPS (sugar free, provides 100 kcal, 15 grams protein per serving) once daily to promote wound healing.   4. Monitor PO intake, PO diet tolerance, skin, weight, nutrition related labs, goals of care

## 2025-01-10 NOTE — PROGRESS NOTE ADULT - SUBJECTIVE AND OBJECTIVE BOX
Torres Andrews MD  Available on TEAMS    Patient is a 79y old  Female who presents with a chief complaint of Weakness (2025 17:24)      SUBJECTIVE / OVERNIGHT EVENTS: No acute events overnight. Patient was assessed at bedside.       REVIEW OF SYSTEMS:  CONSTITUTIONAL: No weakness, fevers, or chills  EYES/ENT: No visual changes; No vertigo, throat pain, or dysphagia  NECK: No pain or stiffness  RESPIRATORY: No cough, wheezing, hemoptysis, or shortness of breath  CARDIOVASCULAR: No chest pain or palpitations  GASTROINTESTINAL: No abdominal or epigastric pain. No nausea, vomiting, or hematemesis; No diarrhea or constipation. No melena or hematochezia.  GENITOURINARY: No dysuria, frequency, or hematuria  MUSCULOSKELETAL: No joint or muscle pain or aches  NEUROLOGICAL: No numbness or weakness  SKIN: No itching or rashes      MEDICATIONS  (STANDING):  atorvastatin 20 milliGRAM(s) Oral at bedtime  celecoxib 100 milliGRAM(s) Oral daily  dextrose 5%. 1000 milliLiter(s) (50 mL/Hr) IV Continuous <Continuous>  dextrose 5%. 1000 milliLiter(s) (100 mL/Hr) IV Continuous <Continuous>  dextrose 50% Injectable 25 Gram(s) IV Push once  dextrose 50% Injectable 12.5 Gram(s) IV Push once  dextrose 50% Injectable 25 Gram(s) IV Push once  enoxaparin Injectable 40 milliGRAM(s) SubCutaneous every 24 hours  fluconAZOLE   Tablet 150 milliGRAM(s) Oral <User Schedule>  glucagon  Injectable 1 milliGRAM(s) IntraMuscular once  influenza  Vaccine (HIGH DOSE) 0.5 milliLiter(s) IntraMuscular once  insulin lispro (ADMELOG) corrective regimen sliding scale   SubCutaneous three times a day before meals  metoprolol tartrate 100 milliGRAM(s) Oral every 12 hours  multivitamin/minerals 1 Tablet(s) Oral daily  nystatin Powder 1 Application(s) Topical two times a day  polyethylene glycol 3350 17 Gram(s) Oral daily  senna 2 Tablet(s) Oral at bedtime    MEDICATIONS  (PRN):  acetaminophen     Tablet .. 650 milliGRAM(s) Oral every 6 hours PRN Temp greater or equal to 38C (100.4F), Mild Pain (1 - 3), Moderate Pain (4 - 6)  dextrose Oral Gel 15 Gram(s) Oral once PRN Blood Glucose LESS THAN 70 milliGRAM(s)/deciliter      CAPILLARY BLOOD GLUCOSE      POCT Blood Glucose.: 182 mg/dL (2025 22:26)  POCT Blood Glucose.: 146 mg/dL (2025 08:43)    I&O's Summary    2025 07:01  -  10 Onofre 2025 07:00  --------------------------------------------------------  IN: 370 mL / OUT: 900 mL / NET: -530 mL        Vital Signs Last 24 Hrs  T(C): 36.6 (10 Onofre 2025 07:02), Max: 37.2 (2025 21:21)  T(F): 97.9 (10 Onofre 2025 07:02), Max: 99 (2025 21:21)  HR: 96 (10 Onofre 2025 07:02) (74 - 96)  BP: 156/65 (10 Onofre 2025 07:02) (109/85 - 156/65)  BP(mean): --  RR: 18 (10 Onofre 2025 07:02) (18 - 19)  SpO2: 93% (10 Onofre 2025 07:02) (92% - 95%)    Parameters below as of 10 Onofre 2025 07:02  Patient On (Oxygen Delivery Method): room air        PHYSICAL EXAM:  GENERAL: NAD, well-developed, well-nourished  HEAD: Atraumatic, Normocephalic  EYES: EOMI, PERRLA, conjunctiva and sclera clear  NECK: Supple, No JVD  CHEST/LUNG: Clear to auscultation bilaterally; No wheezes or crackles  HEART: Normal S1/S2; Regular rate and rhythm; No murmurs, rubs, or gallops  ABDOMEN: Soft, Nontender, Nondistended; Bowel sounds present  EXTREMITIES: 2+ Peripheral Pulses; No clubbing, cyanosis, or edema  PSYCH: A&Ox3  NEUROLOGY: no focal neurologic deficit  SKIN: No rashes or lesions    LABS:                        9.4    11.63 )-----------( 489      ( 2025 10:04 )             31.8      -09    137  |  102  |  37[H]  ----------------------------<  176[H]  5.0   |  23  |  0.86    Ca    9.8      2025 10:04    TPro  7.1  /  Alb  2.8[L]  /  TBili  0.3  /  DBili  x   /  AST  19  /  ALT  34  /  AlkPhos  312[H]  01-          Urinalysis Basic - ( 2025 12:15 )    Color: Yellow / Appearance: Clear / S.017 / pH: x  Gluc: x / Ketone: Negative mg/dL  / Bili: Negative / Urobili: 0.2 mg/dL   Blood: x / Protein: 30 mg/dL / Nitrite: Negative   Leuk Esterase: Small / RBC: 9 /HPF / WBC 5 /HPF   Sq Epi: x / Non Sq Epi: 1 /HPF / Bacteria: Negative /HPF        RADIOLOGY & ADDITIONAL TESTS:    Imaging Personally Reviewed:    Consultant(s) Notes Reviewed:      Care Discussed with Consultants/Other Providers:   Torres Andrews MD  Available on TEAMS    Patient is a 79y old  Female who presents with a chief complaint of Weakness (2025 17:24)      SUBJECTIVE / OVERNIGHT EVENTS: No acute events overnight. Patient was assessed at bedside. No additional pain in the sacrum area.      REVIEW OF SYSTEMS:  CONSTITUTIONAL: No weakness, fevers, or chills  EYES/ENT: No visual changes; No vertigo, throat pain, or dysphagia  NECK: No pain or stiffness  RESPIRATORY: No cough, wheezing, hemoptysis, or shortness of breath  CARDIOVASCULAR: No chest pain or palpitations  GASTROINTESTINAL: No abdominal or epigastric pain. No nausea, vomiting, or hematemesis; No diarrhea or constipation. No melena or hematochezia.  GENITOURINARY: No dysuria, frequency, or hematuria  MUSCULOSKELETAL: No joint or muscle pain or aches  NEUROLOGICAL: No numbness or weakness  SKIN: No itching or rashes      MEDICATIONS  (STANDING):  atorvastatin 20 milliGRAM(s) Oral at bedtime  celecoxib 100 milliGRAM(s) Oral daily  dextrose 5%. 1000 milliLiter(s) (50 mL/Hr) IV Continuous <Continuous>  dextrose 5%. 1000 milliLiter(s) (100 mL/Hr) IV Continuous <Continuous>  dextrose 50% Injectable 25 Gram(s) IV Push once  dextrose 50% Injectable 12.5 Gram(s) IV Push once  dextrose 50% Injectable 25 Gram(s) IV Push once  enoxaparin Injectable 40 milliGRAM(s) SubCutaneous every 24 hours  fluconAZOLE   Tablet 150 milliGRAM(s) Oral <User Schedule>  glucagon  Injectable 1 milliGRAM(s) IntraMuscular once  influenza  Vaccine (HIGH DOSE) 0.5 milliLiter(s) IntraMuscular once  insulin lispro (ADMELOG) corrective regimen sliding scale   SubCutaneous three times a day before meals  metoprolol tartrate 100 milliGRAM(s) Oral every 12 hours  multivitamin/minerals 1 Tablet(s) Oral daily  nystatin Powder 1 Application(s) Topical two times a day  polyethylene glycol 3350 17 Gram(s) Oral daily  senna 2 Tablet(s) Oral at bedtime    MEDICATIONS  (PRN):  acetaminophen     Tablet .. 650 milliGRAM(s) Oral every 6 hours PRN Temp greater or equal to 38C (100.4F), Mild Pain (1 - 3), Moderate Pain (4 - 6)  dextrose Oral Gel 15 Gram(s) Oral once PRN Blood Glucose LESS THAN 70 milliGRAM(s)/deciliter      CAPILLARY BLOOD GLUCOSE      POCT Blood Glucose.: 182 mg/dL (2025 22:26)  POCT Blood Glucose.: 146 mg/dL (2025 08:43)    I&O's Summary    2025 07:01  -  10 Onofre 2025 07:00  --------------------------------------------------------  IN: 370 mL / OUT: 900 mL / NET: -530 mL        Vital Signs Last 24 Hrs  T(C): 36.6 (10 Onofre 2025 07:02), Max: 37.2 (2025 21:21)  T(F): 97.9 (10 Onofre 2025 07:02), Max: 99 (2025 21:21)  HR: 96 (10 Onofre 2025 07:02) (74 - 96)  BP: 156/65 (10 Onofre 2025 07:02) (109/85 - 156/65)  BP(mean): --  RR: 18 (10 Onofre 2025 07:02) (18 - 19)  SpO2: 93% (10 Onofre 2025 07:02) (92% - 95%)    Parameters below as of 10 Onofre 2025 07:02  Patient On (Oxygen Delivery Method): room air        PHYSICAL EXAM:  GENERAL: NAD, well-developed, well-nourished  HEAD: Atraumatic, Normocephalic  EYES: EOMI, PERRLA, conjunctiva and sclera clear  NECK: Supple, No JVD  CHEST/LUNG: Clear to auscultation bilaterally; No wheezes or crackles  HEART: Normal S1/S2; Regular rate and rhythm; No murmurs, rubs, or gallops  ABDOMEN: Soft, Nontender, Nondistended; Bowel sounds present  EXTREMITIES: 2+ Peripheral Pulses; No clubbing, cyanosis, or edema  PSYCH: A&Ox3  NEUROLOGY: no focal neurologic deficit  SKIN: No rashes or lesions    LABS:                        9.4    11.63 )-----------( 489      ( 2025 10:04 )             31.8      -09    137  |  102  |  37[H]  ----------------------------<  176[H]  5.0   |  23  |  0.86    Ca    9.8      2025 10:04    TPro  7.1  /  Alb  2.8[L]  /  TBili  0.3  /  DBili  x   /  AST  19  /  ALT  34  /  AlkPhos  312[H]  -          Urinalysis Basic - ( 2025 12:15 )    Color: Yellow / Appearance: Clear / S.017 / pH: x  Gluc: x / Ketone: Negative mg/dL  / Bili: Negative / Urobili: 0.2 mg/dL   Blood: x / Protein: 30 mg/dL / Nitrite: Negative   Leuk Esterase: Small / RBC: 9 /HPF / WBC 5 /HPF   Sq Epi: x / Non Sq Epi: 1 /HPF / Bacteria: Negative /HPF        RADIOLOGY & ADDITIONAL TESTS:    Imaging Personally Reviewed:    Consultant(s) Notes Reviewed:      Care Discussed with Consultants/Other Providers:

## 2025-01-10 NOTE — PROGRESS NOTE ADULT - PROBLEM SELECTOR PLAN 3
Patient with wide spread candidiasis in the lower back    Plan  - C/w 150 mg oral Fluconazole 1x per week for 4 weeks  - C/w topical Antifungal cream Fluconazole CT A/P: *  Soft tissue defect overlying the lower sacrum, consistent with a   decubitus wound. Associated erosive and sclerotic changes to the lower   sacrum and coccyx, concerning for osteomyelitis. Stool overflows from the   anorectum into the lower aspect of the wound.  *  Stool-filled distended rectum. Mild rectal wall thickening and   perirectal fat infiltration, which may represent stercoral proctitis.    Plan  - Aggressive bowel regimen Miralax BID, senna, Doculax

## 2025-01-10 NOTE — DIETITIAN INITIAL EVALUATION ADULT - OTHER CALCULATIONS
defer fluid needs to medical team  Estimated protein-energy needs calculated using IBW with consideration for wound healing

## 2025-01-10 NOTE — DIETITIAN INITIAL EVALUATION ADULT - PERSON TAUGHT/METHOD
provided diet education on benefit of oral nutrition supplements to promote PO intake and healing of skin impairments. Discussed importance of adequate protein intake and starting with protein portion of meals first. Estimated good understanding of education provided. Pt agreeable to oral nutrition supplements to promote protein intake. Pt declined diet education for DM at initial visit./patient instructed

## 2025-01-10 NOTE — DIETITIAN INITIAL EVALUATION ADULT - NSFNSADHERENCEPTAFT_GEN_A_CORE
Noted PMH DM, Pt reported taking metformin at home (noted metformin per home medication list). Does not check fingersticks.  Hyperglycemia noted on admission, HbA1c 7.3% (1/9), noted insulin ordered in house.

## 2025-01-10 NOTE — DIETITIAN INITIAL EVALUATION ADULT - NS FNS DIET ORDER
Diet, DASH/TLC:   Sodium & Cholesterol Restricted  Consistent Carbohydrate {Evening Snack} (CSTCHOSN) (01-09-25 @ 04:44) [Active]

## 2025-01-10 NOTE — PROGRESS NOTE ADULT - PROBLEM SELECTOR PLAN 1
- Persistent sacral ulcer for 1 year, initially presented when patient was ambulating and after bedbound status was unable to care for herself, has home health aid for limited hours who is unable to turn patient, spouse medically unable. Patient and family looking for LTC/DUSTIN for care  - Elevated WBC and AIM, s/p 1x Zosyn in ED  - pending CT A/P r/u OM  - Wound care following  - Pending Blood culture    Plan  - Start Ancef 2gq12h  - Wound care for eval - Persistent sacral ulcer for 1 year, initially presented when patient was ambulating and after bedbound status was unable to care for herself, has home health aid for limited hours who is unable to turn patient, spouse medically unable. Patient and family looking for LTC/DUSTIN for care  - Elevated WBC and AIM, s/p 1x Zosyn in ED  - CT A/P: Concern for OM, proctitis, exophytic bone lesions, bladder neoplasm not excluded. Mild right hydronephrosis  - Wound care following  - Pending Blood culture  - Wound care following  - s/p 1x Ancef    Plan  - ID consult for OM management  - C/w Wound care

## 2025-01-10 NOTE — DIETITIAN INITIAL EVALUATION ADULT - ORAL INTAKE PTA/DIET HISTORY
Pt reports having a fair appetite and PO intake PTA. Follows regular diet but reported she limits sugar intake. Pt denies any known food allergies or intolerances. Pt reported taking MVI and vitamin C supplementation at home. Denies any difficulty chewing/swallowing at this time.

## 2025-01-10 NOTE — DIETITIAN INITIAL EVALUATION ADULT - NSFNSGIIOFT_GEN_A_CORE
Pt denies nausea, vomiting, diarrhea, constipation.  Pt denies nausea, vomiting, diarrhea, constipation. bowel movement recorded 1/10 per flowsheets

## 2025-01-10 NOTE — PROGRESS NOTE ADULT - PROBLEM SELECTOR PLAN 8
- Will clarify home meds with daughter Home meds: Bisoprolol      Plan  - Metoprolol Tartrate 100mg BID

## 2025-01-10 NOTE — PROGRESS NOTE ADULT - PROBLEM SELECTOR PLAN 4
Med rec unclear, attempted to reach daughter Ling x2, will try in PM for clear med rec, daughter DOES NOT live with patient, but per daughter, Dorinda has a better understanding of current medications. Email given to follow up current medications    Plan  - C/w celecoxib   - Will attempt to reach daughter in PM Patient baseline bedbound, functional paraplegia 2/2 arthlagias/lymphedema and overall fatigue  - PT: DUSTIN  - ABDULKADIR consulted    Plan  - DUSTIN on discharge

## 2025-01-10 NOTE — PROGRESS NOTE ADULT - PROBLEM SELECTOR PLAN 2
Patient baseline bedbound, functional paraplegia 2/2 arthlagias/lymphedema and overall fatigue  - PT consulted   - SW consulted    Plan  - Pending PT/SW recs CT A/P:  Exophytic right femoral ossific lesion measuring 5.1 x 2.0 x 2.8 cm,   indeterminate, with parosteal osteosarcoma within the differential.   Evaluation with MRI of the femur without and with contrast is recommended.      Plan  - Discussed with patient regarding findings, at this time patient states " we'll see" regarding necessity for further MRI work up, findings discussed with daughter, Doridna, states will have family meeting to discuss next steps.

## 2025-01-10 NOTE — PROVIDER CONTACT NOTE (CRITICAL VALUE NOTIFICATION) - SITUATION
Patients blood culture from 1/8 came back with preliminary growth in aerobic gram for positive in glover clusters.

## 2025-01-10 NOTE — PROGRESS NOTE ADULT - PROBLEM SELECTOR PLAN 11
- Fluids: None  - Electrolytes: Will replete to maintain K>4, Phos>3, and Mag>2  - Nutrition:  Consistent Carb  - Activity: As tolerated, pending PT eval  - DVT Prophylaxis: heparin Sub q  - Stress Ulcer/GI Prophylaxis: N/A  - Disposition: Admit to medicine, active, DUSTIN placement per PT

## 2025-01-10 NOTE — PROVIDER CONTACT NOTE (CRITICAL VALUE NOTIFICATION) - BACKGROUND
Patient was admitted for weakness. PMH Lymphedema, arthritis, sacral decubitus ulcer, hypertension, diabetes.

## 2025-01-10 NOTE — PROGRESS NOTE ADULT - PROBLEM SELECTOR PLAN 9
- Fluids: None  - Electrolytes: Will replete to maintain K>4, Phos>3, and Mag>2  - Nutrition:  Consistent Carb  - Activity: As tolerated, pending PT eval  - DVT Prophylaxis: heparin Sub q  - Stress Ulcer/GI Prophylaxis: N/A  - Disposition: Admit to medicine, medically active Per patient; has not previously been on insulin, per outpatient med rec, patient has been on 22U of insulin, refusing POCT   - Will clarify home meds with daughter    Plan  - moderate ISS

## 2025-01-10 NOTE — PROGRESS NOTE ADULT - PROBLEM SELECTOR PLAN 5
Med rec unclear, attempted to reach daughter Ling x2, will try in PM for clear med rec, daughter DOES NOT live with patient, but per daughter, Dorinda has a better understanding of current medications. Email given to follow up current medications    Plan  - PT encourage movement Patient with wide spread candidiasis in the lower back  - C/w 150 mg oral Fluconazole 1x per week for 4 weeks  - C/w topical Antifungal cream Fluconazole

## 2025-01-10 NOTE — DIETITIAN INITIAL EVALUATION ADULT - PROBLEM SELECTOR PLAN 2
Patient with generalized weakness bed bound for a year after being hospitalize for a month  Plan  - Consults PT  - Fall risk  - Consult social work

## 2025-01-10 NOTE — DIETITIAN INITIAL EVALUATION ADULT - PROBLEM/PLAN-2
No protocol for requested medication.    Medication:     nitroGLYCERIN (NITROSTAT) 0.4 MG sublingual tablet     Last office visit date: 12/04/2024  Pharmacy: [facility] - [place], [place] - [address]    Order pended, routed to clinician for review.    
DISPLAY PLAN FREE TEXT

## 2025-01-10 NOTE — CONSULT NOTE ADULT - SUBJECTIVE AND OBJECTIVE BOX
Patient is a 79y old  Female who presents with a chief complaint of 79 year old female admitted with Weakness    HPI:  79F with HTN, lymphedema, b/l arthritis.  She reports having been bound to wheelchair, then developed an sacral ulcer.  Was hospitalized for a month at [facility] and received antibiotics and surgical debridement.  Was discharged January 2024 and completed course of antibiotics orally she thinks for at least 6 weeks total.  Receives thrice weekly visiting nurse and aide.  Sleeps in hospital bed and is bed bound 24 hours a day.  SHe was admitted 1/8/2025 c/o weakness.  Here, marked leukocytosis.  Tm 99.  CT done shows sacral decub with erosive changes consistent with sacral osteomyelitis.  Seen by wound care.  Started on vancomycin / zosyn and dilfucan.  Elevated ESR / CRP.    ID asked to help management.     prior hospital charts reviewed [  ]  primary team notes reviewed [x  ]  other consultant notes reviewed [x ]    PAST MEDICAL & SURGICAL HISTORY:  Hypertension  Diabetes  Back pain  Lymphedema of both lower extremities  HLD (hyperlipidemia)  History of cholecystectomy    Allergies  No Known Allergies    ANTIMICROBIALS:  piperacillin/tazobactam IVPB (1/8 x1)  vancomycin  IVPB (1/8 x1)  fluconAZOLE   Tablet 150 <User Schedule>    MEDICATIONS  (STANDING):  atorvastatin 20 at bedtime  celecoxib 100 daily  enoxaparin Injectable 40 every 24 hours  insulin lispro (ADMELOG) corrective regimen sliding scale  three times a day before meals  metoprolol tartrate 100 every 12 hours  polyethylene glycol 3350 17 two times a day  senna 2 at bedtime    SOCIAL HISTORY:   denies tobacco; lives with ; never worked    FAMILY HISTORY:  n/c    REVIEW OF SYSTEMS  [  ] ROS unobtainable because:    [  ] All other systems negative except as noted below:	    Constitutional:  [ ] fever [ ] chills  [ ] weight loss  [ ] weakness  Skin:  [ ] rash [ ] phlebitis	  Eyes: [ ] icterus [ ] pain  [ ] discharge	  ENMT: [ ] sore throat  [ ] thrush [ ] ulcers [ ] exudates  Respiratory: [ ] dyspnea [ ] hemoptysis [ ] cough [ ] sputum	  Cardiovascular:  [ ] chest pain [ ] palpitations [ ] edema	  Gastrointestinal:  [ ] nausea [ ] vomiting [ ] diarrhea [ ] constipation [ ] pain	  Genitourinary:  [ ] dysuria [ ] frequency [ ] hematuria [ ] discharge [ ] flank pain  [ ] incontinence  Musculoskeletal:  [ ] myalgias [ ] arthralgias [ ] arthritis  [ ] back pain  Neurological:  [ ] headache [ ] seizures  [ ] confusion/altered mental status  Psychiatric:  [ ] anxiety [ ] depression	  Hematology/Lymphatics:  [ ] lymphadenopathy  Endocrine:  [ ] adrenal [ ] thyroid  Allergic/Immunologic:	 [ ] transplant [ ] seasonal    Vital Signs Last 24 Hrs  T(F): 98.1 (01-10-25 @ 12:00), Max: 99 (01-09-25 @ 21:21)  Vital Signs Last 24 Hrs  HR: 81 (01-10-25 @ 12:39) (74 - 96)  BP: 176/92 (01-10-25 @ 12:39) (109/85 - 176/92)  RR: 18 (01-10-25 @ 12:00)  SpO2: 97% (01-10-25 @ 12:39) (92% - 99%)  Wt(kg): --    PHYSICAL EXAM:  Constitutional: non-toxic, no distress  HEAD/EYES: anicteric  ENT:  supple  Cardiovascular:   normal S1, S2  Respiratory:  clear BS bilaterally  GI:  soft, non-tender, normal bowel sounds  :  no aldridge  Musculoskeletal:  no synovitis  Neurologic: awake and alert, normal strength, no focal findings  Skin:  sacral decub   Psychiatric:  awake, alert, appropriate mood                          9.3    13.28 )-----------( 478      ( 10 Onofre 2025 07:45 )             31.0     137  |  104  |  39[H]  ----------------------------<  125[H]  5.1   |  20[L]  |  0.94    Ca    10.2      10 Onofre 2025 07:45  Phos  3.3     01-10  Mg     1.9     01-10    TPro  6.9  /  Alb  2.5[L]  /  TBili  0.3  /  DBili  x   /  AST  22  /  ALT  34  /  AlkPhos  318[H]  01-10    WBC Count: 13.28 (01-10-25 @ 07:45)  WBC Count: 11.63 (01-09-25 @ 10:04)  WBC Count: 17.66 (01-08-25 @ 16:34)    Urinalysis + Microscopic Examination (01.09.25 @ 12:15)   pH Urine: 6.0  Urine Appearance: Clear  Color: Yellow  Specific Gravity: 1.017  Protein, Urine: 30 mg/dL  Glucose Qualitative, Urine: Negative mg/dL  Ketone - Urine: Negative mg/dL  Blood, Urine: Small  Bilirubin: Negative  Urobilinogen: 0.2 mg/dL  Leukocyte Esterase Concentration: Small  Nitrite: Negative  Review: Reviewed  White Blood Cell - Urine: 5 /HPF  Red Blood Cell - Urine: 9 /HPF  Bacteria: Negative /HPF  Cast: 0 /LPF  Epithelial Cells: 1 /HPF  Procalcitonin: 0.15 (01-10 @ 07:45)  Sedimentation Rate, Erythrocyte: 120 (01-08 @ 17:57)  C-Reactive Protein: 97 (01-08 @ 17:57)    MICROBIOLOGY:  Culture - Blood (collected 08 Jan 2025 17:45)  Source: .Blood BLOOD  Preliminary Report (09 Jan 2025 23:01):    No growth at 24 hours    Culture - Blood (collected 08 Jan 2025 17:40)  Source: .Blood BLOOD  Gram Stain (10 Onofre 2025 06:15):    Growth in aerobic bottle: Gram Positive Cocci in Clusters    Growth in anaerobic bottle: Gram Positive Cocci in Clusters  Preliminary Report (10 Onofre 2025 06:16):    Growth in aerobic bottle: Gram Positive Cocci in Clusters    Growth in anaerobic bottle: Gram Positive Cocci in Clusters    Direct identification is available within approximately 3-5    hours either by Blood Panel Multiplexed PCR or Direct    MALDI-TOF. Details: [email or web address]  Organism: Blood Culture PCR (10 Onofre 2025 00:11)  Organism: Blood Culture PCR (10 Onofre 2025 00:11)      Method Type: PCR      -  Staphylococcus epidermidis, Methicillin resistant: Detec    RADIOLOGY:  imaging below personally reviewed and agree with findings    CT Abdomen and Pelvis w/ IV Cont (01.10.25 @ 09:26) >  ABDOMINAL WALL: Soft tissue defect overlying the lower sacrum, consistent with a decubitus wound. There is associated soft tissue infiltration without discrete collection. Associated erosive and sclerotic changes to the lower sacrum and to the coccyx (series 4 image 78). Stool overflows from the anorectum into the lower aspect of the wound. Small fat-containing left inguinal hernia. Tiny fat-containing umbilical/periumbilical hernia. Small focus of subcutaneous gas in the right ventral abdominal wall, which may be injection related.  BONES: Findings pertaining to the sacrum and coccyx as above. Degenerative changes with multilevel central canal narrowing in the lumbar spine, most significantly at L4-L5. S-shaped scoliosis. Ossific lesion measuring 5.1 x 2.0 x 2.8 cm projecting laterally exophytically from the right femoral shaft without clear medullary continuity and appears cortically based (series 5 image 55).  IMPRESSION:  *  Soft tissue defect overlying the lower sacrum, consistent with a decubitus wound. Associated erosive and sclerotic changes to the lower sacrum and coccyx, concerning for osteomyelitis. Stool overflows from the anorectum into the lower aspect of the wound.  *  Stool-filled distended rectum. Mild rectal wall thickening and perirectal fat infiltration, which may represent stercoral proctitis.  *  Focal segment of mural thickening in the proximal sigmoid colon, possibly secondary to underdistention, diverticulitis, or colitis, with colonic neoplasm also within the differential. Consider evaluation with colonoscopy.  *  Exophytic right femoral ossific lesion measuring 5.1 x 2.0 x 2.8 cm, indeterminate, with parosteal osteosarcoma within the differential. Evaluation with MRI of the femur without and with contrast is recommended.  *  Underdistended urinary bladder. Mild nonspecific focal mural thickening along the right bladder wall. Correlate with urinalysis. Bladder neoplasm is not excluded.  *  Mild right hydroureteronephrosis to the level of the distal ureter, with narrowing of the ureter as it courses lateral to the stool-filled distended rectum. Consideration is given to obstruction by mass effect from the rectum, with other etiology not excluded. Advise short interval follow-up imaging to ensure resolution.  *  Minimal intrahepatic biliary duct dilation of unclear significance, with consideration given to postcholecystectomy state.

## 2025-01-10 NOTE — PROGRESS NOTE ADULT - PROBLEM SELECTOR PLAN 7
Per patient; has not previously been on insulin, per outpatient med rec, patient has been on 22U of insulin, refusing POCT   - Will clarify home meds with daughter    Plan  - moderate ISS Med rec unclear, attempted to reach daughter Ling x2, will try in PM for clear med rec, daughter DOES NOT live with patient, but per daughter, Dorinda has a better understanding of current medications. Email given to follow up current medications    Plan  - PT encourage movement

## 2025-01-10 NOTE — OCCUPATIONAL THERAPY INITIAL EVALUATION ADULT - LIVES WITH, PROFILE
Pvt home, sleeps in hospital bed. Bedbound x 1yr. HHA 3hr/day x 3 days. Otherwise spouse assists. Owns mechanical lift and wheelchair, pt reports not using. Sponge bathes and toilets in bed. Assist with all ADLs except feeding, face washing, and teeth brushing/spouse

## 2025-01-10 NOTE — CONSULT NOTE ADULT - ASSESSMENT
79F with HTN, lymphedema, b/l arthritis.  She reports having been bound to wheelchair, then developed an sacral ulcer.  Was hospitalized for a month at [facility] and received antibiotics and surgical debridement.  Was discharged January 2024 and completed course of antibiotics orally she thinks for at least 6 weeks total.  Receives thrice weekly visiting nurse and aide.  Sleeps in hospital bed and is bed bound 24 hours a day.  SHe was admitted 1/8/2025 c/o weakness.  Here, marked leukocytosis.  Tm 99.  CT done shows sacral decub with erosive changes consistent with sacral osteomyelitis.  Seen by wound care.  Started on vancomycin / zosyn and dilfucan.  Elevated ESR / CRP.    Sacral OM with elevated inflammatory markers and leukocytosis  - would restart zosyn  - can continue diflucan   - trend wbc  - f/u all cultures

## 2025-01-10 NOTE — DIETITIAN INITIAL EVALUATION ADULT - NSFNSPHYEXAMSKINFT_GEN_A_CORE
per flowsheets:  Pressure Injury 1: stage IV, sacrum   Pressure Injury 2: Right:, heel, Suspected deep tissue injury  Pressure Injury 3: Left:, heel, Suspected deep tissue injury  per wound care consult 1/9: "Wound Consult requested to assist w/ management of Sacral Stage 4 pressure injury"

## 2025-01-10 NOTE — DIETITIAN INITIAL EVALUATION ADULT - PROBLEM SELECTOR PLAN 3
Patient with wide spread candidiasis in the lower back    - 150 mg oral Fluconazole 1x per week for 4 weeks  - topical Antifungal cream Fluconazole

## 2025-01-11 DIAGNOSIS — M46.28 OSTEOMYELITIS OF VERTEBRA, SACRAL AND SACROCOCCYGEAL REGION: ICD-10-CM

## 2025-01-11 LAB
ANION GAP SERPL CALC-SCNC: 13 MMOL/L — SIGNIFICANT CHANGE UP (ref 5–17)
BUN SERPL-MCNC: 32 MG/DL — HIGH (ref 7–23)
CALCIUM SERPL-MCNC: 9.7 MG/DL — SIGNIFICANT CHANGE UP (ref 8.4–10.5)
CHLORIDE SERPL-SCNC: 101 MMOL/L — SIGNIFICANT CHANGE UP (ref 96–108)
CO2 SERPL-SCNC: 22 MMOL/L — SIGNIFICANT CHANGE UP (ref 22–31)
CREAT SERPL-MCNC: 0.91 MG/DL — SIGNIFICANT CHANGE UP (ref 0.5–1.3)
CULTURE RESULTS: ABNORMAL
EGFR: 63 ML/MIN/1.73M2 — SIGNIFICANT CHANGE UP
GLUCOSE BLDC GLUCOMTR-MCNC: 183 MG/DL — HIGH (ref 70–99)
GLUCOSE BLDC GLUCOMTR-MCNC: 192 MG/DL — HIGH (ref 70–99)
GLUCOSE SERPL-MCNC: 224 MG/DL — HIGH (ref 70–99)
HCT VFR BLD CALC: 31.3 % — LOW (ref 34.5–45)
HGB BLD-MCNC: 9.3 G/DL — LOW (ref 11.5–15.5)
MCHC RBC-ENTMCNC: 25.4 PG — LOW (ref 27–34)
MCHC RBC-ENTMCNC: 29.7 G/DL — LOW (ref 32–36)
MCV RBC AUTO: 85.5 FL — SIGNIFICANT CHANGE UP (ref 80–100)
NRBC # BLD: 0 /100 WBCS — SIGNIFICANT CHANGE UP (ref 0–0)
ORGANISM # SPEC MICROSCOPIC CNT: ABNORMAL
ORGANISM # SPEC MICROSCOPIC CNT: ABNORMAL
PLATELET # BLD AUTO: 457 K/UL — HIGH (ref 150–400)
POTASSIUM SERPL-MCNC: 4.6 MMOL/L — SIGNIFICANT CHANGE UP (ref 3.5–5.3)
POTASSIUM SERPL-SCNC: 4.6 MMOL/L — SIGNIFICANT CHANGE UP (ref 3.5–5.3)
RBC # BLD: 3.66 M/UL — LOW (ref 3.8–5.2)
RBC # FLD: 17.1 % — HIGH (ref 10.3–14.5)
SODIUM SERPL-SCNC: 136 MMOL/L — SIGNIFICANT CHANGE UP (ref 135–145)
SPECIMEN SOURCE: SIGNIFICANT CHANGE UP
WBC # BLD: 9.7 K/UL — SIGNIFICANT CHANGE UP (ref 3.8–10.5)
WBC # FLD AUTO: 9.7 K/UL — SIGNIFICANT CHANGE UP (ref 3.8–10.5)

## 2025-01-11 PROCEDURE — 99232 SBSQ HOSP IP/OBS MODERATE 35: CPT | Mod: GC

## 2025-01-11 RX ORDER — LISINOPRIL 30 MG/1
2.5 TABLET ORAL DAILY
Refills: 0 | Status: DISCONTINUED | OUTPATIENT
Start: 2025-01-11 | End: 2025-01-13

## 2025-01-11 RX ADMIN — PIPERACILLIN AND TAZOBACTAM 25 GRAM(S): 3; .375 INJECTION, POWDER, LYOPHILIZED, FOR SOLUTION INTRAVENOUS at 07:52

## 2025-01-11 RX ADMIN — PIPERACILLIN AND TAZOBACTAM 25 GRAM(S): 3; .375 INJECTION, POWDER, LYOPHILIZED, FOR SOLUTION INTRAVENOUS at 21:38

## 2025-01-11 RX ADMIN — PIPERACILLIN AND TAZOBACTAM 25 GRAM(S): 3; .375 INJECTION, POWDER, LYOPHILIZED, FOR SOLUTION INTRAVENOUS at 13:58

## 2025-01-11 RX ADMIN — NYSTATIN TOPICAL POWDER 1 APPLICATION(S): 100000 POWDER TOPICAL at 05:28

## 2025-01-11 RX ADMIN — LISINOPRIL 2.5 MILLIGRAM(S): 30 TABLET ORAL at 11:49

## 2025-01-11 RX ADMIN — ACETAMINOPHEN 650 MILLIGRAM(S): 80 SOLUTION/ DROPS ORAL at 21:37

## 2025-01-11 RX ADMIN — Medication 17 GRAM(S): at 17:22

## 2025-01-11 RX ADMIN — Medication 500 MILLIGRAM(S): at 11:08

## 2025-01-11 RX ADMIN — Medication 100 MILLIGRAM(S): at 12:21

## 2025-01-11 RX ADMIN — ATORVASTATIN CALCIUM 20 MILLIGRAM(S): 40 TABLET, FILM COATED ORAL at 21:36

## 2025-01-11 RX ADMIN — ACETAMINOPHEN 650 MILLIGRAM(S): 80 SOLUTION/ DROPS ORAL at 11:07

## 2025-01-11 RX ADMIN — Medication 17 GRAM(S): at 05:27

## 2025-01-11 RX ADMIN — PIPERACILLIN AND TAZOBACTAM 25 GRAM(S): 3; .375 INJECTION, POWDER, LYOPHILIZED, FOR SOLUTION INTRAVENOUS at 02:13

## 2025-01-11 RX ADMIN — Medication 2: at 08:36

## 2025-01-11 RX ADMIN — SENNOSIDES 2 TABLET(S): 8.6 TABLET, FILM COATED ORAL at 21:38

## 2025-01-11 RX ADMIN — Medication 100 MILLIGRAM(S): at 05:27

## 2025-01-11 RX ADMIN — Medication 100 MILLIGRAM(S): at 11:08

## 2025-01-11 RX ADMIN — ENOXAPARIN SODIUM 40 MILLIGRAM(S): 60 INJECTION INTRAVENOUS; SUBCUTANEOUS at 11:15

## 2025-01-11 RX ADMIN — ACETAMINOPHEN 650 MILLIGRAM(S): 80 SOLUTION/ DROPS ORAL at 21:30

## 2025-01-11 RX ADMIN — Medication 1 TABLET(S): at 11:08

## 2025-01-11 RX ADMIN — ACETAMINOPHEN 650 MILLIGRAM(S): 80 SOLUTION/ DROPS ORAL at 12:10

## 2025-01-11 RX ADMIN — NYSTATIN TOPICAL POWDER 1 APPLICATION(S): 100000 POWDER TOPICAL at 17:22

## 2025-01-11 RX ADMIN — Medication 100 MILLIGRAM(S): at 17:22

## 2025-01-11 NOTE — PROGRESS NOTE ADULT - PROBLEM SELECTOR PLAN 6
Med rec unclear, attempted to reach daughter Ling x2, will try in PM for clear med rec, daughter DOES NOT live with patient, but per daughter, Dorinda has a better understanding of current medications. Email given to follow up current medications  - C/w celecoxib

## 2025-01-11 NOTE — PROGRESS NOTE ADULT - PROBLEM SELECTOR PLAN 8
Home meds: Bisoprolol      Plan  - Metoprolol Tartrate 100mg BID Home meds: Bisoprolol      Plan  - Metoprolol Tartrate 100mg BID  - Start lisinopril 2.5 QD 1/11

## 2025-01-11 NOTE — PROGRESS NOTE ADULT - PROBLEM SELECTOR PLAN 1
- Persistent sacral ulcer for 1 year, initially presented when patient was ambulating and after bedbound status was unable to care for herself, has home health aid for limited hours who is unable to turn patient, spouse medically unable. Patient and family looking for LTC/DUSTIN for care  - Elevated WBC and AIM, s/p 1x Zosyn in ED  - CT A/P: Concern for OM, proctitis, exophytic bone lesions, bladder neoplasm not excluded. Mild right hydronephrosis  - Wound care following  - Pending Blood culture  - Wound care following  - s/p 1x Ancef    Plan  - ID consult for OM management  - C/w Wound care - Persistent sacral ulcer for 1 year, initially presented when patient was ambulating and after bedbound status was unable to care for herself, has home health aid for limited hours who is unable to turn patient, spouse medically unable. Patient and family looking for LTC/DUSTIN for care  - Elevated WBC and AIM, s/p 1x Zosyn in ED  - CT A/P: Concern for OM, proctitis, exophytic bone lesions, bladder neoplasm not excluded. Mild right hydronephrosis  - Wound care following  - 1/8 bcx NGTD  - Wound care following  - s/p 1x Ancef    Plan  - ID consult for OM management  - C/w Wound care  - C/w Zosyn

## 2025-01-11 NOTE — PROGRESS NOTE ADULT - PROBLEM SELECTOR PLAN 3
CT A/P: *  Soft tissue defect overlying the lower sacrum, consistent with a   decubitus wound. Associated erosive and sclerotic changes to the lower   sacrum and coccyx, concerning for osteomyelitis. Stool overflows from the   anorectum into the lower aspect of the wound.  *  Stool-filled distended rectum. Mild rectal wall thickening and   perirectal fat infiltration, which may represent stercoral proctitis.    Plan  - Aggressive bowel regimen Miralax BID, senna, Doculax CT A/P: *  Soft tissue defect overlying the lower sacrum, consistent with a   decubitus wound. Associated erosive and sclerotic changes to the lower   sacrum and coccyx, concerning for osteomyelitis. Stool overflows from the   anorectum into the lower aspect of the wound.  *  Stool-filled distended rectum. Mild rectal wall thickening and   perirectal fat infiltration, which may represent stercoral proctitis.    Plan  - Aggressive bowel regimen Miralax BID, senna, Dulcolax

## 2025-01-11 NOTE — PROGRESS NOTE ADULT - PROBLEM SELECTOR PLAN 9
Per patient; has not previously been on insulin, per outpatient med rec, patient has been on 22U of insulin, refusing POCT   - Will clarify home meds with daughter    Plan  - moderate ISS

## 2025-01-11 NOTE — PROGRESS NOTE ADULT - ATTENDING COMMENTS
79 y.o. F with pmhx of lymphedema, arthritis, sacral decubitus ulcer, DM, HTN, HLD, admitted for inability to care for self at home. Has had persistent sacral ulcer and weakness >1 year, notes bedbound and has VNS and HHA 3x/week, along with 2 daughters in [place] who prepare food for her. Feels inadequate support at home, looking for LTC/DUSTIN for higher level of care.   - Significant candidal rashes throughout skin folds - c/w nystatin, fluconazole  - BG appear more controlled, only takes metformin 500 mg at home. A1c 7.3, c/w SSI therapy  - CT imaging concerning for osteomyelitis/extension of wound to sacrum/coccyx, appreciate wound care and ID input, blood cx only 1 bottle with likely contaminant, restarted on zosyn  - CT A/P also with high stool burden; pt with longstanding hx of constipation 1 BM weekly - increase bowel regimen, having BM now, states 2 large yesterday and one today  - R femoral ossific lesion noted on CT as well - pt counseled on findings, defers MRI at this time. Findings explained to daughter who states will discuss with siblings and explain to pt to see if she reconsiders further testing. Patient continues to decline.  - Deconditioning d/t bedbound status, significant body habitus, PT/OT recommending DUSTIN - pt hopeful for Pedro.

## 2025-01-11 NOTE — PROGRESS NOTE ADULT - SUBJECTIVE AND OBJECTIVE BOX
Internal Medicine   Zehra Catalina | PGY-3    OVERNIGHT EVENTS: No acute overnight events.    SUBJECTIVE:       MEDICATIONS  (STANDING):  ascorbic acid 500 milliGRAM(s) Oral daily  atorvastatin 20 milliGRAM(s) Oral at bedtime  celecoxib 100 milliGRAM(s) Oral daily  dextrose 5%. 1000 milliLiter(s) (50 mL/Hr) IV Continuous <Continuous>  dextrose 5%. 1000 milliLiter(s) (100 mL/Hr) IV Continuous <Continuous>  dextrose 50% Injectable 25 Gram(s) IV Push once  dextrose 50% Injectable 12.5 Gram(s) IV Push once  dextrose 50% Injectable 25 Gram(s) IV Push once  enoxaparin Injectable 40 milliGRAM(s) SubCutaneous every 24 hours  fluconAZOLE   Tablet 150 milliGRAM(s) Oral <User Schedule>  glucagon  Injectable 1 milliGRAM(s) IntraMuscular once  influenza  Vaccine (HIGH DOSE) 0.5 milliLiter(s) IntraMuscular once  insulin lispro (ADMELOG) corrective regimen sliding scale   SubCutaneous Before meals and at bedtime  metoprolol tartrate 100 milliGRAM(s) Oral every 12 hours  multivitamin 1 Tablet(s) Oral daily  multivitamin/minerals 1 Tablet(s) Oral daily  nystatin Powder 1 Application(s) Topical two times a day  piperacillin/tazobactam IVPB.- 3.375 Gram(s) IV Intermittent once  piperacillin/tazobactam IVPB.. 3.375 Gram(s) IV Intermittent every 8 hours  polyethylene glycol 3350 17 Gram(s) Oral two times a day  senna 2 Tablet(s) Oral at bedtime    MEDICATIONS  (PRN):  acetaminophen     Tablet .. 650 milliGRAM(s) Oral every 6 hours PRN Temp greater or equal to 38C (100.4F), Mild Pain (1 - 3), Moderate Pain (4 - 6)  bisacodyl 5 milliGRAM(s) Oral every 12 hours PRN Constipation  dextrose Oral Gel 15 Gram(s) Oral once PRN Blood Glucose LESS THAN 70 milliGRAM(s)/deciliter        T(F): 97.4 (01-11-25 @ 05:32), Max: 98.1 (01-10-25 @ 12:00)  HR: 83 (01-11-25 @ 05:32) (75 - 96)  BP: 161/98 (01-11-25 @ 05:32) (136/74 - 176/92)  BP(mean): --  RR: 16 (01-11-25 @ 05:32) (16 - 18)  SpO2: 96% (01-11-25 @ 05:32) (93% - 99%)    PHYSICAL EXAM:     GENERAL: NAD, lying in bed comfortably  HEAD:  Atraumatic, Normocephalic  EYES: EOMI, PERRLA, conjunctiva and sclera clear, no nystagmus noted  ENT: Moist mucous membranes,   NECK: Supple, No JVD, trachea midline  CHEST/LUNG: Clear to auscultation bilaterally; No rales, rhonchi, wheezing, or rubs. Unlabored respirations  HEART: Regular rate and rhythm; No murmurs, rubs, or gallops, normal S1/S2  ABDOMEN: normal bowel sounds; Soft, nontender, nondistended, no organomegaly   EXTREMITIES:  2+ Peripheral Pulses, brisk capillary refill. No clubbing, cyanosis, or edema  MSK: No gross deformities noted   Neurological:  A&Ox3, no focal deficits   SKIN: No rashes or lesions  PSYCH: Normal mood, affect     TELEMETRY:    LABS:                        9.3    13.28 )-----------( 478      ( 10 Onofre 2025 07:45 )             31.0     01-10    137  |  104  |  39[H]  ----------------------------<  125[H]  5.1   |  20[L]  |  0.94    Ca    10.2      10 Onofre 2025 07:45  Phos  3.3     01-10  Mg     1.9     01-10    TPro  6.9  /  Alb  2.5[L]  /  TBili  0.3  /  DBili  x   /  AST  22  /  ALT  34  /  AlkPhos  318[H]  01-10            Creatinine Trend: 0.94<--, 0.86<--, 1.02<--, 1.01<--  I&O's Summary    09 Jan 2025 07:01  -  10 Onofre 2025 07:00  --------------------------------------------------------  IN: 370 mL / OUT: 900 mL / NET: -530 mL    10 Onofre 2025 07:01  -  11 Jan 2025 06:47  --------------------------------------------------------  IN: 450 mL / OUT: 800 mL / NET: -350 mL      BNP    RADIOLOGY & ADDITIONAL STUDIES:             Internal Medicine   Zehra Arnold | PGY-3    OVERNIGHT EVENTS: No acute overnight events.    SUBJECTIVE: Patient was seen and examined at bedside this morning. Pt reports she does not want MRI at this time. Denies any nausea/vomiting/diarrhea, headache, shortness of breath, abdominal pain or chest pain/palpitations. Patient responding appropriately to questions and able to make needs known. Vital signs/imaging/telemetry events reviewed.       MEDICATIONS  (STANDING):  ascorbic acid 500 milliGRAM(s) Oral daily  atorvastatin 20 milliGRAM(s) Oral at bedtime  celecoxib 100 milliGRAM(s) Oral daily  dextrose 5%. 1000 milliLiter(s) (50 mL/Hr) IV Continuous <Continuous>  dextrose 5%. 1000 milliLiter(s) (100 mL/Hr) IV Continuous <Continuous>  dextrose 50% Injectable 25 Gram(s) IV Push once  dextrose 50% Injectable 12.5 Gram(s) IV Push once  dextrose 50% Injectable 25 Gram(s) IV Push once  enoxaparin Injectable 40 milliGRAM(s) SubCutaneous every 24 hours  fluconAZOLE   Tablet 150 milliGRAM(s) Oral <User Schedule>  glucagon  Injectable 1 milliGRAM(s) IntraMuscular once  influenza  Vaccine (HIGH DOSE) 0.5 milliLiter(s) IntraMuscular once  insulin lispro (ADMELOG) corrective regimen sliding scale   SubCutaneous Before meals and at bedtime  metoprolol tartrate 100 milliGRAM(s) Oral every 12 hours  multivitamin 1 Tablet(s) Oral daily  multivitamin/minerals 1 Tablet(s) Oral daily  nystatin Powder 1 Application(s) Topical two times a day  piperacillin/tazobactam IVPB.- 3.375 Gram(s) IV Intermittent once  piperacillin/tazobactam IVPB.. 3.375 Gram(s) IV Intermittent every 8 hours  polyethylene glycol 3350 17 Gram(s) Oral two times a day  senna 2 Tablet(s) Oral at bedtime    MEDICATIONS  (PRN):  acetaminophen     Tablet .. 650 milliGRAM(s) Oral every 6 hours PRN Temp greater or equal to 38C (100.4F), Mild Pain (1 - 3), Moderate Pain (4 - 6)  bisacodyl 5 milliGRAM(s) Oral every 12 hours PRN Constipation  dextrose Oral Gel 15 Gram(s) Oral once PRN Blood Glucose LESS THAN 70 milliGRAM(s)/deciliter        T(F): 97.4 (01-11-25 @ 05:32), Max: 98.1 (01-10-25 @ 12:00)  HR: 83 (01-11-25 @ 05:32) (75 - 96)  BP: 161/98 (01-11-25 @ 05:32) (136/74 - 176/92)  BP(mean): --  RR: 16 (01-11-25 @ 05:32) (16 - 18)  SpO2: 96% (01-11-25 @ 05:32) (93% - 99%)    PHYSICAL EXAM:     GENERAL: NAD, lying in bed comfortably  EYES: EOMI, conjunctiva and sclera clear, no nystagmus noted  CHEST/LUNG: Clear to auscultation bilaterally; No rales, rhonchi, wheezing, or rubs. Unlabored respirations  HEART: Regular rate and rhythm; No murmurs, rubs, or gallops, normal S1/S2  ABDOMEN: normal bowel sounds; Soft, nontender, nondistended, no organomegaly   EXTREMITIES:  2+ Peripheral Pulses, brisk capillary refill. No clubbing, cyanosis, or edema  Neurological:  A&Ox3, no focal deficits   SKIN: No rashes or lesions    TELEMETRY:    LABS:                        9.3    13.28 )-----------( 478      ( 10 Onofre 2025 07:45 )             31.0     01-10    137  |  104  |  39[H]  ----------------------------<  125[H]  5.1   |  20[L]  |  0.94    Ca    10.2      10 Onofre 2025 07:45  Phos  3.3     01-10  Mg     1.9     01-10    TPro  6.9  /  Alb  2.5[L]  /  TBili  0.3  /  DBili  x   /  AST  22  /  ALT  34  /  AlkPhos  318[H]  01-10            Creatinine Trend: 0.94<--, 0.86<--, 1.02<--, 1.01<--  I&O's Summary    09 Jan 2025 07:01  -  10 Onofre 2025 07:00  --------------------------------------------------------  IN: 370 mL / OUT: 900 mL / NET: -530 mL    10 Onofre 2025 07:01  -  11 Jan 2025 06:47  --------------------------------------------------------  IN: 450 mL / OUT: 800 mL / NET: -350 mL      BNP    RADIOLOGY & ADDITIONAL STUDIES:

## 2025-01-11 NOTE — PROGRESS NOTE ADULT - PROBLEM SELECTOR PLAN 5
Patient with wide spread candidiasis in the lower back  - C/w 150 mg oral Fluconazole 1x per week for 4 weeks  - C/w topical Antifungal cream Fluconazole

## 2025-01-11 NOTE — PROGRESS NOTE ADULT - PROBLEM SELECTOR PLAN 7
Med rec unclear, attempted to reach daughter Ling x2, will try in PM for clear med rec, daughter DOES NOT live with patient, but per daughter, Dorinda has a better understanding of current medications. Email given to follow up current medications    Plan  - PT encourage movement

## 2025-01-11 NOTE — PROGRESS NOTE ADULT - PROBLEM SELECTOR PLAN 2
CT A/P:  Exophytic right femoral ossific lesion measuring 5.1 x 2.0 x 2.8 cm,   indeterminate, with parosteal osteosarcoma within the differential.   Evaluation with MRI of the femur without and with contrast is recommended.      Plan  - Discussed with patient regarding findings, at this time patient states " we'll see" regarding necessity for further MRI work up, findings discussed with daughter, Dorinda, states will have family meeting to discuss next steps. CT A/P:  Exophytic right femoral ossific lesion measuring 5.1 x 2.0 x 2.8 cm,   indeterminate, with parosteal osteosarcoma within the differential.   Evaluation with MRI of the femur without and with contrast is recommended.      Plan  - Discussed with patient regarding findings, at this time patient states " we'll see" regarding necessity for further MRI work up, findings discussed with daughter, Dorinda, states will have family meeting to discuss next steps.  - 1/11: pt declines MRI at this time

## 2025-01-11 NOTE — PROGRESS NOTE ADULT - ASSESSMENT
79 YOF with PMHx of HTN, lymphedema, arthritis, sacral decubitus ulcer s/p debridement one year ago presents with weakness. PE with multiple areas of skin breakdown/ stage 1 ulcer/skin changes to lower back and buttocks, with super impose candida. Elevated CRP and WBC concerning for infections cause.  79 YOF with PMHx of HTN, lymphedema, arthritis, sacral decubitus ulcer s/p debridement one year ago presents with weakness. PE with multiple areas of skin breakdown/ stage 1 ulcer/skin changes to lower back and buttocks, with super imposed candida. Elevated CRP and WBC concerning for infections cause.

## 2025-01-11 NOTE — PROGRESS NOTE ADULT - PROBLEM SELECTOR PLAN 4
Patient baseline bedbound, functional paraplegia 2/2 arthlagias/lymphedema and overall fatigue  - PT: DUSTIN  - ABDULKADIR consulted    Plan  - DUSTIN on discharge

## 2025-01-12 DIAGNOSIS — N17.9 ACUTE KIDNEY FAILURE, UNSPECIFIED: ICD-10-CM

## 2025-01-12 LAB
ANION GAP SERPL CALC-SCNC: 13 MMOL/L — SIGNIFICANT CHANGE UP (ref 5–17)
APPEARANCE UR: ABNORMAL
BACTERIA # UR AUTO: NEGATIVE /HPF — SIGNIFICANT CHANGE UP
BILIRUB UR-MCNC: NEGATIVE — SIGNIFICANT CHANGE UP
BUN SERPL-MCNC: 38 MG/DL — HIGH (ref 7–23)
CALCIUM SERPL-MCNC: 9.3 MG/DL — SIGNIFICANT CHANGE UP (ref 8.4–10.5)
CAST: 4 /LPF — SIGNIFICANT CHANGE UP (ref 0–4)
CHLORIDE SERPL-SCNC: 100 MMOL/L — SIGNIFICANT CHANGE UP (ref 96–108)
CO2 SERPL-SCNC: 22 MMOL/L — SIGNIFICANT CHANGE UP (ref 22–31)
COLOR SPEC: YELLOW — SIGNIFICANT CHANGE UP
CREAT ?TM UR-MCNC: 64 MG/DL — SIGNIFICANT CHANGE UP
CREAT SERPL-MCNC: 1.34 MG/DL — HIGH (ref 0.5–1.3)
DIFF PNL FLD: ABNORMAL
EGFR: 40 ML/MIN/1.73M2 — LOW
GLUCOSE BLDC GLUCOMTR-MCNC: 163 MG/DL — HIGH (ref 70–99)
GLUCOSE BLDC GLUCOMTR-MCNC: 171 MG/DL — HIGH (ref 70–99)
GLUCOSE SERPL-MCNC: 245 MG/DL — HIGH (ref 70–99)
GLUCOSE UR QL: NEGATIVE MG/DL — SIGNIFICANT CHANGE UP
HCT VFR BLD CALC: 31 % — LOW (ref 34.5–45)
HGB BLD-MCNC: 9.4 G/DL — LOW (ref 11.5–15.5)
KETONES UR-MCNC: NEGATIVE MG/DL — SIGNIFICANT CHANGE UP
LEUKOCYTE ESTERASE UR-ACNC: NEGATIVE — SIGNIFICANT CHANGE UP
MCHC RBC-ENTMCNC: 26.4 PG — LOW (ref 27–34)
MCHC RBC-ENTMCNC: 30.3 G/DL — LOW (ref 32–36)
MCV RBC AUTO: 87.1 FL — SIGNIFICANT CHANGE UP (ref 80–100)
NITRITE UR-MCNC: NEGATIVE — SIGNIFICANT CHANGE UP
NRBC # BLD: 0 /100 WBCS — SIGNIFICANT CHANGE UP (ref 0–0)
OSMOLALITY UR: 575 MOS/KG — SIGNIFICANT CHANGE UP (ref 300–900)
PH UR: 6 — SIGNIFICANT CHANGE UP (ref 5–8)
PLATELET # BLD AUTO: 400 K/UL — SIGNIFICANT CHANGE UP (ref 150–400)
POTASSIUM SERPL-MCNC: 4.5 MMOL/L — SIGNIFICANT CHANGE UP (ref 3.5–5.3)
POTASSIUM SERPL-SCNC: 4.5 MMOL/L — SIGNIFICANT CHANGE UP (ref 3.5–5.3)
POTASSIUM UR-SCNC: 56 MMOL/L — SIGNIFICANT CHANGE UP
PROT ?TM UR-MCNC: 76 MG/DL — HIGH (ref 0–12)
PROT UR-MCNC: 100 MG/DL
PROT/CREAT UR-RTO: 1.2 RATIO — HIGH (ref 0–0.2)
RBC # BLD: 3.56 M/UL — LOW (ref 3.8–5.2)
RBC # FLD: 17.2 % — HIGH (ref 10.3–14.5)
RBC CASTS # UR COMP ASSIST: 20 /HPF — HIGH (ref 0–4)
REVIEW: SIGNIFICANT CHANGE UP
SODIUM SERPL-SCNC: 135 MMOL/L — SIGNIFICANT CHANGE UP (ref 135–145)
SODIUM UR-SCNC: 102 MMOL/L — SIGNIFICANT CHANGE UP
SP GR SPEC: 1.02 — SIGNIFICANT CHANGE UP (ref 1–1.03)
SQUAMOUS # UR AUTO: 4 /HPF — SIGNIFICANT CHANGE UP (ref 0–5)
UROBILINOGEN FLD QL: 1 MG/DL — SIGNIFICANT CHANGE UP (ref 0.2–1)
UUN UR-MCNC: 620 MG/DL — SIGNIFICANT CHANGE UP
WBC # BLD: 7 K/UL — SIGNIFICANT CHANGE UP (ref 3.8–10.5)
WBC # FLD AUTO: 7 K/UL — SIGNIFICANT CHANGE UP (ref 3.8–10.5)
WBC UR QL: 3 /HPF — SIGNIFICANT CHANGE UP (ref 0–5)

## 2025-01-12 PROCEDURE — 99232 SBSQ HOSP IP/OBS MODERATE 35: CPT | Mod: GC

## 2025-01-12 RX ORDER — SODIUM CHLORIDE 9 MG/ML
1000 INJECTION, SOLUTION INTRAVENOUS
Refills: 0 | Status: DISCONTINUED | OUTPATIENT
Start: 2025-01-12 | End: 2025-01-13

## 2025-01-12 RX ORDER — GUAIFENESIN 100 MG/5ML
1200 SYRUP ORAL EVERY 12 HOURS
Refills: 0 | Status: DISCONTINUED | OUTPATIENT
Start: 2025-01-12 | End: 2025-01-16

## 2025-01-12 RX ORDER — BENZONATATE 100 MG
100 CAPSULE ORAL THREE TIMES A DAY
Refills: 0 | Status: DISCONTINUED | OUTPATIENT
Start: 2025-01-12 | End: 2025-01-16

## 2025-01-12 RX ADMIN — Medication 17 GRAM(S): at 17:23

## 2025-01-12 RX ADMIN — Medication 100 MILLIGRAM(S): at 22:38

## 2025-01-12 RX ADMIN — PIPERACILLIN AND TAZOBACTAM 25 GRAM(S): 3; .375 INJECTION, POWDER, LYOPHILIZED, FOR SOLUTION INTRAVENOUS at 13:06

## 2025-01-12 RX ADMIN — ATORVASTATIN CALCIUM 20 MILLIGRAM(S): 40 TABLET, FILM COATED ORAL at 22:37

## 2025-01-12 RX ADMIN — Medication 100 MILLIGRAM(S): at 06:28

## 2025-01-12 RX ADMIN — LISINOPRIL 2.5 MILLIGRAM(S): 30 TABLET ORAL at 06:28

## 2025-01-12 RX ADMIN — Medication 500 MILLIGRAM(S): at 11:15

## 2025-01-12 RX ADMIN — NYSTATIN TOPICAL POWDER 1 APPLICATION(S): 100000 POWDER TOPICAL at 17:23

## 2025-01-12 RX ADMIN — NYSTATIN TOPICAL POWDER 1 APPLICATION(S): 100000 POWDER TOPICAL at 06:28

## 2025-01-12 RX ADMIN — Medication 2: at 08:42

## 2025-01-12 RX ADMIN — PIPERACILLIN AND TAZOBACTAM 25 GRAM(S): 3; .375 INJECTION, POWDER, LYOPHILIZED, FOR SOLUTION INTRAVENOUS at 06:27

## 2025-01-12 RX ADMIN — SODIUM CHLORIDE 50 MILLILITER(S): 9 INJECTION, SOLUTION INTRAVENOUS at 22:38

## 2025-01-12 RX ADMIN — Medication 1 TABLET(S): at 11:14

## 2025-01-12 RX ADMIN — Medication 100 MILLIGRAM(S): at 14:17

## 2025-01-12 RX ADMIN — Medication 100 MILLIGRAM(S): at 11:15

## 2025-01-12 RX ADMIN — Medication 1200 MILLIGRAM(S): at 15:38

## 2025-01-12 RX ADMIN — SODIUM CHLORIDE 50 MILLILITER(S): 9 INJECTION, SOLUTION INTRAVENOUS at 12:54

## 2025-01-12 RX ADMIN — Medication 17 GRAM(S): at 06:28

## 2025-01-12 RX ADMIN — PIPERACILLIN AND TAZOBACTAM 25 GRAM(S): 3; .375 INJECTION, POWDER, LYOPHILIZED, FOR SOLUTION INTRAVENOUS at 22:37

## 2025-01-12 RX ADMIN — ENOXAPARIN SODIUM 40 MILLIGRAM(S): 60 INJECTION INTRAVENOUS; SUBCUTANEOUS at 11:14

## 2025-01-12 RX ADMIN — Medication 2: at 22:38

## 2025-01-12 RX ADMIN — Medication 100 MILLIGRAM(S): at 17:22

## 2025-01-12 NOTE — PROGRESS NOTE ADULT - PROBLEM SELECTOR PLAN 9
Per patient; has not previously been on insulin, per outpatient med rec, patient has been on 22U of insulin, refusing POCT   - Will clarify home meds with daughter    Plan  - moderate ISS Home meds: Bisoprolol      Plan  - Metoprolol Tartrate 100mg BID  - Start lisinopril 2.5 QD 1/11

## 2025-01-12 NOTE — PROGRESS NOTE ADULT - SUBJECTIVE AND OBJECTIVE BOX
Torres Andrews MD  Available on TEAMS    Patient is a 79y old  Female who presents with a chief complaint of Weakness (11 Jan 2025 06:47)      SUBJECTIVE / OVERNIGHT EVENTS: No acute events overnight. Patient was assessed at bedside.       REVIEW OF SYSTEMS:  CONSTITUTIONAL: No weakness, fevers, or chills  EYES/ENT: No visual changes; No vertigo, throat pain, or dysphagia  NECK: No pain or stiffness  RESPIRATORY: No cough, wheezing, hemoptysis, or shortness of breath  CARDIOVASCULAR: No chest pain or palpitations  GASTROINTESTINAL: No abdominal or epigastric pain. No nausea, vomiting, or hematemesis; No diarrhea or constipation. No melena or hematochezia.  GENITOURINARY: No dysuria, frequency, or hematuria  MUSCULOSKELETAL: No joint or muscle pain or aches  NEUROLOGICAL: No numbness or weakness  SKIN: No itching or rashes      MEDICATIONS  (STANDING):  ascorbic acid 500 milliGRAM(s) Oral daily  atorvastatin 20 milliGRAM(s) Oral at bedtime  celecoxib 100 milliGRAM(s) Oral daily  dextrose 5%. 1000 milliLiter(s) (50 mL/Hr) IV Continuous <Continuous>  dextrose 5%. 1000 milliLiter(s) (100 mL/Hr) IV Continuous <Continuous>  dextrose 50% Injectable 25 Gram(s) IV Push once  dextrose 50% Injectable 12.5 Gram(s) IV Push once  dextrose 50% Injectable 25 Gram(s) IV Push once  enoxaparin Injectable 40 milliGRAM(s) SubCutaneous every 24 hours  fluconAZOLE   Tablet 150 milliGRAM(s) Oral <User Schedule>  glucagon  Injectable 1 milliGRAM(s) IntraMuscular once  influenza  Vaccine (HIGH DOSE) 0.5 milliLiter(s) IntraMuscular once  insulin lispro (ADMELOG) corrective regimen sliding scale   SubCutaneous Before meals and at bedtime  lisinopril 2.5 milliGRAM(s) Oral daily  metoprolol tartrate 100 milliGRAM(s) Oral every 12 hours  multivitamin 1 Tablet(s) Oral daily  multivitamin/minerals 1 Tablet(s) Oral daily  nystatin Powder 1 Application(s) Topical two times a day  piperacillin/tazobactam IVPB.. 3.375 Gram(s) IV Intermittent every 8 hours  polyethylene glycol 3350 17 Gram(s) Oral two times a day  senna 2 Tablet(s) Oral at bedtime    MEDICATIONS  (PRN):  acetaminophen     Tablet .. 650 milliGRAM(s) Oral every 6 hours PRN Temp greater or equal to 38C (100.4F), Mild Pain (1 - 3), Moderate Pain (4 - 6)  bisacodyl 5 milliGRAM(s) Oral every 12 hours PRN Constipation  dextrose Oral Gel 15 Gram(s) Oral once PRN Blood Glucose LESS THAN 70 milliGRAM(s)/deciliter      CAPILLARY BLOOD GLUCOSE      POCT Blood Glucose.: 192 mg/dL (11 Jan 2025 21:26)  POCT Blood Glucose.: 183 mg/dL (11 Jan 2025 08:28)    I&O's Summary    11 Jan 2025 07:01  -  12 Jan 2025 07:00  --------------------------------------------------------  IN: 0 mL / OUT: 600 mL / NET: -600 mL        Vital Signs Last 24 Hrs  T(C): 37.6 (11 Jan 2025 21:00), Max: 37.6 (11 Jan 2025 21:00)  T(F): 99.7 (11 Jan 2025 21:00), Max: 99.7 (11 Jan 2025 21:00)  HR: 93 (11 Jan 2025 21:00) (80 - 93)  BP: 105/76 (11 Jan 2025 21:00) (105/76 - 128/85)  BP(mean): --  RR: 16 (11 Jan 2025 21:00) (16 - 18)  SpO2: 94% (11 Jan 2025 21:00) (94% - 94%)    Parameters below as of 11 Jan 2025 21:00  Patient On (Oxygen Delivery Method): room air        PHYSICAL EXAM:  GENERAL: NAD, well-developed, well-nourished  HEAD: Atraumatic, Normocephalic  EYES: EOMI, PERRLA, conjunctiva and sclera clear  NECK: Supple, No JVD  CHEST/LUNG: Clear to auscultation bilaterally; No wheezes or crackles  HEART: Normal S1/S2; Regular rate and rhythm; No murmurs, rubs, or gallops  ABDOMEN: Soft, Nontender, Nondistended; Bowel sounds present  EXTREMITIES: 2+ Peripheral Pulses; No clubbing, cyanosis, or edema  PSYCH: A&Ox3  NEUROLOGY: no focal neurologic deficit  SKIN: No rashes or lesions    LABS:                        9.3    9.70  )-----------( 457      ( 11 Jan 2025 12:10 )             31.3      01-11    136  |  101  |  32[H]  ----------------------------<  224[H]  4.6   |  22  |  0.91    Ca    9.7      11 Jan 2025 12:10  Phos  3.3     01-10  Mg     1.9     01-10    TPro  6.9  /  Alb  2.5[L]  /  TBili  0.3  /  DBili  x   /  AST  22  /  ALT  34  /  AlkPhos  318[H]  01-10          Urinalysis Basic - ( 11 Jan 2025 12:10 )    Color: x / Appearance: x / SG: x / pH: x  Gluc: 224 mg/dL / Ketone: x  / Bili: x / Urobili: x   Blood: x / Protein: x / Nitrite: x   Leuk Esterase: x / RBC: x / WBC x   Sq Epi: x / Non Sq Epi: x / Bacteria: x        RADIOLOGY & ADDITIONAL TESTS:    Imaging Personally Reviewed:    Consultant(s) Notes Reviewed:      Care Discussed with Consultants/Other Providers:   Torres Andrews MD  Available on TEAMS    Patient is a 79y old  Female who presents with a chief complaint of Weakness (11 Jan 2025 06:47)      SUBJECTIVE / OVERNIGHT EVENTS: No acute events overnight. Patient was assessed at bedside. Concerns for cough this morning.      REVIEW OF SYSTEMS:  CONSTITUTIONAL: No weakness, fevers, or chills  EYES/ENT: No visual changes; No vertigo, throat pain, or dysphagia  NECK: No pain or stiffness  RESPIRATORY: No cough, wheezing, hemoptysis, or shortness of breath  CARDIOVASCULAR: No chest pain or palpitations  GASTROINTESTINAL: No abdominal or epigastric pain. No nausea, vomiting, or hematemesis; No diarrhea or constipation. No melena or hematochezia.  GENITOURINARY: No dysuria, frequency, or hematuria  MUSCULOSKELETAL: No joint or muscle pain or aches  NEUROLOGICAL: No numbness or weakness  SKIN: No itching or rashes      MEDICATIONS  (STANDING):  ascorbic acid 500 milliGRAM(s) Oral daily  atorvastatin 20 milliGRAM(s) Oral at bedtime  celecoxib 100 milliGRAM(s) Oral daily  dextrose 5%. 1000 milliLiter(s) (50 mL/Hr) IV Continuous <Continuous>  dextrose 5%. 1000 milliLiter(s) (100 mL/Hr) IV Continuous <Continuous>  dextrose 50% Injectable 25 Gram(s) IV Push once  dextrose 50% Injectable 12.5 Gram(s) IV Push once  dextrose 50% Injectable 25 Gram(s) IV Push once  enoxaparin Injectable 40 milliGRAM(s) SubCutaneous every 24 hours  fluconAZOLE   Tablet 150 milliGRAM(s) Oral <User Schedule>  glucagon  Injectable 1 milliGRAM(s) IntraMuscular once  influenza  Vaccine (HIGH DOSE) 0.5 milliLiter(s) IntraMuscular once  insulin lispro (ADMELOG) corrective regimen sliding scale   SubCutaneous Before meals and at bedtime  lisinopril 2.5 milliGRAM(s) Oral daily  metoprolol tartrate 100 milliGRAM(s) Oral every 12 hours  multivitamin 1 Tablet(s) Oral daily  multivitamin/minerals 1 Tablet(s) Oral daily  nystatin Powder 1 Application(s) Topical two times a day  piperacillin/tazobactam IVPB.. 3.375 Gram(s) IV Intermittent every 8 hours  polyethylene glycol 3350 17 Gram(s) Oral two times a day  senna 2 Tablet(s) Oral at bedtime    MEDICATIONS  (PRN):  acetaminophen     Tablet .. 650 milliGRAM(s) Oral every 6 hours PRN Temp greater or equal to 38C (100.4F), Mild Pain (1 - 3), Moderate Pain (4 - 6)  bisacodyl 5 milliGRAM(s) Oral every 12 hours PRN Constipation  dextrose Oral Gel 15 Gram(s) Oral once PRN Blood Glucose LESS THAN 70 milliGRAM(s)/deciliter      CAPILLARY BLOOD GLUCOSE      POCT Blood Glucose.: 192 mg/dL (11 Jan 2025 21:26)  POCT Blood Glucose.: 183 mg/dL (11 Jan 2025 08:28)    I&O's Summary    11 Jan 2025 07:01  -  12 Jan 2025 07:00  --------------------------------------------------------  IN: 0 mL / OUT: 600 mL / NET: -600 mL        Vital Signs Last 24 Hrs  T(C): 37.6 (11 Jan 2025 21:00), Max: 37.6 (11 Jan 2025 21:00)  T(F): 99.7 (11 Jan 2025 21:00), Max: 99.7 (11 Jan 2025 21:00)  HR: 93 (11 Jan 2025 21:00) (80 - 93)  BP: 105/76 (11 Jan 2025 21:00) (105/76 - 128/85)  BP(mean): --  RR: 16 (11 Jan 2025 21:00) (16 - 18)  SpO2: 94% (11 Jan 2025 21:00) (94% - 94%)    Parameters below as of 11 Jan 2025 21:00  Patient On (Oxygen Delivery Method): room air        PHYSICAL EXAM:  GENERAL: NAD, well-developed, well-nourished  HEAD: Atraumatic, Normocephalic  EYES: EOMI, PERRLA, conjunctiva and sclera clear  NECK: Supple, No JVD  CHEST/LUNG: Clear to auscultation bilaterally; No wheezes or crackles  HEART: Normal S1/S2; Regular rate and rhythm; No murmurs, rubs, or gallops  ABDOMEN: Soft, Nontender, Nondistended; Bowel sounds present  EXTREMITIES: 2+ Peripheral Pulses; No clubbing, cyanosis, or edema  PSYCH: A&Ox3  NEUROLOGY: no focal neurologic deficit  SKIN: No rashes or lesions    LABS:                        9.3    9.70  )-----------( 457      ( 11 Jan 2025 12:10 )             31.3      01-11    136  |  101  |  32[H]  ----------------------------<  224[H]  4.6   |  22  |  0.91    Ca    9.7      11 Jan 2025 12:10  Phos  3.3     01-10  Mg     1.9     01-10    TPro  6.9  /  Alb  2.5[L]  /  TBili  0.3  /  DBili  x   /  AST  22  /  ALT  34  /  AlkPhos  318[H]  01-10          Urinalysis Basic - ( 11 Jan 2025 12:10 )    Color: x / Appearance: x / SG: x / pH: x  Gluc: 224 mg/dL / Ketone: x  / Bili: x / Urobili: x   Blood: x / Protein: x / Nitrite: x   Leuk Esterase: x / RBC: x / WBC x   Sq Epi: x / Non Sq Epi: x / Bacteria: x        RADIOLOGY & ADDITIONAL TESTS:    Imaging Personally Reviewed:    Consultant(s) Notes Reviewed:      Care Discussed with Consultants/Other Providers:

## 2025-01-12 NOTE — PROGRESS NOTE ADULT - PROBLEM SELECTOR PLAN 6
Med rec unclear, attempted to reach daughter Ling x2, will try in PM for clear med rec, daughter DOES NOT live with patient, but per daughter, Dorinda has a better understanding of current medications. Email given to follow up current medications  - C/w celecoxib Baseline creatinine: 0.9 Today’s creatinine: 1.34    Plan  Send urine creatinine, urea and lytes  Check renal sonogram  Trend BUN/Cr, Strict I/O  Consider renal consult if kidneys function does not improve.  (FeNA: if < 1% prerenal and if > 1% ATN post renal)

## 2025-01-12 NOTE — PROGRESS NOTE ADULT - TIME BILLING
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

## 2025-01-12 NOTE — PROGRESS NOTE ADULT - PROBLEM SELECTOR PLAN 11
- Fluids: None  - Electrolytes: Will replete to maintain K>4, Phos>3, and Mag>2  - Nutrition:  Consistent Carb  - Activity: As tolerated, pending PT eval  - DVT Prophylaxis: heparin Sub q  - Stress Ulcer/GI Prophylaxis: N/A  - Disposition: Admit to medicine, active, DUSTIN placement per PT - Lipator 20mg    Plan  - C/w home meds

## 2025-01-12 NOTE — PROGRESS NOTE ADULT - PROBLEM SELECTOR PLAN 1
- Persistent sacral ulcer for 1 year, initially presented when patient was ambulating and after bedbound status was unable to care for herself, has home health aid for limited hours who is unable to turn patient, spouse medically unable. Patient and family looking for LTC/DUSTIN for care  - Elevated WBC and AIM, s/p 1x Zosyn in ED  - CT A/P: Concern for OM, proctitis, exophytic bone lesions, bladder neoplasm not excluded. Mild right hydronephrosis  - Wound care following  - 1/8 bcx NGTD  - Wound care following  - s/p 1x Ancef    Plan  - ID consult for OM management  - C/w Wound care  - C/w Zosyn - Persistent sacral ulcer for 1 year, initially presented when patient was ambulating and after bedbound status was unable to care for herself, has home health aid for limited hours who is unable to turn patient, spouse medically unable. Patient and family looking for LTC/DUSTIN for care  - Elevated WBC and AIM, s/p 1x Zosyn in ED  - CT A/P: Concern for OM, proctitis, exophytic bone lesions, bladder neoplasm not excluded. Mild right hydronephrosis  - Wound care following  - 1/8 bcx NGTD  - Wound care following  - s/p 1x Ancef    Plan  - C/w Wound care  - C/w Zosyn

## 2025-01-12 NOTE — PROGRESS NOTE ADULT - ASSESSMENT
79 YOF with PMHx of HTN, lymphedema, arthritis, sacral decubitus ulcer s/p debridement one year ago presents with weakness. PE with multiple areas of skin breakdown/ stage 1 ulcer/skin changes to lower back and buttocks, with super imposed candida. Elevated CRP and WBC concerning for infections cause.

## 2025-01-12 NOTE — PROGRESS NOTE ADULT - PROBLEM SELECTOR PLAN 7
Med rec unclear, attempted to reach daughter Ling x2, will try in PM for clear med rec, daughter DOES NOT live with patient, but per daughter, Dorinda has a better understanding of current medications. Email given to follow up current medications    Plan  - PT encourage movement Med rec unclear, attempted to reach daughter Ling x2, will try in PM for clear med rec, daughter DOES NOT live with patient, but per daughter, Dorinda has a better understanding of current medications. Email given to follow up current medications  - C/w celecoxib

## 2025-01-12 NOTE — PROGRESS NOTE ADULT - PROBLEM SELECTOR PLAN 8
Home meds: Bisoprolol      Plan  - Metoprolol Tartrate 100mg BID  - Start lisinopril 2.5 QD 1/11 Med rec unclear, attempted to reach daughter Ling x2, will try in PM for clear med rec, daughter DOES NOT live with patient, but per daughter, Dorinda has a better understanding of current medications. Email given to follow up current medications    Plan  - PT encourage movement 20-Aug-2017 13:30

## 2025-01-12 NOTE — PROGRESS NOTE ADULT - PROBLEM SELECTOR PLAN 10
- Lipator 20mg    Plan  - C/w home meds Per patient; has not previously been on insulin, per outpatient med rec, patient has been on 22U of insulin, refusing POCT   - Will clarify home meds with daughter    Plan  - moderate ISS

## 2025-01-12 NOTE — PROGRESS NOTE ADULT - PROBLEM SELECTOR PLAN 2
CT A/P:  Exophytic right femoral ossific lesion measuring 5.1 x 2.0 x 2.8 cm,   indeterminate, with parosteal osteosarcoma within the differential.   Evaluation with MRI of the femur without and with contrast is recommended.      Plan  - Discussed with patient regarding findings, at this time patient states " we'll see" regarding necessity for further MRI work up, findings discussed with daughter, Dorinda, states will have family meeting to discuss next steps.  - 1/11: pt declines MRI at this time

## 2025-01-12 NOTE — PROGRESS NOTE ADULT - PROBLEM SELECTOR PLAN 3
CT A/P: *  Soft tissue defect overlying the lower sacrum, consistent with a   decubitus wound. Associated erosive and sclerotic changes to the lower   sacrum and coccyx, concerning for osteomyelitis. Stool overflows from the   anorectum into the lower aspect of the wound.  *  Stool-filled distended rectum. Mild rectal wall thickening and   perirectal fat infiltration, which may represent stercoral proctitis.    Plan  - Aggressive bowel regimen Miralax BID, senna, Dulcolax

## 2025-01-12 NOTE — PROGRESS NOTE ADULT - ATTENDING COMMENTS
79 y.o. F with pmhx of lymphedema, arthritis, sacral decubitus ulcer, DM, HTN, HLD, admitted for inability to care for self at home. Has had persistent sacral ulcer and weakness >1 year, notes bedbound and has VNS and HHA 3x/week, along with 2 daughters in [place] who prepare food for her. Feels inadequate support at home, looking for LTC/DUSTIN for higher level of care.   - Significant candidal rashes throughout skin folds - c/w nystatin, fluconazole  - BG appear more controlled, only takes metformin 500 mg at home. A1c 7.3, c/w SSI therapy  - CT imaging concerning for osteomyelitis/extension of wound to sacrum/coccyx, appreciate wound care and ID input, blood cx only 1 bottle with likely contaminant, restarted on zosyn, pending duration per ID  - CT A/P also with high stool burden; pt with longstanding hx of constipation 1 BM weekly - increase bowel regimen, having BM now  - R femoral ossific lesion noted on CT as well - pt counseled on findings, defers MRI at this time. Findings explained to daughter who states will discuss with siblings and explain to pt to see if she reconsiders further testing. Patient continues to decline MRI.  - NAVNEET today, Cr 1.34 - obtain urine lytes, gentle IVF, continue to monitor  - Deconditioning d/t bedbound status, significant body habitus, PT/OT recommending DUSTIN - pt hopeful for Pedro.

## 2025-01-13 LAB
ANION GAP SERPL CALC-SCNC: 14 MMOL/L — SIGNIFICANT CHANGE UP (ref 5–17)
BUN SERPL-MCNC: 41 MG/DL — HIGH (ref 7–23)
CALCIUM SERPL-MCNC: 9.2 MG/DL — SIGNIFICANT CHANGE UP (ref 8.4–10.5)
CHLORIDE SERPL-SCNC: 102 MMOL/L — SIGNIFICANT CHANGE UP (ref 96–108)
CO2 SERPL-SCNC: 20 MMOL/L — LOW (ref 22–31)
CREAT SERPL-MCNC: 1.39 MG/DL — HIGH (ref 0.5–1.3)
CULTURE RESULTS: SIGNIFICANT CHANGE UP
EGFR: 38 ML/MIN/1.73M2 — LOW
GLUCOSE BLDC GLUCOMTR-MCNC: 125 MG/DL — HIGH (ref 70–99)
GLUCOSE BLDC GLUCOMTR-MCNC: 161 MG/DL — HIGH (ref 70–99)
GLUCOSE SERPL-MCNC: 164 MG/DL — HIGH (ref 70–99)
HCT VFR BLD CALC: 33.6 % — LOW (ref 34.5–45)
HGB BLD-MCNC: 9.8 G/DL — LOW (ref 11.5–15.5)
MCHC RBC-ENTMCNC: 25.3 PG — LOW (ref 27–34)
MCHC RBC-ENTMCNC: 29.2 G/DL — LOW (ref 32–36)
MCV RBC AUTO: 86.6 FL — SIGNIFICANT CHANGE UP (ref 80–100)
NRBC # BLD: 0 /100 WBCS — SIGNIFICANT CHANGE UP (ref 0–0)
PLATELET # BLD AUTO: 356 K/UL — SIGNIFICANT CHANGE UP (ref 150–400)
POTASSIUM SERPL-MCNC: 4.3 MMOL/L — SIGNIFICANT CHANGE UP (ref 3.5–5.3)
POTASSIUM SERPL-SCNC: 4.3 MMOL/L — SIGNIFICANT CHANGE UP (ref 3.5–5.3)
RBC # BLD: 3.88 M/UL — SIGNIFICANT CHANGE UP (ref 3.8–5.2)
RBC # FLD: 17.4 % — HIGH (ref 10.3–14.5)
SODIUM SERPL-SCNC: 136 MMOL/L — SIGNIFICANT CHANGE UP (ref 135–145)
SPECIMEN SOURCE: SIGNIFICANT CHANGE UP
WBC # BLD: 7.11 K/UL — SIGNIFICANT CHANGE UP (ref 3.8–10.5)
WBC # FLD AUTO: 7.11 K/UL — SIGNIFICANT CHANGE UP (ref 3.8–10.5)

## 2025-01-13 PROCEDURE — G0545: CPT

## 2025-01-13 PROCEDURE — 99232 SBSQ HOSP IP/OBS MODERATE 35: CPT

## 2025-01-13 PROCEDURE — 99233 SBSQ HOSP IP/OBS HIGH 50: CPT

## 2025-01-13 PROCEDURE — 99232 SBSQ HOSP IP/OBS MODERATE 35: CPT | Mod: GC

## 2025-01-13 RX ORDER — DIPHENHYDRAMINE HCL 25 MG
25 TABLET ORAL EVERY 4 HOURS
Refills: 0 | Status: DISCONTINUED | OUTPATIENT
Start: 2025-01-13 | End: 2025-01-16

## 2025-01-13 RX ADMIN — PIPERACILLIN AND TAZOBACTAM 25 GRAM(S): 3; .375 INJECTION, POWDER, LYOPHILIZED, FOR SOLUTION INTRAVENOUS at 06:24

## 2025-01-13 RX ADMIN — Medication 100 MILLIGRAM(S): at 21:51

## 2025-01-13 RX ADMIN — SODIUM CHLORIDE 50 MILLILITER(S): 9 INJECTION, SOLUTION INTRAVENOUS at 06:26

## 2025-01-13 RX ADMIN — Medication 100 MILLIGRAM(S): at 12:21

## 2025-01-13 RX ADMIN — Medication 100 MILLIGRAM(S): at 18:24

## 2025-01-13 RX ADMIN — NYSTATIN TOPICAL POWDER 1 APPLICATION(S): 100000 POWDER TOPICAL at 18:23

## 2025-01-13 RX ADMIN — PIPERACILLIN AND TAZOBACTAM 25 GRAM(S): 3; .375 INJECTION, POWDER, LYOPHILIZED, FOR SOLUTION INTRAVENOUS at 15:59

## 2025-01-13 RX ADMIN — Medication 1200 MILLIGRAM(S): at 21:52

## 2025-01-13 RX ADMIN — Medication 500 MILLIGRAM(S): at 12:21

## 2025-01-13 RX ADMIN — Medication 100 MILLIGRAM(S): at 06:25

## 2025-01-13 RX ADMIN — NYSTATIN TOPICAL POWDER 1 APPLICATION(S): 100000 POWDER TOPICAL at 06:27

## 2025-01-13 RX ADMIN — PIPERACILLIN AND TAZOBACTAM 25 GRAM(S): 3; .375 INJECTION, POWDER, LYOPHILIZED, FOR SOLUTION INTRAVENOUS at 21:52

## 2025-01-13 RX ADMIN — Medication 1200 MILLIGRAM(S): at 06:25

## 2025-01-13 RX ADMIN — Medication 100 MILLIGRAM(S): at 15:40

## 2025-01-13 RX ADMIN — ENOXAPARIN SODIUM 40 MILLIGRAM(S): 60 INJECTION INTRAVENOUS; SUBCUTANEOUS at 12:21

## 2025-01-13 RX ADMIN — Medication 25 MILLIGRAM(S): at 13:00

## 2025-01-13 RX ADMIN — ATORVASTATIN CALCIUM 20 MILLIGRAM(S): 40 TABLET, FILM COATED ORAL at 21:52

## 2025-01-13 RX ADMIN — Medication 1 TABLET(S): at 12:21

## 2025-01-13 RX ADMIN — Medication 17 GRAM(S): at 18:25

## 2025-01-13 NOTE — PROGRESS NOTE ADULT - PROBLEM SELECTOR PLAN 2
CT A/P:  Exophytic right femoral ossific lesion measuring 5.1 x 2.0 x 2.8 cm,   indeterminate, with parosteal osteosarcoma within the differential.   Evaluation with MRI of the femur without and with contrast is recommended.      Plan  - Discussed with patient regarding findings, at this time patient states " we'll see" regarding necessity for further MRI work up, findings discussed with daughter, Dorinda, states will have family meeting to discuss next steps.  - 1/11: pt declines MRI at this time CT A/P:  Exophytic right femoral ossific lesion measuring 5.1 x 2.0 x 2.8 cm,   indeterminate, with parosteal osteosarcoma within the differential.   Evaluation with MRI of the femur without and with contrast is recommended.    - Discussed with patient regarding findings, at this time patient states " we'll see" regarding necessity for further MRI work up, findings discussed with daughter, Dorinda, states will have family meeting to discuss next steps.  - 1/11: pt declines MRI at this time

## 2025-01-13 NOTE — PROGRESS NOTE ADULT - PROBLEM SELECTOR PLAN 12
- Fluids: None  - Electrolytes: Will replete to maintain K>4, Phos>3, and Mag>2  - Nutrition:  Consistent Carb  - Activity: As tolerated, pending PT eval  - DVT Prophylaxis: heparin Sub q  - Stress Ulcer/GI Prophylaxis: N/A  - Disposition: Admit to medicine, active, DUSTIN placement per PT - Fluids: LR at 75cc  - Electrolytes: Will replete to maintain K>4, Phos>3, and Mag>2  - Nutrition:  Consistent Carb  - Activity: As tolerated, pending PT eval  - DVT Prophylaxis: heparin Sub q  - Stress Ulcer/GI Prophylaxis: N/A  - Disposition: Admit to medicine, active, DUSTIN placement per PT

## 2025-01-13 NOTE — PROVIDER CONTACT NOTE (OTHER) - BACKGROUND
pt admitted with weakness
Patient was admitted for weakness PMH of lymphedema, HLD, Hypertension, Diabetes, back pain.

## 2025-01-13 NOTE — PROGRESS NOTE ADULT - PROBLEM SELECTOR PLAN 6
Baseline creatinine: 0.9 Today’s creatinine: 1.34    Plan  Send urine creatinine, urea and lytes  Check renal sonogram  Trend BUN/Cr, Strict I/O  Consider renal consult if kidneys function does not improve.  (FeNA: if < 1% prerenal and if > 1% ATN post renal) Baseline creatinine: 0.9 Today’s creatinine: 1.34  Send urine creatinine, urea and lytes  Check renal sonogram  Trend BUN/Cr, Strict I/O  Consider renal consult if kidneys function does not improve.  FeNA; Indeterminate    Plan  - C/w IVF

## 2025-01-13 NOTE — PROGRESS NOTE ADULT - SUBJECTIVE AND OBJECTIVE BOX
Torres Andrews MD  Available on TEAMS    Patient is a 79y old  Female who presents with a chief complaint of Weakness (2025 07:03)      SUBJECTIVE / OVERNIGHT EVENTS: No acute events overnight. Patient was assessed at bedside.       REVIEW OF SYSTEMS:  CONSTITUTIONAL: No weakness, fevers, or chills  EYES/ENT: No visual changes; No vertigo, throat pain, or dysphagia  NECK: No pain or stiffness  RESPIRATORY: No cough, wheezing, hemoptysis, or shortness of breath  CARDIOVASCULAR: No chest pain or palpitations  GASTROINTESTINAL: No abdominal or epigastric pain. No nausea, vomiting, or hematemesis; No diarrhea or constipation. No melena or hematochezia.  GENITOURINARY: No dysuria, frequency, or hematuria  MUSCULOSKELETAL: No joint or muscle pain or aches  NEUROLOGICAL: No numbness or weakness  SKIN: No itching or rashes      MEDICATIONS  (STANDING):  ascorbic acid 500 milliGRAM(s) Oral daily  atorvastatin 20 milliGRAM(s) Oral at bedtime  celecoxib 100 milliGRAM(s) Oral daily  dextrose 5%. 1000 milliLiter(s) (50 mL/Hr) IV Continuous <Continuous>  dextrose 5%. 1000 milliLiter(s) (100 mL/Hr) IV Continuous <Continuous>  dextrose 50% Injectable 25 Gram(s) IV Push once  dextrose 50% Injectable 12.5 Gram(s) IV Push once  dextrose 50% Injectable 25 Gram(s) IV Push once  enoxaparin Injectable 40 milliGRAM(s) SubCutaneous every 24 hours  fluconAZOLE   Tablet 150 milliGRAM(s) Oral <User Schedule>  glucagon  Injectable 1 milliGRAM(s) IntraMuscular once  influenza  Vaccine (HIGH DOSE) 0.5 milliLiter(s) IntraMuscular once  insulin lispro (ADMELOG) corrective regimen sliding scale   SubCutaneous Before meals and at bedtime  lactated ringers. 1000 milliLiter(s) (50 mL/Hr) IV Continuous <Continuous>  metoprolol tartrate 100 milliGRAM(s) Oral every 12 hours  multivitamin 1 Tablet(s) Oral daily  nystatin Powder 1 Application(s) Topical two times a day  piperacillin/tazobactam IVPB.. 3.375 Gram(s) IV Intermittent every 8 hours  polyethylene glycol 3350 17 Gram(s) Oral two times a day  senna 2 Tablet(s) Oral at bedtime    MEDICATIONS  (PRN):  acetaminophen     Tablet .. 650 milliGRAM(s) Oral every 6 hours PRN Temp greater or equal to 38C (100.4F), Mild Pain (1 - 3), Moderate Pain (4 - 6)  benzonatate 100 milliGRAM(s) Oral three times a day PRN Cough  bisacodyl 5 milliGRAM(s) Oral every 12 hours PRN Constipation  dextrose Oral Gel 15 Gram(s) Oral once PRN Blood Glucose LESS THAN 70 milliGRAM(s)/deciliter  guaiFENesin ER 1200 milliGRAM(s) Oral every 12 hours PRN Cough      CAPILLARY BLOOD GLUCOSE      POCT Blood Glucose.: 171 mg/dL (2025 21:46)  POCT Blood Glucose.: 163 mg/dL (2025 08:26)    I&O's Summary    2025 07:01  -  2025 07:00  --------------------------------------------------------  IN: 0 mL / OUT: 500 mL / NET: -500 mL        Vital Signs Last 24 Hrs  T(C): 36.2 (2025 06:56), Max: 37.3 (2025 05:04)  T(F): 97.1 (2025 06:56), Max: 99.1 (2025 05:04)  HR: 79 (2025 06:56) (79 - 92)  BP: 143/79 (2025 06:56) (114/85 - 143/79)  BP(mean): --  RR: 18 (2025 06:56) (18 - 18)  SpO2: 91% (2025 06:56) (91% - 100%)    Parameters below as of 2025 06:56  Patient On (Oxygen Delivery Method): room air        PHYSICAL EXAM:  GENERAL: NAD, well-developed, well-nourished  HEAD: Atraumatic, Normocephalic  EYES: EOMI, PERRLA, conjunctiva and sclera clear  NECK: Supple, No JVD  CHEST/LUNG: Clear to auscultation bilaterally; No wheezes or crackles  HEART: Normal S1/S2; Regular rate and rhythm; No murmurs, rubs, or gallops  ABDOMEN: Soft, Nontender, Nondistended; Bowel sounds present  EXTREMITIES: 2+ Peripheral Pulses; No clubbing, cyanosis, or edema  PSYCH: A&Ox3  NEUROLOGY: no focal neurologic deficit  SKIN: No rashes or lesions    LABS:                        9.4    7.00  )-----------( 400      ( 2025 11:22 )             31.0      01-12    135  |  100  |  38[H]  ----------------------------<  245[H]  4.5   |  22  |  1.34[H]    Ca    9.3      2025 11:23            Urinalysis Basic - ( 2025 13:06 )    Color: Yellow / Appearance: Cloudy / S.024 / pH: x  Gluc: x / Ketone: Negative mg/dL  / Bili: Negative / Urobili: 1.0 mg/dL   Blood: x / Protein: 100 mg/dL / Nitrite: Negative   Leuk Esterase: Negative / RBC: 20 /HPF / WBC 3 /HPF   Sq Epi: x / Non Sq Epi: 4 /HPF / Bacteria: Negative /HPF        RADIOLOGY & ADDITIONAL TESTS:    Imaging Personally Reviewed:    Consultant(s) Notes Reviewed:      Care Discussed with Consultants/Other Providers:   Torres Andrews MD  Available on TEAMS    Patient is a 79y old  Female who presents with a chief complaint of Weakness (2025 07:03)      SUBJECTIVE / OVERNIGHT EVENTS: No acute events overnight. Patient was assessed at bedside. Itchiness and resolving cough      REVIEW OF SYSTEMS:  CONSTITUTIONAL: No weakness, fevers, or chills  EYES/ENT: No visual changes; No vertigo, throat pain, or dysphagia  NECK: No pain or stiffness  RESPIRATORY: No cough, wheezing, hemoptysis, or shortness of breath  CARDIOVASCULAR: No chest pain or palpitations  GASTROINTESTINAL: No abdominal or epigastric pain. No nausea, vomiting, or hematemesis; No diarrhea or constipation. No melena or hematochezia.  GENITOURINARY: No dysuria, frequency, or hematuria  MUSCULOSKELETAL: No joint or muscle pain or aches  NEUROLOGICAL: No numbness or weakness  SKIN: No itching or rashes      MEDICATIONS  (STANDING):  ascorbic acid 500 milliGRAM(s) Oral daily  atorvastatin 20 milliGRAM(s) Oral at bedtime  celecoxib 100 milliGRAM(s) Oral daily  dextrose 5%. 1000 milliLiter(s) (50 mL/Hr) IV Continuous <Continuous>  dextrose 5%. 1000 milliLiter(s) (100 mL/Hr) IV Continuous <Continuous>  dextrose 50% Injectable 25 Gram(s) IV Push once  dextrose 50% Injectable 12.5 Gram(s) IV Push once  dextrose 50% Injectable 25 Gram(s) IV Push once  enoxaparin Injectable 40 milliGRAM(s) SubCutaneous every 24 hours  fluconAZOLE   Tablet 150 milliGRAM(s) Oral <User Schedule>  glucagon  Injectable 1 milliGRAM(s) IntraMuscular once  influenza  Vaccine (HIGH DOSE) 0.5 milliLiter(s) IntraMuscular once  insulin lispro (ADMELOG) corrective regimen sliding scale   SubCutaneous Before meals and at bedtime  lactated ringers. 1000 milliLiter(s) (50 mL/Hr) IV Continuous <Continuous>  metoprolol tartrate 100 milliGRAM(s) Oral every 12 hours  multivitamin 1 Tablet(s) Oral daily  nystatin Powder 1 Application(s) Topical two times a day  piperacillin/tazobactam IVPB.. 3.375 Gram(s) IV Intermittent every 8 hours  polyethylene glycol 3350 17 Gram(s) Oral two times a day  senna 2 Tablet(s) Oral at bedtime    MEDICATIONS  (PRN):  acetaminophen     Tablet .. 650 milliGRAM(s) Oral every 6 hours PRN Temp greater or equal to 38C (100.4F), Mild Pain (1 - 3), Moderate Pain (4 - 6)  benzonatate 100 milliGRAM(s) Oral three times a day PRN Cough  bisacodyl 5 milliGRAM(s) Oral every 12 hours PRN Constipation  dextrose Oral Gel 15 Gram(s) Oral once PRN Blood Glucose LESS THAN 70 milliGRAM(s)/deciliter  guaiFENesin ER 1200 milliGRAM(s) Oral every 12 hours PRN Cough      CAPILLARY BLOOD GLUCOSE      POCT Blood Glucose.: 171 mg/dL (2025 21:46)  POCT Blood Glucose.: 163 mg/dL (2025 08:26)    I&O's Summary    2025 07:01  -  2025 07:00  --------------------------------------------------------  IN: 0 mL / OUT: 500 mL / NET: -500 mL        Vital Signs Last 24 Hrs  T(C): 36.2 (2025 06:56), Max: 37.3 (2025 05:04)  T(F): 97.1 (2025 06:56), Max: 99.1 (2025 05:04)  HR: 79 (2025 06:56) (79 - 92)  BP: 143/79 (2025 06:56) (114/85 - 143/79)  BP(mean): --  RR: 18 (2025 06:56) (18 - 18)  SpO2: 91% (2025 06:56) (91% - 100%)    Parameters below as of 2025 06:56  Patient On (Oxygen Delivery Method): room air        PHYSICAL EXAM:  GENERAL: NAD, well-developed, well-nourished  HEAD: Atraumatic, Normocephalic  EYES: EOMI, PERRLA, conjunctiva and sclera clear  NECK: Supple, No JVD  CHEST/LUNG: Clear to auscultation bilaterally; No wheezes or crackles  HEART: Normal S1/S2; Regular rate and rhythm; No murmurs, rubs, or gallops  ABDOMEN: Soft, Nontender, Nondistended; Bowel sounds present  EXTREMITIES: 2+ Peripheral Pulses; No clubbing, cyanosis, or edema  PSYCH: A&Ox3  NEUROLOGY: no focal neurologic deficit  SKIN: No rashes or lesions    LABS:                        9.4    7.00  )-----------( 400      ( 2025 11:22 )             31.0      -12    135  |  100  |  38[H]  ----------------------------<  245[H]  4.5   |  22  |  1.34[H]    Ca    9.3      2025 11:23            Urinalysis Basic - ( 2025 13:06 )    Color: Yellow / Appearance: Cloudy / S.024 / pH: x  Gluc: x / Ketone: Negative mg/dL  / Bili: Negative / Urobili: 1.0 mg/dL   Blood: x / Protein: 100 mg/dL / Nitrite: Negative   Leuk Esterase: Negative / RBC: 20 /HPF / WBC 3 /HPF   Sq Epi: x / Non Sq Epi: 4 /HPF / Bacteria: Negative /HPF        RADIOLOGY & ADDITIONAL TESTS:    Imaging Personally Reviewed:    Consultant(s) Notes Reviewed:      Care Discussed with Consultants/Other Providers:

## 2025-01-13 NOTE — PROGRESS NOTE ADULT - PROBLEM SELECTOR PLAN 1
- Persistent sacral ulcer for 1 year, initially presented when patient was ambulating and after bedbound status was unable to care for herself, has home health aid for limited hours who is unable to turn patient, spouse medically unable. Patient and family looking for LTC/DUSTIN for care  - Elevated WBC and AIM, s/p 1x Zosyn in ED  - CT A/P: Concern for OM, proctitis, exophytic bone lesions, bladder neoplasm not excluded. Mild right hydronephrosis  - Wound care following  - 1/8 bcx NGTD  - Wound care following  - s/p 1x Ancef    Plan  - C/w Wound care  - C/w Zosyn - Persistent sacral ulcer for 1 year, initially presented when patient was ambulating and after bedbound status was unable to care for herself, has home health aid for limited hours who is unable to turn patient, spouse medically unable. Patient and family looking for LTC/DUSTIN for care  - Elevated WBC and AIM, s/p 1x Zosyn in ED  - CT A/P: Concern for OM, proctitis, exophytic bone lesions, bladder neoplasm not excluded. Mild right hydronephrosis  - Wound care following  - 1/8 bcx NGTD  - Wound care following  - s/p 1x Ancef    Plan  - C/w Zosyn, per ID possible transition to carbopenem on DC, 6 week course, will see if oral equivalent

## 2025-01-13 NOTE — PROGRESS NOTE ADULT - ASSESSMENT
79F with HTN, lymphedema, b/l arthritis.  She reports having been bound to wheelchair, then developed an sacral ulcer.  Was hospitalized for a month at [facility] and received antibiotics and surgical debridement.  Was discharged January 2024 and completed course of antibiotics orally she thinks for at least 6 weeks total.  Receives thrice weekly visiting nurse and aide.  Sleeps in hospital bed and is bed bound 24 hours a day.  SHe was admitted 1/8/2025 c/o weakness.  Here, marked leukocytosis.  Tm 99.  CT done shows sacral decub with erosive changes consistent with sacral osteomyelitis.  Seen by wound care.  Started on vancomycin / zosyn and dilfucan.  Elevated ESR / CRP.    Sacral OM with elevated inflammatory markers and leukocytosis and now NAVNEET  - zosyn for now  - can change to ertapenem 1g daily upon discharge  - would treat with 6 weeks antibiotics for underlying sacral OM  - would restart zosyn  - can change to topical antifungal

## 2025-01-13 NOTE — PROGRESS NOTE ADULT - ATTENDING COMMENTS
Patient seen and examined at bedside. No acute events overnight. She feels okay. ID recommending 6 weeks of IV antibiotics for OM. Local wound care as ordered. Pending DUSTIN.

## 2025-01-13 NOTE — PROGRESS NOTE ADULT - SUBJECTIVE AND OBJECTIVE BOX
[facility]-- WOUND TEAM -- FOLLOW UP NOTE  --------------------------------------------------------------------------------    24 hour events/subjective:    alert  afebrile  tolerating po w/o n/v  incontinent  encouraged mobility  less pain, no odor/ excess drainage    Diet:  Diet, Consistent Carbohydrate w/Evening Snack:   Teofilo(7 Gm Arginine/7 Gm Glut/1.2 Gm HMB     Qty per Day:  2  Liquid Protein Supplement     Qty per Day:  1 (01-10-25 @ 13:45)      ROS: pt unable to offer    ALLERGIES & MEDICATIONS  --------------------------------------------------------------------------------      No Known Allergies        STANDING INPATIENT MEDICATIONS  ascorbic acid 500 milliGRAM(s) Oral daily  atorvastatin 20 milliGRAM(s) Oral at bedtime  celecoxib 100 milliGRAM(s) Oral daily  dextrose 5%. 1000 milliLiter(s) IV Continuous <Continuous>  dextrose 5%. 1000 milliLiter(s) IV Continuous <Continuous>  dextrose 50% Injectable 25 Gram(s) IV Push once  dextrose 50% Injectable 12.5 Gram(s) IV Push once  dextrose 50% Injectable 25 Gram(s) IV Push once  enoxaparin Injectable 40 milliGRAM(s) SubCutaneous every 24 hours  fluconAZOLE   Tablet 150 milliGRAM(s) Oral <User Schedule>  glucagon  Injectable 1 milliGRAM(s) IntraMuscular once  influenza  Vaccine (HIGH DOSE) 0.5 milliLiter(s) IntraMuscular once  insulin lispro (ADMELOG) corrective regimen sliding scale   SubCutaneous Before meals and at bedtime  lactated ringers. 1000 milliLiter(s) IV Continuous <Continuous>  metoprolol tartrate 100 milliGRAM(s) Oral every 12 hours  multivitamin 1 Tablet(s) Oral daily  nystatin Powder 1 Application(s) Topical two times a day  piperacillin/tazobactam IVPB.. 3.375 Gram(s) IV Intermittent every 8 hours  polyethylene glycol 3350 17 Gram(s) Oral two times a day  senna 2 Tablet(s) Oral at bedtime      PRN INPATIENT MEDICATION  acetaminophen Tablet 650 milliGRAM(s) Oral every 6 hours PRN  benzonatate 100 milliGRAM(s) Oral three times a day PRN  bisacodyl 5 milliGRAM(s) Oral every 12 hours PRN  dextrose Oral Gel 15 Gram(s) Oral once PRN  diphenhydrAMINE 25 milliGRAM(s) Oral every 4 hours PRN  guaiFENesin ER 1200 milliGRAM(s) Oral every 12 hours PRN        VITALS/PHYSICAL EXAM  --------------------------------------------------------------------------------  T(C): 37.1 (01-13-25 @ 12:00), Max: 37.3 (01-13-25 @ 05:04)  HR: 67 (01-13-25 @ 12:00) (67 - 92)  BP: 110/60 (01-13-25 @ 12:00) (110/60 - 143/79)  RR: 18 (01-13-25 @ 12:00) (18 - 18)  SpO2: 90% (01-13-25 @ 12:00) (90% - 100%)  Wt(kg): --        NAD,   A&Ox3, MO, frail,  WD/ WN/ WG  Versa [facility] P500 bed   HEENT:  NC/AT, EOMI, sclera clear, mucosa moist, throat clear, trachea midline, neck supple  Respiratory: nonlabored w/ equal chest rise  Gastrointestinal: soft NT/ND   : (+) purewick cath  Neurology:  weakened strength & sensation grossly intact  Psych: appropriate, anxious  Musculoskeletal:FROM, no deformities/ contractures  Vascular: BLE equally warm,  no cyanosis, clubbing, nor acute ischemia         BLE edema equal         BLE DP/PT pulses palpable  Skin: thin, dry, pale, frail,  ecchymosis w/o hematoma  Sacral Stage 4 pressure injury  moist red granular tissue w/ serosanguinous drainage     no exposed bone     periwound skin up the back into the flanks,down into posterior thighs & anteriorly into thigh/ groin & pannus skin fold regions as well as b/l axilla       w/ dull erythema and flaky skin w/ satellite lesions  Under Bilateral breast dull linear erythematous skin changes w/o blistering or weeping  No odor, erythema, increased warmth, tenderness, induration, fluctuance, nor crepitus          LABS/ CULTURES/ RADIOLOGY:              9.8    7.11  >-----------<  356      [01-13-25 @ 09:07]              33.6     136  |  102  |  41  ----------------------------<  164      [01-13-25 @ 09:07]  4.3   |  20  |  1.39        Ca     9.2     [01-13-25 @ 09:07]          CAPILLARY BLOOD GLUCOSE  POCT Blood Glucose.: 161 mg/dL (13 Jan 2025 08:35)  POCT Blood Glucose.: 171 mg/dL (12 Jan 2025 21:46)        Culture - Blood (collected 01-08-25 @ 17:45)  Source: .Blood BLOOD  Preliminary Report (01-12-25 @ 23:19):    No growth at 4 days    Culture - Blood (collected 01-08-25 @ 17:40)  Source: .Blood BLOOD  Gram Stain (01-10-25 @ 06:15):    Growth in aerobic bottle: Gram Positive Cocci in Clusters    Growth in anaerobic bottle: Gram Positive Cocci in Clusters  Final Report (01-11-25 @ 17:08):    Growth in aerobic and anaerobic bottles: Staphylococcus epidermidis    Isolation of Coagulase negative Staphylococcus from single blood culture    sets may represent    contamination. Contact the Microbiology Department at [phone] if    susceptibilitytesting is needed.    clinically indicated.    Direct identification is available within approximately 3-5    hours either by Blood Panel Multiplexed PCR or Direct    MALDI-TOF. Details: [email or web address]  Organism: Blood Culture PCR (01-11-25 @ 17:08)  Organism: Blood Culture PCR (01-11-25 @ 17:08)      Method Type: PCR      -  Staphylococcus epidermidis, Methicillin resistant: Detec          A1C with Estimated Average Glucose Result: 7.3 % (01-09-25 @ 10:04)

## 2025-01-13 NOTE — PROGRESS NOTE ADULT - PROBLEM SELECTOR PLAN 9
Home meds: Bisoprolol      Plan  - Metoprolol Tartrate 100mg BID  - Start lisinopril 2.5 QD 1/11 Home meds: Bisoprolol  - Metoprolol Tartrate 100mg BID  - D/c Lisinopril given rising NAVNEET

## 2025-01-13 NOTE — PROVIDER CONTACT NOTE (OTHER) - SITUATION
Pt. is sneezing and coughing
Patient had a glucose of 212 on her bedtime finger stick. Patient still refused after education of importance of insulin. Patient stated " Her glucose will go down in the morning".
pt refusing insulin and finger stick

## 2025-01-13 NOTE — PROVIDER CONTACT NOTE (OTHER) - ACTION/TREATMENT ORDERED:
No new orders
Dr. Andrews ordered Benzonatate and guaifenesin ER PRN
MD made aware. no new orders placed.

## 2025-01-13 NOTE — PROGRESS NOTE ADULT - SUBJECTIVE AND OBJECTIVE BOX
Patient is a 79y old  Female who presents with a chief complaint of 79 year old female admitted with Weakness    f/u wound    Interval History/ROS:  no fever/chills.  no n/v/d.  no abdominal pain.  no dysuria.  Remainder of ROS otherwise negative.    PAST MEDICAL & SURGICAL HISTORY:  Hypertension  Diabetes  Back pain  Lymphedema of both lower extremities  HLD (hyperlipidemia)  History of cholecystectomy    Allergies  No Known Allergies    ANTIMICROBIALS:  fluconAZOLE   Tablet 150 <User Schedule>  piperacillin/tazobactam IVPB.. 3.375 every 8 hours (1/8, 1/10-)    MEDICATIONS  (STANDING):  atorvastatin 20 at bedtime  celecoxib 100 daily  enoxaparin Injectable 40 every 24 hours  insulin lispro (ADMELOG) corrective regimen sliding scale  Before meals and at bedtime  metoprolol tartrate 100 every 12 hours  polyethylene glycol 3350 17 two times a day  senna 2 at bedtime    Vital Signs Last 24 Hrs  T(F): 97.1 (01-13-25 @ 06:56), Max: 99.1 (01-13-25 @ 05:04)  HR: 79 (01-13-25 @ 06:56)  BP: 143/79 (01-13-25 @ 06:56)  RR: 18 (01-13-25 @ 06:56)  SpO2: 91% (01-13-25 @ 06:56) (91% - 100%)  Wt(kg): --    PHYSICAL EXAM:  Constitutional: non-toxic, no distress  HEAD/EYES: anicteric  ENT:  supple  Cardiovascular:   normal S1, S2  Respiratory:  clear BS bilaterally  GI:  soft, non-tender, normal bowel sounds  :  no aldridge  Musculoskeletal:  no synovitis  Neurologic: awake and alert, normal strength, no focal findings  Skin:  sacral decub with no purulence, surrounding erythema c/w incontinence dermatitis  Psychiatric:  awake, alert, appropriate mood                        9.8    7.11  )-----------( 356      ( 13 Jan 2025 09:07 )             33.6 01-13    136  |  102  |  41  ----------------------------<  164  4.3   |  20  |  1.39  Ca    9.2      13 Jan 2025 09:07    WBC Count: 7.11 (01-13-25 @ 09:07)  WBC Count: 7.00 (01-12-25 @ 11:22)  WBC Count: 9.70 (01-11-25 @ 12:10)  WBC Count: 13.28 (01-10-25 @ 07:45)  WBC Count: 11.63 (01-09-25 @ 10:04)  WBC Count: 17.66 (01-08-25 @ 16:34)    Creatinine: 1.39 (01-13)  Creatinine: 1.34 (01-12)  Creatinine: 0.91 (01-11)  Creatinine: 0.94 (01-10)  Creatinine: 0.86 (01-09)  Creatinine: 1.02 (01-08)  Creatinine: 1.01 (01-08)    Sedimentation Rate, Erythrocyte: 120 (01-08 @ 17:57)  C-Reactive Protein: 97 (01-08 @ 17:57)    Urinalysis + Microscopic Examination (01.09.25 @ 12:15)   pH Urine: 6.0  Urine Appearance: Clear  Color: Yellow  Specific Gravity: 1.017  Protein, Urine: 30 mg/dL  Glucose Qualitative, Urine: Negative mg/dL  Ketone - Urine: Negative mg/dL  Blood, Urine: Small  Bilirubin: Negative  Urobilinogen: 0.2 mg/dL  Leukocyte Esterase Concentration: Small  Nitrite: Negative  Review: Reviewed  White Blood Cell - Urine: 5 /HPF  Red Blood Cell - Urine: 9 /HPF  Bacteria: Negative /HPF  Cast: 0 /LPF  Epithelial Cells: 1 /HPF  Procalcitonin: 0.15 (01-10 @ 07:45)  Sedimentation Rate, Erythrocyte: 120 (01-08 @ 17:57)  C-Reactive Protein: 97 (01-08 @ 17:57)    MICROBIOLOGY:  Culture - Blood (collected 08 Jan 2025 17:45)  Source: .Blood BLOOD  Preliminary Report (09 Jan 2025 23:01):    No growth at 24 hours    Culture - Blood (collected 08 Jan 2025 17:40)  Source: .Blood BLOOD  Gram Stain (10 Onofre 2025 06:15):    Growth in aerobic bottle: Gram Positive Cocci in Clusters    Growth in anaerobic bottle: Gram Positive Cocci in Clusters  Preliminary Report (10 Onofre 2025 06:16):    Growth in aerobic bottle: Gram Positive Cocci in Clusters    Growth in anaerobic bottle: Gram Positive Cocci in Clusters    Direct identification is available within approximately 3-5    hours either by Blood Panel Multiplexed PCR or Direct    MALDI-TOF. Details: [email or web address]  Organism: Blood Culture PCR (10 Onofre 2025 00:11)  Organism: Blood Culture PCR (10 Onofre 2025 00:11)      Method Type: PCR      -  Staphylococcus epidermidis, Methicillin resistant: Detec    RADIOLOGY:  imaging below personally reviewed and agree with findings    CT Abdomen and Pelvis w/ IV Cont (01.10.25 @ 09:26) >  ABDOMINAL WALL: Soft tissue defect overlying the lower sacrum, consistent with a decubitus wound. There is associated soft tissue infiltration without discrete collection. Associated erosive and sclerotic changes to the lower sacrum and to the coccyx (series 4 image 78). Stool overflows from the anorectum into the lower aspect of the wound. Small fat-containing left inguinal hernia. Tiny fat-containing umbilical/periumbilical hernia. Small focus of subcutaneous gas in the right ventral abdominal wall, which may be injection related.  BONES: Findings pertaining to the sacrum and coccyx as above. Degenerative changes with multilevel central canal narrowing in the lumbar spine, most significantly at L4-L5. S-shaped scoliosis. Ossific lesion measuring 5.1 x 2.0 x 2.8 cm projecting laterally exophytically from the right femoral shaft without clear medullary continuity and appears cortically based (series 5 image 55).  IMPRESSION:  *  Soft tissue defect overlying the lower sacrum, consistent with a decubitus wound. Associated erosive and sclerotic changes to the lower sacrum and coccyx, concerning for osteomyelitis. Stool overflows from the anorectum into the lower aspect of the wound.  *  Stool-filled distended rectum. Mild rectal wall thickening and perirectal fat infiltration, which may represent stercoral proctitis.  *  Focal segment of mural thickening in the proximal sigmoid colon, possibly secondary to underdistention, diverticulitis, or colitis, with colonic neoplasm also within the differential. Consider evaluation with colonoscopy.  *  Exophytic right femoral ossific lesion measuring 5.1 x 2.0 x 2.8 cm, indeterminate, with parosteal osteosarcoma within the differential. Evaluation with MRI of the femur without and with contrast is recommended.  *  Underdistended urinary bladder. Mild nonspecific focal mural thickening along the right bladder wall. Correlate with urinalysis. Bladder neoplasm is not excluded.  *  Mild right hydroureteronephrosis to the level of the distal ureter, with narrowing of the ureter as it courses lateral to the stool-filled distended rectum. Consideration is given to obstruction by mass effect from the rectum, with other etiology not excluded. Advise short interval follow-up imaging to ensure resolution.  *  Minimal intrahepatic biliary duct dilation of unclear significance, with consideration given to postcholecystectomy state.

## 2025-01-13 NOTE — PROGRESS NOTE ADULT - ASSESSMENT
79 YOF with PMHx of HTN, lymphedema, arthritis, sacral decubitus ulcer s/p debridement one year ago presents with weakness.  Pt w/ ulcer/skin changes to lower back and buttocks, with super impose candida. Elevated CRP and WBC concerning for infections cause.       Wound Consult requested to assist w/ management of:  Sacral Stage 4 pressure injury  Fungal Moisture Associated Dermatitis Back/ Flank/ Buttock/ Thighs/ Groin/ Pannus/axilla- improving  Moisture Dermatitis Under Breasts    Sacral wound- Aquacel dressing  Buttocks/ Sacrum Crusting w/ Nystatin + CAVILON BID and prn soiling        Continue w/ attends under pads and Pericare maintenance w/ purewick care as per protocol  Bilateral Groin, Pannus, & Breast skin Folds/axillas- after Cleaning= Tuck in INTERDry AG QD  Consider A/P CT or MRI  Abx per Medicine/ ID      Pt started on Oral Antifungal- monitor for improvement  Moisturize intact skin w/ SWEEN cream BID  Nutrition Consult for optimization        encourage high quality protein, josephine/ prosource, MVI & Vit C to promote wound healing  Hyperglycemia - ADA diet and FS w/ ISS, consider HgA1c- pt non compliant "diet controlled"  Continue turning and positioning w/ offloading assistive devices as per protocol  Waffle Cushion to chair when oob to chair  Continue w/ low air loss pressure redistribution bed surface   Pt will need Group 2 mattress on hospital bed and ROHO cushion for wheel chair upon discharge home  Care as per medicine, will follow w/ you  Upon discharge f/[facility] as [facility] at [facility] [address] [place] [address] [phone]  Seen w/ attng & RN and D/w team   Thank you for this consult  Yolanda Ramos PA-C [id number]  Nights/ Weekends/ Holidays please call:  General Surgery Consult pager [phone]) for emergencies  Wound PT for multilayer leg wrapping or VAC issues (x 2018)   I spent 35minutes face to face w/ this pt of which more than 50% of the time was spent counseling & coordinating care of this pt.

## 2025-01-14 LAB
ANION GAP SERPL CALC-SCNC: 13 MMOL/L — SIGNIFICANT CHANGE UP (ref 5–17)
BUN SERPL-MCNC: 41 MG/DL — HIGH (ref 7–23)
CALCIUM SERPL-MCNC: 8.7 MG/DL — SIGNIFICANT CHANGE UP (ref 8.4–10.5)
CHLORIDE SERPL-SCNC: 103 MMOL/L — SIGNIFICANT CHANGE UP (ref 96–108)
CO2 SERPL-SCNC: 20 MMOL/L — LOW (ref 22–31)
CREAT SERPL-MCNC: 1.39 MG/DL — HIGH (ref 0.5–1.3)
EGFR: 38 ML/MIN/1.73M2 — LOW
GLUCOSE BLDC GLUCOMTR-MCNC: 135 MG/DL — HIGH (ref 70–99)
GLUCOSE BLDC GLUCOMTR-MCNC: 198 MG/DL — HIGH (ref 70–99)
GLUCOSE SERPL-MCNC: 142 MG/DL — HIGH (ref 70–99)
HCT VFR BLD CALC: 30.3 % — LOW (ref 34.5–45)
HGB BLD-MCNC: 9.3 G/DL — LOW (ref 11.5–15.5)
MAGNESIUM SERPL-MCNC: 1.8 MG/DL — SIGNIFICANT CHANGE UP (ref 1.6–2.6)
MCHC RBC-ENTMCNC: 26.4 PG — LOW (ref 27–34)
MCHC RBC-ENTMCNC: 30.7 G/DL — LOW (ref 32–36)
MCV RBC AUTO: 86.1 FL — SIGNIFICANT CHANGE UP (ref 80–100)
NRBC # BLD: 0 /100 WBCS — SIGNIFICANT CHANGE UP (ref 0–0)
PHOSPHATE SERPL-MCNC: 3.4 MG/DL — SIGNIFICANT CHANGE UP (ref 2.5–4.5)
PLATELET # BLD AUTO: 309 K/UL — SIGNIFICANT CHANGE UP (ref 150–400)
POTASSIUM SERPL-MCNC: 4.4 MMOL/L — SIGNIFICANT CHANGE UP (ref 3.5–5.3)
POTASSIUM SERPL-SCNC: 4.4 MMOL/L — SIGNIFICANT CHANGE UP (ref 3.5–5.3)
RBC # BLD: 3.52 M/UL — LOW (ref 3.8–5.2)
RBC # FLD: 17.2 % — HIGH (ref 10.3–14.5)
SODIUM SERPL-SCNC: 136 MMOL/L — SIGNIFICANT CHANGE UP (ref 135–145)
WBC # BLD: 7.1 K/UL — SIGNIFICANT CHANGE UP (ref 3.8–10.5)
WBC # FLD AUTO: 7.1 K/UL — SIGNIFICANT CHANGE UP (ref 3.8–10.5)

## 2025-01-14 PROCEDURE — 99232 SBSQ HOSP IP/OBS MODERATE 35: CPT | Mod: GC

## 2025-01-14 RX ORDER — NYSTATIN TOPICAL POWDER 100000 U/G
1 POWDER TOPICAL
Refills: 0 | Status: DISCONTINUED | OUTPATIENT
Start: 2025-01-14 | End: 2025-01-16

## 2025-01-14 RX ORDER — CLOTRIMAZOLE 10 MG/G
1 CREAM TOPICAL
Refills: 0 | Status: DISCONTINUED | OUTPATIENT
Start: 2025-01-14 | End: 2025-01-16

## 2025-01-14 RX ADMIN — PIPERACILLIN AND TAZOBACTAM 25 GRAM(S): 3; .375 INJECTION, POWDER, LYOPHILIZED, FOR SOLUTION INTRAVENOUS at 05:59

## 2025-01-14 RX ADMIN — NYSTATIN TOPICAL POWDER 1 APPLICATION(S): 100000 POWDER TOPICAL at 06:00

## 2025-01-14 RX ADMIN — Medication 100 MILLIGRAM(S): at 05:59

## 2025-01-14 RX ADMIN — Medication 500 MILLIGRAM(S): at 12:14

## 2025-01-14 RX ADMIN — PIPERACILLIN AND TAZOBACTAM 25 GRAM(S): 3; .375 INJECTION, POWDER, LYOPHILIZED, FOR SOLUTION INTRAVENOUS at 17:51

## 2025-01-14 RX ADMIN — Medication 17 GRAM(S): at 17:51

## 2025-01-14 RX ADMIN — CLOTRIMAZOLE 1 APPLICATION(S): 10 CREAM TOPICAL at 22:10

## 2025-01-14 RX ADMIN — Medication 100 MILLIGRAM(S): at 12:14

## 2025-01-14 RX ADMIN — Medication 1200 MILLIGRAM(S): at 22:09

## 2025-01-14 RX ADMIN — PIPERACILLIN AND TAZOBACTAM 25 GRAM(S): 3; .375 INJECTION, POWDER, LYOPHILIZED, FOR SOLUTION INTRAVENOUS at 22:08

## 2025-01-14 RX ADMIN — NYSTATIN TOPICAL POWDER 1 APPLICATION(S): 100000 POWDER TOPICAL at 20:29

## 2025-01-14 RX ADMIN — Medication 17 GRAM(S): at 06:01

## 2025-01-14 RX ADMIN — ENOXAPARIN SODIUM 40 MILLIGRAM(S): 60 INJECTION INTRAVENOUS; SUBCUTANEOUS at 12:15

## 2025-01-14 RX ADMIN — Medication 1 TABLET(S): at 12:14

## 2025-01-14 RX ADMIN — Medication 100 MILLIGRAM(S): at 17:50

## 2025-01-14 RX ADMIN — ATORVASTATIN CALCIUM 20 MILLIGRAM(S): 40 TABLET, FILM COATED ORAL at 22:08

## 2025-01-14 NOTE — PROGRESS NOTE ADULT - PROBLEM SELECTOR PLAN 6
Baseline creatinine: 0.9 Today’s creatinine: 1.34  Send urine creatinine, urea and lytes  Check renal sonogram  Trend BUN/Cr, Strict I/O  Consider renal consult if kidneys function does not improve.  FeNA; Indeterminate    Plan  - C/w IVF Baseline creatinine: 0.9 Today’s creatinine: 1.34  Send urine creatinine, urea and lytes  Check renal sonogram  Trend BUN/Cr, Strict I/O  Consider renal consult if kidneys function does not improve.  FeNA; Indeterminate

## 2025-01-14 NOTE — PROGRESS NOTE ADULT - SUBJECTIVE AND OBJECTIVE BOX
Torres Andrews MD  Available on TEAMS    Patient is a 80y old  Female who presents with a chief complaint of Weakness (13 Jan 2025 18:08)      SUBJECTIVE / OVERNIGHT EVENTS: No acute events overnight. Patient was assessed at bedside.       REVIEW OF SYSTEMS:  CONSTITUTIONAL: No weakness, fevers, or chills  EYES/ENT: No visual changes; No vertigo, throat pain, or dysphagia  NECK: No pain or stiffness  RESPIRATORY: No cough, wheezing, hemoptysis, or shortness of breath  CARDIOVASCULAR: No chest pain or palpitations  GASTROINTESTINAL: No abdominal or epigastric pain. No nausea, vomiting, or hematemesis; No diarrhea or constipation. No melena or hematochezia.  GENITOURINARY: No dysuria, frequency, or hematuria  MUSCULOSKELETAL: No joint or muscle pain or aches  NEUROLOGICAL: No numbness or weakness  SKIN: No itching or rashes      MEDICATIONS  (STANDING):  ascorbic acid 500 milliGRAM(s) Oral daily  atorvastatin 20 milliGRAM(s) Oral at bedtime  celecoxib 100 milliGRAM(s) Oral daily  dextrose 5%. 1000 milliLiter(s) (50 mL/Hr) IV Continuous <Continuous>  dextrose 5%. 1000 milliLiter(s) (100 mL/Hr) IV Continuous <Continuous>  dextrose 50% Injectable 25 Gram(s) IV Push once  dextrose 50% Injectable 12.5 Gram(s) IV Push once  dextrose 50% Injectable 25 Gram(s) IV Push once  enoxaparin Injectable 40 milliGRAM(s) SubCutaneous every 24 hours  fluconAZOLE   Tablet 150 milliGRAM(s) Oral <User Schedule>  glucagon  Injectable 1 milliGRAM(s) IntraMuscular once  influenza  Vaccine (HIGH DOSE) 0.5 milliLiter(s) IntraMuscular once  insulin lispro (ADMELOG) corrective regimen sliding scale   SubCutaneous Before meals and at bedtime  metoprolol tartrate 100 milliGRAM(s) Oral every 12 hours  multivitamin 1 Tablet(s) Oral daily  nystatin Powder 1 Application(s) Topical two times a day  piperacillin/tazobactam IVPB.. 3.375 Gram(s) IV Intermittent every 8 hours  polyethylene glycol 3350 17 Gram(s) Oral two times a day  senna 2 Tablet(s) Oral at bedtime    MEDICATIONS  (PRN):  acetaminophen     Tablet .. 650 milliGRAM(s) Oral every 6 hours PRN Temp greater or equal to 38C (100.4F), Mild Pain (1 - 3), Moderate Pain (4 - 6)  benzonatate 100 milliGRAM(s) Oral three times a day PRN Cough  bisacodyl 5 milliGRAM(s) Oral every 12 hours PRN Constipation  dextrose Oral Gel 15 Gram(s) Oral once PRN Blood Glucose LESS THAN 70 milliGRAM(s)/deciliter  diphenhydrAMINE 25 milliGRAM(s) Oral every 4 hours PRN Rash and/or Itching  guaiFENesin ER 1200 milliGRAM(s) Oral every 12 hours PRN Cough      CAPILLARY BLOOD GLUCOSE      POCT Blood Glucose.: 125 mg/dL (13 Jan 2025 21:47)  POCT Blood Glucose.: 161 mg/dL (13 Jan 2025 08:35)    I&O's Summary    13 Jan 2025 07:01  -  14 Jan 2025 07:00  --------------------------------------------------------  IN: 600 mL / OUT: 950 mL / NET: -350 mL        Vital Signs Last 24 Hrs  T(C): 36.9 (14 Jan 2025 06:14), Max: 37.1 (13 Jan 2025 12:00)  T(F): 98.4 (14 Jan 2025 06:14), Max: 98.7 (13 Jan 2025 12:00)  HR: 76 (14 Jan 2025 06:14) (67 - 83)  BP: 111/50 (14 Jan 2025 06:14) (110/60 - 115/82)  BP(mean): --  RR: 18 (14 Jan 2025 06:14) (18 - 18)  SpO2: 93% (14 Jan 2025 06:14) (90% - 93%)    Parameters below as of 14 Jan 2025 06:14  Patient On (Oxygen Delivery Method): room air        PHYSICAL EXAM:  GENERAL: NAD, well-developed, well-nourished  HEAD: Atraumatic, Normocephalic  EYES: EOMI, PERRLA, conjunctiva and sclera clear  NECK: Supple, No JVD  CHEST/LUNG: Clear to auscultation bilaterally; No wheezes or crackles  HEART: Normal S1/S2; Regular rate and rhythm; No murmurs, rubs, or gallops  ABDOMEN: Soft, Nontender, Nondistended; Bowel sounds present  EXTREMITIES: 2+ Peripheral Pulses; No clubbing, cyanosis, or edema  PSYCH: A&Ox3  NEUROLOGY: no focal neurologic deficit  SKIN: No rashes or lesions    LABS:                        9.3    7.10  )-----------( 309      ( 14 Jan 2025 06:35 )             30.3      01-14    136  |  103  |  41[H]  ----------------------------<  142[H]  4.4   |  20[L]  |  1.39[H]    Ca    8.7      14 Jan 2025 06:34  Phos  3.4     01-14  Mg     1.8     01-14            Urinalysis Basic - ( 14 Jan 2025 06:34 )    Color: x / Appearance: x / SG: x / pH: x  Gluc: 142 mg/dL / Ketone: x  / Bili: x / Urobili: x   Blood: x / Protein: x / Nitrite: x   Leuk Esterase: x / RBC: x / WBC x   Sq Epi: x / Non Sq Epi: x / Bacteria: x        RADIOLOGY & ADDITIONAL TESTS:    Imaging Personally Reviewed:    Consultant(s) Notes Reviewed:      Care Discussed with Consultants/Other Providers:   Torres Andrews MD  Available on TEAMS    Patient is a 80y old  Female who presents with a chief complaint of Weakness (13 Jan 2025 18:08)      SUBJECTIVE / OVERNIGHT EVENTS: No acute events overnight. Patient was assessed at bedside. Denies fevers, chills, weakness.       REVIEW OF SYSTEMS:  CONSTITUTIONAL: No weakness, fevers, or chills  EYES/ENT: No visual changes; No vertigo, throat pain, or dysphagia  NECK: No pain or stiffness  RESPIRATORY: No cough, wheezing, hemoptysis, or shortness of breath  CARDIOVASCULAR: No chest pain or palpitations  GASTROINTESTINAL: No abdominal or epigastric pain. No nausea, vomiting, or hematemesis; No diarrhea or constipation. No melena or hematochezia.  GENITOURINARY: No dysuria, frequency, or hematuria  MUSCULOSKELETAL: No joint or muscle pain or aches  NEUROLOGICAL: No numbness or weakness  SKIN: No itching or rashes      MEDICATIONS  (STANDING):  ascorbic acid 500 milliGRAM(s) Oral daily  atorvastatin 20 milliGRAM(s) Oral at bedtime  celecoxib 100 milliGRAM(s) Oral daily  dextrose 5%. 1000 milliLiter(s) (50 mL/Hr) IV Continuous <Continuous>  dextrose 5%. 1000 milliLiter(s) (100 mL/Hr) IV Continuous <Continuous>  dextrose 50% Injectable 25 Gram(s) IV Push once  dextrose 50% Injectable 12.5 Gram(s) IV Push once  dextrose 50% Injectable 25 Gram(s) IV Push once  enoxaparin Injectable 40 milliGRAM(s) SubCutaneous every 24 hours  fluconAZOLE   Tablet 150 milliGRAM(s) Oral <User Schedule>  glucagon  Injectable 1 milliGRAM(s) IntraMuscular once  influenza  Vaccine (HIGH DOSE) 0.5 milliLiter(s) IntraMuscular once  insulin lispro (ADMELOG) corrective regimen sliding scale   SubCutaneous Before meals and at bedtime  metoprolol tartrate 100 milliGRAM(s) Oral every 12 hours  multivitamin 1 Tablet(s) Oral daily  nystatin Powder 1 Application(s) Topical two times a day  piperacillin/tazobactam IVPB.. 3.375 Gram(s) IV Intermittent every 8 hours  polyethylene glycol 3350 17 Gram(s) Oral two times a day  senna 2 Tablet(s) Oral at bedtime    MEDICATIONS  (PRN):  acetaminophen     Tablet .. 650 milliGRAM(s) Oral every 6 hours PRN Temp greater or equal to 38C (100.4F), Mild Pain (1 - 3), Moderate Pain (4 - 6)  benzonatate 100 milliGRAM(s) Oral three times a day PRN Cough  bisacodyl 5 milliGRAM(s) Oral every 12 hours PRN Constipation  dextrose Oral Gel 15 Gram(s) Oral once PRN Blood Glucose LESS THAN 70 milliGRAM(s)/deciliter  diphenhydrAMINE 25 milliGRAM(s) Oral every 4 hours PRN Rash and/or Itching  guaiFENesin ER 1200 milliGRAM(s) Oral every 12 hours PRN Cough      CAPILLARY BLOOD GLUCOSE      POCT Blood Glucose.: 125 mg/dL (13 Jan 2025 21:47)  POCT Blood Glucose.: 161 mg/dL (13 Jan 2025 08:35)    I&O's Summary    13 Jan 2025 07:01  -  14 Jan 2025 07:00  --------------------------------------------------------  IN: 600 mL / OUT: 950 mL / NET: -350 mL        Vital Signs Last 24 Hrs  T(C): 36.9 (14 Jan 2025 06:14), Max: 37.1 (13 Jan 2025 12:00)  T(F): 98.4 (14 Jan 2025 06:14), Max: 98.7 (13 Jan 2025 12:00)  HR: 76 (14 Jan 2025 06:14) (67 - 83)  BP: 111/50 (14 Jan 2025 06:14) (110/60 - 115/82)  BP(mean): --  RR: 18 (14 Jan 2025 06:14) (18 - 18)  SpO2: 93% (14 Jan 2025 06:14) (90% - 93%)    Parameters below as of 14 Jan 2025 06:14  Patient On (Oxygen Delivery Method): room air        PHYSICAL EXAM:  GENERAL: NAD, well-developed, well-nourished  HEAD: Atraumatic, Normocephalic  EYES: EOMI, PERRLA, conjunctiva and sclera clear  NECK: Supple, No JVD  CHEST/LUNG: Clear to auscultation bilaterally; No wheezes or crackles  HEART: Normal S1/S2; Regular rate and rhythm; No murmurs, rubs, or gallops  ABDOMEN: Soft, Nontender, Nondistended; Bowel sounds present  EXTREMITIES: 2+ Peripheral Pulses; No clubbing, cyanosis, or edema  PSYCH: A&Ox3  NEUROLOGY: no focal neurologic deficit  SKIN: No rashes or lesions    LABS:                        9.3    7.10  )-----------( 309      ( 14 Jan 2025 06:35 )             30.3      01-14    136  |  103  |  41[H]  ----------------------------<  142[H]  4.4   |  20[L]  |  1.39[H]    Ca    8.7      14 Jan 2025 06:34  Phos  3.4     01-14  Mg     1.8     01-14            Urinalysis Basic - ( 14 Jan 2025 06:34 )    Color: x / Appearance: x / SG: x / pH: x  Gluc: 142 mg/dL / Ketone: x  / Bili: x / Urobili: x   Blood: x / Protein: x / Nitrite: x   Leuk Esterase: x / RBC: x / WBC x   Sq Epi: x / Non Sq Epi: x / Bacteria: x        RADIOLOGY & ADDITIONAL TESTS:    Imaging Personally Reviewed:    Consultant(s) Notes Reviewed:      Care Discussed with Consultants/Other Providers:

## 2025-01-14 NOTE — PROGRESS NOTE ADULT - PROBLEM SELECTOR PLAN 1
- Persistent sacral ulcer for 1 year, initially presented when patient was ambulating and after bedbound status was unable to care for herself, has home health aid for limited hours who is unable to turn patient, spouse medically unable. Patient and family looking for LTC/[facility]IN for care  - Elevated WBC and AIM, s/p 1x Zosyn in ED  - CT A/P: Concern for OM, proctitis, exophytic bone lesions, bladder neoplasm not excluded. Mild right hydronephrosis  - Wound care following  - 1/8 bcx NGTD  - Wound care following  - s/p 1x Ancef    Plan  - C/w Zosyn, per ID possible transition to carbopenem on DC, 6 week course, will see if oral equivalent - Persistent sacral ulcer for 1 year, initially presented when patient was ambulating and after bedbound status was unable to care for herself, has home health aid for limited hours who is unable to turn patient, spouse medically unable. Patient and family looking for LTC/[facility] for care  - Elevated WBC and AIM, s/p 1x Zosyn in ED  - CT A/P: Concern for OM, proctitis, exophytic bone lesions, bladder neoplasm not excluded. Mild right hydronephrosis  - Wound care following  - 1/8 bcx NGTD  - Wound care following  - s/p 1x Ancef    Plan  - Patient declines PICC placement, will transition to Augmentin, needs to be renally dosed on discharge for a completion of 6 week course - Persistent sacral ulcer for 1 year, initially presented when patient was ambulating and after bedbound status was unable to care for herself, has home health aid for limited hours who is unable to turn patient, spouse medically unable. Patient and family looking for LTC/[facility]IN for care  - Elevated WBC and AIM, s/p 1x Zosyn in ED  - CT A/P: Concern for OM, proctitis, exophytic bone lesions, bladder neoplasm not excluded. Mild right hydronephrosis  - Wound care following  - 1/8 bcx NGTD  - Wound care following  - s/p 1x Ancef    Plan  - Patient declines PICC placement, will transition to Augmentin, per conversation with family does not need to be renally dosed, will continue with regular dosing on DC

## 2025-01-14 NOTE — PROGRESS NOTE ADULT - PROBLEM SELECTOR PLAN 2
CT A/P:  Exophytic right femoral ossific lesion measuring 5.1 x 2.0 x 2.8 cm,   indeterminate, with parosteal osteosarcoma within the differential.   Evaluation with MRI of the femur without and with contrast is recommended.    - Discussed with patient regarding findings, at this time patient states " we'll see" regarding necessity for further MRI work up, findings discussed with daughter, Dorinda, states will have family meeting to discuss next steps.  - 1/11: pt declines MRI at this time CT A/P:  Exophytic right femoral ossific lesion measuring 5.1 x 2.0 x 2.8 cm,   indeterminate, with parosteal osteosarcoma within the differential.   Evaluation with MRI of the femur without and with contrast is recommended.  - Discussed with patient regarding findings, at this time patient states " we'll see" regarding necessity for further MRI work up, findings discussed with daughter, Dorinda, states will have family meeting to discuss next steps.  - 1/11: pt declines MRI at this time, no further investigation at this time

## 2025-01-14 NOTE — PROGRESS NOTE ADULT - PROBLEM SELECTOR PLAN 12
- Fluids: LR at 75cc  - Electrolytes: Will replete to maintain K>4, Phos>3, and Mag>2  - Nutrition:  Consistent Carb  - Activity: As tolerated, pending PT eval  - DVT Prophylaxis: heparin Sub q  - Stress Ulcer/GI Prophylaxis: N/A  - Disposition: Admit to medicine, active, DUSTIN placement per PT - Fluids: LR at 75cc  - Electrolytes: Will replete to maintain K>4, Phos>3, and Mag>2  - Nutrition:  Consistent Carb  - Activity: As tolerated, pending PT eval  - DVT Prophylaxis: heparin Sub q  - Stress Ulcer/GI Prophylaxis: N/A  - Disposition: Admit to medicine, active, family actively looking for [facility] placement

## 2025-01-14 NOTE — PROGRESS NOTE ADULT - PROBLEM SELECTOR PLAN 7
Med rec unclear, attempted to reach daughter Ling x2, will try in PM for clear med rec, daughter DOES NOT live with patient, but per daughter, Dorinda has a better understanding of current medications. Email given to follow up current medications  - C/w celecoxib - C/w celecoxib, patient understands can cause renal dysfunction, however still prefers to continue on medication

## 2025-01-14 NOTE — PROGRESS NOTE ADULT - PROBLEM SELECTOR PLAN 4
Patient baseline bedbound, functional paraplegia 2/2 arthlagias/lymphedema and overall fatigue  - PT: [facility]  - ABDULKADIR consulted    Plan  - D[facility]IN on discharge Patient baseline bedbound, functional paraplegia 2/2 arthlagias/lymphedema and overall fatigue  - PT: DUST[facility]  - ABDULKADIR consulted    Plan  - [facility]IN on discharge, pending location

## 2025-01-14 NOTE — PROGRESS NOTE ADULT - PROBLEM SELECTOR PLAN 5
Patient with wide spread candidiasis in the lower back  - C/w 150 mg oral Fluconazole 1x per week for 4 weeks  - C/w topical Antifungal cream Fluconazole Patient with wide spread candidiasis in the lower back    Plan  - Topical Clotrimazole,  - Topical Nystatin

## 2025-01-14 NOTE — PROGRESS NOTE ADULT - PROBLEM SELECTOR PLAN 10
Per patient; has not previously been on insulin, per outpatient med rec, patient has been on 22U of insulin, refusing POCT   - Will clarify home meds with daughter    Plan  - moderate ISS Per patient; has not previously been on insulin, per outpatient med rec, patient has been on 22U of insulin, refusing POCT   - Glucose continues to trend wnl, will DC finger sticks and insulin supplementation, as patient declines insulin and further blood draws at this time.

## 2025-01-14 NOTE — PROGRESS NOTE ADULT - ATTENDING COMMENTS
Patient seen and examined at bedside. No acute events overnight. No pain. Cr is stable, no further intervention. Will just need to continue monitor as outpatient. Plan for DUSTIN with PO antibiotics for OM (she is refusing PICC and IV antibiotics which she understands is the recommended management).

## 2025-01-15 ENCOUNTER — TRANSCRIPTION ENCOUNTER (OUTPATIENT)
Age: 80
End: 2025-01-15

## 2025-01-15 LAB
ALBUMIN SERPL ELPH-MCNC: 2.1 G/DL — LOW (ref 3.3–5)
ALP SERPL-CCNC: 229 U/L — HIGH (ref 40–120)
ALT FLD-CCNC: 34 U/L — SIGNIFICANT CHANGE UP (ref 10–45)
ANION GAP SERPL CALC-SCNC: 9 MMOL/L — SIGNIFICANT CHANGE UP (ref 5–17)
AST SERPL-CCNC: 31 U/L — SIGNIFICANT CHANGE UP (ref 10–40)
BILIRUB SERPL-MCNC: 0.2 MG/DL — SIGNIFICANT CHANGE UP (ref 0.2–1.2)
BUN SERPL-MCNC: 39 MG/DL — HIGH (ref 7–23)
CALCIUM SERPL-MCNC: 8.4 MG/DL — SIGNIFICANT CHANGE UP (ref 8.4–10.5)
CHLORIDE SERPL-SCNC: 102 MMOL/L — SIGNIFICANT CHANGE UP (ref 96–108)
CO2 SERPL-SCNC: 20 MMOL/L — LOW (ref 22–31)
CREAT SERPL-MCNC: 1.18 MG/DL — SIGNIFICANT CHANGE UP (ref 0.5–1.3)
EGFR: 46 ML/MIN/1.73M2 — LOW
GLUCOSE SERPL-MCNC: 164 MG/DL — HIGH (ref 70–99)
POTASSIUM SERPL-MCNC: 4.3 MMOL/L — SIGNIFICANT CHANGE UP (ref 3.5–5.3)
POTASSIUM SERPL-SCNC: 4.3 MMOL/L — SIGNIFICANT CHANGE UP (ref 3.5–5.3)
PROT SERPL-MCNC: 6.2 G/DL — SIGNIFICANT CHANGE UP (ref 6–8.3)
SODIUM SERPL-SCNC: 131 MMOL/L — LOW (ref 135–145)

## 2025-01-15 PROCEDURE — 99232 SBSQ HOSP IP/OBS MODERATE 35: CPT | Mod: GC

## 2025-01-15 RX ADMIN — Medication 1 TABLET(S): at 11:21

## 2025-01-15 RX ADMIN — PIPERACILLIN AND TAZOBACTAM 25 GRAM(S): 3; .375 INJECTION, POWDER, LYOPHILIZED, FOR SOLUTION INTRAVENOUS at 22:03

## 2025-01-15 RX ADMIN — Medication 100 MILLIGRAM(S): at 06:23

## 2025-01-15 RX ADMIN — CLOTRIMAZOLE 1 APPLICATION(S): 10 CREAM TOPICAL at 08:34

## 2025-01-15 RX ADMIN — Medication 100 MILLIGRAM(S): at 17:20

## 2025-01-15 RX ADMIN — CLOTRIMAZOLE 1 APPLICATION(S): 10 CREAM TOPICAL at 22:03

## 2025-01-15 RX ADMIN — PIPERACILLIN AND TAZOBACTAM 25 GRAM(S): 3; .375 INJECTION, POWDER, LYOPHILIZED, FOR SOLUTION INTRAVENOUS at 06:23

## 2025-01-15 RX ADMIN — Medication 17 GRAM(S): at 17:19

## 2025-01-15 RX ADMIN — Medication 500 MILLIGRAM(S): at 11:22

## 2025-01-15 RX ADMIN — Medication 100 MILLIGRAM(S): at 11:21

## 2025-01-15 RX ADMIN — NYSTATIN TOPICAL POWDER 1 APPLICATION(S): 100000 POWDER TOPICAL at 06:28

## 2025-01-15 RX ADMIN — PIPERACILLIN AND TAZOBACTAM 25 GRAM(S): 3; .375 INJECTION, POWDER, LYOPHILIZED, FOR SOLUTION INTRAVENOUS at 13:05

## 2025-01-15 RX ADMIN — SENNOSIDES 2 TABLET(S): 8.6 TABLET, FILM COATED ORAL at 22:02

## 2025-01-15 RX ADMIN — ENOXAPARIN SODIUM 40 MILLIGRAM(S): 60 INJECTION INTRAVENOUS; SUBCUTANEOUS at 11:22

## 2025-01-15 RX ADMIN — ATORVASTATIN CALCIUM 20 MILLIGRAM(S): 40 TABLET, FILM COATED ORAL at 22:01

## 2025-01-15 RX ADMIN — NYSTATIN TOPICAL POWDER 1 APPLICATION(S): 100000 POWDER TOPICAL at 17:21

## 2025-01-15 NOTE — PROGRESS NOTE ADULT - PROBLEM SELECTOR PLAN 10
Per patient; has not previously been on insulin, per outpatient med rec, patient has been on 22U of insulin, refusing POCT   - Glucose continues to trend wnl, will DC finger sticks and insulin supplementation, as patient declines insulin and further blood draws at this time.

## 2025-01-15 NOTE — DISCHARGE NOTE PROVIDER - HOSPITAL COURSE
HPI:  79 YOF with PMHx of HTN, lymphedema, arthritis, sacral decubitus ulcer s/p debridement one year ago presents with weakness. PT reports she has been bed bound since Jan 2024. Her weakness began  after being hospitalized for one month. Her symptoms improved with PT but were discontinue after 8 sessions. She states that her weakness is complicated by her lymphedema and arthritis. She states she is fed up with her condition and wants to be admitted for further management and possible placement to [facility]. Denies, chest pain, fever, worsening pain, nausea, vomiting, abdominal pain     ED: Patient found to have elevated WBC with a left shift, given 1x zosyn. PE showed multiple areas of skin breakdown/ stage 1 ulcer/skin changes to lower back and buttocks, with super impose candida. Patient admitted for sacral wound management.  (09 Jan 2025 01:22)    Hospital Course:  Patient admitted for sacral wound management. CT scan: soft tissue defect suspicious of Osteomyelitis. Exophytic right femoral ossific lesions. Patient and family told regarding CT findings and was decided not to persue further workup of bone lesions. Wound care consulted for sacral wound management. ID consulted for OM, patient started on Zosyn for empiric treatment. BxCx and Wound Cx negative.    On day of discharge, patient is clinically stable with no new exam findings or acute symptoms compared to prior. The patient was seen by the attending physician on the date of discharge and deemed stable and acceptable for discharge. The patient's chronic medical conditions were treated accordingly per the patient's home medication regimen. The patient's medication reconciliation (with changes made to chronic medications), follow up appointments, discharge orders, instructions, and significant lab and diagnostic studies are as noted.    Important Medication Changes and Reason:    Active or Pending Issues Requiring Follow-up:    Advanced Directives:   [X] Full code  [ ] DNR  [ ] Hospice    Discharge Diagnoses:  Sacral Wound        HPI:  79 YOF with PMHx of HTN, lymphedema, arthritis, sacral decubitus ulcer s/p debridement one year ago presents with weakness. PT reports she has been bed bound since Jan 2024. Her weakness began  after being hospitalized for one month. Her symptoms improved with PT but were discontinue after 8 sessions. She states that her weakness is complicated by her lymphedema and arthritis. She states she is fed up with her condition and wants to be admitted for further management and possible placement to [facility] ([place] Denies, chest pain, fever, worsening pain, nausea, vomiting, abdominal pain     ED: Patient found to have elevated WBC with a left shift, given 1x zosyn. PE showed multiple areas of skin breakdown/ stage 1 ulcer/skin changes to lower back and buttocks, with super impose candida. Patient admitted for sacral wound management.  (09 Jan 2025 01:22)    Hospital Course:  Patient admitted for sacral wound management. CT scan: soft tissue defect suspicious of Osteomyelitis. Exophytic right femoral ossific lesions. Patient and family told regarding CT findings and was decided not to peruse further workup of bone lesions. Wound care consulted for sacral wound management. ID consulted for OM, patient started on Zosyn for empiric treatment. BxCx and Wound Cx negative. Patient will continue Augmentin 875mg/125mg for 23 days to complete a 6 week course.     On day of discharge, patient is clinically stable with no new exam findings or acute symptoms compared to prior. The patient was seen by the attending physician on the date of discharge and deemed stable and acceptable for discharge. The patient's chronic medical conditions were treated accordingly per the patient's home medication regimen. The patient's medication reconciliation (with changes made to chronic medications), follow up appointments, discharge orders, instructions, and significant lab and diagnostic studies are as noted.    Important Medication Changes and Reason:    1. Patient will continue Augmentin 875mg/125mg for 23 days to complete a 6 week course.     Active or Pending Issues Requiring Follow-up:    1: Please follow up with your PCP, Dr. Heaton, [phone], within 2 weeks of discharge  2:  Please follow up with wound care within 2 weeks of discharge, at [phone]    Advanced Directives:   [X] Full code  [ ] DNR  [ ] Hospice    Discharge Diagnoses:  Sacral Wound        HPI:  79 YOF with PMHx of HTN, lymphedema, arthritis, sacral decubitus ulcer s/p debridement one year ago presents with weakness. PT reports she has been bed bound since Jan 2024. Her weakness began  after being hospitalized for one month. Her symptoms improved with PT but were discontinue after 8 sessions. She states that her weakness is complicated by her lymphedema and arthritis. She states she is fed up with her condition and wants to be admitted for further management and possible placement to [facility]. Denies, chest pain, fever, worsening pain, nausea, vomiting, abdominal pain     ED: Patient found to have elevated WBC with a left shift, given 1x zosyn. PE showed multiple areas of skin breakdown/ stage 1 ulcer/skin changes to lower back and buttocks, with super impose candida. Patient admitted for sacral wound management.  (09 Jan 2025 01:22)    Hospital Course:  Patient admitted for sacral wound management. CT scan: soft tissue defect suspicious of Osteomyelitis. Exophytic right femoral ossific lesions. Patient and family told regarding CT findings and was decided not to peruse further workup of bone lesions. Wound care consulted for sacral wound management. ID consulted for OM, patient started on Zosyn for empiric treatment. BxCx and Wound Cx negative. Patient will continue Augmentin 875mg/125mg for 23 days to complete a 6 week course.     On day of discharge, patient is clinically stable with no new exam findings or acute symptoms compared to prior. The patient was seen by the attending physician on the date of discharge and deemed stable and acceptable for discharge. The patient's chronic medical conditions were treated accordingly per the patient's home medication regimen. The patient's medication reconciliation (with changes made to chronic medications), follow up appointments, discharge orders, instructions, and significant lab and diagnostic studies are as noted.    Important Medication Changes and Reason:    1. Patient will continue Augmentin 875mg/125mg for 34 days to complete a 6 week course, to be completed on 2/19    Active or Pending Issues Requiring Follow-up:    1: Please follow up with your PCP, Dr. Heaton, [phone], within 2 weeks of discharge  2:  Please follow up with wound care within 2 weeks of discharge, at [phone]    Advanced Directives:   [X] Full code  [ ] DNR  [ ] Hospice    Discharge Diagnoses:  Sacral Wound        HPI:  79 YOF with PMHx of HTN, lymphedema, arthritis, sacral decubitus ulcer s/p debridement one year ago presents with weakness. PT reports she has been bed bound since Jan 2024. Her weakness began  after being hospitalized for one month. Her symptoms improved with PT but were discontinue after 8 sessions. She states that her weakness is complicated by her lymphedema and arthritis. She states she is fed up with her condition and wants to be admitted for further management and possible placement to [facility]. Denies, chest pain, fever, worsening pain, nausea, vomiting, abdominal pain     ED: Patient found to have elevated WBC with a left shift, given 1x zosyn. PE showed multiple areas of skin breakdown/ stage 1 ulcer/skin changes to lower back and buttocks, with super impose candida. Patient admitted for sacral wound management.  (09 Jan 2025 01:22)    Hospital Course:  Patient admitted for sacral wound management. CT scan: soft tissue defect suspicious of Osteomyelitis. Exophytic right femoral ossific lesions. Patient and family told regarding CT findings and was decided not to peruse further workup of bone lesions. Wound care consulted for sacral wound management. ID consulted for OM, patient started on Zosyn for empiric treatment. BxCx and Wound Cx negative. It was recommended that patient be discharged with a PICC for prolonged antibiotic course however patient refused and preferred to be discharged on a 6 week course of Augmentin. Patient will continue Augmentin 875mg/125mg for 23 days to complete a 6 week course until 2/19.    On day of discharge, patient is clinically stable with no new exam findings or acute symptoms compared to prior. The patient was seen by the attending physician on the date of discharge and deemed stable and acceptable for discharge. The patient's chronic medical conditions were treated accordingly per the patient's home medication regimen. The patient's medication reconciliation (with changes made to chronic medications), follow up appointments, discharge orders, instructions, and significant lab and diagnostic studies are as noted.    Important Medication Changes and Reason:    1. Patient will continue Augmentin 875mg/125mg for 34 days to complete a 6 week course, to be completed on 2/19    Active or Pending Issues Requiring Follow-up:    1: Please follow up with your PCP, Dr. Heaton, [phone], within 2 weeks of discharge  2:  Please follow up with wound care within 2 weeks of discharge, at [phone]    Advanced Directives:   [X] Full code  [ ] DNR  [ ] Hospice    Discharge Diagnoses:  Sacral Wound

## 2025-01-15 NOTE — PROGRESS NOTE ADULT - PROBLEM SELECTOR PLAN 2
CT A/P:  Exophytic right femoral ossific lesion measuring 5.1 x 2.0 x 2.8 cm,   indeterminate, with parosteal osteosarcoma within the differential.   Evaluation with MRI of the femur without and with contrast is recommended.  - Discussed with patient regarding findings, at this time patient states " we'll see" regarding necessity for further MRI work up, findings discussed with daughter, Dorinda, states will have family meeting to discuss next steps.  - 1/11: pt declines MRI at this time, no further investigation at this time

## 2025-01-15 NOTE — DISCHARGE NOTE PROVIDER - NSFOLLOWUPCLINICS_GEN_ALL_ED_FT
[facility]  Podiatry/Wound Care  [address] [place] [address]  Phone: [phone]  Fax: [phone]  Follow Up Time: 2 weeks    [facility]  Wound Care  [address] [place] [id number]  Phone: [phone]  Fax: [phone]  Follow Up Time: 2 weeks    [facility]  Wound Care  [address] [place] [address]  Phone: [phone]  Fax:   Follow Up Time: 2 weeks

## 2025-01-15 NOTE — PROGRESS NOTE ADULT - PROBLEM SELECTOR PLAN 5
Patient with wide spread candidiasis in the lower back    Plan  - Topical Clotrimazole per ID  - Topical Nystatin per ID

## 2025-01-15 NOTE — PROGRESS NOTE ADULT - PROBLEM SELECTOR PLAN 7
- C/w celecoxib, patient understands can cause renal dysfunction, however still prefers to continue on medication

## 2025-01-15 NOTE — DISCHARGE NOTE PROVIDER - NSDCFUADDAPPT_GEN_ALL_CORE_FT
APPTS ARE READY TO BE MADE: [] YES    Best Family or Patient Contact (if needed):  Dorinda Tello: [phone]  Additional Information about above appointments (if needed):    1: Please follow up with your PCP, Dr. Heaton, [phone], within 2 weeks of discharge  2:  Wound Care?  3:     Other comments or requests:    APPTS ARE READY TO BE MADE: [X] YES    Best Family or Patient Contact (if needed):  Ean Telloores: [phone]  Additional Information about above appointments (if needed):    1: Please follow up with your PCP, Dr. Heaton, [phone], within 2 weeks of discharge  2:  Please follow up with wound care within 2 weeks of discharge, at [phone]  3:     Other comments or requests:    APPTS ARE READY TO BE MADE: [X] YES    Best Family or Patient Contact (if needed):  Ean Telloores: [phone]  Additional Information about above appointments (if needed):    1: Please follow up with your PCP, Dr. Heaton, [phone], within 2 weeks of discharge  2:  Please follow up with wound care within 2 weeks of discharge, at [phone]  3:     Other comments or requests:     Patient is being discharged to [facility]. Caregiver will arrange follow up.

## 2025-01-15 NOTE — PROGRESS NOTE ADULT - PROBLEM SELECTOR PLAN 4
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

## 2025-01-15 NOTE — PROGRESS NOTE ADULT - PROBLEM SELECTOR PLAN 6
Baseline creatinine: 0.9 Today’s creatinine: 1.34  Send urine creatinine, urea and lytes  Check renal sonogram  Trend BUN/Cr, Strict I/O  Consider renal consult if kidneys function does not improve.  FeNA; Indeterminate    Plan  - Resolving Cr will continue to trend

## 2025-01-15 NOTE — DISCHARGE NOTE PROVIDER - NSDCMRMEDTOKEN_GEN_ALL_CORE_FT
ascorbic acid 250 mg oral tablet: 1 tab(s) orally once a day  bisoprolol 5 mg oral tablet: 2 tab(s) orally once a day  CeleBREX 100 mg oral capsule: 1 cap(s) orally once a day  Lipitor 20 mg oral tablet: 1 tab(s) orally once a day (at bedtime)  metFORMIN 500 mg oral tablet: 1 tab(s) orally once a day (in the morning)  Multiple Vitamins with Minerals oral tablet: 1 tab(s) orally once a day   amoxicillin-clavulanate 875 mg-125 mg oral tablet: 1 tab(s) orally every 12 hours  ascorbic acid 250 mg oral tablet: 1 tab(s) orally once a day  bisoprolol 5 mg oral tablet: 2 tab(s) orally once a day  CeleBREX 100 mg oral capsule: 1 cap(s) orally once a day  clotrimazole 1% topical cream: 1 Apply topically to affected area 2 times a day  Lipitor 20 mg oral tablet: 1 tab(s) orally once a day (at bedtime)  metFORMIN 500 mg oral tablet: 1 tab(s) orally once a day (in the morning)  Multiple Vitamins with Minerals oral tablet: 1 tab(s) orally once a day  nystatin 100,000 units/g topical cream: 1 Apply topically to affected area 2 times a day

## 2025-01-15 NOTE — PROGRESS NOTE ADULT - SUBJECTIVE AND OBJECTIVE BOX
Torres Andrews MD  Available on TEAMS    Patient is a 80y old  Female who presents with a chief complaint of Weakness (14 Jan 2025 07:42)      SUBJECTIVE / OVERNIGHT EVENTS: No acute events overnight. Patient was assessed at bedside. Denies fevers, chills, weakness, resolving cough      REVIEW OF SYSTEMS:  EYES/ENT: No visual changes; No vertigo, throat pain, or dysphagia  NECK: No pain or stiffness  RESPIRATORY: No cough, wheezing, hemoptysis, or shortness of breath  CARDIOVASCULAR: No chest pain or palpitations  GASTROINTESTINAL: No abdominal or epigastric pain. No nausea, vomiting, or hematemesis; No diarrhea or constipation. No melena or hematochezia.  GENITOURINARY: No dysuria, frequency, or hematuria  MUSCULOSKELETAL: No joint or muscle pain or aches  NEUROLOGICAL: No numbness or weakness  SKIN: No itching or rashes      MEDICATIONS  (STANDING):  ascorbic acid 500 milliGRAM(s) Oral daily  atorvastatin 20 milliGRAM(s) Oral at bedtime  celecoxib 100 milliGRAM(s) Oral daily  clotrimazole 1% Cream 1 Application(s) Topical two times a day  enoxaparin Injectable 40 milliGRAM(s) SubCutaneous every 24 hours  influenza  Vaccine (HIGH DOSE) 0.5 milliLiter(s) IntraMuscular once  metoprolol tartrate 100 milliGRAM(s) Oral every 12 hours  multivitamin 1 Tablet(s) Oral daily  nystatin Cream 1 Application(s) Topical two times a day  piperacillin/tazobactam IVPB.. 3.375 Gram(s) IV Intermittent every 8 hours  polyethylene glycol 3350 17 Gram(s) Oral two times a day  senna 2 Tablet(s) Oral at bedtime    MEDICATIONS  (PRN):  acetaminophen     Tablet .. 650 milliGRAM(s) Oral every 6 hours PRN Temp greater or equal to 38C (100.4F), Mild Pain (1 - 3), Moderate Pain (4 - 6)  benzonatate 100 milliGRAM(s) Oral three times a day PRN Cough  bisacodyl 5 milliGRAM(s) Oral every 12 hours PRN Constipation  diphenhydrAMINE 25 milliGRAM(s) Oral every 4 hours PRN Rash and/or Itching  guaiFENesin ER 1200 milliGRAM(s) Oral every 12 hours PRN Cough      CAPILLARY BLOOD GLUCOSE      POCT Blood Glucose.: 198 mg/dL (14 Jan 2025 22:17)    I&O's Summary    14 Jan 2025 07:01  -  15 Onofre 2025 07:00  --------------------------------------------------------  IN: 600 mL / OUT: 500 mL / NET: 100 mL    15 Onofre 2025 07:01  -  15 Onofre 2025 11:00  --------------------------------------------------------  IN: 480 mL / OUT: 0 mL / NET: 480 mL        Vital Signs Last 24 Hrs  T(C): 36.7 (14 Jan 2025 20:14), Max: 37.1 (14 Jan 2025 12:00)  T(F): 98.1 (14 Jan 2025 20:14), Max: 98.7 (14 Jan 2025 12:00)  HR: 66 (14 Jan 2025 20:14) (66 - 74)  BP: 136/53 (14 Jan 2025 20:14) (136/53 - 154/70)  BP(mean): --  RR: 16 (14 Jan 2025 20:14) (16 - 18)  SpO2: 97% (14 Jan 2025 20:14) (97% - 100%)    Parameters below as of 14 Jan 2025 20:14  Patient On (Oxygen Delivery Method): room air        PHYSICAL EXAM:  GENERAL: NAD, well-developed, well-nourished  HEAD: Atraumatic, Normocephalic  EYES: EOMI, PERRLA, conjunctiva and sclera clear  NECK: Supple, No JVD  CHEST/LUNG: Clear to auscultation bilaterally; No wheezes or crackles  HEART: Normal S1/S2; Regular rate and rhythm; No murmurs, rubs, or gallops  ABDOMEN: Soft, Nontender, Nondistended; Bowel sounds present  EXTREMITIES: 2+ Peripheral Pulses; No clubbing, cyanosis, or edema  PSYCH: A&Ox3  NEUROLOGY: no focal neurologic deficit  SKIN: No rashes or lesions    LABS:                        9.3    7.10  )-----------( 309      ( 14 Jan 2025 06:35 )             30.3      01-15    131[L]  |  102  |  39[H]  ----------------------------<  164[H]  4.3   |  20[L]  |  1.18    Ca    8.4      15 Onofre 2025 09:52  Phos  3.4     01-14  Mg     1.8     01-14    TPro  6.2  /  Alb  2.1[L]  /  TBili  0.2  /  DBili  x   /  AST  31  /  ALT  34  /  AlkPhos  229[H]  01-15          Urinalysis Basic - ( 15 Onofre 2025 09:52 )    Color: x / Appearance: x / SG: x / pH: x  Gluc: 164 mg/dL / Ketone: x  / Bili: x / Urobili: x   Blood: x / Protein: x / Nitrite: x   Leuk Esterase: x / RBC: x / WBC x   Sq Epi: x / Non Sq Epi: x / Bacteria: x        RADIOLOGY & ADDITIONAL TESTS:    Imaging Personally Reviewed:    Consultant(s) Notes Reviewed:      Care Discussed with Consultants/Other Providers:

## 2025-01-15 NOTE — PROGRESS NOTE ADULT - PROBLEM SELECTOR PLAN 1
- Persistent sacral ulcer for 1 year, initially presented when patient was ambulating and after bedbound status was unable to care for herself, has home health aid for limited hours who is unable to turn patient, spouse medically unable. Patient and family looking for LTC/DUSTIN for care  - Elevated WBC and AIM, s/p 1x Zosyn in ED  - CT A/P: Concern for OM, proctitis, exophytic bone lesions, bladder neoplasm not excluded. Mild right hydronephrosis  - Wound care following  - 1/8 bcx NGTD  - Wound care following  - s/p 1x Ancef    Plan  - Patient declines PICC placement, will transition to Augmentin, per conversation with pharmacy does not need to be renally dosed, will continue with regular dosing on DC  - Lab holiday tomorrow

## 2025-01-15 NOTE — DISCHARGE NOTE PROVIDER - NSDCCPCAREPLAN_GEN_ALL_CORE_FT
PRINCIPAL DISCHARGE DIAGNOSIS  Diagnosis: Sacral ulcer  Assessment and Plan of Treatment: You have a sacral wound ulcer, also known as a pressure sore or bedsore. This is an area of damaged skin on your sacrum, the bony area at the base of your spine, that develops from prolonged pressure on the skin. The wound care team has been involved and has been regularly cleaning the wound to promote healing. Because it's important to treat any potential infection, the infectious disease team (ID) started you on the antibiotic Zosyn. You'll be transitioned to Augmentin to complete a full six-week course of antibiotics after you're discharged. It's important to continue caring for the wound at home and to watch for any signs of worsening infection. You should return to the emergency department if you notice increased pain, swelling, redness, or warmth around the ulcer, if you develop a fever or chills, or if you see pus or a foul odor coming from the wound. Also, return if the ulcer gets noticeably bigger or deeper, or if you have any other concerns.  To Do:  1. Please follow up with your PCP, Dr. Heaton, [phone], within 2 weeks of discharge  1.      SECONDARY DISCHARGE DIAGNOSES  Diagnosis: Physical deconditioning  Assessment and Plan of Treatment:     Diagnosis: Sacral ulcer  Assessment and Plan of Treatment:      PRINCIPAL DISCHARGE DIAGNOSIS  Diagnosis: Sacral ulcer  Assessment and Plan of Treatment: You have a sacral wound ulcer, also known as a pressure sore or bedsore. This is an area of damaged skin on your sacrum, the bony area at the base of your spine, that develops from prolonged pressure on the skin. The wound care team has been involved and has been regularly cleaning the wound to promote healing. Because it's important to treat any potential infection, the infectious disease team (ID) started you on the antibiotic Zosyn. You'll be transitioned to Augmentin to complete a full six-week course of antibiotics after you're discharged. It's important to continue caring for the wound at home and to watch for any signs of worsening infection. You should return to the emergency department if you notice increased pain, swelling, redness, or warmth around the ulcer, if you develop a fever or chills, or if you see pus or a foul odor coming from the wound. Also, return if the ulcer gets noticeably bigger or deeper, or if you have any other concerns.  To Do:  1. Please follow up with your PCP, Dr. Heaton, [phone], within 2 weeks of discharge  2. Please follow up with the wound care within 2 weeks of discharge  3. Please continue Augmentin 875mg/ 125mg twice a day or q12h for a 23 day course        PRINCIPAL DISCHARGE DIAGNOSIS  Diagnosis: Sacral ulcer  Assessment and Plan of Treatment: You have a sacral wound ulcer, also known as a pressure sore or bedsore. This is an area of damaged skin on your sacrum, the bony area at the base of your spine, that develops from prolonged pressure on the skin. The wound care team has been involved and has been regularly cleaning the wound to promote healing. Because it's important to treat any potential infection, the infectious disease team (ID) started you on the antibiotic Zosyn. You'll be transitioned to Augmentin to complete a full six-week course of antibiotics after you're discharged. It's important to continue caring for the wound at home and to watch for any signs of worsening infection. You should return to the emergency department if you notice increased pain, swelling, redness, or warmth around the ulcer, if you develop a fever or chills, or if you see pus or a foul odor coming from the wound. Also, return if the ulcer gets noticeably bigger or deeper, or if you have any other concerns.  To Do:  1. Please follow up with your PCP, Dr. Heaton, [phone], within 2 weeks of discharge  2. Please follow up with the wound care within 2 weeks of discharge  3. Patient will continue Augmentin 875mg/125mg for 34 days to complete a 6 week course, to be completed on 2/19

## 2025-01-15 NOTE — DISCHARGE NOTE PROVIDER - NSDCCPTREATMENT_GEN_ALL_CORE_FT
PRINCIPAL PROCEDURE  Procedure: CT abdomen and pelvis w contrast  Findings and Treatment: *  Soft tissue defect overlying the lower sacrum, consistent with a   decubitus wound. Associated erosive and sclerotic changes to the lower   sacrum and coccyx, concerning for osteomyelitis. Stool overflows from the   anorectum into the lower aspect of the wound.  *  Stool-filled distended rectum. Mild rectal wall thickening and   perirectal fat infiltration, which may represent stercoral proctitis.  *  Focal segment of mural thickening in the proximal sigmoid colon,   possibly secondary to underdistention, diverticulitis, or colitis, with   colonic neoplasm also within the differential. Consider evaluation with   colonoscopy.  *  Exophytic right femoral ossific lesion measuring 5.1 x 2.0 x 2.8 cm,   indeterminate, with parosteal osteosarcoma within the differential.   Evaluation with MRI of the femur without and with contrast is recommended.  *  Underdistended urinary bladder. Mild nonspecific focal mural   thickening along the right bladder wall. Correlate with urinalysis.   Bladder neoplasm is not excluded.  *  Mild right hydroureteronephrosis to the level of the distal ureter,   with narrowing of the ureter as it courses lateral to the stool-filled   distended rectum. Consideration is given to obstruction by mass effect   from the rectum, with other etiology not excluded. Advise short interval   follow-up imaging to ensure resolution.  *  Minimal intrahepatic biliary duct dilation of unclear significance,   with consideration given to postcholecystectomy state.

## 2025-01-15 NOTE — PROGRESS NOTE ADULT - ATTENDING COMMENTS
Patient seen and examined at bedside. No acute events overnight. No pain. Cr slightly improved. Pending DUSTIN

## 2025-01-15 NOTE — PROGRESS NOTE ADULT - PROBLEM SELECTOR PLAN 12
- Fluids: LR at 75cc  - Electrolytes: Will replete to maintain K>4, Phos>3, and Mag>2  - Nutrition:  Consistent Carb  - Activity: As tolerated, pending PT eval  - DVT Prophylaxis: heparin Sub q  - Stress Ulcer/GI Prophylaxis: N/A  - Disposition: Admit to medicine, active, family actively looking for DUSTIN placement, spoke with Dorinda (daughter) 1/14

## 2025-01-15 NOTE — DISCHARGE NOTE PROVIDER - CARE PROVIDER_API CALL
Ruslan Heaton  [facility]  [address], [place] [address]  Phone: [phone]  Fax: [phone]  Established Patient  Follow Up Time: 2 weeks

## 2025-01-16 ENCOUNTER — TRANSCRIPTION ENCOUNTER (OUTPATIENT)
Age: 80
End: 2025-01-16

## 2025-01-16 VITALS
DIASTOLIC BLOOD PRESSURE: 72 MMHG | HEART RATE: 71 BPM | SYSTOLIC BLOOD PRESSURE: 121 MMHG | RESPIRATION RATE: 18 BRPM | OXYGEN SATURATION: 97 % | TEMPERATURE: 98 F

## 2025-01-16 PROCEDURE — 87040 BLOOD CULTURE FOR BACTERIA: CPT

## 2025-01-16 PROCEDURE — 99285 EMERGENCY DEPT VISIT HI MDM: CPT | Mod: 25

## 2025-01-16 PROCEDURE — 87640 STAPH A DNA AMP PROBE: CPT

## 2025-01-16 PROCEDURE — 36415 COLL VENOUS BLD VENIPUNCTURE: CPT

## 2025-01-16 PROCEDURE — 99239 HOSP IP/OBS DSCHRG MGMT >30: CPT | Mod: GC

## 2025-01-16 PROCEDURE — 80053 COMPREHEN METABOLIC PANEL: CPT

## 2025-01-16 PROCEDURE — 87150 DNA/RNA AMPLIFIED PROBE: CPT

## 2025-01-16 PROCEDURE — 97166 OT EVAL MOD COMPLEX 45 MIN: CPT

## 2025-01-16 PROCEDURE — 80048 BASIC METABOLIC PNL TOTAL CA: CPT

## 2025-01-16 PROCEDURE — 84100 ASSAY OF PHOSPHORUS: CPT

## 2025-01-16 PROCEDURE — 84145 PROCALCITONIN (PCT): CPT

## 2025-01-16 PROCEDURE — 87077 CULTURE AEROBIC IDENTIFY: CPT

## 2025-01-16 PROCEDURE — 82962 GLUCOSE BLOOD TEST: CPT

## 2025-01-16 PROCEDURE — 87641 MR-STAPH DNA AMP PROBE: CPT

## 2025-01-16 PROCEDURE — 85652 RBC SED RATE AUTOMATED: CPT

## 2025-01-16 PROCEDURE — 85025 COMPLETE CBC W/AUTO DIFF WBC: CPT

## 2025-01-16 PROCEDURE — 85027 COMPLETE CBC AUTOMATED: CPT

## 2025-01-16 PROCEDURE — 87086 URINE CULTURE/COLONY COUNT: CPT

## 2025-01-16 PROCEDURE — 96375 TX/PRO/DX INJ NEW DRUG ADDON: CPT

## 2025-01-16 PROCEDURE — 97161 PT EVAL LOW COMPLEX 20 MIN: CPT

## 2025-01-16 PROCEDURE — 74177 CT ABD & PELVIS W/CONTRAST: CPT | Mod: MC

## 2025-01-16 PROCEDURE — 83036 HEMOGLOBIN GLYCOSYLATED A1C: CPT

## 2025-01-16 PROCEDURE — 96374 THER/PROPH/DIAG INJ IV PUSH: CPT

## 2025-01-16 PROCEDURE — 81001 URINALYSIS AUTO W/SCOPE: CPT

## 2025-01-16 PROCEDURE — 97110 THERAPEUTIC EXERCISES: CPT

## 2025-01-16 PROCEDURE — 83735 ASSAY OF MAGNESIUM: CPT

## 2025-01-16 PROCEDURE — 86140 C-REACTIVE PROTEIN: CPT

## 2025-01-16 RX ORDER — AMOXICILLIN/POTASSIUM CLAV 875-125 MG
1 TABLET ORAL
Qty: 0 | Refills: 0 | DISCHARGE
Start: 2025-01-16

## 2025-01-16 RX ORDER — CLOTRIMAZOLE 10 MG/G
1 CREAM TOPICAL
Qty: 0 | Refills: 0 | DISCHARGE
Start: 2025-01-16

## 2025-01-16 RX ORDER — NYSTATIN TOPICAL POWDER 100000 U/G
1 POWDER TOPICAL
Qty: 0 | Refills: 0 | DISCHARGE
Start: 2025-01-16

## 2025-01-16 RX ORDER — AMOXICILLIN/POTASSIUM CLAV 875-125 MG
1 TABLET ORAL EVERY 12 HOURS
Refills: 0 | Status: DISCONTINUED | OUTPATIENT
Start: 2025-01-16 | End: 2025-01-16

## 2025-01-16 RX ADMIN — ENOXAPARIN SODIUM 40 MILLIGRAM(S): 60 INJECTION INTRAVENOUS; SUBCUTANEOUS at 11:19

## 2025-01-16 RX ADMIN — PIPERACILLIN AND TAZOBACTAM 25 GRAM(S): 3; .375 INJECTION, POWDER, LYOPHILIZED, FOR SOLUTION INTRAVENOUS at 06:41

## 2025-01-16 RX ADMIN — Medication 100 MILLIGRAM(S): at 11:20

## 2025-01-16 RX ADMIN — Medication 500 MILLIGRAM(S): at 11:20

## 2025-01-16 RX ADMIN — Medication 1 TABLET(S): at 11:20

## 2025-01-16 RX ADMIN — Medication 100 MILLIGRAM(S): at 06:43

## 2025-01-16 NOTE — PROGRESS NOTE ADULT - PROBLEM SELECTOR PLAN 1
- Persistent sacral ulcer for 1 year, initially presented when patient was ambulating and after bedbound status was unable to care for herself, has home health aid for limited hours who is unable to turn patient, spouse medically unable. Patient and family looking for LTC/DUSTIN for care  - Elevated WBC and AIM, s/p 1x Zosyn in ED  - CT A/P: Concern for OM, proctitis, exophytic bone lesions, bladder neoplasm not excluded. Mild right hydronephrosis  - Wound care following  - 1/8 bcx NGTD  - Wound care following  - s/p 1x Ancef    Plan  - Patient declines PICC placement, will transition to Augmentin, per conversation with pharmacy does not need to be renally dosed, will continue with regular dosing on DC  - Lab holiday tomorrow - Persistent sacral ulcer for 1 year, initially presented when patient was ambulating and after bedbound status was unable to care for herself, has home health aid for limited hours who is unable to turn patient, spouse medically unable. Patient and family looking for LTC/DUSTIN for care  - Elevated WBC and AIM, s/p 1x Zosyn in ED  - CT A/P: Concern for OM, proctitis, exophytic bone lesions, bladder neoplasm not excluded. Mild right hydronephrosis  - Wound care following  - 1/8 bcx NGTD  - Wound care following  - s/p 1x Ancef    Plan  - Patient declines PICC placement, will transition to Augmentin, per conversation with pharmacy does not need to be renally dosed, will continue with regular dosing on DC  - DC today, will switch to Augmentin to complete a 6 week course

## 2025-01-16 NOTE — DISCHARGE NOTE NURSING/CASE MANAGEMENT/SOCIAL WORK - NSDCPEFALRISK_GEN_ALL_CORE
For information on Fall & Injury Prevention, visit: [email or web address] OR  [email or web address] OR  https://www.cdc.gov/steadi/patient.html

## 2025-01-16 NOTE — PROGRESS NOTE ADULT - ATTENDING COMMENTS
Patient seen and examined at bedside. No acute events overnight. No complaints. She will be discharged to [facility] and on 6 weeks of PO antibiotics (understands recommendation is IV). She will follow up outpatient to monitor her wound and progression of OM. In addition, there are several incidentals on CT scan, but she declined further work up. She understands a good starting point when she gets out of [facility] is to at least repeat CT A&P and see what that shows.    Discharge Time: 35 minutes

## 2025-01-16 NOTE — DISCHARGE NOTE NURSING/CASE MANAGEMENT/SOCIAL WORK - FINANCIAL ASSISTANCE
[facility] provides services at a reduced cost to those who are determined to be eligible through [facility]’s financial assistance program. Information regarding [facility]’s financial assistance program can be found by going to [email or web address] or by calling [phone].

## 2025-01-16 NOTE — PROGRESS NOTE ADULT - PROBLEM SELECTOR PLAN 4
Patient baseline bedbound, functional paraplegia 2/2 arthlagias/lymphedema and overall fatigue  - PT: DUSTIN  - ABDULKADIR consulted    Plan  - DUSTIN on discharge, pending location Patient baseline bedbound, functional paraplegia 2/2 arthlagias/lymphedema and overall fatigue  - PT: DUSTIN PANCHAL consulted    Plan  - DC today

## 2025-01-16 NOTE — DISCHARGE NOTE NURSING/CASE MANAGEMENT/SOCIAL WORK - PATIENT PORTAL LINK FT
You can access the FollowMyHealth Patient Portal offered by [facility] by registering at the following website: [email or web address]. By joining [facility]’s FollowMyHealth portal, you will also be able to view your health information using other applications (apps) compatible with our system.

## 2025-01-16 NOTE — PROGRESS NOTE ADULT - SUBJECTIVE AND OBJECTIVE BOX
Torres Andrews MD  Available on TEAMS    Patient is a 80y old  Female who presents with a chief complaint of Weakness (15 Onofre 2025 14:38)      SUBJECTIVE / OVERNIGHT EVENTS: No acute events overnight. Patient was assessed at bedside.       REVIEW OF SYSTEMS:  CONSTITUTIONAL: No weakness, fevers, or chills  EYES/ENT: No visual changes; No vertigo, throat pain, or dysphagia  NECK: No pain or stiffness  RESPIRATORY: No cough, wheezing, hemoptysis, or shortness of breath  CARDIOVASCULAR: No chest pain or palpitations  GASTROINTESTINAL: No abdominal or epigastric pain. No nausea, vomiting, or hematemesis; No diarrhea or constipation. No melena or hematochezia.  GENITOURINARY: No dysuria, frequency, or hematuria  MUSCULOSKELETAL: No joint or muscle pain or aches  NEUROLOGICAL: No numbness or weakness  SKIN: No itching or rashes      MEDICATIONS  (STANDING):  ascorbic acid 500 milliGRAM(s) Oral daily  atorvastatin 20 milliGRAM(s) Oral at bedtime  celecoxib 100 milliGRAM(s) Oral daily  clotrimazole 1% Cream 1 Application(s) Topical two times a day  enoxaparin Injectable 40 milliGRAM(s) SubCutaneous every 24 hours  influenza  Vaccine (HIGH DOSE) 0.5 milliLiter(s) IntraMuscular once  metoprolol tartrate 100 milliGRAM(s) Oral every 12 hours  multivitamin 1 Tablet(s) Oral daily  nystatin Cream 1 Application(s) Topical two times a day  piperacillin/tazobactam IVPB.. 3.375 Gram(s) IV Intermittent every 8 hours  polyethylene glycol 3350 17 Gram(s) Oral two times a day  senna 2 Tablet(s) Oral at bedtime    MEDICATIONS  (PRN):  acetaminophen     Tablet .. 650 milliGRAM(s) Oral every 6 hours PRN Temp greater or equal to 38C (100.4F), Mild Pain (1 - 3), Moderate Pain (4 - 6)  benzonatate 100 milliGRAM(s) Oral three times a day PRN Cough  bisacodyl 5 milliGRAM(s) Oral every 12 hours PRN Constipation  diphenhydrAMINE 25 milliGRAM(s) Oral every 4 hours PRN Rash and/or Itching  guaiFENesin ER 1200 milliGRAM(s) Oral every 12 hours PRN Cough      CAPILLARY BLOOD GLUCOSE        I&O's Summary    15 Onofre 2025 07:01  -  16 Jan 2025 07:00  --------------------------------------------------------  IN: 960 mL / OUT: 2150 mL / NET: -1190 mL        Vital Signs Last 24 Hrs  T(C): 36.9 (15 Onofre 2025 20:26), Max: 36.9 (15 Onofre 2025 20:26)  T(F): 98.4 (15 Onofre 2025 20:26), Max: 98.4 (15 Onofre 2025 20:26)  HR: 72 (15 Onofre 2025 20:26) (63 - 72)  BP: 127/84 (15 Onofre 2025 20:26) (124/81 - 127/84)  BP(mean): --  RR: 16 (15 Onofre 2025 20:26) (16 - 16)  SpO2: 95% (15 Onofre 2025 20:26) (94% - 95%)    Parameters below as of 15 Onofre 2025 20:26  Patient On (Oxygen Delivery Method): room air        PHYSICAL EXAM:  GENERAL: NAD, well-developed, well-nourished  HEAD: Atraumatic, Normocephalic  EYES: EOMI, PERRLA, conjunctiva and sclera clear  NECK: Supple, No JVD  CHEST/LUNG: Clear to auscultation bilaterally; No wheezes or crackles  HEART: Normal S1/S2; Regular rate and rhythm; No murmurs, rubs, or gallops  ABDOMEN: Soft, Nontender, Nondistended; Bowel sounds present  EXTREMITIES: 2+ Peripheral Pulses; No clubbing, cyanosis, or edema  PSYCH: A&Ox3  NEUROLOGY: no focal neurologic deficit  SKIN: No rashes or lesions    LABS:     01-15    131[L]  |  102  |  39[H]  ----------------------------<  164[H]  4.3   |  20[L]  |  1.18    Ca    8.4      15 Onofre 2025 09:52    TPro  6.2  /  Alb  2.1[L]  /  TBili  0.2  /  DBili  x   /  AST  31  /  ALT  34  /  AlkPhos  229[H]  01-15          Urinalysis Basic - ( 15 Onofre 2025 09:52 )    Color: x / Appearance: x / SG: x / pH: x  Gluc: 164 mg/dL / Ketone: x  / Bili: x / Urobili: x   Blood: x / Protein: x / Nitrite: x   Leuk Esterase: x / RBC: x / WBC x   Sq Epi: x / Non Sq Epi: x / Bacteria: x        RADIOLOGY & ADDITIONAL TESTS:    Imaging Personally Reviewed:    Consultant(s) Notes Reviewed:      Care Discussed with Consultants/Other Providers:   Torres Andrews MD  Available on TEAMS    Patient is a 80y old  Female who presents with a chief complaint of Weakness (15 Onofre 2025 14:38)      SUBJECTIVE / OVERNIGHT EVENTS: No acute events overnight. Patient was assessed at bedside. No new concerns       REVIEW OF SYSTEMS:  CONSTITUTIONAL: No weakness, fevers, or chills  EYES/ENT: No visual changes; No vertigo, throat pain, or dysphagia  NECK: No pain or stiffness  RESPIRATORY: No cough, wheezing, hemoptysis, or shortness of breath  CARDIOVASCULAR: No chest pain or palpitations  GASTROINTESTINAL: No abdominal or epigastric pain. No nausea, vomiting, or hematemesis; No diarrhea or constipation. No melena or hematochezia.  GENITOURINARY: No dysuria, frequency, or hematuria  MUSCULOSKELETAL: No joint or muscle pain or aches  NEUROLOGICAL: No numbness or weakness  SKIN: No itching or rashes      MEDICATIONS  (STANDING):  ascorbic acid 500 milliGRAM(s) Oral daily  atorvastatin 20 milliGRAM(s) Oral at bedtime  celecoxib 100 milliGRAM(s) Oral daily  clotrimazole 1% Cream 1 Application(s) Topical two times a day  enoxaparin Injectable 40 milliGRAM(s) SubCutaneous every 24 hours  influenza  Vaccine (HIGH DOSE) 0.5 milliLiter(s) IntraMuscular once  metoprolol tartrate 100 milliGRAM(s) Oral every 12 hours  multivitamin 1 Tablet(s) Oral daily  nystatin Cream 1 Application(s) Topical two times a day  piperacillin/tazobactam IVPB.. 3.375 Gram(s) IV Intermittent every 8 hours  polyethylene glycol 3350 17 Gram(s) Oral two times a day  senna 2 Tablet(s) Oral at bedtime    MEDICATIONS  (PRN):  acetaminophen     Tablet .. 650 milliGRAM(s) Oral every 6 hours PRN Temp greater or equal to 38C (100.4F), Mild Pain (1 - 3), Moderate Pain (4 - 6)  benzonatate 100 milliGRAM(s) Oral three times a day PRN Cough  bisacodyl 5 milliGRAM(s) Oral every 12 hours PRN Constipation  diphenhydrAMINE 25 milliGRAM(s) Oral every 4 hours PRN Rash and/or Itching  guaiFENesin ER 1200 milliGRAM(s) Oral every 12 hours PRN Cough      CAPILLARY BLOOD GLUCOSE        I&O's Summary    15 Onofre 2025 07:01  -  16 Jan 2025 07:00  --------------------------------------------------------  IN: 960 mL / OUT: 2150 mL / NET: -1190 mL        Vital Signs Last 24 Hrs  T(C): 36.9 (15 Onofre 2025 20:26), Max: 36.9 (15 Onofre 2025 20:26)  T(F): 98.4 (15 Onofre 2025 20:26), Max: 98.4 (15 Onofre 2025 20:26)  HR: 72 (15 Onofre 2025 20:26) (63 - 72)  BP: 127/84 (15 Onofre 2025 20:26) (124/81 - 127/84)  BP(mean): --  RR: 16 (15 Onofre 2025 20:26) (16 - 16)  SpO2: 95% (15 Onofre 2025 20:26) (94% - 95%)    Parameters below as of 15 Onofre 2025 20:26  Patient On (Oxygen Delivery Method): room air        PHYSICAL EXAM:  GENERAL: NAD, well-developed, well-nourished  HEAD: Atraumatic, Normocephalic  EYES: EOMI, PERRLA, conjunctiva and sclera clear  NECK: Supple, No JVD  CHEST/LUNG: Clear to auscultation bilaterally; No wheezes or crackles  HEART: Normal S1/S2; Regular rate and rhythm; No murmurs, rubs, or gallops  ABDOMEN: Soft, Nontender, Nondistended; Bowel sounds present  EXTREMITIES: 2+ Peripheral Pulses; No clubbing, cyanosis, or edema  PSYCH: A&Ox3  NEUROLOGY: no focal neurologic deficit  SKIN: No rashes or lesions    LABS:     01-15    131[L]  |  102  |  39[H]  ----------------------------<  164[H]  4.3   |  20[L]  |  1.18    Ca    8.4      15 Onofre 2025 09:52    TPro  6.2  /  Alb  2.1[L]  /  TBili  0.2  /  DBili  x   /  AST  31  /  ALT  34  /  AlkPhos  229[H]  01-15          Urinalysis Basic - ( 15 Onofre 2025 09:52 )    Color: x / Appearance: x / SG: x / pH: x  Gluc: 164 mg/dL / Ketone: x  / Bili: x / Urobili: x   Blood: x / Protein: x / Nitrite: x   Leuk Esterase: x / RBC: x / WBC x   Sq Epi: x / Non Sq Epi: x / Bacteria: x        RADIOLOGY & ADDITIONAL TESTS:    Imaging Personally Reviewed:    Consultant(s) Notes Reviewed:      Care Discussed with Consultants/Other Providers:

## 2025-01-16 NOTE — PROGRESS NOTE ADULT - PROVIDER SPECIALTY LIST ADULT
Infectious Disease
Wound Care
Internal Medicine

## 2025-01-16 NOTE — DISCHARGE NOTE NURSING/CASE MANAGEMENT/SOCIAL WORK - NSDCFUADDAPPT_GEN_ALL_CORE_FT
APPTS ARE READY TO BE MADE: [X] YES    Best Family or Patient Contact (if needed):  Ean Telloores: [phone]  Additional Information about above appointments (if needed):    1: Please follow up with your PCP, Dr. Heaton, [phone], within 2 weeks of discharge  2:  Please follow up with wound care within 2 weeks of discharge, at [phone]  3:     Other comments or requests:
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
## 2025-02-05 ENCOUNTER — APPOINTMENT (OUTPATIENT)
Dept: ORTHOPEDIC SURGERY | Facility: CLINIC | Age: 80
End: 2025-02-05
Payer: MEDICARE

## 2025-02-05 VITALS — BODY MASS INDEX: 38.09 KG/M2 | HEIGHT: 63 IN | WEIGHT: 215 LBS

## 2025-02-05 DIAGNOSIS — M17.0 BILATERAL PRIMARY OSTEOARTHRITIS OF KNEE: ICD-10-CM

## 2025-02-05 PROCEDURE — 99213 OFFICE O/P EST LOW 20 MIN: CPT | Mod: 25

## 2025-02-05 PROCEDURE — 20610 DRAIN/INJ JOINT/BURSA W/O US: CPT | Mod: 50

## 2025-02-11 ENCOUNTER — INPATIENT (INPATIENT)
Facility: HOSPITAL | Age: 80
LOS: 12 days | Discharge: HOME HEALTH | DRG: 379 | End: 2025-02-24
Attending: INTERNAL MEDICINE | Admitting: HOSPITALIST
Payer: COMMERCIAL

## 2025-02-11 VITALS
TEMPERATURE: 98 F | SYSTOLIC BLOOD PRESSURE: 100 MMHG | RESPIRATION RATE: 20 BRPM | DIASTOLIC BLOOD PRESSURE: 68 MMHG | OXYGEN SATURATION: 97 % | HEIGHT: 65 IN | WEIGHT: 199.96 LBS | HEART RATE: 90 BPM

## 2025-02-11 DIAGNOSIS — Z98.890 OTHER SPECIFIED POSTPROCEDURAL STATES: Chronic | ICD-10-CM

## 2025-02-11 DIAGNOSIS — Z29.9 ENCOUNTER FOR PROPHYLACTIC MEASURES, UNSPECIFIED: ICD-10-CM

## 2025-02-11 DIAGNOSIS — R79.89 OTHER SPECIFIED ABNORMAL FINDINGS OF BLOOD CHEMISTRY: ICD-10-CM

## 2025-02-11 DIAGNOSIS — E78.5 HYPERLIPIDEMIA, UNSPECIFIED: ICD-10-CM

## 2025-02-11 DIAGNOSIS — E87.5 HYPERKALEMIA: ICD-10-CM

## 2025-02-11 DIAGNOSIS — E11.9 TYPE 2 DIABETES MELLITUS WITHOUT COMPLICATIONS: ICD-10-CM

## 2025-02-11 DIAGNOSIS — E87.20 ACIDOSIS, UNSPECIFIED: ICD-10-CM

## 2025-02-11 DIAGNOSIS — D62 ACUTE POSTHEMORRHAGIC ANEMIA: ICD-10-CM

## 2025-02-11 DIAGNOSIS — K92.2 GASTROINTESTINAL HEMORRHAGE, UNSPECIFIED: ICD-10-CM

## 2025-02-11 DIAGNOSIS — I95.9 HYPOTENSION, UNSPECIFIED: ICD-10-CM

## 2025-02-11 DIAGNOSIS — D72.829 ELEVATED WHITE BLOOD CELL COUNT, UNSPECIFIED: ICD-10-CM

## 2025-02-11 DIAGNOSIS — M46.28 OSTEOMYELITIS OF VERTEBRA, SACRAL AND SACROCOCCYGEAL REGION: ICD-10-CM

## 2025-02-11 DIAGNOSIS — J10.1 INFLUENZA DUE TO OTHER IDENTIFIED INFLUENZA VIRUS WITH OTHER RESPIRATORY MANIFESTATIONS: ICD-10-CM

## 2025-02-11 DIAGNOSIS — I10 ESSENTIAL (PRIMARY) HYPERTENSION: ICD-10-CM

## 2025-02-11 DIAGNOSIS — A41.9 SEPSIS, UNSPECIFIED ORGANISM: ICD-10-CM

## 2025-02-11 LAB
ALBUMIN SERPL ELPH-MCNC: 2.3 G/DL — LOW (ref 3.3–5)
ALBUMIN SERPL ELPH-MCNC: 2.4 G/DL — LOW (ref 3.3–5)
ALBUMIN SERPL ELPH-MCNC: 2.4 G/DL — LOW (ref 3.3–5)
ALP SERPL-CCNC: 79 U/L — SIGNIFICANT CHANGE UP (ref 40–120)
ALP SERPL-CCNC: 85 U/L — SIGNIFICANT CHANGE UP (ref 40–120)
ALP SERPL-CCNC: 94 U/L — SIGNIFICANT CHANGE UP (ref 40–120)
ALT FLD-CCNC: 18 U/L — SIGNIFICANT CHANGE UP (ref 10–45)
ALT FLD-CCNC: 22 U/L — SIGNIFICANT CHANGE UP (ref 10–45)
ALT FLD-CCNC: 23 U/L — SIGNIFICANT CHANGE UP (ref 10–45)
ANION GAP SERPL CALC-SCNC: 11 MMOL/L — SIGNIFICANT CHANGE UP (ref 5–17)
ANION GAP SERPL CALC-SCNC: 12 MMOL/L — SIGNIFICANT CHANGE UP (ref 5–17)
ANION GAP SERPL CALC-SCNC: 9 MMOL/L — SIGNIFICANT CHANGE UP (ref 5–17)
ANION GAP SERPL CALC-SCNC: 9 MMOL/L — SIGNIFICANT CHANGE UP (ref 5–17)
ANISOCYTOSIS BLD QL: SLIGHT — SIGNIFICANT CHANGE UP
AST SERPL-CCNC: 10 U/L — SIGNIFICANT CHANGE UP (ref 10–40)
AST SERPL-CCNC: 19 U/L — SIGNIFICANT CHANGE UP (ref 10–40)
AST SERPL-CCNC: 25 U/L — SIGNIFICANT CHANGE UP (ref 10–40)
B-OH-BUTYR SERPL-SCNC: 0.4 MMOL/L — SIGNIFICANT CHANGE UP
BASOPHILS # BLD AUTO: 0 K/UL — SIGNIFICANT CHANGE UP (ref 0–0.2)
BASOPHILS NFR BLD AUTO: 0 % — SIGNIFICANT CHANGE UP (ref 0–2)
BILIRUB SERPL-MCNC: 0.2 MG/DL — SIGNIFICANT CHANGE UP (ref 0.2–1.2)
BILIRUB SERPL-MCNC: 0.4 MG/DL — SIGNIFICANT CHANGE UP (ref 0.2–1.2)
BILIRUB SERPL-MCNC: 0.4 MG/DL — SIGNIFICANT CHANGE UP (ref 0.2–1.2)
BUN SERPL-MCNC: 63 MG/DL — HIGH (ref 7–23)
BUN SERPL-MCNC: 64 MG/DL — HIGH (ref 7–23)
BUN SERPL-MCNC: 66 MG/DL — HIGH (ref 7–23)
BUN SERPL-MCNC: 69 MG/DL — HIGH (ref 7–23)
CALCIUM SERPL-MCNC: 8.4 MG/DL — SIGNIFICANT CHANGE UP (ref 8.4–10.5)
CALCIUM SERPL-MCNC: 8.5 MG/DL — SIGNIFICANT CHANGE UP (ref 8.4–10.5)
CALCIUM SERPL-MCNC: 8.6 MG/DL — SIGNIFICANT CHANGE UP (ref 8.4–10.5)
CALCIUM SERPL-MCNC: 8.7 MG/DL — SIGNIFICANT CHANGE UP (ref 8.4–10.5)
CHLORIDE SERPL-SCNC: 110 MMOL/L — HIGH (ref 96–108)
CHLORIDE SERPL-SCNC: 112 MMOL/L — HIGH (ref 96–108)
CO2 SERPL-SCNC: 15 MMOL/L — LOW (ref 22–31)
CO2 SERPL-SCNC: 17 MMOL/L — LOW (ref 22–31)
CO2 SERPL-SCNC: 18 MMOL/L — LOW (ref 22–31)
CO2 SERPL-SCNC: 19 MMOL/L — LOW (ref 22–31)
CREAT SERPL-MCNC: 0.74 MG/DL — SIGNIFICANT CHANGE UP (ref 0.5–1.3)
CREAT SERPL-MCNC: 0.75 MG/DL — SIGNIFICANT CHANGE UP (ref 0.5–1.3)
CREAT SERPL-MCNC: 0.75 MG/DL — SIGNIFICANT CHANGE UP (ref 0.5–1.3)
CREAT SERPL-MCNC: 0.85 MG/DL — SIGNIFICANT CHANGE UP (ref 0.5–1.3)
DACRYOCYTES BLD QL SMEAR: SLIGHT — SIGNIFICANT CHANGE UP
DAT C3-SP REAG RBC QL: NEGATIVE — SIGNIFICANT CHANGE UP
EGFR: 68 ML/MIN/1.73M2 — SIGNIFICANT CHANGE UP
EGFR: 79 ML/MIN/1.73M2 — SIGNIFICANT CHANGE UP
EGFR: 79 ML/MIN/1.73M2 — SIGNIFICANT CHANGE UP
EGFR: 81 ML/MIN/1.73M2 — SIGNIFICANT CHANGE UP
EOSINOPHIL # BLD AUTO: 0 K/UL — SIGNIFICANT CHANGE UP (ref 0–0.5)
EOSINOPHIL NFR BLD AUTO: 0 % — SIGNIFICANT CHANGE UP (ref 0–6)
FLUAV AG NPH QL: DETECTED
FLUBV AG NPH QL: SIGNIFICANT CHANGE UP
GAS PNL BLDV: SIGNIFICANT CHANGE UP
GLUCOSE BLDC GLUCOMTR-MCNC: 172 MG/DL — HIGH (ref 70–99)
GLUCOSE SERPL-MCNC: 173 MG/DL — HIGH (ref 70–99)
GLUCOSE SERPL-MCNC: 225 MG/DL — HIGH (ref 70–99)
GLUCOSE SERPL-MCNC: 233 MG/DL — HIGH (ref 70–99)
GLUCOSE SERPL-MCNC: 253 MG/DL — HIGH (ref 70–99)
HCT VFR BLD CALC: 19.5 % — CRITICAL LOW (ref 34.5–45)
HCT VFR BLD CALC: 23.5 % — LOW (ref 34.5–45)
HCT VFR BLD CALC: 25.7 % — LOW (ref 34.5–45)
HGB BLD-MCNC: 6 G/DL — CRITICAL LOW (ref 11.5–15.5)
HGB BLD-MCNC: 7.6 G/DL — LOW (ref 11.5–15.5)
HGB BLD-MCNC: 8.4 G/DL — LOW (ref 11.5–15.5)
HYPOCHROMIA BLD QL: SLIGHT — SIGNIFICANT CHANGE UP
LIDOCAIN IGE QN: 21 U/L — SIGNIFICANT CHANGE UP (ref 7–60)
LYMPHOCYTES # BLD AUTO: 1.73 K/UL — SIGNIFICANT CHANGE UP (ref 1–3.3)
LYMPHOCYTES # BLD AUTO: 7.6 % — LOW (ref 13–44)
MACROCYTES BLD QL: SLIGHT — SIGNIFICANT CHANGE UP
MANUAL SMEAR VERIFICATION: SIGNIFICANT CHANGE UP
MCHC RBC-ENTMCNC: 27.1 PG — SIGNIFICANT CHANGE UP (ref 27–34)
MCHC RBC-ENTMCNC: 27.5 PG — SIGNIFICANT CHANGE UP (ref 27–34)
MCHC RBC-ENTMCNC: 28.1 PG — SIGNIFICANT CHANGE UP (ref 27–34)
MCHC RBC-ENTMCNC: 30.8 G/DL — LOW (ref 32–36)
MCHC RBC-ENTMCNC: 32.3 G/DL — SIGNIFICANT CHANGE UP (ref 32–36)
MCHC RBC-ENTMCNC: 32.7 G/DL — SIGNIFICANT CHANGE UP (ref 32–36)
MCV RBC AUTO: 85.1 FL — SIGNIFICANT CHANGE UP (ref 80–100)
MCV RBC AUTO: 86 FL — SIGNIFICANT CHANGE UP (ref 80–100)
MCV RBC AUTO: 88.2 FL — SIGNIFICANT CHANGE UP (ref 80–100)
MONOCYTES # BLD AUTO: 0.57 K/UL — SIGNIFICANT CHANGE UP (ref 0–0.9)
MONOCYTES NFR BLD AUTO: 2.5 % — SIGNIFICANT CHANGE UP (ref 2–14)
MYELOCYTES NFR BLD: 1.7 % — HIGH (ref 0–0)
NEUTROPHILS # BLD AUTO: 18.56 K/UL — HIGH (ref 1.8–7.4)
NEUTROPHILS NFR BLD AUTO: 81.4 % — HIGH (ref 43–77)
NRBC BLD AUTO-RTO: 0 /100 WBCS — SIGNIFICANT CHANGE UP (ref 0–0)
NRBC BLD AUTO-RTO: 0 /100 WBCS — SIGNIFICANT CHANGE UP (ref 0–0)
OB PNL STL: POSITIVE
OVALOCYTES BLD QL SMEAR: SLIGHT — SIGNIFICANT CHANGE UP
PLAT MORPH BLD: NORMAL — SIGNIFICANT CHANGE UP
PLATELET # BLD AUTO: 378 K/UL — SIGNIFICANT CHANGE UP (ref 150–400)
PLATELET # BLD AUTO: 393 K/UL — SIGNIFICANT CHANGE UP (ref 150–400)
PLATELET # BLD AUTO: 530 K/UL — HIGH (ref 150–400)
POIKILOCYTOSIS BLD QL AUTO: SLIGHT — SIGNIFICANT CHANGE UP
POLYCHROMASIA BLD QL SMEAR: SLIGHT — SIGNIFICANT CHANGE UP
POTASSIUM SERPL-MCNC: 5.5 MMOL/L — HIGH (ref 3.5–5.3)
POTASSIUM SERPL-MCNC: 5.6 MMOL/L — HIGH (ref 3.5–5.3)
POTASSIUM SERPL-MCNC: 6.5 MMOL/L — CRITICAL HIGH (ref 3.5–5.3)
POTASSIUM SERPL-MCNC: 6.5 MMOL/L — CRITICAL HIGH (ref 3.5–5.3)
POTASSIUM SERPL-SCNC: 5.5 MMOL/L — HIGH (ref 3.5–5.3)
POTASSIUM SERPL-SCNC: 5.6 MMOL/L — HIGH (ref 3.5–5.3)
POTASSIUM SERPL-SCNC: 6.5 MMOL/L — CRITICAL HIGH (ref 3.5–5.3)
POTASSIUM SERPL-SCNC: 6.5 MMOL/L — CRITICAL HIGH (ref 3.5–5.3)
PROT SERPL-MCNC: 5 G/DL — LOW (ref 6–8.3)
PROT SERPL-MCNC: 5.4 G/DL — LOW (ref 6–8.3)
PROT SERPL-MCNC: 5.5 G/DL — LOW (ref 6–8.3)
RBC # BLD: 2.21 M/UL — LOW (ref 3.8–5.2)
RBC # BLD: 2.76 M/UL — LOW (ref 3.8–5.2)
RBC # BLD: 2.99 M/UL — LOW (ref 3.8–5.2)
RBC # FLD: 17.1 % — HIGH (ref 10.3–14.5)
RBC # FLD: 17.7 % — HIGH (ref 10.3–14.5)
RBC # FLD: 20.5 % — HIGH (ref 10.3–14.5)
RBC BLD AUTO: ABNORMAL
RSV RNA NPH QL NAA+NON-PROBE: SIGNIFICANT CHANGE UP
SARS-COV-2 RNA SPEC QL NAA+PROBE: SIGNIFICANT CHANGE UP
SODIUM SERPL-SCNC: 138 MMOL/L — SIGNIFICANT CHANGE UP (ref 135–145)
SODIUM SERPL-SCNC: 139 MMOL/L — SIGNIFICANT CHANGE UP (ref 135–145)
SODIUM SERPL-SCNC: 139 MMOL/L — SIGNIFICANT CHANGE UP (ref 135–145)
SODIUM SERPL-SCNC: 140 MMOL/L — SIGNIFICANT CHANGE UP (ref 135–145)
TROPONIN T, HIGH SENSITIVITY RESULT: 64 NG/L — HIGH (ref 0–51)
VARIANT LYMPHS # BLD: 6.8 % — HIGH (ref 0–6)
VARIANT LYMPHS NFR BLD MANUAL: 6.8 % — HIGH (ref 0–6)
WBC # BLD: 22.8 K/UL — HIGH (ref 3.8–10.5)
WBC # BLD: 27.35 K/UL — HIGH (ref 3.8–10.5)
WBC # BLD: 31.86 K/UL — HIGH (ref 3.8–10.5)
WBC # FLD AUTO: 22.8 K/UL — HIGH (ref 3.8–10.5)
WBC # FLD AUTO: 27.35 K/UL — HIGH (ref 3.8–10.5)
WBC # FLD AUTO: 31.86 K/UL — HIGH (ref 3.8–10.5)

## 2025-02-11 PROCEDURE — 99223 1ST HOSP IP/OBS HIGH 75: CPT | Mod: GC

## 2025-02-11 PROCEDURE — 71045 X-RAY EXAM CHEST 1 VIEW: CPT | Mod: 26

## 2025-02-11 PROCEDURE — 74177 CT ABD & PELVIS W/CONTRAST: CPT | Mod: 26

## 2025-02-11 PROCEDURE — 99285 EMERGENCY DEPT VISIT HI MDM: CPT | Mod: 25

## 2025-02-11 RX ORDER — SODIUM CHLORIDE 9 G/1000ML
1000 INJECTION, SOLUTION INTRAVENOUS
Refills: 0 | Status: DISCONTINUED | OUTPATIENT
Start: 2025-02-11 | End: 2025-02-13

## 2025-02-11 RX ORDER — CEFTRIAXONE 500 MG/1
1000 INJECTION, POWDER, FOR SOLUTION INTRAMUSCULAR; INTRAVENOUS ONCE
Refills: 0 | Status: DISCONTINUED | OUTPATIENT
Start: 2025-02-11 | End: 2025-02-11

## 2025-02-11 RX ORDER — VANCOMYCIN HCL IN 5 % DEXTROSE 1.5G/250ML
1000 PLASTIC BAG, INJECTION (ML) INTRAVENOUS ONCE
Refills: 0 | Status: COMPLETED | OUTPATIENT
Start: 2025-02-11 | End: 2025-02-11

## 2025-02-11 RX ORDER — ONDANSETRON HCL/PF 4 MG/2 ML
4 VIAL (ML) INJECTION ONCE
Refills: 0 | Status: COMPLETED | OUTPATIENT
Start: 2025-02-11 | End: 2025-02-11

## 2025-02-11 RX ORDER — OCTREOTIDE ACETATE 500 UG/ML
50 INJECTION, SOLUTION INTRAVENOUS; SUBCUTANEOUS ONCE
Refills: 0 | Status: COMPLETED | OUTPATIENT
Start: 2025-02-11 | End: 2025-02-11

## 2025-02-11 RX ORDER — SODIUM CHLORIDE 9 G/1000ML
1000 INJECTION, SOLUTION INTRAVENOUS
Refills: 0 | Status: DISCONTINUED | OUTPATIENT
Start: 2025-02-11 | End: 2025-02-12

## 2025-02-11 RX ORDER — CEFEPIME 2 G/20ML
2000 INJECTION, POWDER, FOR SOLUTION INTRAVENOUS ONCE
Refills: 0 | Status: COMPLETED | OUTPATIENT
Start: 2025-02-11 | End: 2025-02-11

## 2025-02-11 RX ORDER — MELATONIN 5 MG
3 TABLET ORAL AT BEDTIME
Refills: 0 | Status: DISCONTINUED | OUTPATIENT
Start: 2025-02-11 | End: 2025-02-24

## 2025-02-11 RX ORDER — ATORVASTATIN CALCIUM 80 MG/1
20 TABLET, FILM COATED ORAL AT BEDTIME
Refills: 0 | Status: DISCONTINUED | OUTPATIENT
Start: 2025-02-11 | End: 2025-02-24

## 2025-02-11 RX ORDER — CEFEPIME 2 G/20ML
INJECTION, POWDER, FOR SOLUTION INTRAVENOUS
Refills: 0 | Status: DISCONTINUED | OUTPATIENT
Start: 2025-02-11 | End: 2025-02-16

## 2025-02-11 RX ORDER — DEXTROSE 50 % IN WATER 50 %
50 SYRINGE (ML) INTRAVENOUS ONCE
Refills: 0 | Status: DISCONTINUED | OUTPATIENT
Start: 2025-02-11 | End: 2025-02-11

## 2025-02-11 RX ORDER — DEXTROSE 50 % IN WATER 50 %
25 SYRINGE (ML) INTRAVENOUS ONCE
Refills: 0 | Status: DISCONTINUED | OUTPATIENT
Start: 2025-02-11 | End: 2025-02-13

## 2025-02-11 RX ORDER — BUTYROSPERMUM PARKII(SHEA BUTTER), SIMMONDSIA CHINENSIS (JOJOBA) SEED OIL, ALOE BARBADENSIS LEAF EXTRACT .01; 1; 3.5 G/100G; G/100G; G/100G
2 LIQUID TOPICAL
Refills: 0 | DISCHARGE

## 2025-02-11 RX ORDER — DEXTROSE 50 % IN WATER 50 %
12.5 SYRINGE (ML) INTRAVENOUS ONCE
Refills: 0 | Status: DISCONTINUED | OUTPATIENT
Start: 2025-02-11 | End: 2025-02-13

## 2025-02-11 RX ORDER — MAGNESIUM, ALUMINUM HYDROXIDE 200-200 MG
30 TABLET,CHEWABLE ORAL ONCE
Refills: 0 | Status: COMPLETED | OUTPATIENT
Start: 2025-02-11 | End: 2025-02-11

## 2025-02-11 RX ORDER — OSELTAMIVIR PHOSPHATE 75 MG/1
75 CAPSULE ORAL
Refills: 0 | Status: COMPLETED | OUTPATIENT
Start: 2025-02-12 | End: 2025-02-15

## 2025-02-11 RX ORDER — SILVER SULFADIAZINE 1 %
1 CREAM (GRAM) TOPICAL
Refills: 0 | DISCHARGE

## 2025-02-11 RX ORDER — VANCOMYCIN HCL IN 5 % DEXTROSE 1.5G/250ML
1250 PLASTIC BAG, INJECTION (ML) INTRAVENOUS EVERY 12 HOURS
Refills: 0 | Status: DISCONTINUED | OUTPATIENT
Start: 2025-02-11 | End: 2025-02-13

## 2025-02-11 RX ORDER — RITONAVIR 100 MG
100 CAPSULE ORAL
Refills: 0 | Status: DISCONTINUED | OUTPATIENT
Start: 2025-02-11 | End: 2025-02-11

## 2025-02-11 RX ORDER — OSELTAMIVIR PHOSPHATE 75 MG/1
75 CAPSULE ORAL ONCE
Refills: 0 | Status: COMPLETED | OUTPATIENT
Start: 2025-02-11 | End: 2025-02-11

## 2025-02-11 RX ORDER — SODIUM CHLORIDE 9 G/1000ML
1000 INJECTION, SOLUTION INTRAVENOUS ONCE
Refills: 0 | Status: COMPLETED | OUTPATIENT
Start: 2025-02-11 | End: 2025-02-11

## 2025-02-11 RX ORDER — INSULIN LISPRO 100 U/ML
INJECTION, SOLUTION INTRAVENOUS; SUBCUTANEOUS EVERY 6 HOURS
Refills: 0 | Status: DISCONTINUED | OUTPATIENT
Start: 2025-02-11 | End: 2025-02-13

## 2025-02-11 RX ORDER — GLUCAGON 3 MG/1
1 POWDER NASAL ONCE
Refills: 0 | Status: DISCONTINUED | OUTPATIENT
Start: 2025-02-11 | End: 2025-02-13

## 2025-02-11 RX ORDER — DEXTROSE 50 % IN WATER 50 %
15 SYRINGE (ML) INTRAVENOUS ONCE
Refills: 0 | Status: DISCONTINUED | OUTPATIENT
Start: 2025-02-11 | End: 2025-02-13

## 2025-02-11 RX ORDER — PIPERACILLIN-TAZO-DEXTROSE,ISO 3.375G/5
3.38 IV SOLUTION, PIGGYBACK PREMIX FROZEN(ML) INTRAVENOUS ONCE
Refills: 0 | Status: COMPLETED | OUTPATIENT
Start: 2025-02-11 | End: 2025-02-11

## 2025-02-11 RX ORDER — DEXTROSE 50 % IN WATER 50 %
50 SYRINGE (ML) INTRAVENOUS ONCE
Refills: 0 | Status: COMPLETED | OUTPATIENT
Start: 2025-02-11 | End: 2025-02-11

## 2025-02-11 RX ORDER — IPRATROPIUM BROMIDE AND ALBUTEROL SULFATE .5; 2.5 MG/3ML; MG/3ML
3 SOLUTION RESPIRATORY (INHALATION) ONCE
Refills: 0 | Status: COMPLETED | OUTPATIENT
Start: 2025-02-11 | End: 2025-02-11

## 2025-02-11 RX ORDER — CEFEPIME 2 G/20ML
2000 INJECTION, POWDER, FOR SOLUTION INTRAVENOUS EVERY 8 HOURS
Refills: 0 | Status: DISCONTINUED | OUTPATIENT
Start: 2025-02-12 | End: 2025-02-16

## 2025-02-11 RX ORDER — ACETAMINOPHEN 500 MG/5ML
650 LIQUID (ML) ORAL EVERY 6 HOURS
Refills: 0 | Status: DISCONTINUED | OUTPATIENT
Start: 2025-02-11 | End: 2025-02-24

## 2025-02-11 RX ADMIN — Medication 5 UNIT(S): at 22:41

## 2025-02-11 RX ADMIN — OCTREOTIDE ACETATE 50 MICROGRAM(S): 500 INJECTION, SOLUTION INTRAVENOUS; SUBCUTANEOUS at 11:35

## 2025-02-11 RX ADMIN — Medication 250 MILLIGRAM(S): at 11:34

## 2025-02-11 RX ADMIN — IPRATROPIUM BROMIDE AND ALBUTEROL SULFATE 3 MILLILITER(S): .5; 2.5 SOLUTION RESPIRATORY (INHALATION) at 15:32

## 2025-02-11 RX ADMIN — Medication 5 UNIT(S): at 15:32

## 2025-02-11 RX ADMIN — Medication 200 GRAM(S): at 11:05

## 2025-02-11 RX ADMIN — ATORVASTATIN CALCIUM 20 MILLIGRAM(S): 80 TABLET, FILM COATED ORAL at 22:40

## 2025-02-11 RX ADMIN — Medication 4 MILLIGRAM(S): at 08:44

## 2025-02-11 RX ADMIN — OSELTAMIVIR PHOSPHATE 75 MILLIGRAM(S): 75 CAPSULE ORAL at 13:38

## 2025-02-11 RX ADMIN — Medication 50 MILLILITER(S): at 22:40

## 2025-02-11 RX ADMIN — Medication 40 MILLIGRAM(S): at 11:18

## 2025-02-11 RX ADMIN — CEFEPIME 100 MILLIGRAM(S): 2 INJECTION, POWDER, FOR SOLUTION INTRAVENOUS at 19:48

## 2025-02-11 RX ADMIN — Medication 5 UNIT(S): at 11:18

## 2025-02-11 RX ADMIN — SODIUM CHLORIDE 1000 MILLILITER(S): 9 INJECTION, SOLUTION INTRAVENOUS at 08:44

## 2025-02-11 RX ADMIN — Medication 40 MILLIGRAM(S): at 19:49

## 2025-02-11 RX ADMIN — Medication 40 MILLIGRAM(S): at 08:44

## 2025-02-11 NOTE — H&P ADULT - HISTORY OF PRESENT ILLNESS
This is an 81 y/o F with PMHx, HTN, lymphedema, arthritis, sacral decubitus ulcer s/p debridement (2024) and recent adm for sacral OM who presented to the ED for     In the ED, T 98.4, HR 90, /68 -> 82/45, RR 20, 97% on RA. Labs significant for WBC 22.8, Hgb 6, Plt 530, K 6.5 (moderately hemolyzed)-> 5.5, BUN 69, Trop 64->55 . RVP positive for flu.   CT A/P without evidence of active GI bleed, nonspecific right lateral bladder wall thickening and known sacral decub ulcer w/ erosive changes consistent w/ OM. s/p hyperK shift w/ 5u insulin x2, octreotide 50mcg x1, zofran x1, tamiflu 75mg x1, PPI 40 IVP x2, zosyn 3.375x1, vanc 1g x1, 1L LR and duoneb x1.       This is an 79 y/o F with PMHx, HTN, lymphedema, T2DM, sacral decubitus ulcer s/p debridement (2024) and recent adm for sacral OM who presented to the ED for coffee-ground emesis and melena x 1 day. Patient noted the episodes first started at 5AM but did not know how many episodes she had. Per chart review, patient w/ multiple episodes of "dark, coffee ground appearing emesis" as well as "large amounts of dark colored loose, watery stools." She was found to have a high BUN on labs and thus brought it for further evaluation of GIB.     Patient endorsed fatigue but denied any chest pain, sob, ab pain n/v at this time. Patient last episode of melena this AM.      In the ED, T 98.4, HR 90, /68 -> 82/45, RR 20, 97% on RA. Labs significant for WBC 22.8, Hgb 6, Plt 530, K 6.5 (moderately hemolyzed)-> 5.5, BUN 69, Trop 64->55 . RVP positive for flu.   CT A/P without evidence of active GI bleed, nonspecific right lateral bladder wall thickening and known sacral decub ulcer w/ erosive changes consistent w/ OM. s/p hyperK shift w/ 5u insulin x2, octreotide 50mcg x1, zofran x1, tamiflu 75mg x1, PPI 40 IVP x2, zosyn 3.375x1, vanc 1g x1, 1L LR and duoneb x1.       This is an 81 y/o F with PMHx, HTN, lymphedema, T2DM, sacral decubitus ulcer s/p debridement (2024) and recent adm for sacral OM who presented to the ED for coffee-ground emesis and melena x 1 day. Patient noted the episodes first started at 5AM but did not know how many episodes she had. Per chart review, patient w/ multiple episodes of "dark, coffee ground appearing emesis" as well as "large amounts of dark colored loose, watery stools." She was found to have a high BUN on labs and thus brought it for further evaluation of GIB.     Patient endorsed fatigue but denied any chest pain, sob, ab pain n/v at this time. Patient last episode of melena this AM in ED.     In the ED, T 98.4, HR 90, /68 -> 82/45, RR 20, 97% on RA. Labs significant for WBC 22.8, Hgb 6, Plt 530, K 6.5 (moderately hemolyzed)-> 5.5, BUN 69, Trop 64->55 . RVP positive for flu.   CT A/P without evidence of active GI bleed, nonspecific right lateral bladder wall thickening and known sacral decub ulcer w/ erosive changes consistent w/ OM. s/p 2u pRBC hyperK shift w/ 5u insulin x2, octreotide 50mcg x1, zofran x1, tamiflu 75mg x1, PPI 40 IVP x2, zosyn 3.375x1, vanc 1g x1, 1L LR and duoneb x1.

## 2025-02-11 NOTE — H&P ADULT - PROBLEM SELECTOR PLAN 6
A1c 7.3% in 1/2025.     - ALBA  - CC diet   - hypoglycemia protocol K 6.5 -> 5.5, s/p shift x 2 w/ insulin 5 units.     - BMP stat   - if continued elevation -> duoneb x1 and shift w/ insulin. if EKG changes -> calcium gluconate

## 2025-02-11 NOTE — H&P ADULT - NSICDXPASTMEDICALHX_GEN_ALL_CORE_FT
PAST MEDICAL HISTORY:  Back pain     Diabetes     HLD (hyperlipidemia)     Hypertension     Lymphedema of both lower extremities     Sacral osteomyelitis

## 2025-02-11 NOTE — ED CLERICAL - NS ED CLERK NOTE PRE-ARRIVAL INFORMATION; ADDITIONAL PRE-ARRIVAL INFORMATION
CC/Reason For referral: had large dark color loose stool, and coffee ground large amount. R/O gi bleed.  Preferred Consultant(if applicable):  Who admits for you (if needed):  Do you have documents you would like to fax over? no  Would you still like to speak to an ED attending? yes

## 2025-02-11 NOTE — CONSULT NOTE ADULT - ASSESSMENT
80-year-old female with PMHx of HTN, DM II, HLD, PVD, lymphedema, anemia, vertebra OM 2/2 sacral decub s/p sacral debridement at [facility] (1/15/2024; pt does not recall surgeon name) presents for hematemesis x3 and diarrhea.   In the ED, patient non-toxic appearing with vital signs within normal limits. CT A/P (1/10/25) significant for sacral decub wound with OM, focal segment of mural thickening in proximal sigmoid colon, and stool filled rectum. Labs significant for leukocytosis (WBC: 22.80), anemia (Hgb: 6.8) with (+) fecal occult s/p 2U PRBC in ED, hyperkalemia (K: 6.5) s/p insulin/dextrose, and lactate (2.1). Pt also (+) Flu A    PLAN:  - No acute surgery intervention   - F/u CTA abd/pelvis   - F/u GI recs for endoscopy/colonoscopy i/s/o anemia requiring PRBC  - IV Protonix as per GI      ACS  [id number] 80-year-old female with PMHx of HTN, DM II, HLD, PVD, lymphedema, anemia, vertebra OM 2/2 sacral decub s/p sacral debridement at [facility] (1/15/2024; pt does not recall surgeon name) presents for hematemesis x3 and diarrhea.   In the ED, patient non-toxic appearing with vital signs within normal limits. CT A/P (1/10/25) significant for sacral decub wound with OM, but no active GI bleed. Labs significant for leukocytosis (WBC: 22.80), anemia (Hgb: 6.8) with (+) fecal occult s/p 2U PRBC in ED, hyperkalemia (K: 6.5) s/p insulin/dextrose with improvement of K to 5.5, and lactate (2.1). Pt also (+) Flu A    PLAN:  - No acute surgery intervention   - Appreciate GI recs: pt currently refusing endoscopy/colonoscopy  - IV Protonix as per GI      ACS  [id number] 80-year-old female with PMHx of HTN, DM II, HLD, PVD, lymphedema, anemia, vertebra OM 2/2 sacral decub s/p sacral debridement at [facility] (1/15/2024; pt does not recall surgeon name) presents for hematemesis x3 and diarrhea.   In the ED, patient non-toxic appearing with vital signs within normal limits. CT A/P (1/10/25) significant for sacral decub ulcer with OM, but no active GI bleed. Labs significant for leukocytosis (WBC: 22.80), anemia (Hgb: 6.8) with (+) fecal occult s/p 2U PRBC in ED, hyperkalemia (K: 6.5) s/p insulin/dextrose with improvement of K to 5.5, and lactate (2.1). Pt also (+) Flu A    PLAN:  - No acute surgery intervention   - Appreciate GI recs: pt currently refusing endoscopy/colonoscopy  - IV Protonix as per GI      ACS  [id number] 80-year-old female with PMHx of HTN, DM II, HLD, PVD, lymphedema, anemia, vertebra OM 2/2 sacral decub s/p sacral debridement at [facility] (1/15/2024; pt does not recall surgeon name) presents for hematemesis x3 and diarrhea.   In the ED, patient non-toxic appearing with vital signs within normal limits. CT A/P (1/10/25) significant for sacral decub ulcer with OM, but no active GI bleed. Labs significant for leukocytosis (WBC: 22.80), anemia (Hgb: 6.8) with (+) fecal occult s/p 2U PRBC in ED, hyperkalemia (K: 6.5) s/p insulin/dextrose with improvement of K to 5.5, and lactate (2.1). Pt also (+) Flu A, Pt is s/p bedside superficial debridement of sacral ulcer    PLAN:  - No acute surgery intervention   - Wound care consult for sacral ulcer wound management  - Appreciate GI recs: pt currently refusing endoscopy/colonoscopy  - IV Protonix as per GI  - Care as per medicine  - Discussed with Dr. Tamara Aguilar      [facility]  [id number]

## 2025-02-11 NOTE — CONSULT NOTE ADULT - SUBJECTIVE AND OBJECTIVE BOX
GENERAL SURGERY CONSULT NOTE  Consulting Surgical Team: Acute Care Surgery  Consulting Attending: Dr. 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 Pt states she first started vomiting at ~02:00, and last vomited at ~05:00. Pt describes emesis as dark, coffee ground and bowel movements as dark and watery.   Pt currently feeling well, and denies any acute complaints. Denies fevers/chills, headaches, dizziness, chest pain, palpitations, dyspnea, abdominal pain, nausea, vomiting, and diarrhea    ------------------------------------------------------------------------------------------------------------------------------------------------------------------------------------------------------  PAST MEDICAL & SURGICAL HISTORY:  Hypertension      Diabetes      Back pain      Lymphedema of both lower extremities      HLD (hyperlipidemia)      History of cholecystectomy      Allergies    No Known Allergies    Intolerances    -------------------------------------------------------------------------------------------------------------------------------------------------------------------------------------------------------  MEDICATIONS  (STANDING):  insulin regular  human recombinant 5 Unit(s) IV Push once  octreotide  Injectable 50 MICROGram(s) IV Push Once  pantoprazole  Injectable 40 milliGRAM(s) IV Push Once  vancomycin  IVPB. 1000 milliGRAM(s) IV Intermittent once    MEDICATIONS  (PRN):    -------------------------------------------------------------------------------------------------------------------------------------------------------------------------------------------------------  SOCIAL HISTORY:    FAMILY HISTORY:    -------------------------------------------------------------------------------------------------------------------------------------------------------------------------------------------------------  Vital Signs Last 24 Hrs  T(C): 36.9 (11 Feb 2025 07:22), Max: 36.9 (11 Feb 2025 07:22)  T(F): 98.4 (11 Feb 2025 07:22), Max: 98.4 (11 Feb 2025 07:22)  HR: 90 (11 Feb 2025 07:22) (90 - 90)  BP: 100/68 (11 Feb 2025 07:22) (100/68 - 100/68)  BP(mean): --  RR: 20 (11 Feb 2025 07:22) (20 - 20)  SpO2: 97% (11 Feb 2025 07:22) (97% - 97%)    Parameters below as of 11 Feb 2025 07:22  Patient On (Oxygen Delivery Method): room air    I&O's Summary    ----------------------------------------------------------------------------------------------------------------------------------------------------------------------------------------------------------  LABS:                        6.0    22.80 )-----------( 530      ( 11 Feb 2025 09:14 )             19.5     02-11    140  |  112[H]  |  69[H]  ----------------------------<  253[H]  6.5[HH]   |  17[L]  |  0.75    Ca    8.7      11 Feb 2025 09:14    TPro  5.5[L]  /  Alb  2.4[L]  /  TBili  0.2  /  DBili  x   /  AST  25  /  ALT  22  /  AlkPhos  85  02-11    LIVER FUNCTIONS - ( 11 Feb 2025 09:14 )  Alb: 2.4 g/dL / Pro: 5.5 g/dL / ALK PHOS: 85 U/L / ALT: 22 U/L / AST: 25 U/L / GGT: x           Urinalysis Basic - ( 11 Feb 2025 09:14 )  Color: x / Appearance: x / SG: x / pH: x  Gluc: 253 mg/dL / Ketone: x  / Bili: x / Urobili: x   Blood: x / Protein: x / Nitrite: x   Leuk Esterase: x / RBC: x / WBC x   Sq Epi: x / Non Sq Epi: x / Bacteria: x    CAPILLARY BLOOD GLUCOSE    POCT Blood Glucose.: 220 mg/dL (11 Feb 2025 10:59)    Cultures:    ----------------------------------------------------------------------------------------------------------------------------------------------------------------------------------------------------------  PHYSICAL EXAM:  General: NAD, resting comfortably  HEENT: NC/AT, EOMI, normal hearing, no oral lesions, no LAD, neck supple  Pulmonary: Normal resp effort, CTA-B  Cardiovascular: NSR, no murmurs  Abdominal: Soft, ND/NT, no organomegaly  Groin: Soft, nontender, no ecchymosis/hematoma, no erythema, no edema.  Extremities: (+) DP/PT pulses. FROM, normal strength, no clubbing/cyanosis/erythema/edema  Neuro: A/O x 3, CNs II-XII grossly intact, normal sensation, no focal deficits  Pulses: Palpable distal pulses    -------------------------------------------------------------------------------------------------------------------------------------------------------------------------------------------------------------  RADIOLOGY & ADDITIONAL STUDIES:  < from: CT Abdomen and Pelvis w/ IV Cont (01.10.25 @ 09:26) >  FINDINGS:  LOWER CHEST: Coronary artery, aortic valve, and mitral annular   calcification. Minimal bibasilar atelectasis.    LIVER: Within normal limits.  BILE DUCTS: Minimal intrahepatic duct dilation. No significant   extrahepatic duct dilation, the common bile duct measuring up to 6 mm in   caliber.  GALLBLADDER: Cholecystectomy.  SPLEEN: Normal in size. A few subcentimeter hypodense foci within the   spleen that are too small to characterize.  PANCREAS: Within normal limits.  ADRENALS: Within normal limits.  KIDNEYS/URETERS: Mild right hydroureteronephrosis to the level of the   distal ureter. No evidence of obstructive urolithiasis. The distal right   ureter is noted to narrow as it courses lateral to a stool-filled   distended rectum. No left hydronephrosis. Suspect a duplicated left renal   collecting system. Subcentimeter hypodense foci in both kidneys that are   too small to characterize.    BLADDER: Underdistended. Mild focal mural thickening along the right   bladder wall (series 3 image 117).  REPRODUCTIVE ORGANS: Uterus and adnexa within normal limits.    BOWEL: Stool-filled distended rectum measuring 9.0 cm in diameter. Mild   rectal wall thickening and perirectal fat infiltration. No significant   upstream bowel distention to suggest bowel obstruction. Colonic   diverticulosis. Focal segment of mural thickening within the proximal   sigmoid colon (series 3 image 108), possibly exaggerated by   underdistention. Appendix is normal.  PERITONEUM/RETROPERITONEUM: Within normal limits.  VESSELS: Atherosclerotic changes. Approximately moderate stenosis in the   proximal superior mesenteric artery  LYMPH NODES: No lymphadenopathy.  ABDOMINAL WALL: Soft tissue defect overlying the lower sacrum, consistent   with a decubitus wound. There is associated soft tissue infiltration   without discrete collection. Associated erosive and sclerotic changes to   the lower sacrum and to the coccyx (series 4 image 78). Stool overflows   from the anorectum into the lower aspect of the wound. Small   fat-containing left inguinal hernia. Tiny fat-containing   umbilical/periumbilical hernia. Small focus of subcutaneous gas in the   right ventral abdominal wall, which may be injection related.  BONES: Findings pertaining to the sacrum and coccyx as above.   Degenerative changes with multilevel central canal narrowing in the   lumbar spine, most significantly at L4-L5. S-shaped scoliosis. Ossific   lesion measuring 5.1 x 2.0 x 2.8 cm projecting laterally exophytically   from the right femoral shaft without clear medullary continuity and   appears cortically based (series 5 image 55).    IMPRESSION:  *  Soft tissue defect overlying the lower sacrum, consistent with a   decubitus wound. Associated erosive and sclerotic changes to the lower   sacrum and coccyx, concerning for osteomyelitis. Stool overflows from the   anorectum into the lower aspect of the wound.  *  Stool-filled distended rectum. Mild rectal wall thickening and   perirectal fat infiltration, which may represent stercoral proctitis.  *  Focal segment of mural thickening in the proximal sigmoid colon,   possibly secondary to underdistention, diverticulitis, or colitis, with   colonic neoplasm also within the differential. Consider evaluation with   colonoscopy.  *  Exophytic right femoral ossific lesion measuring 5.1 x 2.0 x 2.8 cm,   indeterminate, with parosteal osteosarcoma within the differential.   Evaluation with MRI of the femur without and with contrast is recommended.  *  Underdistended urinary bladder. Mild nonspecific focal mural   thickening along the right bladder wall. Correlate with urinalysis.   Bladder neoplasm is not excluded.  *  Mild right hydroureteronephrosis to the level of the distal ureter,   with narrowing of the ureter as it courses lateral to the stool-filled   distended rectum. Consideration is given to obstruction by mass effect   from the rectum, with other etiology not excluded. Advise short interval   follow-up imaging to ensure resolution.  *  Minimal intrahepatic biliary duct dilation of unclear significance,   with consideration given to postcholecystectomy state.    < end of copied text > GENERAL SURGERY CONSULT NOTE  Consulting Surgical Team: Red Surgery  Consulting Attending: Dr. 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 Pt states she first started vomiting at ~02:00, and last vomited at ~05:00. Pt describes emesis as dark, coffee ground, and bowel movements as dark and watery.   Pt currently feeling well, and denies any acute complaints. Denies fevers/chills, headaches, dizziness, chest pain, palpitations, dyspnea, abdominal pain, nausea, vomiting, and diarrhea    ------------------------------------------------------------------------------------------------------------------------------------------------------------------------------------------------------  PAST MEDICAL & SURGICAL HISTORY:  Hypertension      Diabetes      Back pain      Lymphedema of both lower extremities      HLD (hyperlipidemia)      History of cholecystectomy      Allergies    No Known Allergies    Intolerances    -------------------------------------------------------------------------------------------------------------------------------------------------------------------------------------------------------  MEDICATIONS  (STANDING):  insulin regular  human recombinant 5 Unit(s) IV Push once  octreotide  Injectable 50 MICROGram(s) IV Push Once  pantoprazole  Injectable 40 milliGRAM(s) IV Push Once  vancomycin  IVPB. 1000 milliGRAM(s) IV Intermittent once    MEDICATIONS  (PRN):    -------------------------------------------------------------------------------------------------------------------------------------------------------------------------------------------------------  SOCIAL HISTORY:    FAMILY HISTORY:    -------------------------------------------------------------------------------------------------------------------------------------------------------------------------------------------------------  Vital Signs Last 24 Hrs  T(C): 36.9 (11 Feb 2025 07:22), Max: 36.9 (11 Feb 2025 07:22)  T(F): 98.4 (11 Feb 2025 07:22), Max: 98.4 (11 Feb 2025 07:22)  HR: 90 (11 Feb 2025 07:22) (90 - 90)  BP: 100/68 (11 Feb 2025 07:22) (100/68 - 100/68)  BP(mean): --  RR: 20 (11 Feb 2025 07:22) (20 - 20)  SpO2: 97% (11 Feb 2025 07:22) (97% - 97%)    Parameters below as of 11 Feb 2025 07:22  Patient On (Oxygen Delivery Method): room air    I&O's Summary    ----------------------------------------------------------------------------------------------------------------------------------------------------------------------------------------------------------  LABS:                        6.0    22.80 )-----------( 530      ( 11 Feb 2025 09:14 )             19.5     02-11    140  |  112[H]  |  69[H]  ----------------------------<  253[H]  6.5[HH]   |  17[L]  |  0.75    Ca    8.7      11 Feb 2025 09:14    TPro  5.5[L]  /  Alb  2.4[L]  /  TBili  0.2  /  DBili  x   /  AST  25  /  ALT  22  /  AlkPhos  85  02-11    LIVER FUNCTIONS - ( 11 Feb 2025 09:14 )  Alb: 2.4 g/dL / Pro: 5.5 g/dL / ALK PHOS: 85 U/L / ALT: 22 U/L / AST: 25 U/L / GGT: x           Urinalysis Basic - ( 11 Feb 2025 09:14 )  Color: x / Appearance: x / SG: x / pH: x  Gluc: 253 mg/dL / Ketone: x  / Bili: x / Urobili: x   Blood: x / Protein: x / Nitrite: x   Leuk Esterase: x / RBC: x / WBC x   Sq Epi: x / Non Sq Epi: x / Bacteria: x    CAPILLARY BLOOD GLUCOSE    POCT Blood Glucose.: 220 mg/dL (11 Feb 2025 10:59)    Cultures:    ----------------------------------------------------------------------------------------------------------------------------------------------------------------------------------------------------------  PHYSICAL EXAM:  General: NAD, resting comfortably  HEENT: NC/AT, EOMI, normal hearing, no oral lesions, no LAD, neck supple  Pulmonary: Normal resp effort, CTA-B  Cardiovascular: NSR, no murmurs  Abdominal: Soft, ND/NT, no organomegaly  Groin: Soft, nontender, no ecchymosis/hematoma, no erythema, no edema.  Extremities: (+) DP/PT pulses. FROM, normal strength, no clubbing/cyanosis/erythema/edema  Neuro: A/O x 3, CNs II-XII grossly intact, normal sensation, no focal deficits  Pulses: Palpable distal pulses    -------------------------------------------------------------------------------------------------------------------------------------------------------------------------------------------------------------  RADIOLOGY & ADDITIONAL STUDIES:  < from: CT Abdomen and Pelvis w/ IV Cont (01.10.25 @ 09:26) >  FINDINGS:  LOWER CHEST: Coronary artery, aortic valve, and mitral annular   calcification. Minimal bibasilar atelectasis.    LIVER: Within normal limits.  BILE DUCTS: Minimal intrahepatic duct dilation. No significant   extrahepatic duct dilation, the common bile duct measuring up to 6 mm in   caliber.  GALLBLADDER: Cholecystectomy.  SPLEEN: Normal in size. A few subcentimeter hypodense foci within the   spleen that are too small to characterize.  PANCREAS: Within normal limits.  ADRENALS: Within normal limits.  KIDNEYS/URETERS: Mild right hydroureteronephrosis to the level of the   distal ureter. No evidence of obstructive urolithiasis. The distal right   ureter is noted to narrow as it courses lateral to a stool-filled   distended rectum. No left hydronephrosis. Suspect a duplicated left renal   collecting system. Subcentimeter hypodense foci in both kidneys that are   too small to characterize.    BLADDER: Underdistended. Mild focal mural thickening along the right   bladder wall (series 3 image 117).  REPRODUCTIVE ORGANS: Uterus and adnexa within normal limits.    BOWEL: Stool-filled distended rectum measuring 9.0 cm in diameter. Mild   rectal wall thickening and perirectal fat infiltration. No significant   upstream bowel distention to suggest bowel obstruction. Colonic   diverticulosis. Focal segment of mural thickening within the proximal   sigmoid colon (series 3 image 108), possibly exaggerated by   underdistention. Appendix is normal.  PERITONEUM/RETROPERITONEUM: Within normal limits.  VESSELS: Atherosclerotic changes. Approximately moderate stenosis in the   proximal superior mesenteric artery  LYMPH NODES: No lymphadenopathy.  ABDOMINAL WALL: Soft tissue defect overlying the lower sacrum, consistent   with a decubitus wound. There is associated soft tissue infiltration   without discrete collection. Associated erosive and sclerotic changes to   the lower sacrum and to the coccyx (series 4 image 78). Stool overflows   from the anorectum into the lower aspect of the wound. Small   fat-containing left inguinal hernia. Tiny fat-containing   umbilical/periumbilical hernia. Small focus of subcutaneous gas in the   right ventral abdominal wall, which may be injection related.  BONES: Findings pertaining to the sacrum and coccyx as above.   Degenerative changes with multilevel central canal narrowing in the   lumbar spine, most significantly at L4-L5. S-shaped scoliosis. Ossific   lesion measuring 5.1 x 2.0 x 2.8 cm projecting laterally exophytically   from the right femoral shaft without clear medullary continuity and   appears cortically based (series 5 image 55).    IMPRESSION:  *  Soft tissue defect overlying the lower sacrum, consistent with a   decubitus wound. Associated erosive and sclerotic changes to the lower   sacrum and coccyx, concerning for osteomyelitis. Stool overflows from the   anorectum into the lower aspect of the wound.  *  Stool-filled distended rectum. Mild rectal wall thickening and   perirectal fat infiltration, which may represent stercoral proctitis.  *  Focal segment of mural thickening in the proximal sigmoid colon,   possibly secondary to underdistention, diverticulitis, or colitis, with   colonic neoplasm also within the differential. Consider evaluation with   colonoscopy.  *  Exophytic right femoral ossific lesion measuring 5.1 x 2.0 x 2.8 cm,   indeterminate, with parosteal osteosarcoma within the differential.   Evaluation with MRI of the femur without and with contrast is recommended.  *  Underdistended urinary bladder. Mild nonspecific focal mural   thickening along the right bladder wall. Correlate with urinalysis.   Bladder neoplasm is not excluded.  *  Mild right hydroureteronephrosis to the level of the distal ureter,   with narrowing of the ureter as it courses lateral to the stool-filled   distended rectum. Consideration is given to obstruction by mass effect   from the rectum, with other etiology not excluded. Advise short interval   follow-up imaging to ensure resolution.  *  Minimal intrahepatic biliary duct dilation of unclear significance,   with consideration given to postcholecystectomy state.    < end of copied text > GENERAL SURGERY CONSULT NOTE  Consulting Surgical Team: Red Surgery  Consulting Attending: Dr. 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 Pt states she first started vomiting at ~02:00, and last vomited at ~05:00. Pt describes emesis as dark, coffee ground, and bowel movements as dark and watery.   Pt currently feeling well, and denies any acute complaints. Denies fevers/chills, headaches, dizziness, chest pain, palpitations, dyspnea, abdominal pain, nausea, vomiting, and diarrhea    ------------------------------------------------------------------------------------------------------------------------------------------------------------------------------------------------------  PAST MEDICAL & SURGICAL HISTORY:  Hypertension      Diabetes      Back pain      Lymphedema of both lower extremities      HLD (hyperlipidemia)      History of cholecystectomy      Allergies    No Known Allergies    Intolerances    -------------------------------------------------------------------------------------------------------------------------------------------------------------------------------------------------------  MEDICATIONS  (STANDING):  insulin regular  human recombinant 5 Unit(s) IV Push once  octreotide  Injectable 50 MICROGram(s) IV Push Once  pantoprazole  Injectable 40 milliGRAM(s) IV Push Once  vancomycin  IVPB. 1000 milliGRAM(s) IV Intermittent once    MEDICATIONS  (PRN):    -------------------------------------------------------------------------------------------------------------------------------------------------------------------------------------------------------  SOCIAL HISTORY:    FAMILY HISTORY:    -------------------------------------------------------------------------------------------------------------------------------------------------------------------------------------------------------  Vital Signs Last 24 Hrs  T(C): 36.9 (11 Feb 2025 07:22), Max: 36.9 (11 Feb 2025 07:22)  T(F): 98.4 (11 Feb 2025 07:22), Max: 98.4 (11 Feb 2025 07:22)  HR: 90 (11 Feb 2025 07:22) (90 - 90)  BP: 100/68 (11 Feb 2025 07:22) (100/68 - 100/68)  BP(mean): --  RR: 20 (11 Feb 2025 07:22) (20 - 20)  SpO2: 97% (11 Feb 2025 07:22) (97% - 97%)    Parameters below as of 11 Feb 2025 07:22  Patient On (Oxygen Delivery Method): room air    I&O's Summary    ----------------------------------------------------------------------------------------------------------------------------------------------------------------------------------------------------------  LABS:                        6.0    22.80 )-----------( 530      ( 11 Feb 2025 09:14 )             19.5     02-11    140  |  112[H]  |  69[H]  ----------------------------<  253[H]  6.5[HH]   |  17[L]  |  0.75    Ca    8.7      11 Feb 2025 09:14    TPro  5.5[L]  /  Alb  2.4[L]  /  TBili  0.2  /  DBili  x   /  AST  25  /  ALT  22  /  AlkPhos  85  02-11    LIVER FUNCTIONS - ( 11 Feb 2025 09:14 )  Alb: 2.4 g/dL / Pro: 5.5 g/dL / ALK PHOS: 85 U/L / ALT: 22 U/L / AST: 25 U/L / GGT: x           Urinalysis Basic - ( 11 Feb 2025 09:14 )  Color: x / Appearance: x / SG: x / pH: x  Gluc: 253 mg/dL / Ketone: x  / Bili: x / Urobili: x   Blood: x / Protein: x / Nitrite: x   Leuk Esterase: x / RBC: x / WBC x   Sq Epi: x / Non Sq Epi: x / Bacteria: x    CAPILLARY BLOOD GLUCOSE    POCT Blood Glucose.: 220 mg/dL (11 Feb 2025 10:59)    Cultures:    ----------------------------------------------------------------------------------------------------------------------------------------------------------------------------------------------------------  PHYSICAL EXAM:  General: NAD, resting comfortably  HEENT: NC/AT, EOMI, normal hearing, no oral lesions, no LAD, neck supple  Pulmonary: Normal resp effort, CTA-B  Cardiovascular: NSR, no murmurs  Abdominal: Soft, ND/NT, no organomegaly  Groin: Soft, nontender, no ecchymosis/hematoma, no erythema, no edema.  Extremities: (+) DP/PT pulses. Sensation and motor function intact. 2+ Pitting edema  Neuro: A/O x 3, CNs II-XII grossly intact, normal sensation, no focal deficits  Pulses: Palpable distal pulses    -------------------------------------------------------------------------------------------------------------------------------------------------------------------------------------------------------------  RADIOLOGY & ADDITIONAL STUDIES:  < from: CT Abdomen and Pelvis w/ IV Cont (01.10.25 @ 09:26) >  FINDINGS:  LOWER CHEST: Coronary artery, aortic valve, and mitral annular   calcification. Minimal bibasilar atelectasis.    LIVER: Within normal limits.  BILE DUCTS: Minimal intrahepatic duct dilation. No significant   extrahepatic duct dilation, the common bile duct measuring up to 6 mm in   caliber.  GALLBLADDER: Cholecystectomy.  SPLEEN: Normal in size. A few subcentimeter hypodense foci within the   spleen that are too small to characterize.  PANCREAS: Within normal limits.  ADRENALS: Within normal limits.  KIDNEYS/URETERS: Mild right hydroureteronephrosis to the level of the   distal ureter. No evidence of obstructive urolithiasis. The distal right   ureter is noted to narrow as it courses lateral to a stool-filled   distended rectum. No left hydronephrosis. Suspect a duplicated left renal   collecting system. Subcentimeter hypodense foci in both kidneys that are   too small to characterize.    BLADDER: Underdistended. Mild focal mural thickening along the right   bladder wall (series 3 image 117).  REPRODUCTIVE ORGANS: Uterus and adnexa within normal limits.    BOWEL: Stool-filled distended rectum measuring 9.0 cm in diameter. Mild   rectal wall thickening and perirectal fat infiltration. No significant   upstream bowel distention to suggest bowel obstruction. Colonic   diverticulosis. Focal segment of mural thickening within the proximal   sigmoid colon (series 3 image 108), possibly exaggerated by   underdistention. Appendix is normal.  PERITONEUM/RETROPERITONEUM: Within normal limits.  VESSELS: Atherosclerotic changes. Approximately moderate stenosis in the   proximal superior mesenteric artery  LYMPH NODES: No lymphadenopathy.  ABDOMINAL WALL: Soft tissue defect overlying the lower sacrum, consistent   with a decubitus wound. There is associated soft tissue infiltration   without discrete collection. Associated erosive and sclerotic changes to   the lower sacrum and to the coccyx (series 4 image 78). Stool overflows   from the anorectum into the lower aspect of the wound. Small   fat-containing left inguinal hernia. Tiny fat-containing   umbilical/periumbilical hernia. Small focus of subcutaneous gas in the   right ventral abdominal wall, which may be injection related.  BONES: Findings pertaining to the sacrum and coccyx as above.   Degenerative changes with multilevel central canal narrowing in the   lumbar spine, most significantly at L4-L5. S-shaped scoliosis. Ossific   lesion measuring 5.1 x 2.0 x 2.8 cm projecting laterally exophytically   from the right femoral shaft without clear medullary continuity and   appears cortically based (series 5 image 55).    IMPRESSION:  *  Soft tissue defect overlying the lower sacrum, consistent with a   decubitus wound. Associated erosive and sclerotic changes to the lower   sacrum and coccyx, concerning for osteomyelitis. Stool overflows from the   anorectum into the lower aspect of the wound.  *  Stool-filled distended rectum. Mild rectal wall thickening and   perirectal fat infiltration, which may represent stercoral proctitis.  *  Focal segment of mural thickening in the proximal sigmoid colon,   possibly secondary to underdistention, diverticulitis, or colitis, with   colonic neoplasm also within the differential. Consider evaluation with   colonoscopy.  *  Exophytic right femoral ossific lesion measuring 5.1 x 2.0 x 2.8 cm,   indeterminate, with parosteal osteosarcoma within the differential.   Evaluation with MRI of the femur without and with contrast is recommended.  *  Underdistended urinary bladder. Mild nonspecific focal mural   thickening along the right bladder wall. Correlate with urinalysis.   Bladder neoplasm is not excluded.  *  Mild right hydroureteronephrosis to the level of the distal ureter,   with narrowing of the ureter as it courses lateral to the stool-filled   distended rectum. Consideration is given to obstruction by mass effect   from the rectum, with other etiology not excluded. Advise short interval   follow-up imaging to ensure resolution.  *  Minimal intrahepatic biliary duct dilation of unclear significance,   with consideration given to postcholecystectomy state.    < end of copied text > GENERAL SURGERY CONSULT NOTE  Consulting Surgical Team: [facility]  Consulting Attending: Dr. 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 Pt states she first started vomiting at ~02:00, and last vomited at ~05:00. Pt describes emesis as dark, coffee ground, and bowel movements as dark and watery.   Pt currently feeling well, and denies any acute complaints. Denies fevers/chills, headaches, dizziness, chest pain, palpitations, dyspnea, abdominal pain, nausea, vomiting, and diarrhea    ------------------------------------------------------------------------------------------------------------------------------------------------------------------------------------------------------  PAST MEDICAL & SURGICAL HISTORY:  Hypertension      Diabetes      Back pain      Lymphedema of both lower extremities      HLD (hyperlipidemia)      History of cholecystectomy      Allergies    No Known Allergies    Intolerances    -------------------------------------------------------------------------------------------------------------------------------------------------------------------------------------------------------  MEDICATIONS  (STANDING):  insulin regular  human recombinant 5 Unit(s) IV Push once  octreotide  Injectable 50 MICROGram(s) IV Push Once  pantoprazole  Injectable 40 milliGRAM(s) IV Push Once  vancomycin  IVPB. 1000 milliGRAM(s) IV Intermittent once    MEDICATIONS  (PRN):    -------------------------------------------------------------------------------------------------------------------------------------------------------------------------------------------------------  SOCIAL HISTORY:    FAMILY HISTORY:    -------------------------------------------------------------------------------------------------------------------------------------------------------------------------------------------------------  Vital Signs Last 24 Hrs  T(C): 36.9 (11 Feb 2025 07:22), Max: 36.9 (11 Feb 2025 07:22)  T(F): 98.4 (11 Feb 2025 07:22), Max: 98.4 (11 Feb 2025 07:22)  HR: 90 (11 Feb 2025 07:22) (90 - 90)  BP: 100/68 (11 Feb 2025 07:22) (100/68 - 100/68)  BP(mean): --  RR: 20 (11 Feb 2025 07:22) (20 - 20)  SpO2: 97% (11 Feb 2025 07:22) (97% - 97%)    Parameters below as of 11 Feb 2025 07:22  Patient On (Oxygen Delivery Method): room air    I&O's Summary    ----------------------------------------------------------------------------------------------------------------------------------------------------------------------------------------------------------  LABS:                        6.0    22.80 )-----------( 530      ( 11 Feb 2025 09:14 )             19.5     02-11    140  |  112[H]  |  69[H]  ----------------------------<  253[H]  6.5[HH]   |  17[L]  |  0.75    Ca    8.7      11 Feb 2025 09:14    TPro  5.5[L]  /  Alb  2.4[L]  /  TBili  0.2  /  DBili  x   /  AST  25  /  ALT  22  /  AlkPhos  85  02-11    LIVER FUNCTIONS - ( 11 Feb 2025 09:14 )  Alb: 2.4 g/dL / Pro: 5.5 g/dL / ALK PHOS: 85 U/L / ALT: 22 U/L / AST: 25 U/L / GGT: x           Urinalysis Basic - ( 11 Feb 2025 09:14 )  Color: x / Appearance: x / SG: x / pH: x  Gluc: 253 mg/dL / Ketone: x  / Bili: x / Urobili: x   Blood: x / Protein: x / Nitrite: x   Leuk Esterase: x / RBC: x / WBC x   Sq Epi: x / Non Sq Epi: x / Bacteria: x    CAPILLARY BLOOD GLUCOSE    POCT Blood Glucose.: 220 mg/dL (11 Feb 2025 10:59)    Cultures:    ----------------------------------------------------------------------------------------------------------------------------------------------------------------------------------------------------------  PHYSICAL EXAM:  General: NAD, resting comfortably  HEENT: NC/AT, EOMI, normal hearing, no oral lesions, no LAD, neck supple  Pulmonary: Normal resp effort, CTA-B  Cardiovascular: NSR, no murmurs  Abdominal: Soft, ND/NT, no organomegaly  Groin: Soft, nontender, no ecchymosis/hematoma, no erythema, no edema.  Extremities: (+) DP/PT pulses. Sensation and motor function intact. 2+ Pitting edema  Neuro: A/O x 3, CNs II-XII grossly intact, normal sensation, no focal deficits  Pulses: Palpable distal pulses    -------------------------------------------------------------------------------------------------------------------------------------------------------------------------------------------------------------  RADIOLOGY & ADDITIONAL STUDIES:  < from: CT Abdomen and Pelvis w/ IV Cont (01.10.25 @ 09:26) >  FINDINGS:  LOWER CHEST: Coronary artery, aortic valve, and mitral annular   calcification. Minimal bibasilar atelectasis.    LIVER: Within normal limits.  BILE DUCTS: Minimal intrahepatic duct dilation. No significant   extrahepatic duct dilation, the common bile duct measuring up to 6 mm in   caliber.  GALLBLADDER: Cholecystectomy.  SPLEEN: Normal in size. A few subcentimeter hypodense foci within the   spleen that are too small to characterize.  PANCREAS: Within normal limits.  ADRENALS: Within normal limits.  KIDNEYS/URETERS: Mild right hydroureteronephrosis to the level of the   distal ureter. No evidence of obstructive urolithiasis. The distal right   ureter is noted to narrow as it courses lateral to a stool-filled   distended rectum. No left hydronephrosis. Suspect a duplicated left renal   collecting system. Subcentimeter hypodense foci in both kidneys that are   too small to characterize.    BLADDER: Underdistended. Mild focal mural thickening along the right   bladder wall (series 3 image 117).  REPRODUCTIVE ORGANS: Uterus and adnexa within normal limits.    BOWEL: Stool-filled distended rectum measuring 9.0 cm in diameter. Mild   rectal wall thickening and perirectal fat infiltration. No significant   upstream bowel distention to suggest bowel obstruction. Colonic   diverticulosis. Focal segment of mural thickening within the proximal   sigmoid colon (series 3 image 108), possibly exaggerated by   underdistention. Appendix is normal.  PERITONEUM/RETROPERITONEUM: Within normal limits.  VESSELS: Atherosclerotic changes. Approximately moderate stenosis in the   proximal superior mesenteric artery  LYMPH NODES: No lymphadenopathy.  ABDOMINAL WALL: Soft tissue defect overlying the lower sacrum, consistent   with a decubitus wound. There is associated soft tissue infiltration   without discrete collection. Associated erosive and sclerotic changes to   the lower sacrum and to the coccyx (series 4 image 78). Stool overflows   from the anorectum into the lower aspect of the wound. Small   fat-containing left inguinal hernia. Tiny fat-containing   umbilical/periumbilical hernia. Small focus of subcutaneous gas in the   right ventral abdominal wall, which may be injection related.  BONES: Findings pertaining to the sacrum and coccyx as above.   Degenerative changes with multilevel central canal narrowing in the   lumbar spine, most significantly at L4-L5. S-shaped scoliosis. Ossific   lesion measuring 5.1 x 2.0 x 2.8 cm projecting laterally exophytically   from the right femoral shaft without clear medullary continuity and   appears cortically based (series 5 image 55).    IMPRESSION:  *  Soft tissue defect overlying the lower sacrum, consistent with a   decubitus wound. Associated erosive and sclerotic changes to the lower   sacrum and coccyx, concerning for osteomyelitis. Stool overflows from the   anorectum into the lower aspect of the wound.  *  Stool-filled distended rectum. Mild rectal wall thickening and   perirectal fat infiltration, which may represent stercoral proctitis.  *  Focal segment of mural thickening in the proximal sigmoid colon,   possibly secondary to underdistention, diverticulitis, or colitis, with   colonic neoplasm also within the differential. Consider evaluation with   colonoscopy.  *  Exophytic right femoral ossific lesion measuring 5.1 x 2.0 x 2.8 cm,   indeterminate, with parosteal osteosarcoma within the differential.   Evaluation with MRI of the femur without and with contrast is recommended.  *  Underdistended urinary bladder. Mild nonspecific focal mural   thickening along the right bladder wall. Correlate with urinalysis.   Bladder neoplasm is not excluded.  *  Mild right hydroureteronephrosis to the level of the distal ureter,   with narrowing of the ureter as it courses lateral to the stool-filled   distended rectum. Consideration is given to obstruction by mass effect   from the rectum, with other etiology not excluded. Advise short interval   follow-up imaging to ensure resolution.  *  Minimal intrahepatic biliary duct dilation of unclear significance,   with consideration given to postcholecystectomy state.    < end of copied text > GENERAL SURGERY CONSULT NOTE  Consulting Surgical Team: [facility]  Consulting Attending: Dr. 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 Pt states she first started vomiting at ~02:00, and last vomited at ~05:00. Pt describes emesis as dark, coffee ground, and bowel movements as dark and watery.   Pt currently feeling well, and denies any acute complaints. Denies fevers/chills, headaches, dizziness, chest pain, palpitations, dyspnea, abdominal pain, nausea, vomiting, and diarrhea    ------------------------------------------------------------------------------------------------------------------------------------------------------------------------------------------------------  PAST MEDICAL & SURGICAL HISTORY:  Hypertension      Diabetes      Back pain      Lymphedema of both lower extremities      HLD (hyperlipidemia)      History of cholecystectomy      Allergies    No Known Allergies    Intolerances    -------------------------------------------------------------------------------------------------------------------------------------------------------------------------------------------------------  MEDICATIONS  (STANDING):  insulin regular  human recombinant 5 Unit(s) IV Push once  octreotide  Injectable 50 MICROGram(s) IV Push Once  pantoprazole  Injectable 40 milliGRAM(s) IV Push Once  vancomycin  IVPB. 1000 milliGRAM(s) IV Intermittent once    MEDICATIONS  (PRN):    -------------------------------------------------------------------------------------------------------------------------------------------------------------------------------------------------------  SOCIAL HISTORY:    FAMILY HISTORY:    -------------------------------------------------------------------------------------------------------------------------------------------------------------------------------------------------------  Vital Signs Last 24 Hrs  T(C): 36.9 (11 Feb 2025 07:22), Max: 36.9 (11 Feb 2025 07:22)  T(F): 98.4 (11 Feb 2025 07:22), Max: 98.4 (11 Feb 2025 07:22)  HR: 90 (11 Feb 2025 07:22) (90 - 90)  BP: 100/68 (11 Feb 2025 07:22) (100/68 - 100/68)  BP(mean): --  RR: 20 (11 Feb 2025 07:22) (20 - 20)  SpO2: 97% (11 Feb 2025 07:22) (97% - 97%)    Parameters below as of 11 Feb 2025 07:22  Patient On (Oxygen Delivery Method): room air    I&O's Summary    ----------------------------------------------------------------------------------------------------------------------------------------------------------------------------------------------------------  LABS:                        6.0    22.80 )-----------( 530      ( 11 Feb 2025 09:14 )             19.5     02-11    140  |  112[H]  |  69[H]  ----------------------------<  253[H]  6.5[HH]   |  17[L]  |  0.75    Ca    8.7      11 Feb 2025 09:14    TPro  5.5[L]  /  Alb  2.4[L]  /  TBili  0.2  /  DBili  x   /  AST  25  /  ALT  22  /  AlkPhos  85  02-11    LIVER FUNCTIONS - ( 11 Feb 2025 09:14 )  Alb: 2.4 g/dL / Pro: 5.5 g/dL / ALK PHOS: 85 U/L / ALT: 22 U/L / AST: 25 U/L / GGT: x           Urinalysis Basic - ( 11 Feb 2025 09:14 )  Color: x / Appearance: x / SG: x / pH: x  Gluc: 253 mg/dL / Ketone: x  / Bili: x / Urobili: x   Blood: x / Protein: x / Nitrite: x   Leuk Esterase: x / RBC: x / WBC x   Sq Epi: x / Non Sq Epi: x / Bacteria: x    CAPILLARY BLOOD GLUCOSE    POCT Blood Glucose.: 220 mg/dL (11 Feb 2025 10:59)    Cultures:    ----------------------------------------------------------------------------------------------------------------------------------------------------------------------------------------------------------  PHYSICAL EXAM:  General: NAD, resting comfortably  HEENT: NC/AT, EOMI, normal hearing, no oral lesions, no LAD, neck supple  Pulmonary: Normal resp effort, CTA-B  Cardiovascular: NSR, no murmurs  Abdominal: Soft, ND/NT, no organomegaly  Groin: Soft, nontender, no ecchymosis/hematoma, no erythema, no edema.  Extremities: (+) DP/PT pulses. Sensation and motor function intact. 2+ Pitting edema  Back: (+) Sacral ulcer with bone exposed; s/p bedside superficial debridement.  Neuro: A/O x 3, CNs II-XII grossly intact, normal sensation, no focal deficits  Pulses: Palpable distal pulses    -------------------------------------------------------------------------------------------------------------------------------------------------------------------------------------------------------------  RADIOLOGY & ADDITIONAL STUDIES:  < from: CT Abdomen and Pelvis w/ IV Cont (01.10.25 @ 09:26) >  FINDINGS:  LOWER CHEST: Coronary artery, aortic valve, and mitral annular   calcification. Minimal bibasilar atelectasis.    LIVER: Within normal limits.  BILE DUCTS: Minimal intrahepatic duct dilation. No significant   extrahepatic duct dilation, the common bile duct measuring up to 6 mm in   caliber.  GALLBLADDER: Cholecystectomy.  SPLEEN: Normal in size. A few subcentimeter hypodense foci within the   spleen that are too small to characterize.  PANCREAS: Within normal limits.  ADRENALS: Within normal limits.  KIDNEYS/URETERS: Mild right hydroureteronephrosis to the level of the   distal ureter. No evidence of obstructive urolithiasis. The distal right   ureter is noted to narrow as it courses lateral to a stool-filled   distended rectum. No left hydronephrosis. Suspect a duplicated left renal   collecting system. Subcentimeter hypodense foci in both kidneys that are   too small to characterize.    BLADDER: Underdistended. Mild focal mural thickening along the right   bladder wall (series 3 image 117).  REPRODUCTIVE ORGANS: Uterus and adnexa within normal limits.    BOWEL: Stool-filled distended rectum measuring 9.0 cm in diameter. Mild   rectal wall thickening and perirectal fat infiltration. No significant   upstream bowel distention to suggest bowel obstruction. Colonic   diverticulosis. Focal segment of mural thickening within the proximal   sigmoid colon (series 3 image 108), possibly exaggerated by   underdistention. Appendix is normal.  PERITONEUM/RETROPERITONEUM: Within normal limits.  VESSELS: Atherosclerotic changes. Approximately moderate stenosis in the   proximal superior mesenteric artery  LYMPH NODES: No lymphadenopathy.  ABDOMINAL WALL: Soft tissue defect overlying the lower sacrum, consistent   with a decubitus wound. There is associated soft tissue infiltration   without discrete collection. Associated erosive and sclerotic changes to   the lower sacrum and to the coccyx (series 4 image 78). Stool overflows   from the anorectum into the lower aspect of the wound. Small   fat-containing left inguinal hernia. Tiny fat-containing   umbilical/periumbilical hernia. Small focus of subcutaneous gas in the   right ventral abdominal wall, which may be injection related.  BONES: Findings pertaining to the sacrum and coccyx as above.   Degenerative changes with multilevel central canal narrowing in the   lumbar spine, most significantly at L4-L5. S-shaped scoliosis. Ossific   lesion measuring 5.1 x 2.0 x 2.8 cm projecting laterally exophytically   from the right femoral shaft without clear medullary continuity and   appears cortically based (series 5 image 55).    IMPRESSION:  *  Soft tissue defect overlying the lower sacrum, consistent with a   decubitus wound. Associated erosive and sclerotic changes to the lower   sacrum and coccyx, concerning for osteomyelitis. Stool overflows from the   anorectum into the lower aspect of the wound.  *  Stool-filled distended rectum. Mild rectal wall thickening and   perirectal fat infiltration, which may represent stercoral proctitis.  *  Focal segment of mural thickening in the proximal sigmoid colon,   possibly secondary to underdistention, diverticulitis, or colitis, with   colonic neoplasm also within the differential. Consider evaluation with   colonoscopy.  *  Exophytic right femoral ossific lesion measuring 5.1 x 2.0 x 2.8 cm,   indeterminate, with parosteal osteosarcoma within the differential.   Evaluation with MRI of the femur without and with contrast is recommended.  *  Underdistended urinary bladder. Mild nonspecific focal mural   thickening along the right bladder wall. Correlate with urinalysis.   Bladder neoplasm is not excluded.  *  Mild right hydroureteronephrosis to the level of the distal ureter,   with narrowing of the ureter as it courses lateral to the stool-filled   distended rectum. Consideration is given to obstruction by mass effect   from the rectum, with other etiology not excluded. Advise short interval   follow-up imaging to ensure resolution.  *  Minimal intrahepatic biliary duct dilation of unclear significance,   with consideration given to postcholecystectomy state.    < end of copied text >

## 2025-02-11 NOTE — H&P ADULT - PROBLEM SELECTOR PLAN 1
CT neg active GI bleed.     - CBC q8h   - IV PPI BID  - f/u GI recs -> pt deferring EGD at this time  - 2 large bore IVs   - maintain active T&S, transfuse for Hgb >8 Multiple episodes of melena and coffee-ground emesis x 1 day, associated w/ hypotension and acute drop in Hgb. Ddx includes PUD vs AVMs vs malignancy (unclear last EGD), less likely dieulafoy lesion, smitha chen tear. CT neg active GI bleed.     - CBC q8h   - IV PPI BID  - f/u GI recs -> pt deferring EGD at this time  - 2 large bore IVs   - maintain active T&S, transfuse for Hgb >8 Multiple episodes of melena and coffee-ground emesis x 1 day, associated w/ hypotension and acute drop in Hgb. Ddx includes PUD vs AVMs vs malignancy (unclear last EGD), less likely dieulafoy lesion, smitha chen tear. CT neg active GI bleed.     - CBC q8h   - IV PPI BID  - NPO for now   - f/u GI recs -> pt now agreeable to EGD   - 2 large bore IVs   - maintain active T&S, transfuse for Hgb >8

## 2025-02-11 NOTE — CONSULT NOTE ADULT - SUBJECTIVE AND OBJECTIVE BOX
Initial GI Consult    Patient is a 80y old  Female who presents with a chief complaint of CGE and melena    HPI:    Patient is a 79 yo F w/ PMHx of HTN, lymphedema, arthritis, and sacral decubitus ulcer s/p debridement w/ recent admission in 1/2025 for OM (patient and family refused PICC for prolonged IV abx and discharged w/ PO abx) p/w one day hx of ?CGE and melena, found to have acute on chronic anemia and elevated BUN. GI consulted for c/f UGIB.     History obtained from both patient and chart review. Patient reports that she had a big container of cranberry juice and shortly following that, she developed coffee ground emesis x 3. Per ED staff's history from NH, patient was found to have elevated BUN and also appeared at the NH. Given these episodes, patient was sent to the ED. No prior endoscopic eval. No antiplatelet or AC. No NSAID use. Denies weight loss, N/V, dysphagia, odynophagia, abdominal pain, or hematochezia. No prior episodes of GIB.     In the ED, VSS. Labs notable for hgb 6 (hgb mid 9s in 1/2025), WBC 22.8, Plt 530, K 6.5, BUN 69 (39 on 1/15/25) w/ normal Cr, trops 64, LA 2.1. CT w/ no evidence of active GI bleed. Per ED documentation, rectal exam w/ melena. Also found to have influenza A.     PAST MEDICAL & SURGICAL HISTORY:  Hypertension      Diabetes      Back pain      Lymphedema of both lower extremities      HLD (hyperlipidemia)      History of cholecystectomy        FAMILY HISTORY:      MEDS:  MEDICATIONS  (STANDING):    MEDICATIONS  (PRN):    Allergies    No Known Allergies    Intolerances      ______________________________________________________________________  PHYSICAL EXAM:  T(C): 36.9 (02-11-25 @ 07:22), Max: 36.9 (02-11-25 @ 07:22)  HR: 90 (02-11-25 @ 07:22)  BP: 100/68 (02-11-25 @ 07:22)  RR: 20 (02-11-25 @ 07:22)  SpO2: 97% (02-11-25 @ 07:22)  Wt(kg): --      GEN: NAD, normocephalic  CVS: S1S2+  CHEST: no respiratory distress  ABD: soft, nontender, nondistended, bowel sounds present  EXTR: no cyanosis, no clubbing, no edema  NEURO: A&OX3  SKIN:  warm;  non icteric    _____________________________________________________________________  LABS:                        6.0    22.80 )-----------( 530      ( 11 Feb 2025 09:14 )             19.5     02-11    140  |  112[H]  |  69[H]  ----------------------------<  253[H]  6.5[HH]   |  17[L]  |  0.75    Ca    8.7      11 Feb 2025 09:14    TPro  5.5[L]  /  Alb  2.4[L]  /  TBili  0.2  /  DBili  x   /  AST  25  /  ALT  22  /  AlkPhos  85  02-11    LIVER FUNCTIONS - ( 11 Feb 2025 09:14 )  Alb: 2.4 g/dL / Pro: 5.5 g/dL / ALK PHOS: 85 U/L / ALT: 22 U/L / AST: 25 U/L / GGT: x             ____________________________________________

## 2025-02-11 NOTE — H&P ADULT - PROBLEM SELECTOR PLAN 8
Home meds: lipitor 20mg qd     - continue home statin A1c 7.3% in 1/2025.     - ALBA  - CC diet   - hypoglycemia protocol

## 2025-02-11 NOTE — H&P ADULT - NSHPREVIEWOFSYSTEMS_GEN_ALL_CORE
VITALS:   T(C): 36.7 (02-11-25 @ 16:39), Max: 36.9 (02-11-25 @ 07:22)  HR: 88 (02-11-25 @ 16:39) (67 - 90)  BP: 95/54 (02-11-25 @ 16:39) (82/45 - 195/74)  RR: 21 (02-11-25 @ 16:39) (17 - 21)  SpO2: 94% (02-11-25 @ 16:39) (94% - 97%)    GENERAL: NAD, lying in bed comfortably  HEAD:  Atraumatic, Normocephalic  EYES: conjunctiva and sclera clear  ENT: Moist mucous membranes  NECK: Supple, No JVD  CHEST/LUNG: Clear to auscultation bilaterally; No rales, rhonchi, wheezing, or rubs. Unlabored respirations  HEART: Regular rate and rhythm; No murmurs, rubs, or gallops  ABDOMEN: BSx4; Soft, nontender, nondistended  EXTREMITIES:   No clubbing, cyanosis, or edema  NERVOUS SYSTEM:  A&Ox3, no focal deficits   SKIN: No rashes or lesions  Psych: Normal speech, normal behavior, normal affect REVIEW OF SYSTEMS:    CONSTITUTIONAL:  No weakness, fevers or chills  EYES/ENT:  No visual changes;  No vertigo or throat pain   NECK:  No pain or stiffness  RESPIRATORY:  No cough, wheezing, hemoptysis; No shortness of breath  CARDIOVASCULAR:  No chest pain or palpitations  GASTROINTESTINAL:  No abdominal or epigastric pain. No nausea, vomiting, or hematemesis; No diarrhea or constipation. No melena or hematochezia.  GENITOURINARY:  No dysuria, frequency or hematuria  MUSCULOSKELETAL:  FROM all extremities, normal strength, No calf tenderness  NEUROLOGICAL:  No numbness or weakness  SKIN:  No itching, rashes REVIEW OF SYSTEMS:    CONSTITUTIONAL:  No weakness, fevers or chills  EYES/ENT:  No visual changes;  No vertigo or throat pain   NECK:  No pain or stiffness  RESPIRATORY:  No cough, wheezing, hemoptysis; No shortness of breath  CARDIOVASCULAR:  No chest pain or palpitations  GASTROINTESTINAL:  + n/v, hematemesis, diarrhea, melena   GENITOURINARY:  No dysuria, frequency or hematuria  MUSCULOSKELETAL:  FROM all extremities, normal strength, No calf tenderness  NEUROLOGICAL:  No numbness or weakness  SKIN:  No itching, rashes

## 2025-02-11 NOTE — H&P ADULT - ATTENDING COMMENTS
Dagmar Smallwood is an 79 y/o F with PMH significant for HTN, T2DM, PAD, lymphedema, arthritis, Sacral pressure ulcer with OM refusing IV abx on PO, and bed-bound status presenting with diarrhea and coffee ground emesis. Found to hypotensive and anemic Hgb 6.0.    #UGIB  #Hypotension  - Protonix IV BID  - GI consulted, patient initially refused EGD and is now willing.   - NPO, IVF  - Patient is high risk of clinical decompensation. Low threshold to transfer to MICU if becomes hypotensive    #Acute blood loss anemia  -S/p 2u PRBC hgb 6.0 to 8.9  -Trend H/H q8h and transfuse <7.0    #Sepsis  #Influenza A  #Sacral and coccyx OM  -CXR neg, CT abdomen/pelvis with possible UTI. Also with Sacral and coccyx OM  -Start Cefepime/vanc. Send blood and urine cx, GIP, C diff, and MRSA PCR  -Tamiflu  - Wound care consult    #Troponin elevation  -No chest pain, suspect demand ischemic in the setting of hypotension    Time spent: 84 minutes  - Reviewing, and interpreting labs and testing.  - Independently obtaining a review of systems and performing a physical exam  - Reviewing consultant documentation/recommendations in addition to discussing plan of care with consultants.  - Counselling and educating patient and family regarding interpretation of aforementioned items and plan of care.  - This excludes time spent teaching residents/medical students

## 2025-02-11 NOTE — H&P ADULT - PROBLEM SELECTOR PLAN 9
DVT ppx: SCDs, hold in setting of GIB   Diet:   Dispo: Home meds: bisoprolol 10mg qd     - hold all home meds in setting of hypotension 2/2 GIB

## 2025-02-11 NOTE — ED PROVIDER NOTE - PHYSICAL EXAMINATION
GENERAL: well appearing in no acute distress, non-toxic appearing  HEAD: normocephalic, atraumatic  HEENT: pale conjunctiva  CARDIAC: regular rate and rhythm, normal S1S2, no appreciable murmurs, 2+ pulses in UE/LE b/l  PULM: normal breath sounds, clear to ascultation bilaterally, no rales, rhonchi, wheezing  GI: abdomen nondistended, soft, nontender, no guarding, rebound tenderness  : no CVA tenderness b/l, no suprapubic tenderness  SKIN: XXX

## 2025-02-11 NOTE — ED PROVIDER NOTE - NS ED ROS FT
General: denies fever, chills  CV: denies chest pain, palpitations  Resp: denies difficulty breathing, cough  Abdominal: +n/v, bloody stool, denies abdominal pain  : denies urinary pain or discharge  MSK: denies muscle aches, leg swelling  Neuro: denies headaches

## 2025-02-11 NOTE — H&P ADULT - NSHPSOCIALHISTORY_GEN_ALL_CORE
Lived w/  prior to [facility]/hospitalization  Mostly bedbound at baseline   Denied alcohol use  Former smoker  No illicit drug use

## 2025-02-11 NOTE — ED PROVIDER NOTE - CLINICAL SUMMARY MEDICAL DECISION MAKING FREE TEXT BOX
SG 79 y hx  HTN, lymphedema, b/l arthritis. present for nausea vomiting and bloody diarrhea from nursing home. pt A&OX4, but appears pale and confused at times. on chart review pt was recently adm for osteo of sacral ulcer, dc on augmentin. on arrival normotensive Hr 90 orally afebrile. pt appears very pale. abdomen soft non tender. yaa perform anorectal exam for chantel blood, send labs check for anemia, dehydration, and GI PCR given recent abx for c diff. do not think pt needs CTA for active bleed at this time, but will decide s/p rectal exam. PGY-2/Eulogio DO: 80-year-old female PMHx HTN, HLD, PVD, DM 2 on metformin, lymphedema, anemia, CKD, currently at [facility] for management of osteomyelitis of vertebra, sacral and sacrococcygeal region 2/2 decub ulcer (on Augmentin) presents to ED sent by facility members for evaluation of reported X3 episodes of vomiting that appeared to have blood in it, unknown amount of diarrhea.  Patient states vomiting began around 2 AM and her last episode was 5 AM, she states that she drank more cranberry juice than usual (1.5 cups) and that is why she believes her vomit was red.  Per EMS, facility members told them that there was blood in the vomit.  Patient denies any current nausea, chest pain, abdominal pain, fevers, chills, new leg swelling, cough, congestion, headache, vision changes, SOB.  States she was feeling fine yesterday but abruptly woke up at 2 AM with vomiting.  Denies any known cardiac history.  No other complaints at this time.    MDM: 80-year-old female with above-mentioned history on antibiotics currently for osteomyelitis isosacral ulcer, presenting with concern for blood in vomit and diarrhea since 2 AM.  Patient A&O x4 on exam but is not able to quantify or characterize her bowel movements.  Will call facility to clarify further.  Pale, tired appearing, nontoxic, abdomen obese but soft and nontender.  Glucose reportedly into 60s.  Less suspicion for ACS however will obtain troponin, possible viral syndrome, possible GIB, possible C. difficile iso hospitalization and antibiotic use, possibly DKA. HD stable, do not think at this time needs CT/CTA imaging for acute abd/active bleed. Rectal exam to be done. Orders reflect differentials. IVF/analgesia/antiemetics ordered. Reeval to dispo. : 80-year-old female PMHx HTN, HLD, PVD, DM 2 on metformin, lymphedema, anemia, CKD, currently at [facility] for management of osteomyelitis of vertebra, sacral and sacrococcygeal region 2/2 decub ulcer (on Augmentin) presents to ED sent by facility members for evaluation of reported X3 episodes of vomiting that appeared to have blood in it, unknown amount of diarrhea.    Patient states vomiting began around 2 AM and her last episode was 5 AM, she states that she drank more cranberry juice than usual (1.5 cups) and that is why she believes her vomit was red.  Per EMS, facility members told them that there was blood in the vomit.  Patient denies any current nausea, chest pain, abdominal pain, fevers, chills, new leg swelling, cough, congestion, headache, vision changes, SOB.  States she was feeling fine yesterday but abruptly woke up at 2 AM with vomiting.  Denies any known cardiac history.  No other complaints at this time.    MDM: 80-year-old female with above-mentioned history on antibiotics currently for osteomyelitis iso sacral ulcer, presenting with concern for blood in vomit and diarrhea since 2 AM.  Patient A&O x4 on exam but is not able to quantify or characterize her bowel movements.  Will call facility to clarify further.  Pale, tired appearing, nontoxic, abdomen obese but soft and nontender.  Glucose reportedly into 60s.  Less suspicion for ACS however will obtain troponin, possible viral syndrome, possible GIB, possible C. difficile iso hospitalization and antibiotic use, possibly DKA. HD stable, do not think at this time needs CT/CTA imaging for acute abd/active bleed. Rectal exam to be done. Orders reflect differentials. IVF/analgesia/antiemetics ordered. Reeval to dispo. : 80-year-old female PMHx HTN, HLD, PVD, DM 2 on metformin, lymphedema, anemia, CKD, currently at [facility] for management of osteomyelitis of vertebra, sacral and sacrococcygeal region 2/2 decub ulcer (on Augmentin) presents to ED sent by facility members for evaluation of reported X3 episodes of vomiting that appeared to have blood in it, unknown amount of diarrhea.    Patient states vomiting began around 2 AM and her last episode was 5 AM, she states that she drank more cranberry juice than usual (1.5 cups) and that is why she believes her vomit was red.  Per EMS, facility members told them that there was blood in the vomit.  Patient denies any current nausea, chest pain, abdominal pain, fevers, chills, new leg swelling, cough, congestion, headache, vision changes, SOB.  States she was feeling fine yesterday but abruptly woke up at 2 AM with vomiting.  Denies any known cardiac history.  No other complaints at this time.    MDM: 80-year-old female with above-mentioned history on antibiotics currently for osteomyelitis iso sacral ulcer, presenting with concern for blood in vomit and diarrhea since 2 AM.  Patient A&O x4 on exam but is not able to quantify or characterize her bowel movements.    Pale, tired appearing, nontoxic, abdomen obese but soft and nontender.  black  melena in a rectum    Glucose reportedly into 60s.  Less suspicion for ACS however will obtain troponin, possible viral syndrome, possible GIB, possible C. difficile iso hospitalization and antibiotic use, possibly DKA. HD stable, do not think at this time needs CT/CTA imaging for acute abd/active bleed.  blood transfusion ,troponin admission ZR

## 2025-02-11 NOTE — H&P ADULT - PROBLEM SELECTOR PLAN 2
Hgb 6-> 8.4 on repeat. No blood transfusions since admission.   2/2 to UGIB. Baseline hgb  ~9.     - transfuse for Hgb > 8  - maintain active T&S Hgb 6-> 8.4 on repeat s/p 2u pRBC.   2/2 to UGIB. Baseline hgb  ~9.     - transfuse for Hgb > 8  - maintain active T&S Hgb 6-> 8.4 on repeat s/p 2u pRBC.   2/2 to UGIB. Baseline hgb  ~9.     - CBC q8h   - transfuse for Hgb > 8  - maintain active T&S

## 2025-02-11 NOTE — H&P ADULT - PROBLEM SELECTOR PLAN 7
Home meds: bisoprolol 10mg qd     - hold all home meds in setting of hypotension 2/2 GIB Trop 64 -> 55. Likely in setting of demand. Patient currently asymptomatic and w/o EKG changes.     - monitor for chest pain

## 2025-02-11 NOTE — H&P ADULT - PROBLEM SELECTOR PLAN 4
WBC 22.8-> 31.86. Likely multifactorial 2/2 inflammatory response in setting of GIB, chronic sacral OM, influenza A.   CXR w/ clear lungs.     - abx:   - f/u blood cx x2   - tamiflu for flu A   - trend fever curve, WBC WBC 22.8-> 31.86. Likely multifactorial 2/2 inflammatory response in setting of GIB, chronic sacral OM, influenza A.   CXR w/ clear lungs.     - abx: vanc and cefepime   - f/u blood cx x2, U/A, urine cx   - tamiflu for flu A   - collect GI PCR (given possible norovirus at [facility]) and cdiff (has been on abx)   - trend fever curve, WBC

## 2025-02-11 NOTE — CONSULT NOTE ADULT - ASSESSMENT
80F with HTN, DM2, HDL, PVD, lymphedema, anemia, OM 2/2 sacral decubitus, presenting with flu and suspected UGIB iso uremia. s/p 2 units with hemoglobin~8; mentating well, vitally stable.     Plan:   - GI following; appreciate recs  - Recommend PPI BID  - CBC q8; keep Hgb >7 (or 8 if w/ cardiac disease), plt > 50k  - trend trops  - Monitor BMs for evidence of overt Gi bleeding (melena, hematochezia)  - not candidate for ICU at this time as patient HDS    Asif Feliz    80F with HTN, DM2, HDL, PVD, lymphedema, anemia, OM 2/2 sacral decubitus, presenting with flu and suspected UGIB iso uremia. s/p 2 units with hemoglobin~8; mentating well, vitally stable. No recent AC use at this time.     Plan:   - GI following; appreciate recs  - Recommend PPI BID, diet per GI; consider tamiflu as patient may be in window  - CBC q8; keep Hgb >7, plt > 50k  - trend trops  - Monitor BMs for evidence of overt Gi bleeding (melena, hematochezia)  - not candidate for ICU at this time as patient HDS    Asif Feliz

## 2025-02-11 NOTE — ED PROVIDER NOTE - IV ALTEPLASE DOOR HIDDEN
[show

## 2025-02-11 NOTE — H&P ADULT - NSHPLABSRESULTS_GEN_ALL_CORE
8.4    31.86 )-----------( 393      ( 11 Feb 2025 13:55 )             25.7     02-11    138  |  110[H]  |  64[H]  ----------------------------<  233[H]  5.5[H]   |  19[L]  |  0.75    Ca    8.6      11 Feb 2025 13:55    TPro  5.4[L]  /  Alb  2.4[L]  /  TBili  0.4  /  DBili  x   /  AST  19  /  ALT  23  /  AlkPhos  94  02-11    CAPILLARY BLOOD GLUCOSE  POCT Blood Glucose.: 205 mg/dL (11 Feb 2025 15:29)    EKG:     Imaging:   < from: CT Abdomen and Pelvis w/ IV Cont (02.11.25 @ 10:56) >    IMPRESSION:  No evidence of active GI bleed. No bowel wall thickening or acute GI   findings.    Nonspecific right lateral bladder wall thickening. Correlate with   urinalysis and possible cystitis and as necessary, cystoscopy to assess   for underlying bladder mass.    Sacral decubitus ulcer again noted with erosive changes of the underlying   lower sacrum and coccyx, concerning for osteomyelitis.    < end of copied text > 8.4    31.86 )-----------( 393      ( 11 Feb 2025 13:55 )             25.7     02-11    138  |  110[H]  |  64[H]  ----------------------------<  233[H]  5.5[H]   |  19[L]  |  0.75    Ca    8.6      11 Feb 2025 13:55    TPro  5.4[L]  /  Alb  2.4[L]  /  TBili  0.4  /  DBili  x   /  AST  19  /  ALT  23  /  AlkPhos  94  02-11    CAPILLARY BLOOD GLUCOSE  POCT Blood Glucose.: 205 mg/dL (11 Feb 2025 15:29)    EKG: SR, HR 83, QTc 444, no ST elevation/depression, no peaked T waves     Imaging:   < from: CT Abdomen and Pelvis w/ IV Cont (02.11.25 @ 10:56) >    IMPRESSION:  No evidence of active GI bleed. No bowel wall thickening or acute GI   findings.    Nonspecific right lateral bladder wall thickening. Correlate with   urinalysis and possible cystitis and as necessary, cystoscopy to assess   for underlying bladder mass.    Sacral decubitus ulcer again noted with erosive changes of the underlying   lower sacrum and coccyx, concerning for osteomyelitis.    < end of copied text >

## 2025-02-11 NOTE — CONSULT NOTE ADULT - SUBJECTIVE AND OBJECTIVE BOX
MICU CONSULT NOTE    Pt is 80F with PMH HTN, lymphedema, arthritis, and sacral decubitus ulcer s/p debridement w/ recent admission in 1/2025 for OM (patient and family refused PICC for prolonged IV abx and discharged w/ PO abx) p/w one day hx of ?CGE and melena, found to have acute on chronic anemia and elevated BUN.     Per ED staff's history from NH, patient was found to have elevated BUN and also appeared at the NH. Given these episodes, patient was sent to the ED. No prior endoscopic eval. No antiplatelet or AC. No NSAID use. Denies weight loss, N/V, dysphagia, odynophagia, abdominal pain, or hematochezia. No prior episodes of GIB.     In the ED, VSS. Labs notable for hgb 6 (hgb mid 9s in 1/2025), leukocytosis, BUN 69 (39 on 1/15/25) w/ normal Cr. CT w/ no evidence of active GI bleed. Also Found to have influenza A.     GI consulted for c/f UGIB, considering EGD. MICU consulted for initial hypotension.     Past Medical and Surgical History  Hypertension  Diabetes  Back pain  Lymphedema of both lower extremities  HLD (hyperlipidemia)  History of cholecystectomy    Meds:   albuterol/ipratropium for Nebulization.; insulin regular  human recombinant    Allergies  No Known Allergies    --------------------------------    OBJECTIVE:  ICU Vital Signs Last 24 Hrs  T(C): 36.9 (11 Feb 2025 09:18), Max: 36.9 (11 Feb 2025 07:22)  T(F): 98.4 (11 Feb 2025 09:18), Max: 98.4 (11 Feb 2025 07:22)  HR: 76 (11 Feb 2025 09:18) (76 - 90)  BP: 116/54 (11 Feb 2025 09:18) (82/45 - 116/54)  BP(mean): --  ABP: --  ABP(mean): --  RR: 18 (11 Feb 2025 09:18) (18 - 20)  SpO2: 96% (11 Feb 2025 09:18) (96% - 97%)    O2 Parameters below as of 11 Feb 2025 09:18  Patient On (Oxygen Delivery Method): room air      POCT Blood Glucose.: 221 mg/dL (11 Feb 2025 12:14)    PHYSICAL EXAM:  GENERAL: NAD, lying in bed, a little pale   HEAD:  Atraumatic, normocephalic  EYES: PERRLA, conjunctiva and sclera clear  NECK: Supple,   HEART: Regular rate and rhythm  LUNGS: Unlabored respirations.    ABDOMEN: Soft, nontender, distended/edematous +BS  EXTREMITIES: edematous nonpitting 3+   NERVOUS SYSTEM: AnOx3 following commands     LABS:                        8.4    31.86 )-----------( 393      ( 11 Feb 2025 13:55 )             25.7     Hgb Trend: 8.4<--, 6.0<--  02-11    138  |  110[H]  |  64[H]  ----------------------------<  233[H]  5.5[H]   |  19[L]  |  0.75    Ca    8.6      11 Feb 2025 13:55    TPro  5.4[L]  /  Alb  2.4[L]  /  TBili  0.4  /  DBili  x   /  AST  19  /  ALT  23  /  AlkPhos  94  02-11    Creatinine Trend: 0.75<--, 0.74<--, 0.75<--, 1.18<--, 1.39<--, 1.39<--      Venous Blood Gas:  02-11 @ 09:13  7.30/36/57/18/90.8  VBG Lactate: 2.1

## 2025-02-11 NOTE — ED PROVIDER NOTE - PROGRESS NOTE DETAILS
SG S y hx  HTN, lymphedema, b/l arthritis. present for nausea vomiting and bloody diarrhea from nursing home. pt A&OX4, but appears pale and confused at times. on chart review pt was recently adm for osteo of sacral ulcer, dc on augmentin. on arrival normotensive Hr 90 orally afebrile. pt appears very pale. abdomen soft non tender. yaa perform anorectal exam for chnatel blood, send labs check for anemia, dehydration, and GI PCR given recent abx for c diff. do not think pt needs CTA for active bleed at this time, but will decide s/p rectal exam. EM PGY-2/DO Eulogio: Spoke to nurse on floor from [facility] -- per staff, patient began vomiting ~0500 with multiple episodes of dark, coffee ground appearing emesis. Had "large amount of dark colored loose, watery stools" they noticed after changing pts diaper. Endorse pt was feeling fine yesterday, but have concern also as other residents at this facility have had norovirus. Also expressed she was pale appearing, BUN was high at facility, so suspicious  for GIB. SG pt had large volume melena, hypotensive, blood bank called emergent release 2u, octreotide, protonix ordered, GI fellow consulted SG pt had large volume melena, hypotensive, blood bank called emergent release 2u, octreotide, protonix ordered, GI fellow consulted, aware. also discussed case with surgery given recent history of stage IV sacral decub w/ vertebral osteo, given concern for fistula, CTA ordered. Anticipate ICU admission. Surgery to see.  Pt also noted to have K 6.6. On EKG, no peaked t-waves. Glucose >250, given insulin, no lokelma iso GIB. Awaiting repeat. Continue to monitor closely. EM PGY-2/Eulogio, DO: on rpt BMP after hyperK tx, shows still elevated K but hemolyzed, labs redrawn and sent, if still high will treat w/ more insulin. Pending rpt labs and cbc for admission. Trops flat.

## 2025-02-11 NOTE — ED ADULT NURSE REASSESSMENT NOTE - NS ED NURSE REASSESS COMMENT FT1
questioned 's regarding no D50 with insulin for hyperkalemia; was told glucose high no dextrose needed questioned dr sánchez regarding no D50 with insulin for hyperkalemia; was told glucose high no dextrose needed

## 2025-02-11 NOTE — CONSULT NOTE ADULT - TIME BILLING
Review of current and prior medical records, labs, imaging, devising plan of care, coordination with consultants/ ED team, counseling of patient and family. This time does not include time spent on teaching.

## 2025-02-11 NOTE — ED ADULT NURSE NOTE - OBJECTIVE STATEMENT
80y f pt arrived via ems; emt reports pt vomiting blood; pt coming from nursing home; pt arrived looking very pale and diaphoretic; pt states vomited blood 1 time and states had bloody stool 1 time; pt bp very low upon arrival; md at bedside; pt wearing a diaper when turned diaper full of bloody stool; has the odor of melena; pt states does not walk; both legs edematous; pt states normal; pt c/o nausea; pt states has a pressure wound to sacrum that is being treated; cleaned pt of stool; pt has unstageable wound to sacrum approx 5" x 3" and full of feces; pt given 2 units of emergent blood; aox3; no resp distress; no chest pain; no abd pain; no fever; pt reports feeling very cold; 2 ivs placed; pt medicated per orders; call bell in reach; monitoring pt closely

## 2025-02-11 NOTE — CONSULT NOTE ADULT - ASSESSMENT
Patient is a 81 yo F w/ PMHx of HTN, lymphedema, arthritis, and sacral decubitus ulcer s/p debridement w/ recent admission in 1/2025 for OM (patient and family refused PICC for prolonged IV abx and discharged w/ PO abx) p/w one day hx of ?CGE and melena, found to have acute on chronic anemia and elevated BUN. GI consulted for c/f UGIB.     #?CGE   #Melena   #Acute on chronic anemia  #Elevated BUN  #Influenza A   #Hx of OM  Patient p/w ?CGE x 3 and melena on exam per ED documentation. Labs with acute on chronic anemia and elevated BUN. Clinical presentation most consistent with upper GI bleeding, the differential diagnosis for which includes esophagitis, peptic ulcer disease, erosive gastropathy, arteriovenous malformation [AVM], malignancy, or less likely Ania-Coyne tear, Dieulafoy lesion, or Driss lesions. Discussed w/ patient re indication, alternatives, and risks of EGD; patient deferring at this time.     Recommendations:   - would transfuse 1u pRBC now  - please keep NPO for now  - please start PPI BID  - trend CBC q8hrs, transfuse if hgb < 7  - trend trops  - will re-discuss w/ patient later today re EGD    All recs are preliminary until attending attestation.    Romero Ramsey, PGY-4  Gastroenterology & Hepatology Fellow  Available on TEAMS  Long range pager #: [phone]  Short range pager#: [phone]    For non-urgent consults, please send email to [email or web address] (for [facility]) or [email or web address] (for [facility])

## 2025-02-11 NOTE — H&P ADULT - ASSESSMENT
This is an 79 y/o F with PMHx, HTN, lymphedema, T2DM, sacral decubitus ulcer s/p debridement (2024) and recent adm for sacral OM who presented to the ED for coffee-ground emesis and melena x 1 day, admitted for acute blood loss anemia 2/2 to UGIB.

## 2025-02-11 NOTE — H&P ADULT - NSHPPHYSICALEXAM_GEN_ALL_CORE
VITALS:   T(C): 36.7 (02-11-25 @ 16:39), Max: 36.9 (02-11-25 @ 07:22)  HR: 88 (02-11-25 @ 16:39) (67 - 90)  BP: 95/54 (02-11-25 @ 16:39) (82/45 - 195/74)  RR: 21 (02-11-25 @ 16:39) (17 - 21)  SpO2: 94% (02-11-25 @ 16:39) (94% - 97%)    GENERAL: NAD, lying in bed comfortably  HEAD:  Atraumatic, Normocephalic  EYES: conjunctiva and sclera clear  ENT: Moist mucous membranes  NECK: Supple, No JVD  CHEST/LUNG: Clear to auscultation bilaterally; No rales, rhonchi, wheezing, or rubs. Unlabored respirations  HEART: Regular rate and rhythm; No murmurs, rubs, or gallops  ABDOMEN: BSx4; Soft, nontender, nondistended  EXTREMITIES:   No clubbing, cyanosis, or edema  NERVOUS SYSTEM:  A&Ox3, no focal deficits   SKIN: No rashes or lesions  Psych: Normal speech, normal behavior, normal affect VITALS:   T(C): 36.7 (02-11-25 @ 16:39), Max: 36.9 (02-11-25 @ 07:22)  HR: 88 (02-11-25 @ 16:39) (67 - 90)  BP: 95/54 (02-11-25 @ 16:39) (82/45 - 195/74)  RR: 21 (02-11-25 @ 16:39) (17 - 21)  SpO2: 94% (02-11-25 @ 16:39) (94% - 97%)    GENERAL: NAD, obese, chronically ill appearing, pale   HEAD:  Atraumatic, Normocephalic  EYES: conjunctiva and sclera clear, pale conjunctiva   ENT: dry mucous membranes  NECK: Supple, No JVD  CHEST/LUNG: Clear to auscultation bilaterally; No rales, rhonchi, wheezing, or rubs. Unlabored respirations  HEART: Regular rate and rhythm; No murmurs, rubs, or gallops  ABDOMEN: BSx4; Soft, nontender, nondistended  EXTREMITIES:  + b/l nonpitting edema   NERVOUS SYSTEM:  A&Ox3, able to answer questions and follow commands appropriately   SKIN: large sacral OM present

## 2025-02-11 NOTE — CONSULT NOTE ADULT - ATTENDING COMMENTS
Agree w above. 80 year old female w history of HTN, lymphedema, arthritis, and sacral decubitus ulcer s/p debridement w/ recent admission in 1/2025 for OM, presenting with one day hx of coffee ground emesis and melena, found to have acute on chronic anemia and elevated BUN. GI consulted for evaluation of upper GI bleed. Clinically stable with Hb 6. CT without active bleed. Transfuse PRBC with goal Hb > 8.0. PPI tx. Discussed management with patient including upper endoscopy. Patient deferring upper endoscopy at this time. PPI tx.
Pt seen and examined. 80 yr F with medical hx as noted above, p/w hypotension and acute blood loss anemia in the setting of UGIB, also found to have influenza A infection. Hb initially 6, now improved to 8.4 following 2 unit PRBC transfusion. Hypotension resolved with SBP fo 116/54. Pt is awake and alert, protecting her airway without any active hematemesis of BRBPR. Agree with IV Protonix BID, follow serial CBCs, transfuse if Hb < 7 or sooner if actively bleeding or hypotensive. EGD per GI. Cont Tamiflu for Influenza A infection. Hyperkalemia improving ( although initial chemistry hemolyzed), cont medical management. No need for MICU level of care at this time. Please reconsult if her condition changes.

## 2025-02-12 LAB
ANION GAP SERPL CALC-SCNC: 7 MMOL/L — SIGNIFICANT CHANGE UP (ref 5–17)
APTT BLD: 35.8 SEC — HIGH (ref 24.5–35.6)
BLD GP AB SCN SERPL QL: POSITIVE — SIGNIFICANT CHANGE UP
BUN SERPL-MCNC: 63 MG/DL — HIGH (ref 7–23)
CALCIUM SERPL-MCNC: 8.8 MG/DL — SIGNIFICANT CHANGE UP (ref 8.4–10.5)
CHLORIDE SERPL-SCNC: 111 MMOL/L — HIGH (ref 96–108)
CO2 SERPL-SCNC: 19 MMOL/L — LOW (ref 22–31)
CREAT SERPL-MCNC: 0.93 MG/DL — SIGNIFICANT CHANGE UP (ref 0.5–1.3)
EGFR: 61 ML/MIN/1.73M2 — SIGNIFICANT CHANGE UP
GAS PNL BLDV: SIGNIFICANT CHANGE UP
GLUCOSE BLDC GLUCOMTR-MCNC: 123 MG/DL — HIGH (ref 70–99)
GLUCOSE BLDC GLUCOMTR-MCNC: 147 MG/DL — HIGH (ref 70–99)
GLUCOSE BLDC GLUCOMTR-MCNC: 148 MG/DL — HIGH (ref 70–99)
GLUCOSE BLDC GLUCOMTR-MCNC: 205 MG/DL — HIGH (ref 70–99)
GLUCOSE SERPL-MCNC: 226 MG/DL — HIGH (ref 70–99)
HCT VFR BLD CALC: 19.1 % — CRITICAL LOW (ref 34.5–45)
HCT VFR BLD CALC: 33.5 % — LOW (ref 34.5–45)
HGB BLD-MCNC: 10.9 G/DL — LOW (ref 11.5–15.5)
HGB BLD-MCNC: 5.2 G/DL — CRITICAL LOW (ref 11.5–15.5)
INR BLD: 1.59 RATIO — HIGH (ref 0.85–1.16)
MAGNESIUM SERPL-MCNC: 1.7 MG/DL — SIGNIFICANT CHANGE UP (ref 1.6–2.6)
MCHC RBC-ENTMCNC: 27.2 G/DL — LOW (ref 32–36)
MCHC RBC-ENTMCNC: 28.1 PG — SIGNIFICANT CHANGE UP (ref 27–34)
MCHC RBC-ENTMCNC: 28.5 PG — SIGNIFICANT CHANGE UP (ref 27–34)
MCHC RBC-ENTMCNC: 32.5 G/DL — SIGNIFICANT CHANGE UP (ref 32–36)
MCV RBC AUTO: 103.2 FL — HIGH (ref 80–100)
MCV RBC AUTO: 87.5 FL — SIGNIFICANT CHANGE UP (ref 80–100)
NRBC BLD AUTO-RTO: 0 /100 WBCS — SIGNIFICANT CHANGE UP (ref 0–0)
NRBC BLD AUTO-RTO: 0 /100 WBCS — SIGNIFICANT CHANGE UP (ref 0–0)
PHOSPHATE SERPL-MCNC: 2.6 MG/DL — SIGNIFICANT CHANGE UP (ref 2.5–4.5)
PLATELET # BLD AUTO: 197 K/UL — SIGNIFICANT CHANGE UP (ref 150–400)
PLATELET # BLD AUTO: 312 K/UL — SIGNIFICANT CHANGE UP (ref 150–400)
POTASSIUM SERPL-MCNC: 5.5 MMOL/L — HIGH (ref 3.5–5.3)
POTASSIUM SERPL-SCNC: 5.5 MMOL/L — HIGH (ref 3.5–5.3)
PROTHROM AB SERPL-ACNC: 18.2 SEC — HIGH (ref 9.9–13.4)
RAPID RVP RESULT: SIGNIFICANT CHANGE UP
RBC # BLD: 1.85 M/UL — LOW (ref 3.8–5.2)
RBC # BLD: 3.83 M/UL — SIGNIFICANT CHANGE UP (ref 3.8–5.2)
RBC # FLD: 16.3 % — HIGH (ref 10.3–14.5)
RBC # FLD: 19.9 % — HIGH (ref 10.3–14.5)
RH IG SCN BLD-IMP: NEGATIVE — SIGNIFICANT CHANGE UP
SARS-COV-2 RNA SPEC QL NAA+PROBE: SIGNIFICANT CHANGE UP
SODIUM SERPL-SCNC: 137 MMOL/L — SIGNIFICANT CHANGE UP (ref 135–145)
WBC # BLD: 12.1 K/UL — HIGH (ref 3.8–10.5)
WBC # BLD: 18.18 K/UL — HIGH (ref 3.8–10.5)
WBC # FLD AUTO: 12.1 K/UL — HIGH (ref 3.8–10.5)
WBC # FLD AUTO: 18.18 K/UL — HIGH (ref 3.8–10.5)

## 2025-02-12 PROCEDURE — 43235 EGD DIAGNOSTIC BRUSH WASH: CPT

## 2025-02-12 PROCEDURE — 99233 SBSQ HOSP IP/OBS HIGH 50: CPT | Mod: GC

## 2025-02-12 PROCEDURE — 99221 1ST HOSP IP/OBS SF/LOW 40: CPT

## 2025-02-12 DEVICE — NET RETRV ROT ROTH 2.5MMX230CM: Type: IMPLANTABLE DEVICE | Status: FUNCTIONAL

## 2025-02-12 RX ORDER — SODIUM CHLORIDE 9 G/1000ML
1000 INJECTION, SOLUTION INTRAVENOUS
Refills: 0 | Status: DISCONTINUED | OUTPATIENT
Start: 2025-02-12 | End: 2025-02-13

## 2025-02-12 RX ORDER — INFLUENZA A VIRUS A/IDAHO/07/2018 (H1N1) ANTIGEN (MDCK CELL DERIVED, PROPIOLACTONE INACTIVATED, INFLUENZA A VIRUS A/INDIANA/08/2018 (H3N2) ANTIGEN (MDCK CELL DERIVED, PROPIOLACTONE INACTIVATED), INFLUENZA B VIRUS B/SINGAPORE/INFTT-16-0610/2016 ANTIGEN (MDCK CELL DERIVED, PROPIOLACTONE INACTIVATED), INFLUENZA B VIRUS B/IOWA/06/2017 ANTIGEN (MDCK CELL DERIVED, PROPIOLACTONE INACTIVATED) 15; 15; 15; 15 UG/.5ML; UG/.5ML; UG/.5ML; UG/.5ML
0.5 INJECTION, SUSPENSION INTRAMUSCULAR ONCE
Refills: 0 | Status: DISCONTINUED | OUTPATIENT
Start: 2025-02-12 | End: 2025-02-24

## 2025-02-12 RX ORDER — MAGNESIUM SULFATE 500 MG/ML
1 SYRINGE (ML) INJECTION ONCE
Refills: 0 | Status: COMPLETED | OUTPATIENT
Start: 2025-02-12 | End: 2025-02-12

## 2025-02-12 RX ORDER — IPRATROPIUM BROMIDE AND ALBUTEROL SULFATE .5; 2.5 MG/3ML; MG/3ML
3 SOLUTION RESPIRATORY (INHALATION) ONCE
Refills: 0 | Status: COMPLETED | OUTPATIENT
Start: 2025-02-12 | End: 2025-02-12

## 2025-02-12 RX ADMIN — OSELTAMIVIR PHOSPHATE 75 MILLIGRAM(S): 75 CAPSULE ORAL at 21:29

## 2025-02-12 RX ADMIN — CEFEPIME 100 MILLIGRAM(S): 2 INJECTION, POWDER, FOR SOLUTION INTRAVENOUS at 23:28

## 2025-02-12 RX ADMIN — SODIUM CHLORIDE 75 MILLILITER(S): 9 INJECTION, SOLUTION INTRAVENOUS at 14:33

## 2025-02-12 RX ADMIN — ATORVASTATIN CALCIUM 20 MILLIGRAM(S): 80 TABLET, FILM COATED ORAL at 21:29

## 2025-02-12 RX ADMIN — SODIUM CHLORIDE 75 MILLILITER(S): 9 INJECTION, SOLUTION INTRAVENOUS at 01:16

## 2025-02-12 RX ADMIN — CEFEPIME 100 MILLIGRAM(S): 2 INJECTION, POWDER, FOR SOLUTION INTRAVENOUS at 14:33

## 2025-02-12 RX ADMIN — Medication 166.67 MILLIGRAM(S): at 21:21

## 2025-02-12 RX ADMIN — OSELTAMIVIR PHOSPHATE 75 MILLIGRAM(S): 75 CAPSULE ORAL at 06:12

## 2025-02-12 RX ADMIN — Medication 40 MILLIGRAM(S): at 06:12

## 2025-02-12 RX ADMIN — Medication 100 GRAM(S): at 11:13

## 2025-02-12 RX ADMIN — Medication 166.67 MILLIGRAM(S): at 06:40

## 2025-02-12 RX ADMIN — INSULIN LISPRO 2: 100 INJECTION, SOLUTION INTRAVENOUS; SUBCUTANEOUS at 01:15

## 2025-02-12 RX ADMIN — CEFEPIME 100 MILLIGRAM(S): 2 INJECTION, POWDER, FOR SOLUTION INTRAVENOUS at 06:12

## 2025-02-12 NOTE — DISCHARGE NOTE PROVIDER - HOSPITAL COURSE
HPI:  This is an 81 y/o F with PMHx, HTN, lymphedema, T2DM, sacral decubitus ulcer s/p debridement (2024) and recent adm for sacral OM who presented to the ED for coffee-ground emesis and melena x 1 day. Patient noted the episodes first started at 5AM but did not know how many episodes she had. Per chart review, patient w/ multiple episodes of "dark, coffee ground appearing emesis" as well as "large amounts of dark colored loose, watery stools." She was found to have a high BUN on labs and thus brought it for further evaluation of GIB.     Patient endorsed fatigue but denied any chest pain, sob, ab pain n/v at this time. Patient last episode of melena this AM in ED.     In the ED, T 98.4, HR 90, /68 -> 82/45, RR 20, 97% on RA. Labs significant for WBC 22.8, Hgb 6, Plt 530, K 6.5 (moderately hemolyzed)-> 5.5, BUN 69, Trop 64->55 . RVP positive for flu.   CT A/P without evidence of active GI bleed, nonspecific right lateral bladder wall thickening and known sacral decub ulcer w/ erosive changes consistent w/ OM. s/p 2u pRBC hyperK shift w/ 5u insulin x2, octreotide 50mcg x1, zofran x1, tamiflu 75mg x1, PPI 40 IVP x2, zosyn 3.375x1, vanc 1g x1, 1L LR and duoneb x1.       (11 Feb 2025 17:30)    Hospital Course:  The patient was admitted to the general medicine floors for management of her UGIB. She presented to the hospital having had coffee ground appear emesis and large amounts of dark colored loose, water stools. Prior to her EGD, she was given 5 Units of blood due to persistent decreased Hgb. The patient has not had any further episodes of coffee ground emesis and dark stools. In addition, the patient's potassium was elevated. She was shifted with insulin and duonebs. She received her EGD and that was significant for ___.    Important Medication Changes and Reason:    Active or Pending Issues Requiring Follow-up:  Please follow up with your PCP in 1-2 weeks  Please follow up with GI in 1-2 weeks    Advanced Directives:   [X] Full code  [ ] DNR  [ ] Hospice    Discharge Diagnoses:  UGIB       HPI:  This is an 81 y/o F with PMHx, HTN, lymphedema, T2DM, sacral decubitus ulcer s/p debridement (2024) and recent adm for sacral OM who presented to the ED for coffee-ground emesis and melena x 1 day. Patient noted the episodes first started at 5AM but did not know how many episodes she had. Per chart review, patient w/ multiple episodes of "dark, coffee ground appearing emesis" as well as "large amounts of dark colored loose, watery stools." She was found to have a high BUN on labs and thus brought it for further evaluation of GIB.     Patient endorsed fatigue but denied any chest pain, sob, ab pain n/v at this time. Patient last episode of melena this AM in ED.     In the ED, T 98.4, HR 90, /68 -> 82/45, RR 20, 97% on RA. Labs significant for WBC 22.8, Hgb 6, Plt 530, K 6.5 (moderately hemolyzed)-> 5.5, BUN 69, Trop 64->55 . RVP positive for flu.   CT A/P without evidence of active GI bleed, nonspecific right lateral bladder wall thickening and known sacral decub ulcer w/ erosive changes consistent w/ OM. s/p 2u pRBC hyperK shift w/ 5u insulin x2, octreotide 50mcg x1, zofran x1, tamiflu 75mg x1, PPI 40 IVP x2, zosyn 3.375x1, vanc 1g x1, 1L LR and duoneb x1.       (11 Feb 2025 17:30)    Hospital Course:  The patient was admitted to the general medicine floors for management of her UGIB. She presented to the hospital having had coffee ground appear emesis and large amounts of dark colored loose, water stools. Prior to her EGD, she was given 5 Units of blood due to persistent decreased Hgb. The patient has not had any further episodes of coffee ground emesis and dark stools. In addition, the patient's potassium was elevated. She was shifted with insulin and duonebs. She received her EGD and that was significant for three nonbleeding duodenal ulcers. The patient was on octreotide drip for 3 days and Protonix drip until 2/15/24. In addition, the patient was in afib w/RVR most likely 2/2 not having a bowel movement for some time. She was given fluids, and Lopressor 5mg. She broke afib.     Important Medication Changes and Reason:    Active or Pending Issues Requiring Follow-up:  Please follow up with your PCP in 1-2 weeks  Please follow up with GI in 1-2 weeks    Advanced Directives:   [X] Full code  [ ] DNR  [ ] Hospice    Discharge Diagnoses:  UGIB       HPI:  This is an 79 y/o F with PMHx, HTN, lymphedema, T2DM, sacral decubitus ulcer s/p debridement (2024) and recent adm for sacral OM who presented to the ED for coffee-ground emesis and melena x 1 day. Patient noted the episodes first started at 5AM but did not know how many episodes she had. Per chart review, patient w/ multiple episodes of "dark, coffee ground appearing emesis" as well as "large amounts of dark colored loose, watery stools." She was found to have a high BUN on labs and thus brought it for further evaluation of GIB. Patient endorsed fatigue but denied any chest pain, sob, ab pain n/v at this time. Patient last episode of melena this AM in ED.     In the ED, T 98.4, HR 90, /68 -> 82/45, RR 20, 97% on RA. Labs significant for WBC 22.8, Hgb 6, Plt 530, K 6.5 (moderately hemolyzed)-> 5.5, BUN 69, Trop 64->55 . RVP positive for flu. CT A/P without evidence of active GI bleed, nonspecific right lateral bladder wall thickening and known sacral decub ulcer w/ erosive changes consistent w/ OM. s/p 2u pRBC hyperK shift w/ 5u insulin x2, octreotide 50mcg x1, zofran x1, tamiflu 75mg x1, PPI 40 IVP x2, zosyn 3.375x1, vanc 1g x1, 1L LR and duoneb x1.      Hospital Course:  The patient was admitted to the general medicine floors for management of her UGIB. She presented to the hospital having had coffee ground appear emesis and large amounts of dark colored loose, water stools. Prior to her EGD, she was given 5 Units of blood due to persistent decreased Hgb. The patient has not had any further episodes of coffee ground emesis and dark stools. In addition, the patient's potassium was elevated. She was shifted with insulin and duonebs. She received her EGD and that was significant for three nonbleeding duodenal ulcers. The patient was on octreotide drip for 3 days and Protonix drip until 2/15/24. In addition, the patient was in afib w/RVR most likely 2/2 not having a bowel movement for some time. She was given fluids, and Lopressor 5mg. She broke afib. Patient was continued on Metoprolol tartrate 100 mg BID and Xarelto 20 mg qd for stroke prevention. GI was okay with the AC given that the patient's hemoglobin was stable after the treatment of protonix. She was  given a wound vacuum for her osteomyelitis to the sacrum. She finished the 6 week course with Augmentin for the OM, but there was notably some fungal looking areas to her sacrum. She was given a five day course of fluconazole 200mg qd. By the end of her stay, she was stable for discharge and was sent home with OP f/u of ID, PCP, wound care, and GI.     Important Medication Changes and Reason:  Started: Fluconazole 200 mg qd for fungal area to sacrum, Metoprolol tartrate 100 mg BID and Xarelto 20 mg qd for AFIB RVR  Stopped: Augmentin for the osteomyelitis given that the end abx date was 2/19/2025.   Continued all other home meds.     Active or Pending Issues Requiring Follow-up:  Please follow up with your PCP in 1-2 weeks  Please follow up with GI in 1-2 weeks    Advanced Directives:   [X] Full code  [ ] DNR  [ ] Hospice    Discharge Diagnoses:  UGIB  Acute Hospital Acquired Delirium   AFIB RVR   Osteomyelitis to the sacrum  Fungal Dermatitis of the sacrum          HPI:  This is an 81 y/o F with PMHx, HTN, lymphedema, T2DM, sacral decubitus ulcer s/p debridement (2024) and recent adm for sacral OM who presented to the ED for coffee-ground emesis and melena x 1 day. Patient noted the episodes first started at 5AM but did not know how many episodes she had. Per chart review, patient w/ multiple episodes of "dark, coffee ground appearing emesis" as well as "large amounts of dark colored loose, watery stools." She was found to have a high BUN on labs and thus brought it for further evaluation of GIB. Patient endorsed fatigue but denied any chest pain, sob, ab pain n/v at this time. Patient last episode of melena this AM in ED.     In the ED, T 98.4, HR 90, /68 -> 82/45, RR 20, 97% on RA. Labs significant for WBC 22.8, Hgb 6, Plt 530, K 6.5 (moderately hemolyzed)-> 5.5, BUN 69, Trop 64->55 . RVP positive for flu. CT A/P without evidence of active GI bleed, nonspecific right lateral bladder wall thickening and known sacral decub ulcer w/ erosive changes consistent w/ OM. s/p 2u pRBC hyperK shift w/ 5u insulin x2, octreotide 50mcg x1, zofran x1, tamiflu 75mg x1, PPI 40 IVP x2, zosyn 3.375x1, vanc 1g x1, 1L LR and duoneb x1.      Hospital Course:  The patient was admitted to the general medicine floors for management of her UGIB. She presented to the hospital having had coffee ground appear emesis and large amounts of dark colored loose, water stools. Prior to her EGD, she was given 5 Units of blood due to persistent decreased Hgb. The patient has not had any further episodes of coffee ground emesis and dark stools. In addition, the patient's potassium was elevated. She was shifted with insulin and duonebs. She received her EGD and that was significant for three nonbleeding duodenal ulcers. The patient was on octreotide drip for 3 days and Protonix drip until 2/15/24. In addition, the patient was in afib w/RVR most likely 2/2 not having a bowel movement for some time. She was given fluids, and Lopressor 5mg. She broke afib. Patient was continued on Metoprolol tartrate 100 mg BID and Xarelto 20 mg qd for stroke prevention. GI was okay with the AC given that the patient's hemoglobin was stable after the treatment of protonix. She was  given a wound vacuum for her osteomyelitis to the sacrum. She finished the 6 week course with Augmentin for the OM, but there was notably some fungal looking areas to her sacrum. She was given a five day course of fluconazole 200mg qd. By the end of her stay, she was stable for discharge and was sent home with OP f/u of ID, PCP, wound care, and GI.     Important Medication Changes and Reason:  Started: Fluconazole 200 mg qd for fungal area to sacrum, Metoprolol tartrate 100 mg BID and Xarelto 20 mg qd for AFIB RVR  Stopped: Augmentin for the osteomyelitis given that the end abx date was 2/19/2025.   Continued all other home meds.     Active or Pending Issues Requiring Follow-up:  Please follow up with your PCP in 1-2 weeks  Please follow up with GI in 1-2 weeks  Please follow up with wound care    Advanced Directives:   [X] Full code  [ ] DNR  [ ] Hospice    Discharge Diagnoses:  UGIB  Acute Hospital Acquired Delirium   AFIB RVR   Osteomyelitis to the sacrum  Fungal Dermatitis of the sacrum          HPI:  This is an 81 y/o F with PMHx, HTN, lymphedema, T2DM, sacral decubitus ulcer s/p debridement (2024) and recent adm for sacral OM who presented to the ED for coffee-ground emesis and melena x 1 day. Patient noted the episodes first started at 5AM but did not know how many episodes she had. Per chart review, patient w/ multiple episodes of "dark, coffee ground appearing emesis" as well as "large amounts of dark colored loose, watery stools." She was found to have a high BUN on labs and thus brought it for further evaluation of GIB. Patient endorsed fatigue but denied any chest pain, sob, ab pain n/v at this time. Patient last episode of melena this AM in ED.     In the ED, T 98.4, HR 90, /68 -> 82/45, RR 20, 97% on RA. Labs significant for WBC 22.8, Hgb 6, Plt 530, K 6.5 (moderately hemolyzed)-> 5.5, BUN 69, Trop 64->55 . RVP positive for flu. CT A/P without evidence of active GI bleed, nonspecific right lateral bladder wall thickening and known sacral decub ulcer w/ erosive changes consistent w/ OM. s/p 2u pRBC hyperK shift w/ 5u insulin x2, octreotide 50mcg x1, zofran x1, tamiflu 75mg x1, PPI 40 IVP x2, zosyn 3.375x1, vanc 1g x1, 1L LR and duoneb x1.      Hospital Course:  The patient was admitted to the general medicine floors for management of her UGIB. She presented to the hospital having had coffee ground appear emesis and large amounts of dark colored loose, water stools. Prior to her EGD, she was given 5 Units of blood due to persistent decreased Hgb. The patient has not had any further episodes of coffee ground emesis and dark stools. In addition, the patient's potassium was elevated. She was shifted with insulin and duonebs. She received her EGD and that was significant for three nonbleeding duodenal ulcers. The patient was on octreotide drip for 3 days and Protonix drip until 2/15/24. In addition, the patient was in afib w/RVR most likely 2/2 not having a bowel movement for some time. She was given fluids, and Lopressor 5mg. She broke afib. Patient was continued on Metoprolol tartrate 100 mg BID and Xarelto 20 mg qd for stroke prevention. GI was okay with the AC given that the patient's hemoglobin was stable after the treatment of protonix. She was  given a wound vacuum for her osteomyelitis to the sacrum. She finished the 6 week course with Augmentin for the OM, but there was notably some fungal looking areas to her sacrum. She was given a five day course of fluconazole 200mg qd. By the end of her stay, she was stable for discharge and was sent home with OP f/u of ID, PCP, wound care, and GI.     Important Medication Changes and Reason:  Started: Fluconazole 200 mg qd for fungal area to sacrum, Metoprolol tartrate 100 mg BID and Xarelto 20 mg qd for AFIB RVR  Stopped: Augmentin for the osteomyelitis given that the end abx date was 2/19/2025.   Continued all other home meds.     Active or Pending Issues Requiring Follow-up:  Please follow up with your PCP in 1-2 weeks  Please follow up with GI in 1-2 weeks  Please follow up with wound care Dr. Brown. Wound care needs to place orders for wound vac to [facility] after appointment.     Advanced Directives:   [X] Full code  [ ] DNR  [ ] Hospice    Discharge Diagnoses:  UGIB  Acute Hospital Acquired Delirium   AFIB RVR   Osteomyelitis to the sacrum  Fungal Dermatitis of the sacrum

## 2025-02-12 NOTE — PROGRESS NOTE ADULT - PROBLEM SELECTOR PLAN 4
WBC 22.8-> 31.86. Likely multifactorial 2/2 inflammatory response in setting of GIB, chronic sacral OM, influenza A.   CXR w/ clear lungs.     - abx: vanc and cefepime   - f/u blood cx x2, U/A, urine cx   - tamiflu for flu A   - collect GI PCR (given possible norovirus at [facility], [place]) and cdiff (has been on abx)   - trend fever curve, WBC

## 2025-02-12 NOTE — PATIENT PROFILE ADULT - FUNCTIONAL ASSESSMENT - BASIC MOBILITY SCORE.
[place] TAVR review: Consider a 26mm S3UR from a right femoral approach.  Suboptimal CT quality - please confirm sizing  Diffuse distal abdominal aortic and iliac calcification  Left CFA calcification  L19, Cra11  
6

## 2025-02-12 NOTE — DISCHARGE NOTE PROVIDER - NSDCCPTREATMENT_GEN_ALL_CORE_FT
PRINCIPAL PROCEDURE  Procedure: Upper G.I. endoscopy  Findings and Treatment: - No active bleeding but source of anemia and melena likely 2/2 duodenal bulb ulcers  - LA Grade B esophagitis.  - Gastroesophageal flap valve classified as Hill Grade III (minimal fold, loose to endoscope, hiatal hernia likely).  - Small hiatal hernia.  - Normal stomach.  - Three non-bleeding duodenal ulcers with a flat pigmented spot (Alex Class IIc).  - No specimens collected.       PRINCIPAL PROCEDURE  Procedure: Upper G.I. endoscopy  Findings and Treatment: - No active bleeding but source of anemia and melena likely 2/2 duodenal bulb ulcers  - LA Grade B esophagitis.  - Gastroesophageal flap valve classified as Hill Grade III (minimal fold, loose to endoscope, hiatal hernia likely).  - Small hiatal hernia.  - Normal stomach.  - Three non-bleeding duodenal ulcers with a flat pigmented spot (Alex Class IIc).  - No specimens collected.        SECONDARY PROCEDURE  Procedure: CT head  Findings and Treatment: IMPRESSION:  No acute intracranial hemorrhage, mass effect, or midline shift.      Procedure: Abdomen CT  Findings and Treatment: No evidence of active GI bleed. No bowel wall thickening or acute GI   findings.  Nonspecific right lateral bladder wall thickening. Correlate with   urinalysis and possible cystitis and as necessary, cystoscopy to assess   for underlying bladder mass.  Sacral decubitus ulcer again noted with erosive changes of the underlying   lower sacrum and coccyx, concerning for osteomyelitis.

## 2025-02-12 NOTE — PROGRESS NOTE ADULT - ATTENDING COMMENTS
Damgar Smallwood is an 81 y/o F with PMH significant for HTN, T2DM, PAD, lymphedema, arthritis, Sacral pressure ulcer with OM refusing IV abx on PO, and bed-bound status presenting with diarrhea and coffee ground emesis. Found to hypotensive and anemic Hgb 6.0.    #UGIB  #Hypotension  - Protonix IV BID  - GI consulted, plan for EGD today  - NPO, IVF  - Patient is high risk of clinical decompensation. Low threshold to transfer to MICU if becomes hypotensive.    #Acute blood loss anemia  -S/p 2u PRBC 2/11  - Hematemesis overnight, received 1u PRBC. Hgb this morning 5.2, addition 2u PRBC ordered.   - Repeat H/H after tranfusion and transfuse <7.0    #Sepsis  #Influenza A  #Sacral and coccyx OM  -CXR neg, CT abdomen/pelvis with possible UTI. Also with Sacral and coccyx OM  -Continue Cefepime/vanc. Infectious workup ongoing.  -Tamiflu for influenza  - Wound care consult    #Troponin elevation  -No chest pain, suspect demand ischemic in the setting of hypotension    Time spent: 58 minutes  - Reviewing, and interpreting labs and testing.  - Independently obtaining a review of systems and performing a physical exam  - Reviewing consultant documentation/recommendations in addition to discussing plan of care with consultants.  - Counselling and educating patient and family regarding interpretation of aforementioned items and plan of care.  - This excludes time spent teaching residents/medical students .

## 2025-02-12 NOTE — PROGRESS NOTE ADULT - PROBLEM SELECTOR PLAN 2
Hgb 6-> 8.4 on repeat s/p 2u pRBC.   2/2 to UGIB. Baseline hgb  ~9.     - CBC q8h   - transfuse for Hgb > 8  - maintain active T&S

## 2025-02-12 NOTE — DISCHARGE NOTE PROVIDER - NSDCFUSCHEDAPPT_GEN_ALL_CORE_FT
[facility] Physician [facility]  WOUNDCARE [address]  Scheduled Appointment: 02/26/2025     Laura Banks  [facility] Physician [facility]  WOUNDCARE [address]  Scheduled Appointment: 03/06/2025     Laura Banks  [facility] Physician [facility]  WOUNDCARE [address]  Scheduled Appointment: 04/03/2025

## 2025-02-12 NOTE — PROGRESS NOTE ADULT - PROBLEM SELECTOR PLAN 7
Trop 64 -> 55. Likely in setting of demand. Patient currently asymptomatic and w/o EKG changes.     - monitor for chest pain

## 2025-02-12 NOTE — PROGRESS NOTE ADULT - PROBLEM SELECTOR PLAN 5
Previous adm (1/8-16) for sacral OM, rec for PICC line on dc for IV abx however pt refused and discharged on augmentin w/ plan to complete 6 week course (until 2/19).   Gen surg performed bedside superficial wound debridement; wound looks clean.     - continue augmentin until 2/19   - wound care consult

## 2025-02-12 NOTE — PROGRESS NOTE ADULT - PROBLEM SELECTOR PLAN 6
K 6.5 -> 5.5, s/p shift x 2 w/ insulin 5 units.     - Continued elevation -> duoneb x1 and shift w/ insulin  - Continue to monitor

## 2025-02-12 NOTE — DISCHARGE NOTE PROVIDER - NSDCCPCAREPLAN_GEN_ALL_CORE_FT
PRINCIPAL DISCHARGE DIAGNOSIS  Diagnosis: GI bleeding  Assessment and Plan of Treatment: You were admitted to the hospital due to an upper GI bleed. You reported coffee ground emesis and dark stools on presentation. Your hemoglobin showed that you had lost a significant amount of blood. Prior to your scope, you required 5 Units of blood. However, you did not report any further episodes of coffee ground emesis and dark stools. In addition, your potassium was elevated and we treated that with medication. Your scope was performed and was significant for ____.  You are stable will be discharged back to subacute rehab.   If you have shortness of breath and/or chest pain and any further episodes of coffee ground emesis and bloody stools, please do not hesistate and call 911 and return to the hospital.     PRINCIPAL DISCHARGE DIAGNOSIS  Diagnosis: GI bleeding  Assessment and Plan of Treatment: You were admitted to the hospital due to an upper GI bleed. You reported coffee ground emesis and dark stools on presentation. Your hemoglobin showed that you had lost a significant amount of blood. Prior to your scope, you required 5 Units of blood. However, you did not report any further episodes of coffee ground emesis and dark stools. In addition, your potassium was elevated and we treated that with medication. Your scope was performed and was significant for 3 duodenal ulcers nonbleeding. Your octreotide drip was discontinued after 3 days. Your protonix drip was ended on 2/15 and you were put on oral protonix.   You are stable will be discharged back to subacute rehab.   If you have shortness of breath and/or chest pain and any further episodes of coffee ground emesis and bloody stools, please do not hesistate and call 911 and return to the hospital.      SECONDARY DISCHARGE DIAGNOSES  Diagnosis: Atrial fibrillation with RVR  Assessment and Plan of Treatment: During your hospitilization, you entered a state called atrial fibrillation with rapid ventricular response. This means that your heart was beating very quickly. At first you responded well to fluids. However, you returned to this state and we had to give medication. We believe that you entered this state due to not having a bowel movement for quite some time while you were in the hospital. We treated this with miralax and lactulose.     PRINCIPAL DISCHARGE DIAGNOSIS  Diagnosis: GI bleeding  Assessment and Plan of Treatment: You were admitted to the hospital due to an upper GI bleed. You reported coffee ground emesis and dark stools on presentation. Your hemoglobin showed that you had lost a significant amount of blood. Prior to your scope, you required 5 Units of blood. However, you did not report any further episodes of coffee ground emesis and dark stools. In addition, your potassium was elevated and we treated that with medication. Your scope was performed and was significant for 3 duodenal ulcers nonbleeding. Your octreotide drip was discontinued after 3 days. Your protonix drip was ended on 2/15 and you were put on oral protonix. You will complete a total 8 weeks of this medication until 4/09/2025.   If you have shortness of breath and/or chest pain and any further episodes of coffee ground emesis and bloody stools, please do not hesistate and call 911 and return to the hospital.      SECONDARY DISCHARGE DIAGNOSES  Diagnosis: Atrial fibrillation with RVR  Assessment and Plan of Treatment: During your hospitilization, you entered a state called atrial fibrillation with rapid ventricular response. This means that your heart was beating very quickly. At first you responded well to fluids. However, you returned to this state and we had to give medication. We believe that you entered this state due to not having a bowel movement for quite some time while you were in the hospital. We treated this with miralax and lactulose. Having said that, it's possible that you had this abnormal heart rhythm before. To avoid clots from forming, we gave you metoprolol tartrate 100 mg BID and a blood thinner called Xarelto 20 mg once daily. The Xarelto has the potential to cause you to bleed, so please monitor for blood in your spit, blood in your poop/urine, new bruises, or any dark stools. The other medication can slow down your heart rate, so please assess yourself if you are fatigued or feel like you are about to pass out. Follow up with your PCP to discuss continued use of these medications.    Diagnosis: Osteomyelitis  Assessment and Plan of Treatment: You completed the 6 weeks of antibiotics on 2/19. This infection of your sacrum is being treated with a wound vacuum that has been sent to your house. You will complete five total days of Fluconazole for a fungal infection found near the wound, and this medication will end on 2/23/2025. Please continue to follow up with your wound care specialist to make sure that your wound is recovering well.    Diagnosis: Delirium  Assessment and Plan of Treatment: You were slightly altered with your mental status in the hospital. On the CT scan, there were no signs of strokes, but in the setting of your afib with rvr, we were concerned for the possibility of blood clots extending into your brain. This acute episode resolved by prioritizing your sleep and managing how many interruptions were made to your sleep. Please continue to monitor yourself for not feeling like yourself or feeling confused, but hopefully this is the only time that this episode of confusion will have happened to you.     PRINCIPAL DISCHARGE DIAGNOSIS  Diagnosis: GI bleeding  Assessment and Plan of Treatment: You were admitted to the hospital due to an upper GI bleed. You reported coffee ground emesis and dark stools on presentation. Your hemoglobin showed that you had lost a significant amount of blood. Prior to your scope, you required 5 Units of blood. However, you did not report any further episodes of coffee ground emesis and dark stools. In addition, your potassium was elevated and we treated that with medication. Your scope was performed and was significant for 3 duodenal ulcers nonbleeding. Your octreotide drip was discontinued after 3 days. Your protonix drip was ended on 2/15 and you were put on oral protonix. You will complete a total 8 weeks of this medication until 4/09/2025.   If you have shortness of breath and/or chest pain and any further episodes of coffee ground emesis and bloody stools, please do not hesistate and call 911 and return to the hospital.      SECONDARY DISCHARGE DIAGNOSES  Diagnosis: Atrial fibrillation with RVR  Assessment and Plan of Treatment: During your hospitilization, you entered a state called atrial fibrillation with rapid ventricular response. This means that your heart was beating very quickly. At first you responded well to fluids. However, you returned to this state and we had to give medication. We believe that you entered this state due to not having a bowel movement for quite some time while you were in the hospital. We treated this with miralax and lactulose. Having said that, it's possible that you had this abnormal heart rhythm before. To avoid clots from forming, we gave you metoprolol tartrate 100 mg BID and a blood thinner called Xarelto 20 mg once daily. The Xarelto has the potential to cause you to bleed, so please monitor for blood in your spit, blood in your poop/urine, new bruises, or any dark stools. The other medication can slow down your heart rate, so please assess yourself if you are fatigued or feel like you are about to pass out. Follow up with your PCP to discuss continued use of these medications.    Diagnosis: Osteomyelitis  Assessment and Plan of Treatment: You completed the 6 weeks of antibiotics on 2/19. This infection of your sacrum is being treated with a wound vacuum that has been sent to your house. You will complete five total days of Fluconazole for a fungal infection found near the wound, and this medication will end on 2/23/2025. Please continue to follow up with your wound care specialist to make sure that your wound is recovering well.    Diagnosis: Delirium  Assessment and Plan of Treatment: You were slightly altered with your mental status in the hospital. On the CT scan, there were no signs of strokes, but in the setting of your afib with rvr, we were concerned for the possibility of blood clots extending into your brain. This acute episode resolved by prioritizing your sleep and managing how many interruptions were made to your sleep. Please continue to monitor yourself for not feeling like yourself or feeling confused, but hopefully this is the only time that this episode of confusion will have happened to you.    Diagnosis: Visit for wound care  Assessment and Plan of Treatment: Sacral wound- continue to Pack w/ 1/4 strength packing BID  - Upon dc home Wash w/ 1/4 strength Dakins and  - Nystatin Crusting to periwound skin & Pack w/ Aquacel dressing qd   - HOME CARE RN to reassess to place VAC at home       Buttocks/ Sacrum Crusting w/ Nystatin + CAVILON BID and prn soiling   - Continue w/ attends under pads and Pericare maintenance w/ purewick care as per protocol

## 2025-02-12 NOTE — PROGRESS NOTE ADULT - SUBJECTIVE AND OBJECTIVE BOX
Subjective/Overnight Events: Overnight, repeat CBC showing Hgb 7.6. Received 1 unit of blood. Potassium was elevated and was shifted, given 1x Duoneb and Insulin/Dextrose. Did not endorse any symptoms.    MEDICATIONS  (STANDING):  atorvastatin 20 milliGRAM(s) Oral at bedtime  cefepime   IVPB 2000 milliGRAM(s) IV Intermittent every 8 hours  cefepime   IVPB      dextrose 5%. 1000 milliLiter(s) (100 mL/Hr) IV Continuous <Continuous>  dextrose 5%. 1000 milliLiter(s) (50 mL/Hr) IV Continuous <Continuous>  dextrose 50% Injectable 25 Gram(s) IV Push once  dextrose 50% Injectable 12.5 Gram(s) IV Push once  dextrose 50% Injectable 25 Gram(s) IV Push once  glucagon  Injectable 1 milliGRAM(s) IntraMuscular once  insulin lispro (ADMELOG) corrective regimen sliding scale   SubCutaneous every 6 hours  lactated ringers. 1000 milliLiter(s) (75 mL/Hr) IV Continuous <Continuous>  oseltamivir 75 milliGRAM(s) Oral two times a day  pantoprazole  Injectable 40 milliGRAM(s) IV Push every 12 hours  vancomycin  IVPB 1250 milliGRAM(s) IV Intermittent every 12 hours    MEDICATIONS  (PRN):  acetaminophen     Tablet .. 650 milliGRAM(s) Oral every 6 hours PRN Temp greater or equal to 38C (100.4F), Mild Pain (1 - 3)  dextrose Oral Gel 15 Gram(s) Oral once PRN Blood Glucose LESS THAN 70 milliGRAM(s)/deciliter  melatonin 3 milliGRAM(s) Oral at bedtime PRN Insomnia    Objective:  Vital Signs Last 24 Hrs  T(C): 36.5 (12 Feb 2025 01:58), Max: 36.9 (11 Feb 2025 07:22)  T(F): 97.7 (12 Feb 2025 01:58), Max: 98.4 (11 Feb 2025 07:22)  HR: 73 (12 Feb 2025 01:58) (67 - 90)  BP: 100/59 (12 Feb 2025 01:58) (82/45 - 195/74)  BP(mean): 71 (11 Feb 2025 16:39) (71 - 71)  RR: 18 (12 Feb 2025 01:58) (17 - 21)  SpO2: 96% (12 Feb 2025 01:58) (94% - 97%)    Parameters below as of 12 Feb 2025 01:58  Patient On (Oxygen Delivery Method): room air    I&O's Summary    11 Feb 2025 07:01  -  12 Feb 2025 07:00  --------------------------------------------------------  IN: 0 mL / OUT: 200 mL / NET: -200 mL      Physical Exam:  GENERAL: NAD, obese, chronically ill appearing, pale   HEAD:  Atraumatic, Normocephalic  EYES: conjunctiva and sclera clear, pale conjunctiva   ENT: dry mucous membranes  NECK: Supple, No JVD  CHEST/LUNG: Clear to auscultation bilaterally; No rales, rhonchi, wheezing, or rubs. Unlabored respirations  HEART: Regular rate and rhythm; No murmurs, rubs, or gallops  ABDOMEN: BSx4; Soft, nontender, nondistended  EXTREMITIES:  + b/l nonpitting edema   NERVOUS SYSTEM:  A&Ox3, able to answer questions and follow commands appropriately   SKIN: large sacral OM present    Labs:          Imaging: Subjective/Overnight Events: Overnight, repeat CBC showing Hgb 7.6. Received 1 unit of blood. This morning Hgb 5.2, no reports of bloody BM or vomit. Ordered 2 units of blood. Potassium was elevated and was shifted, given 1x Duoneb. Did not endorse any symptoms.    MEDICATIONS  (STANDING):  atorvastatin 20 milliGRAM(s) Oral at bedtime  cefepime   IVPB 2000 milliGRAM(s) IV Intermittent every 8 hours  cefepime   IVPB      dextrose 5%. 1000 milliLiter(s) (100 mL/Hr) IV Continuous <Continuous>  dextrose 5%. 1000 milliLiter(s) (50 mL/Hr) IV Continuous <Continuous>  dextrose 50% Injectable 25 Gram(s) IV Push once  dextrose 50% Injectable 12.5 Gram(s) IV Push once  dextrose 50% Injectable 25 Gram(s) IV Push once  glucagon  Injectable 1 milliGRAM(s) IntraMuscular once  insulin lispro (ADMELOG) corrective regimen sliding scale   SubCutaneous every 6 hours  lactated ringers. 1000 milliLiter(s) (75 mL/Hr) IV Continuous <Continuous>  oseltamivir 75 milliGRAM(s) Oral two times a day  pantoprazole  Injectable 40 milliGRAM(s) IV Push every 12 hours  vancomycin  IVPB 1250 milliGRAM(s) IV Intermittent every 12 hours    MEDICATIONS  (PRN):  acetaminophen     Tablet .. 650 milliGRAM(s) Oral every 6 hours PRN Temp greater or equal to 38C (100.4F), Mild Pain (1 - 3)  dextrose Oral Gel 15 Gram(s) Oral once PRN Blood Glucose LESS THAN 70 milliGRAM(s)/deciliter  melatonin 3 milliGRAM(s) Oral at bedtime PRN Insomnia    Objective:  Vital Signs Last 24 Hrs  T(C): 36.5 (12 Feb 2025 01:58), Max: 36.9 (11 Feb 2025 07:22)  T(F): 97.7 (12 Feb 2025 01:58), Max: 98.4 (11 Feb 2025 07:22)  HR: 73 (12 Feb 2025 01:58) (67 - 90)  BP: 100/59 (12 Feb 2025 01:58) (82/45 - 195/74)  BP(mean): 71 (11 Feb 2025 16:39) (71 - 71)  RR: 18 (12 Feb 2025 01:58) (17 - 21)  SpO2: 96% (12 Feb 2025 01:58) (94% - 97%)    Parameters below as of 12 Feb 2025 01:58  Patient On (Oxygen Delivery Method): room air    I&O's Summary    11 Feb 2025 07:01  -  12 Feb 2025 07:00  --------------------------------------------------------  IN: 0 mL / OUT: 200 mL / NET: -200 mL      Physical Exam:  GENERAL: NAD, obese, chronically ill appearing, pale   HEAD:  Atraumatic, Normocephalic  EYES: conjunctiva and sclera clear, pale conjunctiva   ENT: dry mucous membranes  NECK: Supple, No JVD  CHEST/LUNG: Clear to auscultation bilaterally; No rales, rhonchi, wheezing, or rubs. Unlabored respirations  HEART: Regular rate and rhythm; No murmurs, rubs, or gallops  ABDOMEN: BSx4; Soft, nontender, nondistended  EXTREMITIES:  + b/l nonpitting edema   NERVOUS SYSTEM:  A&Ox3, able to answer questions and follow commands appropriately   SKIN: large sacral OM present    Labs:          Imaging:

## 2025-02-12 NOTE — PROGRESS NOTE ADULT - PROBLEM SELECTOR PLAN 1
Multiple episodes of melena and coffee-ground emesis x 1 day, associated w/ hypotension and acute drop in Hgb. Ddx includes PUD vs AVMs vs malignancy (unclear last EGD), less likely dieulafoy lesion, smitha chen tear. CT neg active GI bleed.     - CBC q8h   - IV PPI BID  - NPO for now   - f/u GI recs -> pt now agreeable to EGD   - 2 large bore IVs   - maintain active T&S, transfuse for Hgb >8

## 2025-02-12 NOTE — PATIENT PROFILE ADULT - FALL HARM RISK - HARM RISK INTERVENTIONS
Assistance with ambulation/Assistance OOB with selected safe patient handling equipment/Communicate Risk of Fall with Harm to all staff/Discuss with provider need for PT consult/Monitor gait and stability/Reinforce activity limits and safety measures with patient and family/Tailored Fall Risk Interventions/Visual Cue: Yellow wristband and red socks/Bed in lowest position, wheels locked, appropriate side rails in place/Call bell, personal items and telephone in reach/Instruct patient to call for assistance before getting out of bed or chair/Non-slip footwear when patient is out of bed/[place] to call system/Physically safe environment - no spills, clutter or unnecessary equipment/Purposeful Proactive Rounding/Room/bathroom lighting operational, light cord in reach

## 2025-02-12 NOTE — DISCHARGE NOTE PROVIDER - NSDCFUADDINST_GEN_ALL_CORE_FT
ABDOMINAL Pannus/ GROIN/ BREAST and Buttocks/ Sacrum       after washing + pat dry      CRUSTING  w/ Sprinkling Nystatin Powder + BLOT w/CAVILON   Sacral wound- Wash wound w/ 1/4 strength Dakins Solution      Pat dry     Nystatin Crusting to periwound skin (as above)        & Pack w/ Aquacel dressing qd   -HOME CARE RN to reassess to place VAC at home - will need to continue Nystatin crusting to periwound skin  -Bilateral Groin, Pannus, & Breast skin Folds/axillas- after Cleaning= Tuck in INTERDry AG QD

## 2025-02-12 NOTE — DISCHARGE NOTE PROVIDER - CARE PROVIDER_API CALL
Ruslan Heaton  [facility]  [address], [place] [address]  Phone: [phone]  Fax: [phone]  Established Patient  Follow Up Time: 1 week   Ruslan Heaton  [facility]  [address], [place] [address]  Phone: [phone]  Fax: [phone]  Established Patient  Follow Up Time: 1 week    Leonila Brown  [address] [place] [address]  Phone: [phone]  Fax: (   )    -  Scheduled Appointment: 02/26/2025 03:00 PM

## 2025-02-12 NOTE — DISCHARGE NOTE PROVIDER - ATTENDING DISCHARGE PHYSICAL EXAMINATION:
PHYSICAL EXAM:  GENERAL:  Well appearing, in NAD  HEENT: Normocephalic, atraumatic, no conjunctival injection  CHEST/LUNG: CTA B/L. No w/r/r.  HEART: Reg rate and rhythm. No m/r/g.   ABDOMEN: BS normoactive, no TTP, obese  EXTREMITIES: No LE edema, no cyanosis or clubbing  PSYCH: AAOx3, appropriate affect  NEUROLOGY: No new FND  SKIN: sacral ulcer, poa

## 2025-02-12 NOTE — DISCHARGE NOTE PROVIDER - CARE PROVIDERS DIRECT ADDRESSES
,[email or web address]

## 2025-02-12 NOTE — PROGRESS NOTE ADULT - ASSESSMENT
This is an 81 y/o F with PMHx, HTN, lymphedema, T2DM, sacral decubitus ulcer s/p debridement (2024) and recent adm for sacral OM who presented to the ED for coffee-ground emesis and melena x 1 day, admitted for acute blood loss anemia 2/2 to UGIB.

## 2025-02-12 NOTE — DISCHARGE NOTE PROVIDER - NSDCFUADDAPPT_GEN_ALL_CORE_FT
APPTS ARE READY TO BE MADE: [ ] YES    Best Family or Patient Contact (if needed):    Additional Information about above appointments (if needed):    1: PCP  2: GI  3:     Other comments or requests:    APPTS ARE READY TO BE MADE: [X] YES    Best Family or Patient Contact (if needed):    Additional Information about above appointments (if needed):    1: PCP  2: GI  3: ID  4. Wound care     Other comments or requests:    APPTS ARE READY TO BE MADE: [X] YES    Best Family or Patient Contact (if needed):    Additional Information about above appointments (if needed):    1: PCP (Dr. Heaton)  2: GI  3: ID  4. Wound care (Leonila Brown MD)    Other comments or requests:    Follow-up with Wound Care Dr. Leonila Brown on Wednesday 2/26 at 3:30pm.   [address]  [place] [address]  [phone]    APPTS ARE READY TO BE MADE: [X] YES    Best Family or Patient Contact (if needed): Dorinda (daughter) [phone]    Additional Information about above appointments (if needed):    1: PCP (Dr. Heaton)  2: GI  3: ID  4. Wound care (Leonila Brown MD)    Other comments or requests:    Follow-up with Wound Care Dr. Leonila Brown on Wednesday 2/26 at 3:30pm.   [address] [address]  [phone]    APPTS ARE READY TO BE MADE: [X] YES    Best Family or Patient Contact (if needed): Dorinda (daughter) [phone]    Additional Information about above appointments (if needed):    1: PCP (Dr. Heaton)  2: GI  3: ID  4. Wound care (Leonila Brown MD)    Other comments or requests:   Prior to outreaching the patient, it was visible that the patient has secured a follow up appointment which was not scheduled by our team on 3/6 at 2pm with dr. velarde at [address] [facility]    pcp/id/[id number] Patient was outreached but did not answer. A voicemail was left for the patient to return our call. Follow-up with Wound Care Dr. Leonila Brown on Wednesday 2/26 at 3:30pm.   [address] [address]  [phone]    APPTS ARE READY TO BE MADE: [X] YES    Best Family or Patient Contact (if needed): Dorinda (daughter) [phone]    Additional Information about above appointments (if needed):    1: PCP (Dr. Castañeda)  2: GI  3: ID  4. Wound care (Leonila Brown MD)    Other comments or requests:   Prior to outreaching the patient, it was visible that the patient has secured a follow up appointment which was not scheduled by our team on 3/6 at 2pm with dr. velarde at [address] [facility]    pcp-Patient informed us they already have secured a follow up appointment which is not visible on [place] during the week of 2/24 with dr castañeda at [address] [place] [phone]    gi/id-Patient advises they do not want our assistance and prefer to coordinate the non - [facility] appointments on their own. No information was provided to the patient.

## 2025-02-12 NOTE — PRE-ANESTHESIA EVALUATION ADULT - NSANTHPMHFT_GEN_ALL_CORE
chart and consultant notes reviewed. no apparent hx sig cv/pulm dz - ambulated without cp/sob. ekg noted. dm fs < 200. flu +, sating well on RA. GIB s/p transfusion with appropriate response. pt is hyperkalemia, etiology unclear. improved following treatment, but still abnormal/elevated. dr alejandra states that case is an emergency and cannot wait for w/u or optimization of hyperkalemia

## 2025-02-12 NOTE — DISCHARGE NOTE PROVIDER - PROVIDER TOKENS
PROVIDER:[TOKEN:[[id number]],FOLLOWUP:[1 week],ESTABLISHEDPATIENT:[T]] PROVIDER:[TOKEN:[[id number]],FOLLOWUP:[1 week],ESTABLISHEDPATIENT:[T]],FREE:[LAST:[Kevin],FIRST:[Leonila],PHONE:[phone]],FAX:[(   )    -],ADDRESS:[[address] [place] [address]],SCHEDULEDAPPT:[02/26/2025],SCHEDULEDAPPTTIME:[03:00 PM]]

## 2025-02-12 NOTE — PRE PROCEDURE NOTE - PRE PROCEDURE EVALUATION
Attending Physician:             Lesley Willson               Procedure: upper endoscopy    Indication for Procedure: anemia  ________________________________________________________  PAST MEDICAL & SURGICAL HISTORY:  Hypertension      Diabetes      Back pain      Lymphedema of both lower extremities      HLD (hyperlipidemia)      Sacral osteomyelitis      History of cholecystectomy        ALLERGIES:  No Known Allergies    HOME MEDICATIONS:  amoxicillin-clavulanate 875 mg-125 mg oral tablet: 1 tab(s) orally every 12 hours  ascorbic acid 250 mg oral tablet: 1 tab(s) orally once a day  bisoprolol 5 mg oral tablet: 2 tab(s) orally once a day  fluconazole 150 mg oral tablet: 1 tab(s) orally once a week every monday x 4 weeks for sacral ulcer (1/23- )  Lipitor 20 mg oral tablet: 1 tab(s) orally once a day (at bedtime)  Multiple Vitamins with Minerals oral tablet: 1 tab(s) orally once a day  nystatin 100,000 units/g topical cream: 1 Apply topically to affected area 2 times a day  saccharomyces boulardii lyo 250 mg oral capsule: 2 cap(s) orally once a day  silver sulfADIAZINE 1% topical cream: Apply topically to affected area 2 times a day  zinc (as gluconate) 50 mg oral tablet: 1 tab(s) orally once a day    AICD/PPM: [ ] yes   [ ] no    PERTINENT LAB DATA:                        10.9   18.18 )-----------( 312      ( 12 Feb 2025 16:31 )             33.5     02-12    137  |  111[H]  |  63[H]  ----------------------------<  226[H]  5.5[H]   |  19[L]  |  0.93    Ca    8.8      12 Feb 2025 06:59  Phos  2.6     02-12  Mg     1.7     02-12    TPro  5.0[L]  /  Alb  2.3[L]  /  TBili  0.4  /  DBili  x   /  AST  10  /  ALT  18  /  AlkPhos  79  02-11    PT/INR - ( 12 Feb 2025 06:59 )   PT: 18.2 sec;   INR: 1.59 ratio         PTT - ( 12 Feb 2025 06:59 )  PTT:35.8 sec            PHYSICAL EXAMINATION:    T(C): 36.2  HR: 60  BP: 174/72  RR: 18  SpO2: 97%    Constitutional: NAD  HEENT: PERRLA, EOMI,    Neck:  No JVD  Respiratory: CTAB/L  Cardiovascular: S1 and S2  Gastrointestinal: BS+, soft, NT/ND  Extremities: No peripheral edema  Neurological: A/O x 3, no focal deficits  Psychiatric: Normal mood, normal affect  Skin: No rashes    ASA Class: I [ ]  II [ ]  III [ ]  IV [ ]    COMMENTS:    The patient is a suitable candidate for the planned procedure unless box checked [ ]  No, explain:

## 2025-02-12 NOTE — DISCHARGE NOTE PROVIDER - NSDCMRMEDTOKEN_GEN_ALL_CORE_FT
amoxicillin-clavulanate 875 mg-125 mg oral tablet: 1 tab(s) orally every 12 hours  ascorbic acid 250 mg oral tablet: 1 tab(s) orally once a day  bisoprolol 5 mg oral tablet: 2 tab(s) orally once a day  fluconazole 150 mg oral tablet: 1 tab(s) orally once a week every monday x 4 weeks for sacral ulcer (1/23- )  Lipitor 20 mg oral tablet: 1 tab(s) orally once a day (at bedtime)  metFORMIN 500 mg oral tablet: 1 tab(s) orally once a day (in the morning)  Multiple Vitamins with Minerals oral tablet: 1 tab(s) orally once a day  nystatin 100,000 units/g topical cream: 1 Apply topically to affected area 2 times a day  saccharomyces boulardii lyo 250 mg oral capsule: 2 cap(s) orally once a day  silver sulfADIAZINE 1% topical cream: Apply topically to affected area 2 times a day  zinc (as gluconate) 50 mg oral tablet: 1 tab(s) orally once a day   amoxicillin-clavulanate 875 mg-125 mg oral tablet: 1 tab(s) orally every 12 hours (last day of treatment 2/19)  ascorbic acid 250 mg oral tablet: 1 tab(s) orally once a day  bisoprolol 5 mg oral tablet: 2 tab(s) orally once a day  fluconazole 150 mg oral tablet: 1 tab(s) orally once a week every monday x 4 weeks for sacral ulcer (1/23- )  Lipitor 20 mg oral tablet: 1 tab(s) orally once a day (at bedtime)  metFORMIN 500 mg oral tablet: 1 tab(s) orally once a day (in the morning)  Multiple Vitamins with Minerals oral tablet: 1 tab(s) orally once a day  nystatin 100,000 units/g topical cream: 1 Apply topically to affected area 2 times a day  saccharomyces boulardii lyo 250 mg oral capsule: 2 cap(s) orally once a day  silver sulfADIAZINE 1% topical cream: Apply topically to affected area 2 times a day  zinc (as gluconate) 50 mg oral tablet: 1 tab(s) orally once a day   amLODIPine 10 mg oral tablet: 1 tab(s) orally once a day  ascorbic acid 250 mg oral tablet: 1 tab(s) orally once a day  atorvastatin 20 mg oral tablet: 1 tab(s) orally once a day (at bedtime)  fluconazole 200 mg oral tablet: 1 tab(s) orally once a day take one  2/21 2/22 and 2/23  [employer]: [id number]  NPI: [id number]  metFORMIN 500 mg oral tablet: 1 tab(s) orally once a day (in the morning)  metoprolol tartrate 100 mg oral tablet: 1 tab(s) orally 2 times a day  Multiple Vitamins with Minerals oral tablet: 1 tab(s) orally once a day  nystatin 100,000 units/g topical powder: 1 Apply topically to affected area every 12 hours  pantoprazole 40 mg oral delayed release tablet: 1 tab(s) orally every 12 hours  rivaroxaban 20 mg oral tablet: 1 tab(s) orally once a day (before a meal)  saccharomyces boulardii lyo 250 mg oral capsule: 2 cap(s) orally once a day  silver sulfADIAZINE 1% topical cream: Apply topically to affected area 2 times a day  zinc (as gluconate) 50 mg oral tablet: 1 tab(s) orally once a day

## 2025-02-13 DIAGNOSIS — K26.9 DUODENAL ULCER, UNSPECIFIED AS ACUTE OR CHRONIC, WITHOUT HEMORRHAGE OR PERFORATION: ICD-10-CM

## 2025-02-13 LAB
ANION GAP SERPL CALC-SCNC: 11 MMOL/L — SIGNIFICANT CHANGE UP (ref 5–17)
BUN SERPL-MCNC: 43 MG/DL — HIGH (ref 7–23)
CALCIUM SERPL-MCNC: 8.8 MG/DL — SIGNIFICANT CHANGE UP (ref 8.4–10.5)
CHLORIDE SERPL-SCNC: 106 MMOL/L — SIGNIFICANT CHANGE UP (ref 96–108)
CO2 SERPL-SCNC: 18 MMOL/L — LOW (ref 22–31)
CREAT SERPL-MCNC: 0.91 MG/DL — SIGNIFICANT CHANGE UP (ref 0.5–1.3)
EGFR: 63 ML/MIN/1.73M2 — SIGNIFICANT CHANGE UP
GLUCOSE BLDC GLUCOMTR-MCNC: 106 MG/DL — HIGH (ref 70–99)
GLUCOSE BLDC GLUCOMTR-MCNC: 133 MG/DL — HIGH (ref 70–99)
GLUCOSE BLDC GLUCOMTR-MCNC: 160 MG/DL — HIGH (ref 70–99)
GLUCOSE SERPL-MCNC: 194 MG/DL — HIGH (ref 70–99)
HCT VFR BLD CALC: 34.3 % — LOW (ref 34.5–45)
HCT VFR BLD CALC: 37.1 % — SIGNIFICANT CHANGE UP (ref 34.5–45)
HGB BLD-MCNC: 11 G/DL — LOW (ref 11.5–15.5)
HGB BLD-MCNC: 12 G/DL — SIGNIFICANT CHANGE UP (ref 11.5–15.5)
MAGNESIUM SERPL-MCNC: 1.9 MG/DL — SIGNIFICANT CHANGE UP (ref 1.6–2.6)
MCHC RBC-ENTMCNC: 28.9 PG — SIGNIFICANT CHANGE UP (ref 27–34)
MCHC RBC-ENTMCNC: 29 PG — SIGNIFICANT CHANGE UP (ref 27–34)
MCHC RBC-ENTMCNC: 32.1 G/DL — SIGNIFICANT CHANGE UP (ref 32–36)
MCHC RBC-ENTMCNC: 32.3 G/DL — SIGNIFICANT CHANGE UP (ref 32–36)
MCV RBC AUTO: 89.6 FL — SIGNIFICANT CHANGE UP (ref 80–100)
MCV RBC AUTO: 90 FL — SIGNIFICANT CHANGE UP (ref 80–100)
MRSA PCR RESULT.: SIGNIFICANT CHANGE UP
NRBC BLD AUTO-RTO: 0 /100 WBCS — SIGNIFICANT CHANGE UP (ref 0–0)
NRBC BLD AUTO-RTO: 0 /100 WBCS — SIGNIFICANT CHANGE UP (ref 0–0)
PHOSPHATE SERPL-MCNC: 2.5 MG/DL — SIGNIFICANT CHANGE UP (ref 2.5–4.5)
PLATELET # BLD AUTO: 353 K/UL — SIGNIFICANT CHANGE UP (ref 150–400)
PLATELET # BLD AUTO: 401 K/UL — HIGH (ref 150–400)
POTASSIUM SERPL-MCNC: 5 MMOL/L — SIGNIFICANT CHANGE UP (ref 3.5–5.3)
POTASSIUM SERPL-SCNC: 5 MMOL/L — SIGNIFICANT CHANGE UP (ref 3.5–5.3)
RBC # BLD: 3.81 M/UL — SIGNIFICANT CHANGE UP (ref 3.8–5.2)
RBC # BLD: 4.14 M/UL — SIGNIFICANT CHANGE UP (ref 3.8–5.2)
RBC # FLD: 17.2 % — HIGH (ref 10.3–14.5)
RBC # FLD: 17.2 % — HIGH (ref 10.3–14.5)
S AUREUS DNA NOSE QL NAA+PROBE: SIGNIFICANT CHANGE UP
SODIUM SERPL-SCNC: 135 MMOL/L — SIGNIFICANT CHANGE UP (ref 135–145)
VANCOMYCIN TROUGH SERPL-MCNC: 18.8 UG/ML — SIGNIFICANT CHANGE UP (ref 10–20)
WBC # BLD: 15.5 K/UL — HIGH (ref 3.8–10.5)
WBC # BLD: 17.82 K/UL — HIGH (ref 3.8–10.5)
WBC # FLD AUTO: 15.5 K/UL — HIGH (ref 3.8–10.5)
WBC # FLD AUTO: 17.82 K/UL — HIGH (ref 3.8–10.5)

## 2025-02-13 PROCEDURE — G0545: CPT

## 2025-02-13 PROCEDURE — 99233 SBSQ HOSP IP/OBS HIGH 50: CPT | Mod: GC

## 2025-02-13 PROCEDURE — 99232 SBSQ HOSP IP/OBS MODERATE 35: CPT | Mod: GC

## 2025-02-13 PROCEDURE — 99222 1ST HOSP IP/OBS MODERATE 55: CPT

## 2025-02-13 RX ORDER — CYST/ALA/Q10/PHOS.SER/DHA/BROC 100-20-50
1 POWDER (GRAM) ORAL DAILY
Refills: 0 | Status: DISCONTINUED | OUTPATIENT
Start: 2025-02-13 | End: 2025-02-24

## 2025-02-13 RX ORDER — INSULIN LISPRO 100 U/ML
INJECTION, SOLUTION INTRAVENOUS; SUBCUTANEOUS
Refills: 0 | Status: DISCONTINUED | OUTPATIENT
Start: 2025-02-13 | End: 2025-02-24

## 2025-02-13 RX ORDER — SODIUM CHLORIDE 9 G/1000ML
1000 INJECTION, SOLUTION INTRAVENOUS
Refills: 0 | Status: DISCONTINUED | OUTPATIENT
Start: 2025-02-13 | End: 2025-02-24

## 2025-02-13 RX ORDER — DEXTROSE 50 % IN WATER 50 %
25 SYRINGE (ML) INTRAVENOUS ONCE
Refills: 0 | Status: DISCONTINUED | OUTPATIENT
Start: 2025-02-13 | End: 2025-02-24

## 2025-02-13 RX ORDER — DEXTROSE 50 % IN WATER 50 %
12.5 SYRINGE (ML) INTRAVENOUS ONCE
Refills: 0 | Status: DISCONTINUED | OUTPATIENT
Start: 2025-02-13 | End: 2025-02-24

## 2025-02-13 RX ORDER — INSULIN LISPRO 100 U/ML
INJECTION, SOLUTION INTRAVENOUS; SUBCUTANEOUS AT BEDTIME
Refills: 0 | Status: DISCONTINUED | OUTPATIENT
Start: 2025-02-13 | End: 2025-02-24

## 2025-02-13 RX ORDER — VANCOMYCIN HCL IN 5 % DEXTROSE 1.5G/250ML
1500 PLASTIC BAG, INJECTION (ML) INTRAVENOUS EVERY 24 HOURS
Refills: 0 | Status: DISCONTINUED | OUTPATIENT
Start: 2025-02-13 | End: 2025-02-14

## 2025-02-13 RX ORDER — ZINC SULFATE 50(220)MG
220 CAPSULE ORAL DAILY
Refills: 0 | Status: DISCONTINUED | OUTPATIENT
Start: 2025-02-13 | End: 2025-02-24

## 2025-02-13 RX ORDER — DEXTROSE 50 % IN WATER 50 %
15 SYRINGE (ML) INTRAVENOUS ONCE
Refills: 0 | Status: DISCONTINUED | OUTPATIENT
Start: 2025-02-13 | End: 2025-02-24

## 2025-02-13 RX ORDER — AMLODIPINE BESYLATE 10 MG/1
5 TABLET ORAL DAILY
Refills: 0 | Status: DISCONTINUED | OUTPATIENT
Start: 2025-02-13 | End: 2025-02-18

## 2025-02-13 RX ORDER — AMLODIPINE BESYLATE 10 MG/1
5 TABLET ORAL ONCE
Refills: 0 | Status: DISCONTINUED | OUTPATIENT
Start: 2025-02-13 | End: 2025-02-13

## 2025-02-13 RX ORDER — GLUCAGON 3 MG/1
1 POWDER NASAL ONCE
Refills: 0 | Status: DISCONTINUED | OUTPATIENT
Start: 2025-02-13 | End: 2025-02-24

## 2025-02-13 RX ADMIN — CEFEPIME 100 MILLIGRAM(S): 2 INJECTION, POWDER, FOR SOLUTION INTRAVENOUS at 13:29

## 2025-02-13 RX ADMIN — OSELTAMIVIR PHOSPHATE 75 MILLIGRAM(S): 75 CAPSULE ORAL at 18:18

## 2025-02-13 RX ADMIN — ATORVASTATIN CALCIUM 20 MILLIGRAM(S): 80 TABLET, FILM COATED ORAL at 22:16

## 2025-02-13 RX ADMIN — Medication 500 MILLIGRAM(S): at 18:18

## 2025-02-13 RX ADMIN — CEFEPIME 100 MILLIGRAM(S): 2 INJECTION, POWDER, FOR SOLUTION INTRAVENOUS at 08:33

## 2025-02-13 RX ADMIN — AMLODIPINE BESYLATE 5 MILLIGRAM(S): 10 TABLET ORAL at 12:07

## 2025-02-13 RX ADMIN — OSELTAMIVIR PHOSPHATE 75 MILLIGRAM(S): 75 CAPSULE ORAL at 06:51

## 2025-02-13 RX ADMIN — Medication 220 MILLIGRAM(S): at 18:18

## 2025-02-13 RX ADMIN — Medication 300 MILLIGRAM(S): at 22:16

## 2025-02-13 RX ADMIN — Medication 1 TABLET(S): at 18:18

## 2025-02-13 RX ADMIN — CEFEPIME 100 MILLIGRAM(S): 2 INJECTION, POWDER, FOR SOLUTION INTRAVENOUS at 22:16

## 2025-02-13 RX ADMIN — Medication 10 MG/HR: at 01:55

## 2025-02-13 NOTE — PROGRESS NOTE ADULT - PROBLEM SELECTOR PLAN 6
K 6.5 -> 5.5, s/p shift x 2 w/ insulin 5 units.     - Continued elevation -> duoneb x1 and shift w/ insulin  - Continue to monitor K 6.5 -> 5.5, s/p shift x 2 w/ insulin 5 units.   Improved.     - daily BMP

## 2025-02-13 NOTE — PHYSICAL THERAPY INITIAL EVALUATION ADULT - ADDITIONAL COMMENTS
Pt resides with her spouse in a private home with +5 steps to enter, first floor setup. PTA, pt was bedbound and non-ambulatory for ~1 year. Pt reports requiring total assistance to transfer out of bed. Pt has HHA 3hrs/3days to assist and is otherwise in bed,  is able to provide limited assistance. Pt owns hospital bed, RW, wheelchair, commode, shower chair. Pt recently in [facility].

## 2025-02-13 NOTE — PHARMACOTHERAPY INTERVENTION NOTE - COMMENTS
79 y/o F currently receiving cefepime 2g IV q8H and vancomycin 1250mg IV Q12H empirically for sacral osteomyelitis. Patient's vancomycin level came back at 18.8 this morning. Recommend changing vancomycin dose to 1500mg IV Q24H for an estimated trough of 12 and AUC of 567 (goal 400-600). MRSA nasal swab result pending to guide duration of vancomycin.     Itz FloresD, BCPS  Clinical Pharmacy Specialist  Available on [employer]  Cell: [phone]

## 2025-02-13 NOTE — DIETITIAN INITIAL EVALUATION ADULT - PERTINENT LABORATORY DATA
02-12    137  |  111[H]  |  63[H]  ----------------------------<  226[H]  5.5[H]   |  19[L]  |  0.93    Ca    8.8      12 Feb 2025 06:59  Phos  2.6     02-12  Mg     1.7     02-12    TPro  5.0[L]  /  Alb  2.3[L]  /  TBili  0.4  /  DBili  x   /  AST  10  /  ALT  18  /  AlkPhos  79  02-11  POCT Blood Glucose.: 133 mg/dL (02-13-25 @ 06:56)  A1C with Estimated Average Glucose Result: 7.3 % (01-09-25 @ 10:04)

## 2025-02-13 NOTE — ADVANCED PRACTICE NURSE CONSULT - REASON FOR CONSULT
Vascular Access Team    Evaluation for: Bedside PICC/Midline placement  Requested by name: Mitzi Reynoso  Date/Time: 2/13/2025    Indication: IV abx. difficult stick  Allergy to CHG or Heparin or Lidocaine: no    Platelets(>20): 353  INR(<3): 1.59  eGFR(>40): 63  Blood cultures sent: 2/11/2025 @0949  Blood culture negative in 48hrs:  pending results (still at 24hours)  Anticoagulants/Antiplatelets: no  Arms DVT:  Mastectomy: no  Fistula: no  PPM/Defib: no  IR or Nephrology or ID clearance needed: no    Consent obtained: no    Pending: consent, blood culture results    Plan: Bedside picc order evaluated. Please contact KATIE RN #[phone] with any questions.

## 2025-02-13 NOTE — CONSULT NOTE ADULT - ASSESSMENT
79 y hx  HTN, lymphedema, b/l arthritis, recent hospitalization 1/8/2025-1/16/2025 for weakness and sacral wound management where she was dx with OM of sacrum in the setting of elevated inflammatory markers and leukocytosis placed on zosyn and switched to eratapenem on discharge and was recommended for a PICC line, pt refused IV abx and was sent home on Augmentin 875/125mg to complete 6 weeks on 2/19. She was also treated with diflucan for candidiasis of skin. She now presents for nausea vomiting and bloody diarrhea from nursing home.    Afebrile  Leukocytosis 31.8 on admission now 15  Lactate 2.1  Currently on Vanco1.5g qd/cefepime 2g q8  Vanc/zosyn 2/11  +flu A on tamiflu 2/11-->  2/11 bld cx neg  2/12 Endoscopy: No active bleeding but source of anemia and melena likely 2/2 duodenal bulb ulcers    INCOMPLETE***********************************************                   79 y hx  HTN, lymphedema, b/l arthritis, recent hospitalization 1/8/2025-1/16/2025 for weakness and sacral wound management where she was dx with OM of sacrum in the setting of elevated inflammatory markers and leukocytosis placed on zosyn and switched to ertapenem on discharge and was recommended for a PICC line, pt refused IV abx and was sent home on Augmentin 875/125mg to complete 6 weeks on 2/19.(no culture data) She was also treated with diflucan for candidiasis of skin. She now presents for nausea vomiting and bloody diarrhea from nursing home. ID consulted for Leukocytosis.    Afebrile  Leukocytosis 31.8 on admission now 15  Denies dysuria  Lactate 2.1  Currently on Vanco1.5g qd/cefepime 2g q8  Vanc/zosyn 2/11  +flu A on tamiflu 2/11-->  2/11 bld cx neg  2/12 Endoscopy: No active bleeding but source of anemia and melena likely 2/2 duodenal bulb ulcers  Sacral Stage 4 pressure injury-Vac in place as per wound care note--> 9.0cm x 7.0cm x 2.0cm moist pale tissue w/ some slough serosanguinous drainage no exposed bone No odor, increased warmth, tenderness, induration, fluctuance, nor crepitus, Fungal Moisture Associated Dermatitis Back/ Flank/ Buttock/ Thighs/ Groin/ Pannus/axilla- improving, Moisture Dermatitis Under Breasts    Plan  Overall non toxic appearing admitted for GI Bleed, s/p on treatment for sacral OM to complete on 2/19(had been on augmentin)  Leukocytosis likely reactive 2/2 GI bleeding now down trending  Sacral wound with vac-likely non infectious  No upper resp, Symptoms + Flu A   Continue Vanco and cefepime for now empirically  Continue to trend wbc to norm  Monitor for fevers  D/w Dr. Mayes                     79 y hx  HTN, lymphedema, b/l arthritis, recent hospitalization 1/8/2025-1/16/2025 for weakness and sacral wound management where she was dx with OM of sacrum in the setting of elevated inflammatory markers and leukocytosis placed on zosyn and switched to ertapenem on discharge and was recommended for a PICC line, pt refused IV abx and was sent home on Augmentin 875/125mg to complete 6 weeks on 2/19.(no culture data) She was also treated with diflucan for candidiasis of skin. She now presents for nausea vomiting and bloody diarrhea from nursing home. ID consulted for Leukocytosis.    Afebrile  Leukocytosis 31.8 on admission now 15  Denies dysuria  Lactate 2.1  Currently on Vanco1.5g qd/cefepime 2g q8  Vanc/zosyn 2/11  +flu A on tamiflu 2/11-->  2/11 bld cx neg  2/12 Endoscopy: No active bleeding but source of anemia and melena likely 2/2 duodenal bulb ulcers  Sacral Stage 4 pressure injury-Vac in place as per wound care note--> 9.0cm x 7.0cm x 2.0cm moist pale tissue w/ some slough serosanguinous drainage no exposed bone No odor, increased warmth, tenderness, induration, fluctuance, nor crepitus, Fungal Moisture Associated Dermatitis Back/ Flank/ Buttock/ Thighs/ Groin/ Pannus/axilla- improving, Moisture Dermatitis Under Breasts    Plan  Overall non toxic appearing admitted for GI Bleed, s/p on treatment for sacral OM to complete on 2/19(had been on augmentin)  Leukocytosis likely reactive 2/2 GI bleeding now down trending  Sacral wound with vac-likely non infectious  No upper resp, Symptoms + Flu A   Continue Vanco and cefepime for now empirically, if IV access becomes an issue can switch back to augmentin 875/125mg bid if needed.  Continue to trend wbc to norm  Monitor for fevers  D/w Dr. Mayes

## 2025-02-13 NOTE — PROGRESS NOTE ADULT - PROBLEM SELECTOR PLAN 9
Home meds: bisoprolol 10mg qd     - hold all home meds in setting of hypotension 2/2 GIB Home meds: bisoprolol 10mg qd     - hold bisoprolol for now  - start amlodipine 5mg qd

## 2025-02-13 NOTE — PHYSICAL THERAPY INITIAL EVALUATION ADULT - ACTIVE RANGE OF MOTION EXAMINATION, REHAB EVAL
decreased BLE AROM due to weakness and pain./bilateral upper extremity Active ROM was WFL (within functional limits)

## 2025-02-13 NOTE — DIETITIAN INITIAL EVALUATION ADULT - PROBLEM SELECTOR PLAN 6
K 6.5 -> 5.5, s/p shift x 2 w/ insulin 5 units.     - BMP stat   - if continued elevation -> duoneb x1 and shift w/ insulin. if EKG changes -> calcium gluconate

## 2025-02-13 NOTE — DIETITIAN INITIAL EVALUATION ADULT - OTHER CALCULATIONS
defer to MD team for fluid needs; IBW 56.8 kg; %IBW: 158% - upper IBW 62.5 kg used to estimate nutrient needs

## 2025-02-13 NOTE — DIETITIAN INITIAL EVALUATION ADULT - ENERGY INTAKE
Fair (50-75%) Pt reports tolerating clear liquid diet without issue, but upset she couldn't receive cream for her coffee. Per GI notes, Pt to remain on clear liquid diet for now. Labs reviewed, hyperkalemia noted. Fingersticks controlled. No food preferences offered. Clear liquid diet explained to Pt and will upgrade as tolerated/per MD team.

## 2025-02-13 NOTE — PROGRESS NOTE ADULT - SUBJECTIVE AND OBJECTIVE BOX
Wound SURGERY CONSULT NOTE    HPI:  This is an 79 y/o F with PMHx, HTN, lymphedema, T2DM, sacral decubitus ulcer s/p debridement (2024) and recent adm for sacral OM who presented to the ED for coffee-ground emesis and melena x 1 day. Patient noted the episodes first started at 5AM but did not know how many episodes she had. Per chart review, patient w/ multiple episodes of "dark, coffee ground appearing emesis" as well as "large amounts of dark colored loose, watery stools." She was found to have a high BUN on labs and thus brought it for further evaluation of GIB.     Patient endorsed fatigue but denied any chest pain, sob, ab pain n/v at this time. Patient last episode of melena this AM in ED.     In the ED, T 98.4, HR 90, /68 -> 82/45, RR 20, 97% on RA. Labs significant for WBC 22.8, Hgb 6, Plt 530, K 6.5 (moderately hemolyzed)-> 5.5, BUN 69, Trop 64->55 . RVP positive for flu.   CT A/P without evidence of active GI bleed, nonspecific right lateral bladder wall thickening and known sacral decub ulcer w/ erosive changes consistent w/ OM. s/p 2u pRBC hyperK shift w/ 5u insulin x2, octreotide 50mcg x1, zofran x1, tamiflu 75mg x1, PPI 40 IVP x2, zosyn 3.375x1, vanc 1g x1, 1L LR and duoneb x1.       (11 Feb 2025 17:30)        Wound consult requested by team to assist w/ management of  sacral stage 4 pressure injury.   Pt w/o  c/o pain, drainage, odor, color change,  or worsening swelling. Offloading and pericare initiated upon admission as pt Increasingly sedentary 2/2 to illness. Pt is Incontinent of urine & stool. Daughter at bedside. All questions asked and answered to pt's and family's expressed understanding and satisfaction.    Current Diet: Diet, Clear Liquid:   Consistent Carbohydrate No Snacks (CSTCHO) (02-12-25 @ 18:18)      PAST MEDICAL & SURGICAL HISTORY:  Hypertension      Diabetes      Back pain      Lymphedema of both lower extremities      HLD (hyperlipidemia)      Sacral osteomyelitis      History of cholecystectomy          REVIEW OF SYSTEMS:   General/ Breast/ Skin/Vasc/ Neuro/ MSK: see HPI  All other systems negative    MEDICATIONS  (STANDING):  atorvastatin 20 milliGRAM(s) Oral at bedtime  cefepime   IVPB 2000 milliGRAM(s) IV Intermittent every 8 hours  cefepime   IVPB      dextrose 5%. 1000 milliLiter(s) (100 mL/Hr) IV Continuous <Continuous>  dextrose 5%. 1000 milliLiter(s) (50 mL/Hr) IV Continuous <Continuous>  dextrose 50% Injectable 25 Gram(s) IV Push once  dextrose 50% Injectable 12.5 Gram(s) IV Push once  dextrose 50% Injectable 25 Gram(s) IV Push once  glucagon  Injectable 1 milliGRAM(s) IntraMuscular once  influenza  Vaccine (HIGH DOSE) 0.5 milliLiter(s) IntraMuscular once  insulin lispro (ADMELOG) corrective regimen sliding scale   SubCutaneous every 6 hours  lactated ringers. 1000 milliLiter(s) (75 mL/Hr) IV Continuous <Continuous>  oseltamivir 75 milliGRAM(s) Oral two times a day  pantoprazole Infusion 8 mG/Hr (10 mL/Hr) IV Continuous <Continuous>  vancomycin  IVPB 1250 milliGRAM(s) IV Intermittent every 12 hours    MEDICATIONS  (PRN):  acetaminophen     Tablet .. 650 milliGRAM(s) Oral every 6 hours PRN Temp greater or equal to 38C (100.4F), Mild Pain (1 - 3)  dextrose Oral Gel 15 Gram(s) Oral once PRN Blood Glucose LESS THAN 70 milliGRAM(s)/deciliter  melatonin 3 milliGRAM(s) Oral at bedtime PRN Insomnia      Allergies    No Known Allergies    Intolerances        SOCIAL HISTORY:    Lived w/  prior to DUSTIN/hospitalization  Mostly bedbound at baseline   Denied alcohol use  Former smoker  No illicit drug use      FAMILY HISTORY:   No pertinent family hx among first degree relatives.    PHYSICAL EXAM:  Vital Signs Last 24 Hrs  T(C): 36.4 (13 Feb 2025 06:42), Max: 36.8 (12 Feb 2025 11:15)  T(F): 97.5 (13 Feb 2025 06:42), Max: 98.2 (12 Feb 2025 11:15)  HR: 58 (13 Feb 2025 06:42) (56 - 76)  BP: 166/70 (13 Feb 2025 06:42) (95/45 - 204/86)  BP(mean): 71 (12 Feb 2025 17:46) (71 - 71)  RR: 18 (13 Feb 2025 06:42) (16 - 20)  SpO2: 96% (13 Feb 2025 06:42) (96% - 100%)    Parameters below as of 13 Feb 2025 06:42  Patient On (Oxygen Delivery Method): room air    NAD,   A&Ox3, MO, frail,  Versa Care P500 bed   HEENT:  sclera clear, mucosa moist, throat clear, trachea midline, neck supple  Respiratory: nonlabored w/ equal chest rise  Gastrointestinal: soft NT/ND   :Deferred  Neurology:  weakened strength & sensation grossly intact  Psych: appropriate, anxious  Musculoskeletal:  BL foot drop  Vascular: BLE equally warm,  no cyanosis, clubbing, nor acute ischemia         BLE edema equal         BLE DPpulses palpable  Skin: thin, dry, pale, frail,  ecchymosis w/o hematoma  Sacral Stage 4 pressure injury about 9.0cm x 7.0cm x 2.0cm   moist pale tissue w/ some slough serosanguinous drainage     no exposed bone No odor, increased warmth, tenderness, induration, fluctuance, nor crepitus  Periwound skin follows up the back into the flanks, down into posterior thighs & anteriorly thigh/ groin & pannus skin fold regions as well as b/l axilla       w/ erythema and dry flaky skin w/ satellite lesions  Under Bilateral breast dull linear erythematous skin changes w/o blistering or weeping            LABS/ CULTURES/ RADIOLOGY:                        10.9   18.18 )-----------( 312      ( 12 Feb 2025 16:31 )             33.5       137  |  111  |  63  ----------------------------<  226      [02-12-25 @ 06:59]  5.5   |  19  |  0.93        Ca     8.8     [02-12-25 @ 06:59]      Mg     1.7     [02-12-25 @ 06:59]      Phos  2.6     [02-12-25 @ 06:59]    TPro  5.0  /  Alb  2.3  /  TBili  0.4  /  DBili  x   /  AST  10  /  ALT  18  /  AlkPhos  79  [02-11-25 @ 20:00]    PT/INR: PT 18.2 , INR 1.59       [02-12-25 @ 06:59]  PTT: 35.8       [02-12-25 @ 06:59]        A1C with Estimated Average Glucose Result: 7.3 % (01-09-25 @ 10:04)        Culture - Blood (collected 02-11-25 @ 09:49)  Source: .Blood BLOOD  Preliminary Report (02-12-25 @ 17:01):    No growth at 24 hours    Culture - Blood (collected 02-11-25 @ 09:39)  Source: .Blood BLOOD  Preliminary Report (02-12-25 @ 17:01):    No growth at 24 hours

## 2025-02-13 NOTE — CONSULT NOTE ADULT - SUBJECTIVE AND OBJECTIVE BOX
Patient is a 80y old  Female who presents with a chief complaint of UGIB (13 Feb 2025 10:22)    HPI:  This is an 79 y/o F with PMHx, HTN, lymphedema, T2DM, sacral decubitus ulcer s/p debridement (2024) and recent adm for sacral OM who presented to the ED for coffee-ground emesis and melena x 1 day. Patient noted the episodes first started at 5AM but did not know how many episodes she had. Per chart review, patient w/ multiple episodes of "dark, coffee ground appearing emesis" as well as "large amounts of dark colored loose, watery stools." She was found to have a high BUN on labs and thus brought it for further evaluation of GIB.     Patient endorsed fatigue but denied any chest pain, sob, ab pain n/v at this time. Patient last episode of melena this AM in ED.     In the ED, T 98.4, HR 90, /68 -> 82/45, RR 20, 97% on RA. Labs significant for WBC 22.8, Hgb 6, Plt 530, K 6.5 (moderately hemolyzed)-> 5.5, BUN 69, Trop 64->55 . RVP positive for flu.   CT A/P without evidence of active GI bleed, nonspecific right lateral bladder wall thickening and known sacral decub ulcer w/ erosive changes consistent w/ OM. s/p 2u pRBC hyperK shift w/ 5u insulin x2, octreotide 50mcg x1, zofran x1, tamiflu 75mg x1, PPI 40 IVP x2, zosyn 3.375x1, vanc 1g x1, 1L LR and duoneb x1.       (11 Feb 2025 17:30)     REVIEW OF SYSTEMS  [  ] ROS unobtainable because:    [ x ] All other systems negative except as noted below  Constitutional:  [ ] fever [ ] chills  [ ] weight loss  [ ]night sweat  [ ]poor appetite/PO intake [ ]fatigue   Skin:  [ ] rash [ ] phlebitis	  Eyes: [ ] icterus [ ] pain  [ ] discharge	  ENMT: [ ] sore throat  [ ] thrush [ ] ulcers [ ] exudates [ ]anosmia  Respiratory: [ ] dyspnea [ ] hemoptysis [ ] cough [ ] sputum	  Cardiovascular:  [ ] chest pain [ ] palpitations [ ] edema	  Gastrointestinal:  [ ] nausea [ ] vomiting [ ] diarrhea [ ] constipation [ ] pain	  Genitourinary:  [ ] dysuria [ ] frequency [ ] hematuria [ ] discharge [ ] flank pain  [ ] incontinence  Musculoskeletal:  [ ] myalgias [ ] arthralgias [ ] arthritis  [ ] back pain  Neurological:  [ ] headache [ ] weakness [ ] seizures  [ ] confusion/altered mental status  prior hospital charts reviewed [V]  primary team notes reviewed [V]  other consultant notes reviewed [V]    PAST MEDICAL & SURGICAL HISTORY:  Hypertension      Diabetes      Back pain      Lymphedema of both lower extremities      HLD (hyperlipidemia)      Sacral osteomyelitis      History of cholecystectomy          SOCIAL HISTORY:  - Denied smoking/vaping/alcohol/recreational drug use    FAMILY HISTORY:      Allergies  No Known Allergies        ANTIMICROBIALS:  cefepime   IVPB 2000 every 8 hours  cefepime   IVPB    oseltamivir 75 two times a day  vancomycin  IVPB 1500 every 24 hours      ANTIMICROBIALS (past 90 days):  MEDICATIONS  (STANDING):  cefepime   IVPB   100 mL/Hr IV Intermittent (02-11-25 @ 19:48)    cefepime   IVPB   100 mL/Hr IV Intermittent (02-13-25 @ 13:29)   100 mL/Hr IV Intermittent (02-13-25 @ 08:33)   100 mL/Hr IV Intermittent (02-12-25 @ 23:28)   100 mL/Hr IV Intermittent (02-12-25 @ 14:33)   100 mL/Hr IV Intermittent (02-12-25 @ 06:12)    oseltamivir   75 milliGRAM(s) Oral (02-11-25 @ 13:38)    oseltamivir   75 milliGRAM(s) Oral (02-13-25 @ 06:51)   75 milliGRAM(s) Oral (02-12-25 @ 21:29)   75 milliGRAM(s) Oral (02-12-25 @ 06:12)    piperacillin/tazobactam IVPB...   200 mL/Hr IV Intermittent (02-11-25 @ 11:05)    vancomycin  IVPB   166.67 mL/Hr IV Intermittent (02-12-25 @ 21:21)   166.67 mL/Hr IV Intermittent (02-12-25 @ 06:40)    vancomycin  IVPB.   250 mL/Hr IV Intermittent (02-11-25 @ 11:34)        OTHER MEDS:   MEDICATIONS  (STANDING):  acetaminophen     Tablet .. 650 every 6 hours PRN  amLODIPine   Tablet 5 daily  atorvastatin 20 at bedtime  dextrose 50% Injectable 25 once  dextrose 50% Injectable 12.5 once  dextrose 50% Injectable 25 once  dextrose Oral Gel 15 once PRN  glucagon  Injectable 1 once  influenza  Vaccine (HIGH DOSE) 0.5 once  insulin lispro (ADMELOG) corrective regimen sliding scale  every 6 hours  melatonin 3 at bedtime PRN  pantoprazole Infusion 8 <Continuous>      VITALS:  Vital Signs Last 24 Hrs  T(F): 98.2 (02-13-25 @ 11:32), Max: 98.4 (02-11-25 @ 07:22)    Vital Signs Last 24 Hrs  HR: 58 (02-13-25 @ 11:32) (56 - 76)  BP: 169/55 (02-13-25 @ 11:32) (95/45 - 204/86)  RR: 18 (02-13-25 @ 11:32)  SpO2: 95% (02-13-25 @ 11:32) (95% - 100%)  Wt(kg): --    EXAM:    GA: NAD, AOx3  HEENT: oral cavity no lesion  CV: nl S1/S2, no RMG  Lungs: CTAB, No distress  Abd: BS+, soft, nontender, no rebounding pain  Ext: no edema  Neuro: No focal deficits  Skin: Intact  IV: no phlebitis  Labs:                        11.0   15.50 )-----------( 353      ( 13 Feb 2025 10:20 )             34.3     02-13    135  |  106  |  43[H]  ----------------------------<  194[H]  5.0   |  18[L]  |  0.91    Ca    8.8      13 Feb 2025 10:17  Phos  2.5     02-13  Mg     1.9     02-13    TPro  5.0[L]  /  Alb  2.3[L]  /  TBili  0.4  /  DBili  x   /  AST  10  /  ALT  18  /  AlkPhos  79  02-11    WBC Trend:  WBC Count: 15.50 (02-13-25 @ 10:20)  WBC Count: 18.18 (02-12-25 @ 16:31)  WBC Count: 12.10 (02-12-25 @ 06:59)  WBC Count: 27.35 (02-11-25 @ 20:00)    Auto Neutrophil #: 14.39 K/uL (01-08-25 @ 16:34)    Creatine Trend:  Creatinine: 0.91 (02-13)  Creatinine: 0.93 (02-12)  Creatinine: 0.85 (02-11)  Creatinine: 0.75 (02-11)    Liver Biochemical Testing Trend:  Alanine Aminotransferase (ALT/SGPT): 18 (02-11)  Alanine Aminotransferase (ALT/SGPT): 23 (02-11)  Alanine Aminotransferase (ALT/SGPT): 22 (02-11)  Alanine Aminotransferase (ALT/SGPT): 34 (01-15)  Alanine Aminotransferase (ALT/SGPT): 34 (01-10)  Aspartate Aminotransferase (AST/SGOT): 10 (02-11-25 @ 20:00)  Aspartate Aminotransferase (AST/SGOT): 19 (02-11-25 @ 12:41)  Aspartate Aminotransferase (AST/SGOT): 25 (02-11-25 @ 09:14)  Aspartate Aminotransferase (AST/SGOT): 31 (01-15-25 @ 09:52)  Aspartate Aminotransferase (AST/SGOT): 22 (01-10-25 @ 07:45)  Bilirubin Total: 0.4 (02-11)  Bilirubin Total: 0.4 (02-11)  Bilirubin Total: 0.2 (02-11)  Bilirubin Total: 0.2 (01-15)  Bilirubin Total: 0.3 (01-10)    Trend LDH      Urinalysis Basic - ( 13 Feb 2025 10:17 )    Color: x / Appearance: x / SG: x / pH: x  Gluc: 194 mg/dL / Ketone: x  / Bili: x / Urobili: x   Blood: x / Protein: x / Nitrite: x   Leuk Esterase: x / RBC: x / WBC x   Sq Epi: x / Non Sq Epi: x / Bacteria: x      MICROBIOLOGY:  Vancomycin Level, Trough: 18.8 (02-13 @ 10:21)    MRSA PCR Result.: NotDetec (01-09-25 @ 19:25)      Culture - Blood (collected 11 Feb 2025 09:49)  Source: .Blood BLOOD  Preliminary Report:    No growth at 24 hours    Culture - Blood (collected 11 Feb 2025 09:39)  Source: .Blood BLOOD  Preliminary Report:    No growth at 24 hours    Culture - Urine (collected 09 Jan 2025 12:47)  Source: Clean Catch Clean Catch (Midstream)  Final Report:    <10,000 CFU/mL Normal Urogenital Nancy    Culture - Blood (collected 08 Jan 2025 17:45)  Source: .Blood BLOOD  Final Report:    No growth at 5 days    Culture - Blood (collected 08 Jan 2025 17:40)  Source: .Blood BLOOD  Final Report:    Growth in aerobic and anaerobic bottles: Staphylococcus epidermidis    Isolation of Coagulase negative Staphylococcus from single blood culture    sets may represent    contamination. Contact the Microbiology Department at [phone] if    susceptibilitytesting is needed.    clinically indicated.    Direct identification is available within approximately 3-5    hours either by Blood Panel Multiplexed PCR or Direct    MALDI-TOF. Details: [email or web address]  Organism: Blood Culture PCR  Organism: Blood Culture PCR    Sensitivities:      Method Type: PCR      -  Staphylococcus epidermidis, Methicillin resistant: Detec      Rapid RVP Result: NotDetec (02-11 @ 09:14)    Troponin T, High Sensitivity Result: 55 (02-11)  Troponin T, High Sensitivity Result: 64 (02-11)    Blood Gas Venous - Lactate: 1.8 (02-12 @ 16:31)  Blood Gas Venous - Lactate: 2.1 (02-11 @ 09:13)    A1C with Estimated Average Glucose Result: 7.3 % (01-09-25 @ 10:04)    Sedimentation Rate, Erythrocyte: 120 mm/hr (01-08-25 @ 17:57)    CSF:      RADIOLOGY:  < from: CT Abdomen and Pelvis w/ IV Cont (02.11.25 @ 10:56) >  IMPRESSION:  No evidence of active GI bleed. No bowel wall thickening or acute GI   findings.    Nonspecific right lateral bladder wall thickening. Correlate with   urinalysis and possible cystitis and as necessary, cystoscopy to assess   for underlying bladder mass.    Sacral decubitus ulcer again noted with erosive changes of the underlying   lower sacrum and coccyx, concerning for osteomyelitis.    < end of copied text >  < from: CT Abdomen and Pelvis w/ IV Cont (01.10.25 @ 09:26) >  IMPRESSION:  *  Soft tissue defect overlying the lower sacrum, consistent with a   decubitus wound. Associated erosive and sclerotic changes to the lower   sacrum and coccyx, concerning for osteomyelitis. Stool overflows from the   anorectum into the lower aspect of the wound.  *  Stool-filled distended rectum. Mild rectal wall thickening and   perirectal fat infiltration, which may represent stercoral proctitis.  *  Focal segment of mural thickening in the proximal sigmoid colon,   possibly secondary to underdistention, diverticulitis, or colitis, with   colonic neoplasm also within the differential. Consider evaluation with   colonoscopy.  *  Exophytic right femoral ossific lesion measuring 5.1 x 2.0 x 2.8 cm,   indeterminate, with parosteal osteosarcoma within the differential.   Evaluation with MRI of the femur without and with contrast is recommended.  *  Underdistended urinary bladder. Mild nonspecific focal mural   thickening along the right bladder wall. Correlate with urinalysis.   Bladder neoplasm is not excluded.  *  Mild right hydroureteronephrosis to the level of the distal ureter,   with narrowing of the ureter as it courses lateral to the stool-filled   distended rectum. Consideration is given to obstruction by mass effect   from the rectum, with other etiology not excluded. Advise short interval   follow-up imaging to ensure resolution.  *  Minimal intrahepatic biliary duct dilation of unclear significance,   with consideration given to postcholecystectomy state.    < end of copied text >   Patient is a 80y old  Female who presents with a chief complaint of UGIB (13 Feb 2025 10:22)    HPI:  This is an 81 y/o F with PMHx, HTN, lymphedema, T2DM, sacral decubitus ulcer s/p debridement (2024) and recent adm for sacral OM who presented to the ED for coffee-ground emesis and melena x 1 day. Patient noted the episodes first started at 5AM but did not know how many episodes she had. Per chart review, patient w/ multiple episodes of "dark, coffee ground appearing emesis" as well as "large amounts of dark colored loose, watery stools." She was found to have a high BUN on labs and thus brought it for further evaluation of GIB.     Patient endorsed fatigue but denied any chest pain, sob, ab pain n/v at this time. Patient last episode of melena this AM in ED.     In the ED, T 98.4, HR 90, /68 -> 82/45, RR 20, 97% on RA. Labs significant for WBC 22.8, Hgb 6, Plt 530, K 6.5 (moderately hemolyzed)-> 5.5, BUN 69, Trop 64->55 . RVP positive for flu.   CT A/P without evidence of active GI bleed, nonspecific right lateral bladder wall thickening and known sacral decub ulcer w/ erosive changes consistent w/ OM. s/p 2u pRBC hyperK shift w/ 5u insulin x2, octreotide 50mcg x1, zofran x1, tamiflu 75mg x1, PPI 40 IVP x2, zosyn 3.375x1, vanc 1g x1, 1L LR and duoneb x1.       (11 Feb 2025 17:30)     REVIEW OF SYSTEMS  [  ] ROS unobtainable because:    [ x ] All other systems negative except as noted below  Constitutional:  [ ] fever [ ] chills  [ ] weight loss  [ ]night sweat  [ ]poor appetite/PO intake [ ]fatigue   Skin:  [ ] rash [ ] phlebitis	  Eyes: [ ] icterus [ ] pain  [ ] discharge	  ENMT: [ ] sore throat  [ ] thrush [ ] ulcers [ ] exudates [ ]anosmia  Respiratory: [ ] dyspnea [ ] hemoptysis [ ] cough [ ] sputum	  Cardiovascular:  [ ] chest pain [ ] palpitations [ ] edema	  Gastrointestinal:  [ ] nausea [ ] vomiting [ ] diarrhea [ ] constipation [ ] pain	  Genitourinary:  [ ] dysuria [ ] frequency [ ] hematuria [ ] discharge [ ] flank pain  [ ] incontinence  Musculoskeletal:  [ ] myalgias [ ] arthralgias [ ] arthritis  [ ] back pain  Neurological:  [ ] headache [ ] weakness [ ] seizures  [ ] confusion/altered mental status  prior hospital charts reviewed [V]  primary team notes reviewed [V]  other consultant notes reviewed [V]    PAST MEDICAL & SURGICAL HISTORY:  Hypertension      Diabetes      Back pain      Lymphedema of both lower extremities      HLD (hyperlipidemia)      Sacral osteomyelitis      History of cholecystectomy          SOCIAL HISTORY:  - Denied smoking/vaping/alcohol/recreational drug use    FAMILY HISTORY:      Allergies  No Known Allergies        ANTIMICROBIALS:  cefepime   IVPB 2000 every 8 hours  cefepime   IVPB    oseltamivir 75 two times a day  vancomycin  IVPB 1500 every 24 hours      ANTIMICROBIALS (past 90 days):  MEDICATIONS  (STANDING):  cefepime   IVPB   100 mL/Hr IV Intermittent (02-11-25 @ 19:48)    cefepime   IVPB   100 mL/Hr IV Intermittent (02-13-25 @ 13:29)   100 mL/Hr IV Intermittent (02-13-25 @ 08:33)   100 mL/Hr IV Intermittent (02-12-25 @ 23:28)   100 mL/Hr IV Intermittent (02-12-25 @ 14:33)   100 mL/Hr IV Intermittent (02-12-25 @ 06:12)    oseltamivir   75 milliGRAM(s) Oral (02-11-25 @ 13:38)    oseltamivir   75 milliGRAM(s) Oral (02-13-25 @ 06:51)   75 milliGRAM(s) Oral (02-12-25 @ 21:29)   75 milliGRAM(s) Oral (02-12-25 @ 06:12)    piperacillin/tazobactam IVPB...   200 mL/Hr IV Intermittent (02-11-25 @ 11:05)    vancomycin  IVPB   166.67 mL/Hr IV Intermittent (02-12-25 @ 21:21)   166.67 mL/Hr IV Intermittent (02-12-25 @ 06:40)    vancomycin  IVPB.   250 mL/Hr IV Intermittent (02-11-25 @ 11:34)        OTHER MEDS:   MEDICATIONS  (STANDING):  acetaminophen     Tablet .. 650 every 6 hours PRN  amLODIPine   Tablet 5 daily  atorvastatin 20 at bedtime  dextrose 50% Injectable 25 once  dextrose 50% Injectable 12.5 once  dextrose 50% Injectable 25 once  dextrose Oral Gel 15 once PRN  glucagon  Injectable 1 once  influenza  Vaccine (HIGH DOSE) 0.5 once  insulin lispro (ADMELOG) corrective regimen sliding scale  every 6 hours  melatonin 3 at bedtime PRN  pantoprazole Infusion 8 <Continuous>      VITALS:  Vital Signs Last 24 Hrs  T(F): 98.2 (02-13-25 @ 11:32), Max: 98.4 (02-11-25 @ 07:22)    Vital Signs Last 24 Hrs  HR: 58 (02-13-25 @ 11:32) (56 - 76)  BP: 169/55 (02-13-25 @ 11:32) (95/45 - 204/86)  RR: 18 (02-13-25 @ 11:32)  SpO2: 95% (02-13-25 @ 11:32) (95% - 100%)  Wt(kg): --    EXAM:    GA: NAD, AOx3  HEENT: oral cavity no lesion  CV: nl S1/S2, no RMG  Lungs: CTAB, No distress  Abd: BS+, soft, nontender, no rebounding pain  Ext: no edema  Neuro: No focal deficits  Skin: Sacral Stage 4 pressure injury, Fungal Moisture Associated Dermatitis Back/ Flank/ Buttock/ Thighs/ Groin/ Pannus/axilla- improving, Moisture Dermatitis Under Breasts  IV: no phlebitis  Labs:                        11.0   15.50 )-----------( 353      ( 13 Feb 2025 10:20 )             34.3     02-13    135  |  106  |  43[H]  ----------------------------<  194[H]  5.0   |  18[L]  |  0.91    Ca    8.8      13 Feb 2025 10:17  Phos  2.5     02-13  Mg     1.9     02-13    TPro  5.0[L]  /  Alb  2.3[L]  /  TBili  0.4  /  DBili  x   /  AST  10  /  ALT  18  /  AlkPhos  79  02-11    WBC Trend:  WBC Count: 15.50 (02-13-25 @ 10:20)  WBC Count: 18.18 (02-12-25 @ 16:31)  WBC Count: 12.10 (02-12-25 @ 06:59)  WBC Count: 27.35 (02-11-25 @ 20:00)    Auto Neutrophil #: 14.39 K/uL (01-08-25 @ 16:34)    Creatine Trend:  Creatinine: 0.91 (02-13)  Creatinine: 0.93 (02-12)  Creatinine: 0.85 (02-11)  Creatinine: 0.75 (02-11)    Liver Biochemical Testing Trend:  Alanine Aminotransferase (ALT/SGPT): 18 (02-11)  Alanine Aminotransferase (ALT/SGPT): 23 (02-11)  Alanine Aminotransferase (ALT/SGPT): 22 (02-11)  Alanine Aminotransferase (ALT/SGPT): 34 (01-15)  Alanine Aminotransferase (ALT/SGPT): 34 (01-10)  Aspartate Aminotransferase (AST/SGOT): 10 (02-11-25 @ 20:00)  Aspartate Aminotransferase (AST/SGOT): 19 (02-11-25 @ 12:41)  Aspartate Aminotransferase (AST/SGOT): 25 (02-11-25 @ 09:14)  Aspartate Aminotransferase (AST/SGOT): 31 (01-15-25 @ 09:52)  Aspartate Aminotransferase (AST/SGOT): 22 (01-10-25 @ 07:45)  Bilirubin Total: 0.4 (02-11)  Bilirubin Total: 0.4 (02-11)  Bilirubin Total: 0.2 (02-11)  Bilirubin Total: 0.2 (01-15)  Bilirubin Total: 0.3 (01-10)    Trend LDH      Urinalysis Basic - ( 13 Feb 2025 10:17 )    Color: x / Appearance: x / SG: x / pH: x  Gluc: 194 mg/dL / Ketone: x  / Bili: x / Urobili: x   Blood: x / Protein: x / Nitrite: x   Leuk Esterase: x / RBC: x / WBC x   Sq Epi: x / Non Sq Epi: x / Bacteria: x      MICROBIOLOGY:  Vancomycin Level, Trough: 18.8 (02-13 @ 10:21)    MRSA PCR Result.: NotDetec (01-09-25 @ 19:25)      Culture - Blood (collected 11 Feb 2025 09:49)  Source: .Blood BLOOD  Preliminary Report:    No growth at 24 hours    Culture - Blood (collected 11 Feb 2025 09:39)  Source: .Blood BLOOD  Preliminary Report:    No growth at 24 hours    Culture - Urine (collected 09 Jan 2025 12:47)  Source: Clean Catch Clean Catch (Midstream)  Final Report:    <10,000 CFU/mL Normal Urogenital Nancy    Culture - Blood (collected 08 Jan 2025 17:45)  Source: .Blood BLOOD  Final Report:    No growth at 5 days    Culture - Blood (collected 08 Jan 2025 17:40)  Source: .Blood BLOOD  Final Report:    Growth in aerobic and anaerobic bottles: Staphylococcus epidermidis    Isolation of Coagulase negative Staphylococcus from single blood culture    sets may represent    contamination. Contact the Microbiology Department at [phone] if    susceptibilitytesting is needed.    clinically indicated.    Direct identification is available within approximately 3-5    hours either by Blood Panel Multiplexed PCR or Direct    MALDI-TOF. Details: [email or web address]  Organism: Blood Culture PCR  Organism: Blood Culture PCR    Sensitivities:      Method Type: PCR      -  Staphylococcus epidermidis, Methicillin resistant: Detec      Rapid RVP Result: NotDetec (02-11 @ 09:14)    Troponin T, High Sensitivity Result: 55 (02-11)  Troponin T, High Sensitivity Result: 64 (02-11)    Blood Gas Venous - Lactate: 1.8 (02-12 @ 16:31)  Blood Gas Venous - Lactate: 2.1 (02-11 @ 09:13)    A1C with Estimated Average Glucose Result: 7.3 % (01-09-25 @ 10:04)    Sedimentation Rate, Erythrocyte: 120 mm/hr (01-08-25 @ 17:57)    CSF:      RADIOLOGY:  < from: CT Abdomen and Pelvis w/ IV Cont (02.11.25 @ 10:56) >  IMPRESSION:  No evidence of active GI bleed. No bowel wall thickening or acute GI   findings.    Nonspecific right lateral bladder wall thickening. Correlate with   urinalysis and possible cystitis and as necessary, cystoscopy to assess   for underlying bladder mass.    Sacral decubitus ulcer again noted with erosive changes of the underlying   lower sacrum and coccyx, concerning for osteomyelitis.    < end of copied text >  < from: CT Abdomen and Pelvis w/ IV Cont (01.10.25 @ 09:26) >  IMPRESSION:  *  Soft tissue defect overlying the lower sacrum, consistent with a   decubitus wound. Associated erosive and sclerotic changes to the lower   sacrum and coccyx, concerning for osteomyelitis. Stool overflows from the   anorectum into the lower aspect of the wound.  *  Stool-filled distended rectum. Mild rectal wall thickening and   perirectal fat infiltration, which may represent stercoral proctitis.  *  Focal segment of mural thickening in the proximal sigmoid colon,   possibly secondary to underdistention, diverticulitis, or colitis, with   colonic neoplasm also within the differential. Consider evaluation with   colonoscopy.  *  Exophytic right femoral ossific lesion measuring 5.1 x 2.0 x 2.8 cm,   indeterminate, with parosteal osteosarcoma within the differential.   Evaluation with MRI of the femur without and with contrast is recommended.  *  Underdistended urinary bladder. Mild nonspecific focal mural   thickening along the right bladder wall. Correlate with urinalysis.   Bladder neoplasm is not excluded.  *  Mild right hydroureteronephrosis to the level of the distal ureter,   with narrowing of the ureter as it courses lateral to the stool-filled   distended rectum. Consideration is given to obstruction by mass effect   from the rectum, with other etiology not excluded. Advise short interval   follow-up imaging to ensure resolution.  *  Minimal intrahepatic biliary duct dilation of unclear significance,   with consideration given to postcholecystectomy state.    < end of copied text >   Patient is a 80y old  Female who presents with a chief complaint of UGIB (13 Feb 2025 10:22)    HPI:  This is an 81 y/o F with PMHx, HTN, lymphedema, T2DM, sacral decubitus ulcer s/p debridement (2024) and recent adm for sacral OM who presented to the ED for coffee-ground emesis and melena x 1 day. Patient noted the episodes first started at 5AM but did not know how many episodes she had. Per chart review, patient w/ multiple episodes of "dark, coffee ground appearing emesis" as well as "large amounts of dark colored loose, watery stools." She was found to have a high BUN on labs and thus brought it for further evaluation of GIB.     Patient endorsed fatigue but denied any chest pain, sob, ab pain n/v at this time. Patient last episode of melena this AM in ED.     In the ED, T 98.4, HR 90, /68 -> 82/45, RR 20, 97% on RA. Labs significant for WBC 22.8, Hgb 6, Plt 530, K 6.5 (moderately hemolyzed)-> 5.5, BUN 69, Trop 64->55 . RVP positive for flu.   CT A/P without evidence of active GI bleed, nonspecific right lateral bladder wall thickening and known sacral decub ulcer w/ erosive changes consistent w/ OM. s/p 2u pRBC hyperK shift w/ 5u insulin x2, octreotide 50mcg x1, zofran x1, tamiflu 75mg x1, PPI 40 IVP x2, zosyn 3.375x1, vanc 1g x1, 1L LR and duoneb x1.       (11 Feb 2025 17:30)     REVIEW OF SYSTEMS  [  ] ROS unobtainable because:    [ x ] All other systems negative except as noted below  Constitutional:  [ ] fever [ ] chills  [ ] weight loss  [ ]night sweat  [ ]poor appetite/PO intake [ ]fatigue   Skin:  [ ] rash [ ] phlebitis	  Eyes: [ ] icterus [ ] pain  [ ] discharge	  ENMT: [ ] sore throat  [ ] thrush [ ] ulcers [ ] exudates [ ]anosmia  Respiratory: [ ] dyspnea [ ] hemoptysis [ ] cough [ ] sputum	  Cardiovascular:  [ ] chest pain [ ] palpitations [ ] edema	  Gastrointestinal:  [ ] nausea [ ] vomiting [x ] diarrhea [ ] constipation [ ] pain	  Genitourinary:  [ ] dysuria [ ] frequency [ ] hematuria [ ] discharge [ ] flank pain  [ ] incontinence  Musculoskeletal:  [ ] myalgias [ ] arthralgias [ ] arthritis  [ ] back pain  Neurological:  [ ] headache [ ] weakness [ ] seizures  [ ] confusion/altered mental status  prior hospital charts reviewed [V]  primary team notes reviewed [V]  other consultant notes reviewed [V]    PAST MEDICAL & SURGICAL HISTORY:  Hypertension      Diabetes      Back pain      Lymphedema of both lower extremities      HLD (hyperlipidemia)      Sacral osteomyelitis      History of cholecystectomy          SOCIAL HISTORY:  - Denied smoking/vaping/alcohol/recreational drug use    FAMILY HISTORY:      Allergies  No Known Allergies        ANTIMICROBIALS:  cefepime   IVPB 2000 every 8 hours  cefepime   IVPB    oseltamivir 75 two times a day  vancomycin  IVPB 1500 every 24 hours      ANTIMICROBIALS (past 90 days):  MEDICATIONS  (STANDING):  cefepime   IVPB   100 mL/Hr IV Intermittent (02-11-25 @ 19:48)    cefepime   IVPB   100 mL/Hr IV Intermittent (02-13-25 @ 13:29)   100 mL/Hr IV Intermittent (02-13-25 @ 08:33)   100 mL/Hr IV Intermittent (02-12-25 @ 23:28)   100 mL/Hr IV Intermittent (02-12-25 @ 14:33)   100 mL/Hr IV Intermittent (02-12-25 @ 06:12)    oseltamivir   75 milliGRAM(s) Oral (02-11-25 @ 13:38)    oseltamivir   75 milliGRAM(s) Oral (02-13-25 @ 06:51)   75 milliGRAM(s) Oral (02-12-25 @ 21:29)   75 milliGRAM(s) Oral (02-12-25 @ 06:12)    piperacillin/tazobactam IVPB...   200 mL/Hr IV Intermittent (02-11-25 @ 11:05)    vancomycin  IVPB   166.67 mL/Hr IV Intermittent (02-12-25 @ 21:21)   166.67 mL/Hr IV Intermittent (02-12-25 @ 06:40)    vancomycin  IVPB.   250 mL/Hr IV Intermittent (02-11-25 @ 11:34)        OTHER MEDS:   MEDICATIONS  (STANDING):  acetaminophen     Tablet .. 650 every 6 hours PRN  amLODIPine   Tablet 5 daily  atorvastatin 20 at bedtime  dextrose 50% Injectable 25 once  dextrose 50% Injectable 12.5 once  dextrose 50% Injectable 25 once  dextrose Oral Gel 15 once PRN  glucagon  Injectable 1 once  influenza  Vaccine (HIGH DOSE) 0.5 once  insulin lispro (ADMELOG) corrective regimen sliding scale  every 6 hours  melatonin 3 at bedtime PRN  pantoprazole Infusion 8 <Continuous>      VITALS:  Vital Signs Last 24 Hrs  T(F): 98.2 (02-13-25 @ 11:32), Max: 98.4 (02-11-25 @ 07:22)    Vital Signs Last 24 Hrs  HR: 58 (02-13-25 @ 11:32) (56 - 76)  BP: 169/55 (02-13-25 @ 11:32) (95/45 - 204/86)  RR: 18 (02-13-25 @ 11:32)  SpO2: 95% (02-13-25 @ 11:32) (95% - 100%)  Wt(kg): --    EXAM:    GA: NAD, AOx3  HEENT: oral cavity no lesion  CV: nl S1/S2, no RMG  Lungs: CTAB, No distress  Abd: BS+, soft, nontender, no rebounding pain  Ext: no edema  Neuro: No focal deficits  Skin: Sacral Stage 4 pressure injury, Fungal Moisture Associated Dermatitis Back/ Flank/ Buttock/ Thighs/ Groin/ Pannus/axilla- improving, Moisture Dermatitis Under Breasts  IV: no phlebitis  Labs:                        11.0   15.50 )-----------( 353      ( 13 Feb 2025 10:20 )             34.3     02-13    135  |  106  |  43[H]  ----------------------------<  194[H]  5.0   |  18[L]  |  0.91    Ca    8.8      13 Feb 2025 10:17  Phos  2.5     02-13  Mg     1.9     02-13    TPro  5.0[L]  /  Alb  2.3[L]  /  TBili  0.4  /  DBili  x   /  AST  10  /  ALT  18  /  AlkPhos  79  02-11    WBC Trend:  WBC Count: 15.50 (02-13-25 @ 10:20)  WBC Count: 18.18 (02-12-25 @ 16:31)  WBC Count: 12.10 (02-12-25 @ 06:59)  WBC Count: 27.35 (02-11-25 @ 20:00)    Auto Neutrophil #: 14.39 K/uL (01-08-25 @ 16:34)    Creatine Trend:  Creatinine: 0.91 (02-13)  Creatinine: 0.93 (02-12)  Creatinine: 0.85 (02-11)  Creatinine: 0.75 (02-11)    Liver Biochemical Testing Trend:  Alanine Aminotransferase (ALT/SGPT): 18 (02-11)  Alanine Aminotransferase (ALT/SGPT): 23 (02-11)  Alanine Aminotransferase (ALT/SGPT): 22 (02-11)  Alanine Aminotransferase (ALT/SGPT): 34 (01-15)  Alanine Aminotransferase (ALT/SGPT): 34 (01-10)  Aspartate Aminotransferase (AST/SGOT): 10 (02-11-25 @ 20:00)  Aspartate Aminotransferase (AST/SGOT): 19 (02-11-25 @ 12:41)  Aspartate Aminotransferase (AST/SGOT): 25 (02-11-25 @ 09:14)  Aspartate Aminotransferase (AST/SGOT): 31 (01-15-25 @ 09:52)  Aspartate Aminotransferase (AST/SGOT): 22 (01-10-25 @ 07:45)  Bilirubin Total: 0.4 (02-11)  Bilirubin Total: 0.4 (02-11)  Bilirubin Total: 0.2 (02-11)  Bilirubin Total: 0.2 (01-15)  Bilirubin Total: 0.3 (01-10)    Trend LDH      Urinalysis Basic - ( 13 Feb 2025 10:17 )    Color: x / Appearance: x / SG: x / pH: x  Gluc: 194 mg/dL / Ketone: x  / Bili: x / Urobili: x   Blood: x / Protein: x / Nitrite: x   Leuk Esterase: x / RBC: x / WBC x   Sq Epi: x / Non Sq Epi: x / Bacteria: x      MICROBIOLOGY:  Vancomycin Level, Trough: 18.8 (02-13 @ 10:21)    MRSA PCR Result.: NotDetec (01-09-25 @ 19:25)      Culture - Blood (collected 11 Feb 2025 09:49)  Source: .Blood BLOOD  Preliminary Report:    No growth at 24 hours    Culture - Blood (collected 11 Feb 2025 09:39)  Source: .Blood BLOOD  Preliminary Report:    No growth at 24 hours    Culture - Urine (collected 09 Jan 2025 12:47)  Source: Clean Catch Clean Catch (Midstream)  Final Report:    <10,000 CFU/mL Normal Urogenital Nancy    Culture - Blood (collected 08 Jan 2025 17:45)  Source: .Blood BLOOD  Final Report:    No growth at 5 days    Culture - Blood (collected 08 Jan 2025 17:40)  Source: .Blood BLOOD  Final Report:    Growth in aerobic and anaerobic bottles: Staphylococcus epidermidis    Isolation of Coagulase negative Staphylococcus from single blood culture    sets may represent    contamination. Contact the Microbiology Department at [phone] if    susceptibilitytesting is needed.    clinically indicated.    Direct identification is available within approximately 3-5    hours either by Blood Panel Multiplexed PCR or Direct    MALDI-TOF. Details: [email or web address]  Organism: Blood Culture PCR  Organism: Blood Culture PCR    Sensitivities:      Method Type: PCR      -  Staphylococcus epidermidis, Methicillin resistant: Detec      Rapid RVP Result: NotDetec (02-11 @ 09:14)    Troponin T, High Sensitivity Result: 55 (02-11)  Troponin T, High Sensitivity Result: 64 (02-11)    Blood Gas Venous - Lactate: 1.8 (02-12 @ 16:31)  Blood Gas Venous - Lactate: 2.1 (02-11 @ 09:13)    A1C with Estimated Average Glucose Result: 7.3 % (01-09-25 @ 10:04)    Sedimentation Rate, Erythrocyte: 120 mm/hr (01-08-25 @ 17:57)    CSF:      RADIOLOGY:  < from: CT Abdomen and Pelvis w/ IV Cont (02.11.25 @ 10:56) >  IMPRESSION:  No evidence of active GI bleed. No bowel wall thickening or acute GI   findings.    Nonspecific right lateral bladder wall thickening. Correlate with   urinalysis and possible cystitis and as necessary, cystoscopy to assess   for underlying bladder mass.    Sacral decubitus ulcer again noted with erosive changes of the underlying   lower sacrum and coccyx, concerning for osteomyelitis.    < end of copied text >  < from: CT Abdomen and Pelvis w/ IV Cont (01.10.25 @ 09:26) >  IMPRESSION:  *  Soft tissue defect overlying the lower sacrum, consistent with a   decubitus wound. Associated erosive and sclerotic changes to the lower   sacrum and coccyx, concerning for osteomyelitis. Stool overflows from the   anorectum into the lower aspect of the wound.  *  Stool-filled distended rectum. Mild rectal wall thickening and   perirectal fat infiltration, which may represent stercoral proctitis.  *  Focal segment of mural thickening in the proximal sigmoid colon,   possibly secondary to underdistention, diverticulitis, or colitis, with   colonic neoplasm also within the differential. Consider evaluation with   colonoscopy.  *  Exophytic right femoral ossific lesion measuring 5.1 x 2.0 x 2.8 cm,   indeterminate, with parosteal osteosarcoma within the differential.   Evaluation with MRI of the femur without and with contrast is recommended.  *  Underdistended urinary bladder. Mild nonspecific focal mural   thickening along the right bladder wall. Correlate with urinalysis.   Bladder neoplasm is not excluded.  *  Mild right hydroureteronephrosis to the level of the distal ureter,   with narrowing of the ureter as it courses lateral to the stool-filled   distended rectum. Consideration is given to obstruction by mass effect   from the rectum, with other etiology not excluded. Advise short interval   follow-up imaging to ensure resolution.  *  Minimal intrahepatic biliary duct dilation of unclear significance,   with consideration given to postcholecystectomy state.    < end of copied text >

## 2025-02-13 NOTE — DIETITIAN INITIAL EVALUATION ADULT - OTHER INFO
dosing wt: 90.7 kg (2/12)  bed wt obtained by RD: 89.8 kg (2/13) *wt gain noted 2/2 worsening edema  wt hx per [facility] HIE: 81.6 kg (1/8, 1+ edema), 97.5 kg (9/20/22)  wt per SNF paperwork: 80.5 kg (2/6)  UBW: unknown but Pt reports she thinks she has lost wt in the past month due to decreased PO intake - not c/w wt hx per chart review

## 2025-02-13 NOTE — PROGRESS NOTE ADULT - PROBLEM SELECTOR PLAN 11
DVT ppx: SCDs, hold in setting of GIB   Diet: NPO  Dispo: pending clinical course DVT ppx: SCDs, hold in setting of GIB   Diet: CLD   Dispo: pending clinical course, PT eval

## 2025-02-13 NOTE — PROGRESS NOTE ADULT - SUBJECTIVE AND OBJECTIVE BOX
GI Progress Note     SUBJECTIVE/INTERVAL:   - s/p EGD on 2/12; showing LA grade B esophagitis and 3 non-bleeding duodenal ulcers with a flat pigmented spot (Alex Class IIc) likely to be source of anemia and prior episodes of melena.   - no BMs overnight  - pending AM hgb    OBJECTIVE:    VITAL SIGNS:  ICU Vital Signs Last 24 Hrs  T(C): 36.4 (13 Feb 2025 06:42), Max: 36.8 (12 Feb 2025 11:15)  T(F): 97.5 (13 Feb 2025 06:42), Max: 98.2 (12 Feb 2025 11:15)  HR: 58 (13 Feb 2025 06:42) (56 - 76)  BP: 166/70 (13 Feb 2025 06:42) (95/45 - 204/86)  BP(mean): 71 (12 Feb 2025 17:46) (71 - 71)  ABP: --  ABP(mean): --  RR: 18 (13 Feb 2025 06:42) (16 - 20)  SpO2: 96% (13 Feb 2025 06:42) (96% - 100%)    O2 Parameters below as of 13 Feb 2025 06:42  Patient On (Oxygen Delivery Method): room air              02-12 @ 07:01  -  02-13 @ 07:00  --------------------------------------------------------  IN: 0 mL / OUT: 800 mL / NET: -800 mL        PHYSICAL EXAM:    General: NAD  HEENT: NC/AT  Neck: supple  Respiratory: Regular RR, no increase in WOB  Cardiovascular: RRR  Abdomen: soft, NT/ND; +BS x4  Extremities: WWP, 2+ peripheral pulses b/l  Skin: normal color and turgor; no rash  Neurological: A&OX3    MEDICATIONS:  MEDICATIONS  (STANDING):  atorvastatin 20 milliGRAM(s) Oral at bedtime  cefepime   IVPB 2000 milliGRAM(s) IV Intermittent every 8 hours  cefepime   IVPB      dextrose 5%. 1000 milliLiter(s) (100 mL/Hr) IV Continuous <Continuous>  dextrose 5%. 1000 milliLiter(s) (50 mL/Hr) IV Continuous <Continuous>  dextrose 50% Injectable 25 Gram(s) IV Push once  dextrose 50% Injectable 12.5 Gram(s) IV Push once  dextrose 50% Injectable 25 Gram(s) IV Push once  glucagon  Injectable 1 milliGRAM(s) IntraMuscular once  influenza  Vaccine (HIGH DOSE) 0.5 milliLiter(s) IntraMuscular once  insulin lispro (ADMELOG) corrective regimen sliding scale   SubCutaneous every 6 hours  lactated ringers. 1000 milliLiter(s) (75 mL/Hr) IV Continuous <Continuous>  oseltamivir 75 milliGRAM(s) Oral two times a day  pantoprazole Infusion 8 mG/Hr (10 mL/Hr) IV Continuous <Continuous>  vancomycin  IVPB 1250 milliGRAM(s) IV Intermittent every 12 hours    MEDICATIONS  (PRN):  acetaminophen     Tablet .. 650 milliGRAM(s) Oral every 6 hours PRN Temp greater or equal to 38C (100.4F), Mild Pain (1 - 3)  dextrose Oral Gel 15 Gram(s) Oral once PRN Blood Glucose LESS THAN 70 milliGRAM(s)/deciliter  melatonin 3 milliGRAM(s) Oral at bedtime PRN Insomnia      ALLERGIES:  Allergies    No Known Allergies    Intolerances        LABS:                        10.9   18.18 )-----------( 312      ( 12 Feb 2025 16:31 )             33.5     02-12    137  |  111[H]  |  63[H]  ----------------------------<  226[H]  5.5[H]   |  19[L]  |  0.93    Ca    8.8      12 Feb 2025 06:59  Phos  2.6     02-12  Mg     1.7     02-12    TPro  5.0[L]  /  Alb  2.3[L]  /  TBili  0.4  /  DBili  x   /  AST  10  /  ALT  18  /  AlkPhos  79  02-11    PT/INR - ( 12 Feb 2025 06:59 )   PT: 18.2 sec;   INR: 1.59 ratio         PTT - ( 12 Feb 2025 06:59 )  PTT:35.8 sec  Urinalysis Basic - ( 12 Feb 2025 06:59 )    Color: x / Appearance: x / SG: x / pH: x  Gluc: 226 mg/dL / Ketone: x  / Bili: x / Urobili: x   Blood: x / Protein: x / Nitrite: x   Leuk Esterase: x / RBC: x / WBC x   Sq Epi: x / Non Sq Epi: x / Bacteria: x

## 2025-02-13 NOTE — PHYSICAL THERAPY INITIAL EVALUATION ADULT - PERTINENT HX OF CURRENT PROBLEM, REHAB EVAL
Pt is 81 y/o F with PMHx, HTN, lymphedema, T2DM, sacral decubitus ulcer s/p debridement (2024) and recent adm for sacral OM who presented to the ED for coffee-ground emesis and melena x 1 day, admitted for acute blood loss anemia 2/2 to UGIB. Also found to be flu positive. PT wound care also consulted for VAC placement to stage IV pressure injury to sacrum.

## 2025-02-13 NOTE — PROGRESS NOTE ADULT - ASSESSMENT
This is an 79 y/o F with PMHx, HTN, lymphedema, T2DM, sacral decubitus ulcer s/p debridement (2024) and recent adm for sacral OM who presented to the ED for coffee-ground emesis and melena x 1 day, admitted for acute blood loss anemia 2/2 to UGIB.  This is an 79 y/o F with PMHx, HTN, lymphedema, T2DM, sacral decubitus ulcer s/p debridement (2024) and recent adm for sacral OM who presented to the ED for coffee-ground emesis and melena x 1 day, admitted for acute blood loss anemia 2/2 to UGIB. Also found to be flu positive.

## 2025-02-13 NOTE — PROGRESS NOTE ADULT - PROBLEM SELECTOR PLAN 8
A1c 7.3% in 1/2025.     - ALBA  - CC diet   - hypoglycemia protocol A1c 7.3% in 1/2025.     - ALBA premeal and bedtime   - hypoglycemia protocol

## 2025-02-13 NOTE — PROGRESS NOTE ADULT - PROBLEM SELECTOR PLAN 5
Previous adm (1/8-16) for sacral OM, rec for PICC line on dc for IV abx however pt refused and discharged on augmentin w/ plan to complete 6 week course (until 2/19).   Gen surg performed bedside superficial wound debridement; wound looks clean.     - continue augmentin until 2/19   - wound care consult Previous adm (1/8-16) for sacral OM, rec for PICC line on dc for IV abx however pt refused and discharged on augmentin w/ plan to complete 6 week course (until 2/19).   Gen surg performed bedside superficial wound debridement; wound looks clean.     - f/u wound care recs -> wound vac placed   - ID consult given persistent leukocytosis since admission, concern for PO Augmentin failure

## 2025-02-13 NOTE — DIETITIAN INITIAL EVALUATION ADULT - PROBLEM SELECTOR PLAN 4
WBC 22.8-> 31.86. Likely multifactorial 2/2 inflammatory response in setting of GIB, chronic sacral OM, influenza A.   CXR w/ clear lungs.     - abx: vanc and cefepime   - f/u blood cx x2, U/A, urine cx   - tamiflu for flu A   - collect GI PCR (given possible norovirus at [facility]) and cdiff (has been on abx)   - trend fever curve, WBC

## 2025-02-13 NOTE — PROGRESS NOTE ADULT - PROBLEM SELECTOR PLAN 1
Multiple episodes of melena and coffee-ground emesis x 1 day, associated w/ hypotension and acute drop in Hgb. Ddx includes PUD vs AVMs vs malignancy (unclear last EGD), less likely dieulafoy lesion, smitha chen tear. CT neg active GI bleed.     - CBC q8h   - IV PPI BID  - NPO for now   - f/u GI recs -> pt now agreeable to EGD   - 2 large bore IVs   - maintain active T&S, transfuse for Hgb >8 Multiple episodes of melena and coffee-ground emesis x 1 day, associated w/ hypotension and acute drop in Hgb. Underwent EGD w/ three nonbleeding duodenal ulcers. CT neg active GI bleed.     - CBC BID  - IV gtt x 72 hrs (2/12 - )   - CLD   - f/u GI recs -> H pylori serology tests    - 2 large bore IVs   - maintain active T&S, transfuse for Hgb >8

## 2025-02-13 NOTE — PROGRESS NOTE ADULT - PROBLEM SELECTOR PLAN 2
Hgb 6-> 8.4 on repeat s/p 2u pRBC.   2/2 to UGIB. Baseline hgb  ~9.     - CBC q8h   - transfuse for Hgb > 8  - maintain active T&S Hgb 6 on adm, now s/p 5u pRBC since admission.   2/2 to UGI from ulcers.   baseline Hgb ~9.     - CBC BID   - transfuse for Hgb > 8  - maintain active T&S

## 2025-02-13 NOTE — PROGRESS NOTE ADULT - ASSESSMENT
Patient is a 79 yo F w/ PMHx of HTN, lymphedema, arthritis, and sacral decubitus ulcer s/p debridement w/ recent admission in 1/2025 for OM (patient and family refused PICC for prolonged IV abx and discharged w/ PO abx) p/w one day hx of ?CGE and melena, found to have acute on chronic anemia and elevated BUN. GI consulted for c/f UGIB.     #?CGE   #Melena   #Acute on chronic anemia  #Elevated BUN  #Influenza A   #Hx of OM  Patient p/w ?CGE x 3 and melena on exam per ED documentation. Labs with acute on chronic anemia and elevated BUN. Clinical presentation most consistent with upper GI bleeding. S/p EGD on 2/12; showing LA grade B esophagitis and 3 non-bleeding duodenal ulcers with a flat pigmented spot (Alex Class IIc) likely to be source of anemia and prior episodes of melena. Currently HDS with no further episodes of melena. However, would not be surprising to see a few additional episodes of melena, which would likely be old blood.     Recommendations:   - c/w pantoprazole gtt x 3 days and then can transition to IV or PO BID   - c/w CLD for now  - Trend CBC q12hrs and monitor BMs for melena; transfuse for hgb < 7   - Please obtain H pylori serologies; if positive, would consider treating with quadruple therapy  - Please avoid NSAIDs    All recs are preliminary until attending attestation.    Romero Ramsey, PGY-4  Gastroenterology & Hepatology Fellow  Available on TEAMS  Long range pager #: [phone]  Short range pager#: [phone]    For non-urgent consults, please send email to [email or web address] (for [facility]) or [email or web address] (for [facility])

## 2025-02-13 NOTE — PROGRESS NOTE ADULT - ASSESSMENT
This is an 81 y/o F with PMHx, HTN, lymphedema, T2DM, sacral decubitus ulcer s/p debridement (2024) and recent adm for sacral OM who presented to the ED for coffee-ground emesis and melena x 1 day. Patient noted the episodes first started at 5AM but did not know how many episodes she had. Per chart review, patient w/ multiple episodes of "dark, coffee ground appearing emesis" as well as "large amounts of dark colored loose, watery stools." She was found to have a high BUN on labs and thus brought it for further evaluation of GIB.       Wound Consult requested to assist w/ management of:  Sacral Stage 4 pressure injury  Fungal Moisture Associated Dermatitis Back/ Flank/ Buttock/ Thighs/ Groin/ Pannus/axilla- improving  Moisture Dermatitis Under Breasts    Sacral wound- Aquacel dressing/PT wound vacuum  Buttocks/ Sacrum Crusting w/ Nystatin + CAVILON BID and prn soiling        Continue w/ attends under pads and Pericare maintenance w/ purewick care as per protocol  Bilateral Groin, Pannus, & Breast skin Folds/axillas- after Cleaning INTERDry AG QD  Consider A/P CT or MRI  Abx per Medicine/ ID  Moisturize intact skin w/ SWEEN cream BID  Nutrition Consult for optimization        encourage high quality protein, josephine/ prosource, MVI & Vit C to promote wound healing  Hyperglycemia - ADA diet and FS   Continue turning and positioning w/ offloading assistive devices as per protocol  Waffle Cushion to chair when oob to chair  Continue w/ low air loss pressure redistribution bed surface   Pt will need Group 2 mattress on hospital bed and ROHO cushion for wheel chair upon discharge home  Care as per medicine, will follow w/ you  Upon discharge f/u as outpatient at [facility] [address] [place] [address] [phone]  Seen w/ RN and D/w team   Thank you for this consult  Christine SPEAR-BC, KRISSY [facility]  [phone]  Nights/ Weekends/ Holidays please call:  General Surgery Consult pager ([phone]) for emergencies  Wound PT for multilayer leg wrapping or VAC issues (x [phone])

## 2025-02-13 NOTE — DIETITIAN INITIAL EVALUATION ADULT - ADD RECOMMEND
1) continue diabetic clear liquid diet  2) advance diet as tolerated/feasible to diabetic full liquid --> consistent carbohydrate diet  3) recommend LPS 2x/day to supplement diet  4) recommend MVI, vit C, and zinc sulfate for wound healing  5) daily wts to trend  6) Monitor PO intake, weight, labs, skin, GI status, diet

## 2025-02-13 NOTE — PROGRESS NOTE ADULT - ATTENDING COMMENTS
Agree w above. No BMs or bleeding overnight. S/p EGD w large multiple duodenal ulcers. IV PPI tx. Trend CBC and transfuse as needed. GI will follow.

## 2025-02-13 NOTE — PROGRESS NOTE ADULT - PROBLEM SELECTOR PLAN 4
WBC 22.8-> 31.86. Likely multifactorial 2/2 inflammatory response in setting of GIB, chronic sacral OM, influenza A.   CXR w/ clear lungs.     - abx: vanc and cefepime   - f/u blood cx x2, U/A, urine cx   - tamiflu for flu A   - collect GI PCR (given possible norovirus at [facility] [place]) and cdiff (has been on abx)   - trend fever curve, WBC RVP + influenza.     - tamiflu (2/11- )  x 5 days   - droplet isolation

## 2025-02-13 NOTE — DIETITIAN INITIAL EVALUATION ADULT - REASON FOR ADMISSION
Per chart, Pt is an 81 y/o F with PMH: "HTN, lymphedema, arthritis, and sacral decubitus ulcer s/p debridement w/ recent admission in 1/2025 for OM (patient and family refused PICC for prolonged IV abx and discharged w/ PO abx) p/w one day hx of ?CGE and melena, found to have acute on chronic anemia and elevated BUN. GI consulted for c/f UGIB. S/p EGD w large multiple duodenal ulcers." Also positive for Flu, on IV Abx.

## 2025-02-13 NOTE — PROGRESS NOTE ADULT - SUBJECTIVE AND OBJECTIVE BOX
*******************************  Mitzi Ryenoso MD (PGY-2)  Internal Medicine  Contact via [employer] TEAMS  *******************************    VICENTA DAVIS  80y  Female    Patient is a 80y old  Female who presents with a chief complaint of Per chart, Pt is an 79 y/o F with PMH: "HTN, lymphedema, arthritis, and sacral decubitus ulcer s/p debridement w/ recent admission in 1/2025 for OM (patient and family refused PICC for prolonged IV abx and discharged w/ PO abx) p/w one day hx of ?CGE and melena, found to have acute on chronic anemia and elevated BUN. GI consulted for c/f UGIB. S/p EGD w large multiple duodenal ulcers." Also positive for Flu, on IV Abx. (13 Feb 2025 09:41)      Subjective:    Objective:  T(C): 36.4 (02-13-25 @ 06:42), Max: 36.8 (02-12-25 @ 11:15)  HR: 58 (02-13-25 @ 06:42) (56 - 76)  BP: 166/70 (02-13-25 @ 06:42) (95/45 - 204/86)  RR: 18 (02-13-25 @ 06:42) (16 - 20)  SpO2: 96% (02-13-25 @ 06:42) (96% - 100%)  I&O's Summary    12 Feb 2025 07:01  -  13 Feb 2025 07:00  --------------------------------------------------------  IN: 0 mL / OUT: 800 mL / NET: -800 mL        PHYSICAL EXAM:  GENERAL: NAD  HEAD:  Atraumatic, Normocephalic  EYES: EOMI, PERRLA, conjunctiva and sclera clear  ENMT: Moist mucous membranes  NECK: Supple, No JVD, trachea midline   NERVOUS SYSTEM:  Alert & Oriented X3, Good concentration; Motor Strength 5/5 B/L upper and lower extremities; DTRs 2+ intact and symmetric  CHEST/LUNG: Clear to auscultation bilaterally; No rales, rhonchi, wheezing, or rubs  HEART: Regular rate and rhythm; No murmurs, rubs, or gallops  ABDOMEN: Soft, Nontender, Nondistended; Bowel sounds present  EXTREMITIES:  2+ Peripheral Pulses, No clubbing, cyanosis, or edema  LYMPH: No lymphadenopathy noted  SKIN: No rashes or lesions    MEDICATIONS  (STANDING):  atorvastatin 20 milliGRAM(s) Oral at bedtime  cefepime   IVPB 2000 milliGRAM(s) IV Intermittent every 8 hours  cefepime   IVPB      dextrose 5%. 1000 milliLiter(s) (100 mL/Hr) IV Continuous <Continuous>  dextrose 5%. 1000 milliLiter(s) (50 mL/Hr) IV Continuous <Continuous>  dextrose 50% Injectable 25 Gram(s) IV Push once  dextrose 50% Injectable 12.5 Gram(s) IV Push once  dextrose 50% Injectable 25 Gram(s) IV Push once  glucagon  Injectable 1 milliGRAM(s) IntraMuscular once  influenza  Vaccine (HIGH DOSE) 0.5 milliLiter(s) IntraMuscular once  insulin lispro (ADMELOG) corrective regimen sliding scale   SubCutaneous every 6 hours  lactated ringers. 1000 milliLiter(s) (75 mL/Hr) IV Continuous <Continuous>  oseltamivir 75 milliGRAM(s) Oral two times a day  pantoprazole Infusion 8 mG/Hr (10 mL/Hr) IV Continuous <Continuous>  vancomycin  IVPB 1250 milliGRAM(s) IV Intermittent every 12 hours    MEDICATIONS  (PRN):  acetaminophen     Tablet .. 650 milliGRAM(s) Oral every 6 hours PRN Temp greater or equal to 38C (100.4F), Mild Pain (1 - 3)  dextrose Oral Gel 15 Gram(s) Oral once PRN Blood Glucose LESS THAN 70 milliGRAM(s)/deciliter  melatonin 3 milliGRAM(s) Oral at bedtime PRN Insomnia      LABS:        CAPILLARY BLOOD GLUCOSE      POCT Blood Glucose.: 133 mg/dL (13 Feb 2025 06:56)  POCT Blood Glucose.: 160 mg/dL (13 Feb 2025 01:40)  POCT Blood Glucose.: 123 mg/dL (12 Feb 2025 17:05)  POCT Blood Glucose.: 148 mg/dL (12 Feb 2025 12:01)      RADIOLOGY & ADDITIONAL TESTS:               *******************************  Mitzi Reynoso MD (PGY-2)  Internal Medicine  Contact via [employer] TEAMS  *******************************    VICENTA DAVIS  80y  Female    Patient is a 80y old  Female who presents with a chief complaint of UGIB.     Subjective: No acute events overnight. Patient seen at bedside, frustrated by frequent blood draws. Overall feels ok. Denied any dizziness, lightheadedness, chest pain, sob, ab pain, n/v. No episodes of melena.     Objective:  T(C): 36.4 (02-13-25 @ 06:42), Max: 36.8 (02-12-25 @ 11:15)  HR: 58 (02-13-25 @ 06:42) (56 - 76)  BP: 166/70 (02-13-25 @ 06:42) (95/45 - 204/86)  RR: 18 (02-13-25 @ 06:42) (16 - 20)  SpO2: 96% (02-13-25 @ 06:42) (96% - 100%)  I&O's Summary    12 Feb 2025 07:01  -  13 Feb 2025 07:00  --------------------------------------------------------  IN: 0 mL / OUT: 800 mL / NET: -800 mL    PHYSICAL EXAM:  GENERAL: NAD, chronically ill appearing, pale   HEAD: Atraumatic, Normocephalic  EYES: EOMI, PERRLA, conjunctiva and sclera clear  ENMT: Moist mucous membranes  NECK: Supple, trachea midline   NERVOUS SYSTEM:  Alert & Oriented X3, Good concentration  CHEST/LUNG: Clear to auscultation bilaterally on anterior exam; No rales, rhonchi, wheezing, or rubs  HEART: Regular rate and rhythm; No murmu rs, rubs, or gallops  ABDOMEN: Soft, Nontender, Nondistended; Bowel sounds present  EXTREMITIES:  2+ Peripheral Pulses, nonpitting edema b/l (unchanged from prior)     MEDICATIONS  (STANDING):  atorvastatin 20 milliGRAM(s) Oral at bedtime  cefepime   IVPB 2000 milliGRAM(s) IV Intermittent every 8 hours  cefepime   IVPB      dextrose 5%. 1000 milliLiter(s) (100 mL/Hr) IV Continuous <Continuous>  dextrose 5%. 1000 milliLiter(s) (50 mL/Hr) IV Continuous <Continuous>  dextrose 50% Injectable 25 Gram(s) IV Push once  dextrose 50% Injectable 12.5 Gram(s) IV Push once  dextrose 50% Injectable 25 Gram(s) IV Push once  glucagon  Injectable 1 milliGRAM(s) IntraMuscular once  influenza  Vaccine (HIGH DOSE) 0.5 milliLiter(s) IntraMuscular once  insulin lispro (ADMELOG) corrective regimen sliding scale   SubCutaneous every 6 hours  lactated ringers. 1000 milliLiter(s) (75 mL/Hr) IV Continuous <Continuous>  oseltamivir 75 milliGRAM(s) Oral two times a day  pantoprazole Infusion 8 mG/Hr (10 mL/Hr) IV Continuous <Continuous>  vancomycin  IVPB 1250 milliGRAM(s) IV Intermittent every 12 hours    MEDICATIONS  (PRN):  acetaminophen     Tablet .. 650 milliGRAM(s) Oral every 6 hours PRN Temp greater or equal to 38C (100.4F), Mild Pain (1 - 3)  dextrose Oral Gel 15 Gram(s) Oral once PRN Blood Glucose LESS THAN 70 milliGRAM(s)/deciliter  melatonin 3 milliGRAM(s) Oral at bedtime PRN Insomnia    LABS:                   11.0   15.50 )-----------( 353      ( 13 Feb 2025 10:20 )             34.3     02-13    135  |  106  |  43[H]  ----------------------------<  194[H]  5.0   |  18[L]  |  0.91    Ca    8.8      13 Feb 2025 10:17  Phos  2.5     02-13  Mg     1.9     02-13    TPro  5.0[L]  /  Alb  2.3[L]  /  TBili  0.4  /  DBili  x   /  AST  10  /  ALT  18  /  AlkPhos  79  02-11    PT/INR - ( 12 Feb 2025 06:59 )   PT: 18.2 sec;   INR: 1.59 ratio    PTT - ( 12 Feb 2025 06:59 )  PTT:35.8 sec    Culture Results:   No growth at 24 hours (02-11 @ 09:49)  Culture Results:   No growth at 24 hours (02-11 @ 09:39)    CAPILLARY BLOOD GLUCOSE  POCT Blood Glucose.: 133 mg/dL (13 Feb 2025 06:56)  POCT Blood Glucose.: 160 mg/dL (13 Feb 2025 01:40)  POCT Blood Glucose.: 123 mg/dL (12 Feb 2025 17:05)  POCT Blood Glucose.: 148 mg/dL (12 Feb 2025 12:01)    RADIOLOGY & ADDITIONAL TESTS:  < from: Upper Endoscopy (02.12.25 @ 17:55) >                                                                                                        Findings:       LA Grade B (one or more mucosal breaks greater than 5 mm, not extending between the tops of        two mucosal folds) esophagitis with no bleeding was found at the gastroesophageal junction.       The gastroesophageal flap valve was visualized endoscopically and classified as Hill Grade       III (minimal fold, loose to endoscope, hiatal hernia likely).       A small hiatal hernia was present.       Thick mucus in the stomach but otherwise, the entire examined stomach was normal.       Three non-bleeding cratered duodenal ulcers with a flat pigmented spot (Alex Class IIc)        were found in the duodenal bulb. The largest lesion was 30 mm in largest dimension. There was        associated distortion of the pyloric channel.       The exam of the duodenum was otherwise normal.    < end of copied text >

## 2025-02-13 NOTE — DIETITIAN INITIAL EVALUATION ADULT - PERTINENT MEDS FT
MEDICATIONS  (STANDING):  atorvastatin 20 milliGRAM(s) Oral at bedtime  cefepime   IVPB 2000 milliGRAM(s) IV Intermittent every 8 hours  cefepime   IVPB      dextrose 5%. 1000 milliLiter(s) (100 mL/Hr) IV Continuous <Continuous>  dextrose 5%. 1000 milliLiter(s) (50 mL/Hr) IV Continuous <Continuous>  dextrose 50% Injectable 25 Gram(s) IV Push once  dextrose 50% Injectable 12.5 Gram(s) IV Push once  dextrose 50% Injectable 25 Gram(s) IV Push once  glucagon  Injectable 1 milliGRAM(s) IntraMuscular once  influenza  Vaccine (HIGH DOSE) 0.5 milliLiter(s) IntraMuscular once  insulin lispro (ADMELOG) corrective regimen sliding scale   SubCutaneous every 6 hours  lactated ringers. 1000 milliLiter(s) (75 mL/Hr) IV Continuous <Continuous>  oseltamivir 75 milliGRAM(s) Oral two times a day  pantoprazole Infusion 8 mG/Hr (10 mL/Hr) IV Continuous <Continuous>  vancomycin  IVPB 1250 milliGRAM(s) IV Intermittent every 12 hours    MEDICATIONS  (PRN):  acetaminophen     Tablet .. 650 milliGRAM(s) Oral every 6 hours PRN Temp greater or equal to 38C (100.4F), Mild Pain (1 - 3)  dextrose Oral Gel 15 Gram(s) Oral once PRN Blood Glucose LESS THAN 70 milliGRAM(s)/deciliter  melatonin 3 milliGRAM(s) Oral at bedtime PRN Insomnia

## 2025-02-13 NOTE — DIETITIAN INITIAL EVALUATION ADULT - NSFNSGIIOFT_GEN_A_CORE
Pt denies any N/V/D/C. No BM since admission per RN flowsheets. Pt not ordered for bowel regimen at this time.

## 2025-02-13 NOTE — DIETITIAN INITIAL EVALUATION ADULT - NSFNSADHERENCEPTAFT_GEN_A_CORE
Pt on a LCS (limited concentrated sweets) diet at [facility]. Also ordered for Admelog and Metformin. HbA1c 7.3% (1/9) indicating fair glycemic control.

## 2025-02-13 NOTE — PROGRESS NOTE ADULT - ATTENDING COMMENTS
Dagmar Smallwood is an 79 y/o F with PMH significant for HTN, T2DM, PAD, lymphedema, arthritis, Sacral pressure ulcer with OM refusing IV abx on PO, and bed-bound status presenting with diarrhea and coffee ground emesis. Found to hypotensive and anemic Hgb 6.0.    #UGIB  #Hypotension  - S/p EGD 2/12 demonstrating esophagitis and multiple duodenal ulcers  - Transitioned from IV PPI BID to protonix gtt, to continue x 72H per GI  - CLD  - will d/w GI H pylori testing    #Acute blood loss anemia  -S/p 2u PRBC 2/11  - Hematemesis overnight, received 1u PRBC. Hgb this morning 5.2, addition 2u PRBC ordered.   - Repeat H/H after tranfusion and transfuse <7.0    #Sepsis  #Influenza A  #Sacral and coccyx OM  -CXR neg, CT abdomen/pelvis with possible UTI. Also with Sacral and coccyx OM  -Continue Cefepime/vanc. MRSA pcr pending, blood cx neg x 24h  - Concern for PO augmentin failure, ID consulted.   -Tamiflu for influenza  - Wound care consult    #Troponin elevation  -No chest pain, suspect demand ischemic in the setting of hypotension    Time spent: 52 minutes  - Reviewing, and interpreting labs and testing.  - Independently obtaining a review of systems and performing a physical exam  - Reviewing consultant documentation/recommendations in addition to discussing plan of care with consultants.  - Counselling and educating patient and family regarding interpretation of aforementioned items and plan of care.  - This excludes time spent teaching residents/medical students .

## 2025-02-13 NOTE — CONSULT NOTE ADULT - NS ATTEND AMEND GEN_ALL_CORE FT
Patient seen and examined. Chart with pertinent labs and imaging reviewed.  Leukocytosis, declining. Suspect related to acute illness/GIB. No clear or convincing evidence of infection. VAC in place, wound care assessment of sacrum appreciated.   Trend CBC  Role of antibiotics TBD  If IV access lost, ok to de-escalate back to Augmentin  D/W patient/family  Left message for Dr. Lepe.  Thank you for the courtesy of this referral.  Richmond Mayes MD  Attending Physician  [facility]  Division of Infectious Diseases  [phone]
Consult for evaluation of sacral decubitus ulcer  Patient with history of debridements in the past  Wound appears clean, base of wound is against bone with some superficial eschar   No necrotic tissue for surgical debridement  Recommend wound care consult   Wet to dry packing changes for now

## 2025-02-13 NOTE — PROGRESS NOTE ADULT - PROBLEM SELECTOR PLAN 3
RVP + influenza.     - tamiflu (2/11- )  x 5 days   - droplet isolation WBC 22.8-> 31.86-> 15.5. Improving   Likely multifactorial 2/2 inflammatory response in setting of GIB, chronic sacral OM, influenza A.   CXR w/ clear lungs, blood cx x2 neg, U/A, urine cx negative.     - ID consult   - abx: vanc and cefepime   - tamiflu for flu A   - GI PCR if continued diarrhea   - trend fever curve, WBC

## 2025-02-13 NOTE — DIETITIAN INITIAL EVALUATION ADULT - EDUCATION DIETARY MODIFICATIONS
increased protein intake for wound healing; diabetic diet principles/(1) partially meets; needs review/practice/verbalization

## 2025-02-13 NOTE — PHYSICAL THERAPY INITIAL EVALUATION ADULT - MODALITIES TREATMENT COMMENTS
Pt presents with stage IV sacral pressure injury measuring [id number] with no undermining/tunneling, scant serosanguinous drainage, 95% granulation tissue, 5% slough at distal wound edges, periwound skin appearing erythremic likely fungal moisture associated dermatitis.

## 2025-02-14 LAB
GLUCOSE BLDC GLUCOMTR-MCNC: 119 MG/DL — HIGH (ref 70–99)
GLUCOSE BLDC GLUCOMTR-MCNC: 123 MG/DL — HIGH (ref 70–99)

## 2025-02-14 PROCEDURE — 99231 SBSQ HOSP IP/OBS SF/LOW 25: CPT | Mod: GC

## 2025-02-14 PROCEDURE — G0545: CPT

## 2025-02-14 PROCEDURE — 99233 SBSQ HOSP IP/OBS HIGH 50: CPT | Mod: GC

## 2025-02-14 PROCEDURE — 99232 SBSQ HOSP IP/OBS MODERATE 35: CPT

## 2025-02-14 PROCEDURE — 93010 ELECTROCARDIOGRAM REPORT: CPT

## 2025-02-14 RX ORDER — ACETAMINOPHEN 500 MG/5ML
1000 LIQUID (ML) ORAL ONCE
Refills: 0 | Status: COMPLETED | OUTPATIENT
Start: 2025-02-14 | End: 2025-02-14

## 2025-02-14 RX ORDER — POLYETHYLENE GLYCOL 3350 17 G/17G
17 POWDER, FOR SOLUTION ORAL DAILY
Refills: 0 | Status: DISCONTINUED | OUTPATIENT
Start: 2025-02-14 | End: 2025-02-19

## 2025-02-14 RX ADMIN — OSELTAMIVIR PHOSPHATE 75 MILLIGRAM(S): 75 CAPSULE ORAL at 18:52

## 2025-02-14 RX ADMIN — ATORVASTATIN CALCIUM 20 MILLIGRAM(S): 80 TABLET, FILM COATED ORAL at 21:52

## 2025-02-14 RX ADMIN — CEFEPIME 100 MILLIGRAM(S): 2 INJECTION, POWDER, FOR SOLUTION INTRAVENOUS at 13:57

## 2025-02-14 RX ADMIN — AMLODIPINE BESYLATE 5 MILLIGRAM(S): 10 TABLET ORAL at 06:11

## 2025-02-14 RX ADMIN — POLYETHYLENE GLYCOL 3350 17 GRAM(S): 17 POWDER, FOR SOLUTION ORAL at 12:45

## 2025-02-14 RX ADMIN — CEFEPIME 100 MILLIGRAM(S): 2 INJECTION, POWDER, FOR SOLUTION INTRAVENOUS at 21:52

## 2025-02-14 RX ADMIN — Medication 500 MILLIGRAM(S): at 12:35

## 2025-02-14 RX ADMIN — CEFEPIME 100 MILLIGRAM(S): 2 INJECTION, POWDER, FOR SOLUTION INTRAVENOUS at 06:27

## 2025-02-14 RX ADMIN — OSELTAMIVIR PHOSPHATE 75 MILLIGRAM(S): 75 CAPSULE ORAL at 06:11

## 2025-02-14 RX ADMIN — Medication 220 MILLIGRAM(S): at 12:35

## 2025-02-14 RX ADMIN — Medication 1 TABLET(S): at 12:35

## 2025-02-14 NOTE — PROGRESS NOTE ADULT - PROBLEM SELECTOR PLAN 5
Previous adm (1/8-16) for sacral OM, rec for PICC line on dc for IV abx however pt refused and discharged on augmentin w/ plan to complete 6 week course (until 2/19).   Gen surg performed bedside superficial wound debridement; wound looks clean.     - f/u wound care recs -> wound vac placed   - ID consult given persistent leukocytosis since admission, concern for PO Augmentin failure

## 2025-02-14 NOTE — PROGRESS NOTE ADULT - ASSESSMENT
This is an 79 y/o F with PMHx, HTN, lymphedema, T2DM, sacral decubitus ulcer s/p debridement (2024) and recent adm for sacral OM who presented to the ED for coffee-ground emesis and melena x 1 day, admitted for acute blood loss anemia 2/2 to UGIB. Also found to be flu positive.

## 2025-02-14 NOTE — PROGRESS NOTE ADULT - PROBLEM SELECTOR PLAN 2
Hgb 6 on adm, now s/p 5u pRBC since admission.   2/2 to UGI from ulcers.   baseline Hgb ~9.     - CBC BID   - transfuse for Hgb > 8  - maintain active T&S

## 2025-02-14 NOTE — PROGRESS NOTE ADULT - ASSESSMENT
79 y hx  HTN, lymphedema, b/l arthritis, recent hospitalization 1/8/2025-1/16/2025 for weakness and sacral wound management where she was dx with OM of sacrum in the setting of elevated inflammatory markers and leukocytosis placed on zosyn and switched to ertapenem on discharge and was recommended for a PICC line, pt refused IV abx and was sent home on Augmentin 875/125mg to complete 6 weeks on 2/19.(no culture data) She was also treated with diflucan for candidiasis of skin. She now presents for nausea vomiting and bloody diarrhea from nursing home. ID consulted for Leukocytosis.  +flu A on tamiflu 2/11-->  2/11 bld cx neg  2/12 Endoscopy: No active bleeding but source of anemia and melena likely 2/2 duodenal bulb ulcers  Sacral Stage 4 pressure injury-Vac in place as per wound care note--> 9.0cm x 7.0cm x 2.0cm moist pale tissue w/ some slough serosanguinous drainage no exposed bone No odor, increased warmth, tenderness, induration, fluctuance, nor crepitus, Fungal Moisture Associated Dermatitis Back/ Flank/ Buttock/ Thighs/ Groin/ Pannus/axilla- improving, Moisture Dermatitis Under Breasts    02/14: Blood cx NTD, exam unrevealing, and WBC ~same range. Now with thrombocytosis suggesting reactive component.    Suggestions--  Trend WBC  Local care to sacrum  Complete course of tamiflu  Unless stronger support for infection emerges, would limit IV antibiotics to another 24h and then transition back to Augmentin 875/125mg bid  (or if IV access lost).    If any ID input is needed over the long weekend, please call [phone]. Thanks.    Richmond Mayes MD  Attending Physician  [facility]  Division of Infectious Diseases  [phone]

## 2025-02-14 NOTE — PROGRESS NOTE ADULT - ATTENDING COMMENTS
Dagmar Smallwood is an 81 y/o F with PMH significant for HTN, T2DM, PAD, lymphedema, arthritis, Sacral pressure ulcer with OM refusing IV abx on PO, and bed-bound status presenting with diarrhea and coffee ground emesis. Found to hypotensive and anemic Hgb 6.0.    #UGIB  #Hypotension  - S/p EGD 2/12 demonstrating esophagitis and multiple duodenal ulcers  - Protonix gtt, to continue x 72H per GI, end date 2/15. Then transition to PO BID.  - Advance diet as tolerated    #Acute blood loss anemia  -S/p 5u PRBC total this admission, last transfusion 2/12  -CBC qd and transfuse hgb <7.0    #Sepsis  #Influenza A  #Sacral and coccyx OM  -CXR neg, CT abdomen/pelvis with possible UTI. Also with Sacral and coccyx OM  -MRSA PCR neg, DC vanc. Discussed with ID, ok to continue IV while inpatient and transition to Augmentin on DC (end date 2/19)  -Tamiflu for influenza until 2/15  - Wound care following and s/p wound vac. F/u CM home wound vac.     #Troponin elevation  -No chest pain, suspect demand ischemic in the setting of hypotension    Dispo: Discharge home once DME set up and off protonix gtt.     Time spent: 53 minutes  - Reviewing, and interpreting labs and testing.  - Independently obtaining a review of systems and performing a physical exam  - Reviewing consultant documentation/recommendations in addition to discussing plan of care with consultants.  - Counselling and educating patient and family regarding interpretation of aforementioned items and plan of care.  - This excludes time spent teaching residents/medical students .

## 2025-02-14 NOTE — PROGRESS NOTE ADULT - SUBJECTIVE AND OBJECTIVE BOX
[facility]  Division of Infectious Diseases  [phone]    Name: VICENTA DAVIS  Age: 80y  Gender: Female  MRN: [id number]    Interval History--  Notes reviewed.     Past Medical History--  Hypertension    Diabetes    Back pain    Lymphedema of both lower extremities    HLD (hyperlipidemia)    Sacral osteomyelitis    History of cholecystectomy        For details regarding the patient's social history, family history, and other miscellaneous elements, please refer the initial infectious diseases consultation and/or the admitting history and physical examination for this admission.    Allergies    No Known Allergies    Intolerances        Medications--  Antibiotics:  cefepime   IVPB 2000 milliGRAM(s) IV Intermittent every 8 hours  cefepime   IVPB      oseltamivir 75 milliGRAM(s) Oral two times a day    Immunologic:  influenza  Vaccine (HIGH DOSE) 0.5 milliLiter(s) IntraMuscular once    Other:  acetaminophen     Tablet .. PRN  amLODIPine   Tablet  ascorbic acid  atorvastatin  dextrose 5%.  dextrose 5%.  dextrose 50% Injectable  dextrose 50% Injectable  dextrose 50% Injectable  dextrose Oral Gel PRN  glucagon  Injectable  insulin lispro (ADMELOG) corrective regimen sliding scale  insulin lispro (ADMELOG) corrective regimen sliding scale  melatonin PRN  multivitamin/minerals  pantoprazole Infusion  zinc sulfate      Review of Systems--  A 10-point review of systems was obtained.     Pertinent positives and negatives--  Constitutional: No fevers. No Chills. No Rigors.   Cardiovascular: No chest pain. No palpitations.  Respiratory: No shortness of breath. No cough.  Gastrointestinal: No nausea or vomiting. No diarrhea or constipation.   Psychiatric: Pleasant. Appropriate affect.    Review of systems otherwise negative except as previously noted.    Physical Examination--  Vital Signs: T(F): 98.3 (02-14-25 @ 07:50), Max: 98.6 (02-13-25 @ 21:47)  HR: 78 (02-14-25 @ 07:50)  BP: 135/76 (02-14-25 @ 07:50)  RR: 18 (02-14-25 @ 07:50)  SpO2: 96% (02-14-25 @ 07:50)  Wt(kg): --  General: Nontoxic-appearing Female in no acute distress.  HEENT: AT/NC. PERRL. EOMI. Anicteric. Conjunctiva pink and moist. Oropharynx clear. Dentition fair.  Neck: Not rigid. No sense of mass.  Nodes: None palpable.  Lungs: Clear bilaterally without rales, wheezing or rhonchi  Heart: Regular rate and rhythm. No Murmur. No rub. No gallop. No palpable thrill.  Abdomen: Bowel sounds present and normoactive. Soft. Nondistended. Nontender. No sense of mass. No organomegaly.  Back: No spinal tenderness. No costovertebral angle tenderness.   Extremities: No cyanosis or clubbing. No edema.   Skin: Warm. Dry. Good turgor. No rash. No vasculitic stigmata.  Psychiatric: Appropriate affect and mood for situation.         Laboratory Studies--  CBC                        12.0   17.82 )-----------( 401      ( 13 Feb 2025 17:46 )             37.1       Chemistries  02-13    135  |  106  |  43[H]  ----------------------------<  194[H]  5.0   |  18[L]  |  0.91    Ca    8.8      13 Feb 2025 10:17  Phos  2.5     02-13  Mg     1.9     02-13        Culture Data    Culture - Blood (collected 11 Feb 2025 09:49)  Source: .Blood BLOOD  Preliminary Report (13 Feb 2025 17:01):    No growth at 48 Hours    Culture - Blood (collected 11 Feb 2025 09:39)  Source: .Blood BLOOD  Preliminary Report (13 Feb 2025 17:01):    No growth at 48 Hours             [facility]  Division of Infectious Diseases  [phone]    Name: VICENTA DAVIS  Age: 80y  Gender: Female  MRN: [id number]      Interval History--  Notes reviewed. "I feel fine." No complaints. Denies fevers, chills, or rigors. Pain not an issue. Denies diarrhea/hematochezia.      Past Medical History--  Hypertension    Diabetes    Back pain    Lymphedema of both lower extremities    HLD (hyperlipidemia)    Sacral osteomyelitis    History of cholecystectomy        For details regarding the patient's social history, family history, and other miscellaneous elements, please refer the initial infectious diseases consultation and/or the admitting history and physical examination for this admission.    Allergies    No Known Allergies    Intolerances      Medications--  Antibiotics:  cefepime   IVPB 2000 milliGRAM(s) IV Intermittent every 8 hours  cefepime   IVPB      oseltamivir 75 milliGRAM(s) Oral two times a day    Immunologic:  influenza  Vaccine (HIGH DOSE) 0.5 milliLiter(s) IntraMuscular once    Other:  acetaminophen     Tablet .. PRN  amLODIPine   Tablet  ascorbic acid  atorvastatin  dextrose 5%.  dextrose 5%.  dextrose 50% Injectable  dextrose 50% Injectable  dextrose 50% Injectable  dextrose Oral Gel PRN  glucagon  Injectable  insulin lispro (ADMELOG) corrective regimen sliding scale  insulin lispro (ADMELOG) corrective regimen sliding scale  melatonin PRN  multivitamin/minerals  pantoprazole Infusion  zinc sulfate      Review of Systems--  A 10-point review of systems was obtained.   Review of systems otherwise negative except as previously noted.    Physical Examination--  Vital Signs: T(F): 98.3 (02-14-25 @ 07:50), Max: 98.6 (02-13-25 @ 21:47)  HR: 78 (02-14-25 @ 07:50)  BP: 135/76 (02-14-25 @ 07:50)  RR: 18 (02-14-25 @ 07:50)  SpO2: 96% (02-14-25 @ 07:50)  Wt(kg): --  General: Nontoxic-appearing Female in no acute distress.  HEENT: AT/NC. Anicteric. Conjunctiva pink and moist. Oropharynx clear.   Neck: Not rigid. No sense of mass.  Nodes: None palpable.  Lungs: Decreased BS B  Heart: Regular rate and rhythm.   Abdomen: Bowel sounds present and normoactive. Soft. Nondistended. Nontender.  Extremities: No cyanosis or clubbing. 1+ edema.   Skin: Warm. Dry. Good turgor. No rash. No vasculitic stigmata.  Psychiatric: Appropriate affect and mood for situation.         Laboratory Studies--  CBC                        12.0   17.82 )-----------( 401      ( 13 Feb 2025 17:46 )             37.1     WBC Count: 15.50 K/uL (02-13-25 @ 10:20)  WBC Count: 18.18 K/uL (02-12-25 @ 16:31)  WBC Count: 12.10 K/uL (02-12-25 @ 06:59)  WBC Count: 27.35 K/uL (02-11-25 @ 20:00)  WBC Count: 31.86 K/uL (02-11-25 @ 13:55)  WBC Count: 22.80 K/uL (02-11-25 @ 09:14)      Chemistries  02-13    135  |  106  |  43[H]  ----------------------------<  194[H]  5.0   |  18[L]  |  0.91    Ca    8.8      13 Feb 2025 10:17  Phos  2.5     02-13  Mg     1.9     02-13        Culture Data    Culture - Blood (collected 11 Feb 2025 09:49)  Source: .Blood BLOOD  Preliminary Report (13 Feb 2025 17:01):    No growth at 48 Hours    Culture - Blood (collected 11 Feb 2025 09:39)  Source: .Blood BLOOD  Preliminary Report (13 Feb 2025 17:01):    No growth at 48 Hours

## 2025-02-14 NOTE — PROGRESS NOTE ADULT - SUBJECTIVE AND OBJECTIVE BOX
Patient is a 80y old  Female who presents with a chief complaint of UGIB.     Subjective: No acute events overnight. Patient seen at bedside, overall feels ok. Denied any dizziness, lightheadedness, chest pain, sob, ab pain, n/v. No episodes of melena.     Objective:  Vital Signs Last 24 Hrs  T(C): 37 (13 Feb 2025 21:47), Max: 37 (13 Feb 2025 21:47)  T(F): 98.6 (13 Feb 2025 21:47), Max: 98.6 (13 Feb 2025 21:47)  HR: 74 (13 Feb 2025 21:47) (58 - 74)  BP: 167/67 (13 Feb 2025 21:47) (167/67 - 169/55)  BP(mean): --  RR: 18 (13 Feb 2025 21:47) (18 - 18)  SpO2: 96% (13 Feb 2025 21:47) (95% - 96%)    Parameters below as of 13 Feb 2025 21:47  Patient On (Oxygen Delivery Method): room air      I&O's Summary    12 Feb 2025 07:01  -  13 Feb 2025 07:00  --------------------------------------------------------  IN: 0 mL / OUT: 800 mL / NET: -800 mL    13 Feb 2025 07:01  -  14 Feb 2025 06:45  --------------------------------------------------------  IN: 360 mL / OUT: 500 mL / NET: -140 mL      PHYSICAL EXAM:  GENERAL: NAD, chronically ill appearing, pale   HEAD: Atraumatic, Normocephalic  EYES: EOMI, PERRLA, conjunctiva and sclera clear  ENMT: Moist mucous membranes  NECK: Supple, trachea midline   NERVOUS SYSTEM:  Alert & Oriented X3, Good concentration  CHEST/LUNG: Clear to auscultation bilaterally on anterior exam; No rales, rhonchi, wheezing, or rubs  HEART: Regular rate and rhythm; No murmurs, rubs, or gallops  ABDOMEN: Soft, Nontender, Nondistended; Bowel sounds present  EXTREMITIES:  2+ Peripheral Pulses, nonpitting edema b/l (unchanged from prior)       MEDICATIONS  (STANDING):  amLODIPine   Tablet 5 milliGRAM(s) Oral daily  ascorbic acid 500 milliGRAM(s) Oral daily  atorvastatin 20 milliGRAM(s) Oral at bedtime  cefepime   IVPB 2000 milliGRAM(s) IV Intermittent every 8 hours  cefepime   IVPB      dextrose 5%. 1000 milliLiter(s) (50 mL/Hr) IV Continuous <Continuous>  dextrose 5%. 1000 milliLiter(s) (100 mL/Hr) IV Continuous <Continuous>  dextrose 50% Injectable 25 Gram(s) IV Push once  dextrose 50% Injectable 12.5 Gram(s) IV Push once  dextrose 50% Injectable 25 Gram(s) IV Push once  glucagon  Injectable 1 milliGRAM(s) IntraMuscular once  influenza  Vaccine (HIGH DOSE) 0.5 milliLiter(s) IntraMuscular once  insulin lispro (ADMELOG) corrective regimen sliding scale   SubCutaneous three times a day before meals  insulin lispro (ADMELOG) corrective regimen sliding scale   SubCutaneous at bedtime  multivitamin/minerals 1 Tablet(s) Oral daily  oseltamivir 75 milliGRAM(s) Oral two times a day  pantoprazole Infusion 8 mG/Hr (10 mL/Hr) IV Continuous <Continuous>  vancomycin  IVPB 1500 milliGRAM(s) IV Intermittent every 24 hours  zinc sulfate 220 milliGRAM(s) Oral daily    MEDICATIONS  (PRN):  acetaminophen     Tablet .. 650 milliGRAM(s) Oral every 6 hours PRN Temp greater or equal to 38C (100.4F), Mild Pain (1 - 3)  dextrose Oral Gel 15 Gram(s) Oral once PRN Blood Glucose LESS THAN 70 milliGRAM(s)/deciliter  melatonin 3 milliGRAM(s) Oral at bedtime PRN Insomnia      LABS:                     11.0   15.50 )-----------( 353      ( 13 Feb 2025 10:20 )             34.3     02-13    135  |  106  |  43[H]  ----------------------------<  194[H]  5.0   |  18[L]  |  0.91    Ca    8.8      13 Feb 2025 10:17  Phos  2.5     02-13  Mg     1.9     02-13    TPro  5.0[L]  /  Alb  2.3[L]  /  TBili  0.4  /  DBili  x   /  AST  10  /  ALT  18  /  AlkPhos  79  02-11    PT/INR - ( 12 Feb 2025 06:59 )   PT: 18.2 sec;   INR: 1.59 ratio    PTT - ( 12 Feb 2025 06:59 )  PTT:35.8 sec    Culture Results:   Culture - Blood (collected 11 Feb 2025 09:49)  Source: .Blood BLOOD  Preliminary Report (13 Feb 2025 17:01):    No growth at 48 Hours    Culture - Blood (collected 11 Feb 2025 09:39)  Source: .Blood BLOOD  Preliminary Report (13 Feb 2025 17:01):    No growth at 48 Hours      CAPILLARY BLOOD GLUCOSE    POCT Blood Glucose.: 106 mg/dL (13 Feb 2025 21:35)  POCT Blood Glucose.: 133 mg/dL (13 Feb 2025 06:56)  POCT Blood Glucose.: 160 mg/dL (13 Feb 2025 01:40)  POCT Blood Glucose.: 123 mg/dL (12 Feb 2025 17:05)  POCT Blood Glucose.: 148 mg/dL (12 Feb 2025 12:01)    RADIOLOGY & ADDITIONAL TESTS:  < from: Upper Endoscopy (02.12.25 @ 17:55) >                                                                                                        Findings:       LA Grade B (one or more mucosal breaks greater than 5 mm, not extending between the tops of        two mucosal folds) esophagitis with no bleeding was found at the gastroesophageal junction.       The gastroesophageal flap valve was visualized endoscopically and classified as Hill Grade       III (minimal fold, loose to endoscope, hiatal hernia likely).       A small hiatal hernia was present.       Thick mucus in the stomach but otherwise, the entire examined stomach was normal.       Three non-bleeding cratered duodenal ulcers with a flat pigmented spot (Alex Class IIc)        were found in the duodenal bulb. The largest lesion was 30 mm in largest dimension. There was        associated distortion of the pyloric channel.       The exam of the duodenum was otherwise normal.    < end of copied text >

## 2025-02-14 NOTE — PROGRESS NOTE ADULT - PROBLEM SELECTOR PLAN 1
Multiple episodes of melena and coffee-ground emesis x 1 day, associated w/ hypotension and acute drop in Hgb. Underwent EGD w/ three nonbleeding duodenal ulcers. CT neg active GI bleed.     - CBC BID  - Protonix gtt   - CLD   - f/u GI recs -> H pylori serology tests    - 2 large bore IVs   - maintain active T&S, transfuse for Hgb >8

## 2025-02-14 NOTE — PROGRESS NOTE ADULT - PROBLEM SELECTOR PLAN 3
WBC 22.8-> 31.86-> 15.5. Improving   Likely multifactorial 2/2 inflammatory response in setting of GIB, chronic sacral OM, influenza A.   CXR w/ clear lungs, blood cx x2 neg, U/A, urine cx negative.     - ID consult   - abx: vanc and cefepime   - tamiflu for flu A   - GI PCR if continued diarrhea   - trend fever curve, WBC

## 2025-02-14 NOTE — PROGRESS NOTE ADULT - ASSESSMENT
Patient is a 79 yo F w/ PMHx of HTN, lymphedema, arthritis, and sacral decubitus ulcer s/p debridement w/ recent admission in 1/2025 for OM (patient and family refused PICC for prolonged IV abx and discharged w/ PO abx) p/w one day hx of ?CGE and melena, found to have acute on chronic anemia and elevated BUN. GI consulted for c/f UGIB.     #?CGE   #Melena   #Acute on chronic anemia  #Elevated BUN  #Influenza A   #Hx of OM  Patient p/w ?CGE x 3 and melena on exam per ED documentation. Labs with acute on chronic anemia and elevated BUN. Clinical presentation most consistent with upper GI bleeding. S/p EGD on 2/12; showing LA grade B esophagitis and 3 non-bleeding duodenal ulcers with a flat pigmented spot (Alex Class IIc) likely to be source of anemia and prior episodes of melena. Currently HDS with no further episodes of melena. However, would not be surprising to see a few additional episodes of melena, which would likely be old blood.     Recommendations:   - c/w pantoprazole gtt x 3 days (through 2/15) and then can transition to IV or PO BID   - advance diet as tolerated  - Trend CBC daily and monitor BMs for melena; transfuse for hgb < 7   - can obtain stool H pylori testing but likely to be falsely negative given PPI. However, if positive, would treat w/ quadruple therapy. If negative, would obtain serologies outpatient  - Please avoid NSAIDs  - please start bowel regimen w/ miralax daily   - GI will sign off at this time, however please call back with questions or should any worsening/persistent issues arise.  Please provide patient with Gastroenterology Clinic to confirm appointment; [phone] ([facility] at [address] [place] [address]) or [phone] ([facility] at [address] [place] [address]) or [phone] ([facility] at [address] [facility]).    All recs are preliminary until attending attestation.    Romero Ramsey, PGY-4  Gastroenterology & Hepatology Fellow  Available on TEAMS  Long range pager #: [phone]  Short range pager#: [phone]    For non-urgent consults, please send email to [email or web address] (for NS) or [email or web address] (for JAVIER)

## 2025-02-14 NOTE — PROGRESS NOTE ADULT - SUBJECTIVE AND OBJECTIVE BOX
GI Progress Note     SUBJECTIVE/INTERVAL:   - no BMs since EGD  - 2/13 PM labs w/ hgb 12     OBJECTIVE:    VITAL SIGNS:  ICU Vital Signs Last 24 Hrs  T(C): 36.4 (13 Feb 2025 06:42), Max: 36.8 (12 Feb 2025 11:15)  T(F): 97.5 (13 Feb 2025 06:42), Max: 98.2 (12 Feb 2025 11:15)  HR: 58 (13 Feb 2025 06:42) (56 - 76)  BP: 166/70 (13 Feb 2025 06:42) (95/45 - 204/86)  BP(mean): 71 (12 Feb 2025 17:46) (71 - 71)  ABP: --  ABP(mean): --  RR: 18 (13 Feb 2025 06:42) (16 - 20)  SpO2: 96% (13 Feb 2025 06:42) (96% - 100%)    O2 Parameters below as of 13 Feb 2025 06:42  Patient On (Oxygen Delivery Method): room air              02-12 @ 07:01  -  02-13 @ 07:00  --------------------------------------------------------  IN: 0 mL / OUT: 800 mL / NET: -800 mL        PHYSICAL EXAM:    General: NAD  HEENT: NC/AT  Neck: supple  Respiratory: Regular RR, no increase in WOB  Cardiovascular: RRR  Abdomen: soft, NT/ND; +BS x4  Extremities: WWP, 2+ peripheral pulses b/l  Skin: normal color and turgor; no rash  Neurological: A&OX3    MEDICATIONS:  MEDICATIONS  (STANDING):  atorvastatin 20 milliGRAM(s) Oral at bedtime  cefepime   IVPB 2000 milliGRAM(s) IV Intermittent every 8 hours  cefepime   IVPB      dextrose 5%. 1000 milliLiter(s) (100 mL/Hr) IV Continuous <Continuous>  dextrose 5%. 1000 milliLiter(s) (50 mL/Hr) IV Continuous <Continuous>  dextrose 50% Injectable 25 Gram(s) IV Push once  dextrose 50% Injectable 12.5 Gram(s) IV Push once  dextrose 50% Injectable 25 Gram(s) IV Push once  glucagon  Injectable 1 milliGRAM(s) IntraMuscular once  influenza  Vaccine (HIGH DOSE) 0.5 milliLiter(s) IntraMuscular once  insulin lispro (ADMELOG) corrective regimen sliding scale   SubCutaneous every 6 hours  lactated ringers. 1000 milliLiter(s) (75 mL/Hr) IV Continuous <Continuous>  oseltamivir 75 milliGRAM(s) Oral two times a day  pantoprazole Infusion 8 mG/Hr (10 mL/Hr) IV Continuous <Continuous>  vancomycin  IVPB 1250 milliGRAM(s) IV Intermittent every 12 hours    MEDICATIONS  (PRN):  acetaminophen     Tablet .. 650 milliGRAM(s) Oral every 6 hours PRN Temp greater or equal to 38C (100.4F), Mild Pain (1 - 3)  dextrose Oral Gel 15 Gram(s) Oral once PRN Blood Glucose LESS THAN 70 milliGRAM(s)/deciliter  melatonin 3 milliGRAM(s) Oral at bedtime PRN Insomnia      ALLERGIES:  Allergies    No Known Allergies    Intolerances        LABS:                        10.9   18.18 )-----------( 312      ( 12 Feb 2025 16:31 )             33.5     02-12    137  |  111[H]  |  63[H]  ----------------------------<  226[H]  5.5[H]   |  19[L]  |  0.93    Ca    8.8      12 Feb 2025 06:59  Phos  2.6     02-12  Mg     1.7     02-12    TPro  5.0[L]  /  Alb  2.3[L]  /  TBili  0.4  /  DBili  x   /  AST  10  /  ALT  18  /  AlkPhos  79  02-11    PT/INR - ( 12 Feb 2025 06:59 )   PT: 18.2 sec;   INR: 1.59 ratio         PTT - ( 12 Feb 2025 06:59 )  PTT:35.8 sec  Urinalysis Basic - ( 12 Feb 2025 06:59 )    Color: x / Appearance: x / SG: x / pH: x  Gluc: 226 mg/dL / Ketone: x  / Bili: x / Urobili: x   Blood: x / Protein: x / Nitrite: x   Leuk Esterase: x / RBC: x / WBC x   Sq Epi: x / Non Sq Epi: x / Bacteria: x

## 2025-02-14 NOTE — PROGRESS NOTE ADULT - ATTENDING COMMENTS
Agree w above. No evidence for further GI bleeding. s/p EGD w non-bleeding duodenal ulcers. Continue PPI tx. Trend CBC and transfuse as needed. Please reconsult GI as needed.

## 2025-02-15 DIAGNOSIS — I48.91 UNSPECIFIED ATRIAL FIBRILLATION: ICD-10-CM

## 2025-02-15 LAB
ANION GAP SERPL CALC-SCNC: 10 MMOL/L — SIGNIFICANT CHANGE UP (ref 5–17)
BUN SERPL-MCNC: 27 MG/DL — HIGH (ref 7–23)
CALCIUM SERPL-MCNC: 8.6 MG/DL — SIGNIFICANT CHANGE UP (ref 8.4–10.5)
CHLORIDE SERPL-SCNC: 107 MMOL/L — SIGNIFICANT CHANGE UP (ref 96–108)
CO2 SERPL-SCNC: 21 MMOL/L — LOW (ref 22–31)
CREAT SERPL-MCNC: 0.93 MG/DL — SIGNIFICANT CHANGE UP (ref 0.5–1.3)
EGFR: 61 ML/MIN/1.73M2 — SIGNIFICANT CHANGE UP
GLUCOSE BLDC GLUCOMTR-MCNC: 115 MG/DL — HIGH (ref 70–99)
GLUCOSE BLDC GLUCOMTR-MCNC: 117 MG/DL — HIGH (ref 70–99)
GLUCOSE BLDC GLUCOMTR-MCNC: 123 MG/DL — HIGH (ref 70–99)
GLUCOSE SERPL-MCNC: 113 MG/DL — HIGH (ref 70–99)
HCT VFR BLD CALC: 35.9 % — SIGNIFICANT CHANGE UP (ref 34.5–45)
HGB BLD-MCNC: 11.3 G/DL — LOW (ref 11.5–15.5)
MAGNESIUM SERPL-MCNC: 1.6 MG/DL — SIGNIFICANT CHANGE UP (ref 1.6–2.6)
MCHC RBC-ENTMCNC: 28.8 PG — SIGNIFICANT CHANGE UP (ref 27–34)
MCHC RBC-ENTMCNC: 31.5 G/DL — LOW (ref 32–36)
MCV RBC AUTO: 91.6 FL — SIGNIFICANT CHANGE UP (ref 80–100)
NRBC BLD AUTO-RTO: 0 /100 WBCS — SIGNIFICANT CHANGE UP (ref 0–0)
PHOSPHATE SERPL-MCNC: 1.9 MG/DL — LOW (ref 2.5–4.5)
PLATELET # BLD AUTO: 381 K/UL — SIGNIFICANT CHANGE UP (ref 150–400)
POTASSIUM SERPL-MCNC: 4.6 MMOL/L — SIGNIFICANT CHANGE UP (ref 3.5–5.3)
POTASSIUM SERPL-SCNC: 4.6 MMOL/L — SIGNIFICANT CHANGE UP (ref 3.5–5.3)
RBC # BLD: 3.92 M/UL — SIGNIFICANT CHANGE UP (ref 3.8–5.2)
RBC # FLD: 18 % — HIGH (ref 10.3–14.5)
SODIUM SERPL-SCNC: 138 MMOL/L — SIGNIFICANT CHANGE UP (ref 135–145)
WBC # BLD: 15.49 K/UL — HIGH (ref 3.8–10.5)
WBC # FLD AUTO: 15.49 K/UL — HIGH (ref 3.8–10.5)

## 2025-02-15 PROCEDURE — 99233 SBSQ HOSP IP/OBS HIGH 50: CPT

## 2025-02-15 RX ORDER — SODIUM CHLORIDE 9 G/1000ML
500 INJECTION, SOLUTION INTRAVENOUS ONCE
Refills: 0 | Status: COMPLETED | OUTPATIENT
Start: 2025-02-15 | End: 2025-02-15

## 2025-02-15 RX ORDER — MAGNESIUM, ALUMINUM HYDROXIDE 200-200 MG
30 TABLET,CHEWABLE ORAL ONCE
Refills: 0 | Status: COMPLETED | OUTPATIENT
Start: 2025-02-15 | End: 2025-02-15

## 2025-02-15 RX ORDER — SODIUM PHOSPHATE,DIBASIC DIHYD
15 POWDER (GRAM) MISCELLANEOUS ONCE
Refills: 0 | Status: COMPLETED | OUTPATIENT
Start: 2025-02-15 | End: 2025-02-15

## 2025-02-15 RX ORDER — METOPROLOL SUCCINATE 50 MG/1
5 TABLET, EXTENDED RELEASE ORAL ONCE
Refills: 0 | Status: COMPLETED | OUTPATIENT
Start: 2025-02-15 | End: 2025-02-15

## 2025-02-15 RX ORDER — LACTULOSE 10 G/15ML
10 SOLUTION ORAL
Refills: 0 | Status: DISCONTINUED | OUTPATIENT
Start: 2025-02-15 | End: 2025-02-24

## 2025-02-15 RX ORDER — LACTULOSE 10 G/15ML
10 SOLUTION ORAL DAILY
Refills: 0 | Status: DISCONTINUED | OUTPATIENT
Start: 2025-02-15 | End: 2025-02-15

## 2025-02-15 RX ORDER — SODIUM CHLORIDE 9 G/1000ML
1000 INJECTION, SOLUTION INTRAVENOUS ONCE
Refills: 0 | Status: COMPLETED | OUTPATIENT
Start: 2025-02-15 | End: 2025-02-15

## 2025-02-15 RX ADMIN — AMLODIPINE BESYLATE 5 MILLIGRAM(S): 10 TABLET ORAL at 06:17

## 2025-02-15 RX ADMIN — SODIUM CHLORIDE 1000 MILLILITER(S): 9 INJECTION, SOLUTION INTRAVENOUS at 16:06

## 2025-02-15 RX ADMIN — CEFEPIME 100 MILLIGRAM(S): 2 INJECTION, POWDER, FOR SOLUTION INTRAVENOUS at 13:40

## 2025-02-15 RX ADMIN — CEFEPIME 100 MILLIGRAM(S): 2 INJECTION, POWDER, FOR SOLUTION INTRAVENOUS at 22:00

## 2025-02-15 RX ADMIN — Medication 63.75 MILLIMOLE(S): at 13:36

## 2025-02-15 RX ADMIN — Medication 400 MILLIGRAM(S): at 00:33

## 2025-02-15 RX ADMIN — Medication 30 MILLILITER(S): at 15:36

## 2025-02-15 RX ADMIN — OSELTAMIVIR PHOSPHATE 75 MILLIGRAM(S): 75 CAPSULE ORAL at 06:20

## 2025-02-15 RX ADMIN — SODIUM CHLORIDE 1000 MILLILITER(S): 9 INJECTION, SOLUTION INTRAVENOUS at 00:37

## 2025-02-15 RX ADMIN — Medication 500 MILLIGRAM(S): at 11:49

## 2025-02-15 RX ADMIN — ATORVASTATIN CALCIUM 20 MILLIGRAM(S): 80 TABLET, FILM COATED ORAL at 22:00

## 2025-02-15 RX ADMIN — CEFEPIME 100 MILLIGRAM(S): 2 INJECTION, POWDER, FOR SOLUTION INTRAVENOUS at 06:20

## 2025-02-15 RX ADMIN — LACTULOSE 10 GRAM(S): 10 SOLUTION ORAL at 12:28

## 2025-02-15 RX ADMIN — SODIUM CHLORIDE 1000 MILLILITER(S): 9 INJECTION, SOLUTION INTRAVENOUS at 12:27

## 2025-02-15 RX ADMIN — METOPROLOL SUCCINATE 5 MILLIGRAM(S): 50 TABLET, EXTENDED RELEASE ORAL at 17:17

## 2025-02-15 RX ADMIN — Medication 10 MG/HR: at 11:54

## 2025-02-15 RX ADMIN — Medication 1 TABLET(S): at 11:50

## 2025-02-15 RX ADMIN — POLYETHYLENE GLYCOL 3350 17 GRAM(S): 17 POWDER, FOR SOLUTION ORAL at 12:28

## 2025-02-15 RX ADMIN — LACTULOSE 10 GRAM(S): 10 SOLUTION ORAL at 17:22

## 2025-02-15 RX ADMIN — Medication 220 MILLIGRAM(S): at 11:50

## 2025-02-15 RX ADMIN — OSELTAMIVIR PHOSPHATE 75 MILLIGRAM(S): 75 CAPSULE ORAL at 18:08

## 2025-02-15 NOTE — PROGRESS NOTE ADULT - PROBLEM SELECTOR PLAN 1
Multiple episodes of melena and coffee-ground emesis x 1 day, associated w/ hypotension and acute drop in Hgb. Underwent EGD w/ three nonbleeding duodenal ulcers. CT neg active GI bleed.     - CBC BID  - Protonix gtt (ending 2/15)  - Advance diet as tolerated  - f/u GI recs -> H pylori serology tests    - maintain active T&S, transfuse for Hgb >8

## 2025-02-15 NOTE — PROGRESS NOTE ADULT - PROBLEM SELECTOR PLAN 4
WBC 22.8-> 31.86-> 15.5. Improving   Likely multifactorial 2/2 inflammatory response in setting of GIB, chronic sacral OM, influenza A.   CXR w/ clear lungs, blood cx x2 neg, U/A, urine cx negative.     - ID consult   - abx: cefepime; planning to switch back to Augmentin (2/16)   - tamiflu for flu A   - GI PCR if continued diarrhea   - trend fever curve, WBC

## 2025-02-15 NOTE — CHART NOTE - NSCHARTNOTEFT_GEN_A_CORE
Resident called to bedside stating patient was sustaining HR in the 160s, previously 80s. Recently wound vac packing had fallen out and was replaced with packing by nursing staff for evaluation by wound care in the AM. 12 Lead EKG confirmed A fib RVR. Resident clarified with patient that this is new onset with no prior diagnosis of A Fib. Vital signs demonstrated patient was afebrile, with variable HR > 140, and BP 100s/60s. Blood pressure significantly reduced from prior of 150s/70s.    Patient mentating well with notable palpitations. Denied any pain. No bowel movements while on shift, so unable to confirm if GI bleed recurred. Discussed with patient regarding the need for lab draws to determine if the patient is bleeding or experiencing worsening infection, however patient deferred, requesting phlebotomy to draw her labs.    Risks of deferred laboratory evaluation was explained to the patient and she expressed understanding but still requested to defer until phlebotomy could draw labs in the AM. Patient stated would alert nurse if worsening palpitations, chest pain, or SOB. Review of patient chart demonstrated positive flu infection with additional empiric antibiotic coverage. Patient given 1L LR bolus for fluid challenge and 1g IV tylenol for potential pain from recent manipulation of sacral ulcer, rate control deferred i/s/o drop in blood pressure. Anticoagulation held i/s/o recent bleed.    Patient continued to be monitored via Air[employer]rip. Conversion to sinus rhythm at 80-90 BPM at 2/15 1:24AM. Will continue to monitor.    Chirag Villagran MD | PGY-1

## 2025-02-15 NOTE — PROGRESS NOTE ADULT - SUBJECTIVE AND OBJECTIVE BOX
Patient is a 80y old  Female who presents with a chief complaint of UGIB.     Subjective: Overnight, the patient went into afib w/RVR, HR increased to the 160s, was given 1L LR bolus for fluid challenge and 1g IV Tylenol for potential pain from recent manipulation of sacral ulcer, rate control deferred i/s/o drop in blood pressure. Patient refused blood draws until this morning. Denied any dizziness, lightheadedness, chest pain, sob, ab pain, n/v. No episodes of melena.     Objective:  Vital Signs Last 24 Hrs  T(C): 36.8 (15 Feb 2025 06:20), Max: 36.8 (14 Feb 2025 07:50)  T(F): 98.2 (15 Feb 2025 06:20), Max: 98.3 (14 Feb 2025 07:50)  HR: 90 (15 Feb 2025 06:20) (70 - 146)  BP: 150/89 (15 Feb 2025 06:20) (106/67 - 176/74)  BP(mean): --  RR: 18 (15 Feb 2025 06:20) (18 - 18)  SpO2: 95% (15 Feb 2025 06:20) (95% - 99%)    Parameters below as of 15 Feb 2025 06:20  Patient On (Oxygen Delivery Method): room air      I&O's Summary    14 Feb 2025 07:01  -  15 Feb 2025 07:00  --------------------------------------------------------  IN: 580 mL / OUT: 950 mL / NET: -370 mL      PHYSICAL EXAM:  GENERAL: NAD, chronically ill appearing, pale   HEAD: Atraumatic, Normocephalic  EYES: EOMI, PERRLA, conjunctiva and sclera clear  ENMT: Moist mucous membranes  NECK: Supple, trachea midline   NERVOUS SYSTEM:  Alert & Oriented X3, Good concentration  CHEST/LUNG: Clear to auscultation bilaterally on anterior exam; No rales, rhonchi, wheezing, or rubs  HEART: Regular rate and rhythm; No murmurs, rubs, or gallops  ABDOMEN: Soft, Nontender, Nondistended; Bowel sounds present  EXTREMITIES:  2+ Peripheral Pulses, nonpitting edema b/l (unchanged from prior)   SKIN: Sacral Ulcer noted      MEDICATIONS  (STANDING):  amLODIPine   Tablet 5 milliGRAM(s) Oral daily  ascorbic acid 500 milliGRAM(s) Oral daily  atorvastatin 20 milliGRAM(s) Oral at bedtime  cefepime   IVPB 2000 milliGRAM(s) IV Intermittent every 8 hours  cefepime   IVPB      dextrose 5%. 1000 milliLiter(s) (50 mL/Hr) IV Continuous <Continuous>  dextrose 5%. 1000 milliLiter(s) (100 mL/Hr) IV Continuous <Continuous>  dextrose 50% Injectable 25 Gram(s) IV Push once  dextrose 50% Injectable 12.5 Gram(s) IV Push once  dextrose 50% Injectable 25 Gram(s) IV Push once  glucagon  Injectable 1 milliGRAM(s) IntraMuscular once  influenza  Vaccine (HIGH DOSE) 0.5 milliLiter(s) IntraMuscular once  insulin lispro (ADMELOG) corrective regimen sliding scale   SubCutaneous three times a day before meals  insulin lispro (ADMELOG) corrective regimen sliding scale   SubCutaneous at bedtime  multivitamin/minerals 1 Tablet(s) Oral daily  oseltamivir 75 milliGRAM(s) Oral two times a day  pantoprazole Infusion 8 mG/Hr (10 mL/Hr) IV Continuous <Continuous>  polyethylene glycol 3350 17 Gram(s) Oral daily  zinc sulfate 220 milliGRAM(s) Oral daily    MEDICATIONS  (PRN):  acetaminophen     Tablet .. 650 milliGRAM(s) Oral every 6 hours PRN Temp greater or equal to 38C (100.4F), Mild Pain (1 - 3)  dextrose Oral Gel 15 Gram(s) Oral once PRN Blood Glucose LESS THAN 70 milliGRAM(s)/deciliter  melatonin 3 milliGRAM(s) Oral at bedtime PRN Insomnia      LABS:                                12.0   17.82 )-----------( 401      ( 13 Feb 2025 17:46 )             37.1       02-13    135  |  106  |  43[H]  ----------------------------<  194[H]  5.0   |  18[L]  |  0.91    Ca    8.8      13 Feb 2025 10:17  Phos  2.5     02-13  Mg     1.9     02-13           Culture Results:   Culture - Blood (collected 11 Feb 2025 09:49)  Source: .Blood BLOOD  Preliminary Report (13 Feb 2025 17:01):    No growth at 48 Hours    Culture - Blood (collected 11 Feb 2025 09:39)  Source: .Blood BLOOD  Preliminary Report (13 Feb 2025 17:01):    No growth at 48 Hours      CAPILLARY BLOOD GLUCOSE    POCT Blood Glucose.: 123 mg/dL (14 Feb 2025 22:38)  POCT Blood Glucose.: 106 mg/dL (13 Feb 2025 21:35)  POCT Blood Glucose.: 133 mg/dL (13 Feb 2025 06:56)  POCT Blood Glucose.: 160 mg/dL (13 Feb 2025 01:40)  POCT Blood Glucose.: 123 mg/dL (12 Feb 2025 17:05)  POCT Blood Glucose.: 148 mg/dL (12 Feb 2025 12:01)    RADIOLOGY & ADDITIONAL TESTS:  < from: Upper Endoscopy (02.12.25 @ 17:55) >                                                                                                        Findings:       LA Grade B (one or more mucosal breaks greater than 5 mm, not extending between the tops of        two mucosal folds) esophagitis with no bleeding was found at the gastroesophageal junction.       The gastroesophageal flap valve was visualized endoscopically and classified as Hill Grade       III (minimal fold, loose to endoscope, hiatal hernia likely).       A small hiatal hernia was present.       Thick mucus in the stomach but otherwise, the entire examined stomach was normal.       Three non-bleeding cratered duodenal ulcers with a flat pigmented spot (Alex Class IIc)        were found in the duodenal bulb. The largest lesion was 30 mm in largest dimension. There was        associated distortion of the pyloric channel.       The exam of the duodenum was otherwise normal.    < end of copied text >

## 2025-02-15 NOTE — PROGRESS NOTE ADULT - ATTENDING COMMENTS
80F PMHx HTN, T2DM, PAD, lymphedema, arthritis, Sacral pressure ulcer with OM refusing IV abx on PO, and bed-bound status presenting with diarrhea and coffee ground emesis. Found to hypotensive and anemic Hgb 6.0.    #Acute blood loss anemia  #UGIB  #Hypotension  - S/p EGD 2/12 demonstrating esophagitis and multiple duodenal ulcers  - Protonix gtt, to continue x 72H per GI, last date today 2/15. Then transition to PO BID for total of 8 weeks  - Advance diet as tolerated  - Trend CBC and transfuse if Hgb less than 7, monitor for recurrence of bleeding  - S/p 5u PRBC total this admission, last transfusion 2/12  - Appreciate GI recs    #Sepsis  #Influenza A  #Sacral and coccyx OM  - CXR neg, CT abdomen/pelvis with possible UTI. Also with Sacral and coccyx OM  - C/w cefepime, transition to Augmentin 2/16  - Tamiflu for influenza until 2/15  - Wound care following and s/p wound vac. F/u  home wound vac.     #Troponin elevation  - No chest pain, suspect demand ischemic in the setting of hypotension and anemia    Dispo: Discharge home once DME set up and off protonix gtt; will discuss with CM 2/16 80F PMHx HTN, T2DM, PAD, lymphedema, arthritis, Sacral pressure ulcer with OM refusing IV abx on PO, and bed-bound status presenting with diarrhea and coffee ground emesis. Found to hypotensive and anemic Hgb 6.0.    #Acute blood loss anemia  #UGIB  #Hypotension  - S/p EGD 2/12 demonstrating esophagitis and multiple duodenal ulcers  - Protonix gtt, to continue x 72H per GI, last date today 2/15. Then transition to PO BID for total of 8 weeks  - Advance diet as tolerated  - Trend CBC and transfuse if Hgb less than 7, monitor for recurrence of bleeding  - S/p 5u PRBC total this admission, last transfusion 2/12  - Appreciate GI recs    #Sepsis  #Influenza A  #Sacral and coccyx OM  - CXR neg, CT abdomen/pelvis with possible UTI. Also with Sacral and coccyx OM  - C/w cefepime, transition to Augmentin 2/16  - Tamiflu for influenza until 2/15  - Wound care following and s/p wound vac. F/u CM home wound vac.     #Troponin elevation  - No chest pain, suspect demand ischemic in the setting of hypotension and anemia    Dispo: Discharge home once DME set up and off protonix gtt; will discuss with CM 2/16    Case discussed with HS7.

## 2025-02-15 NOTE — PROGRESS NOTE ADULT - PROBLEM SELECTOR PLAN 2
Hgb 6 on adm, now s/p 5u pRBC since admission.   2/2 to UGI from ulcers.   baseline Hgb ~9.     - transfuse for Hgb > 8  - maintain active T&S

## 2025-02-15 NOTE — PROGRESS NOTE ADULT - ASSESSMENT
This is an 81 y/o F with PMHx, HTN, lymphedema, T2DM, sacral decubitus ulcer s/p debridement (2024) and recent adm for sacral OM who presented to the ED for coffee-ground emesis and melena x 1 day, admitted for acute blood loss anemia 2/2 to UGIB. Also found to be flu positive.

## 2025-02-15 NOTE — PROGRESS NOTE ADULT - PROBLEM SELECTOR PLAN 12
DVT ppx: SCDs, hold in setting of GIB   Diet: Pureed, will advance as tolerated  Dispo: pending clinical course, PT eval DVT ppx: SCDs, hold in setting of GIB   Diet: Soft and Bite sized, will advance as tolerated  Dispo: pending clinical course, PT eval

## 2025-02-15 NOTE — PROGRESS NOTE ADULT - PROBLEM SELECTOR PLAN 3
Patient with no hx of afib, went into afib w/RVR. HR increased to 160s, suspicion patient might have had a bleed, although patient denies bloody stool and vomiting. Was given 1L LR and tylenol and responded well.    Plan:  - Continue to monitor for events

## 2025-02-16 DIAGNOSIS — K59.00 CONSTIPATION, UNSPECIFIED: ICD-10-CM

## 2025-02-16 LAB
CULTURE RESULTS: SIGNIFICANT CHANGE UP
CULTURE RESULTS: SIGNIFICANT CHANGE UP
GLUCOSE BLDC GLUCOMTR-MCNC: 103 MG/DL — HIGH (ref 70–99)
GLUCOSE BLDC GLUCOMTR-MCNC: 134 MG/DL — HIGH (ref 70–99)
GLUCOSE BLDC GLUCOMTR-MCNC: 140 MG/DL — HIGH (ref 70–99)
GLUCOSE BLDC GLUCOMTR-MCNC: 97 MG/DL — SIGNIFICANT CHANGE UP (ref 70–99)
SPECIMEN SOURCE: SIGNIFICANT CHANGE UP
SPECIMEN SOURCE: SIGNIFICANT CHANGE UP

## 2025-02-16 PROCEDURE — 99232 SBSQ HOSP IP/OBS MODERATE 35: CPT | Mod: GC

## 2025-02-16 RX ORDER — MAGNESIUM CITRATE
296 SOLUTION, ORAL ORAL ONCE
Refills: 0 | Status: DISCONTINUED | OUTPATIENT
Start: 2025-02-16 | End: 2025-02-16

## 2025-02-16 RX ORDER — METOPROLOL SUCCINATE 50 MG/1
12.5 TABLET, EXTENDED RELEASE ORAL EVERY 12 HOURS
Refills: 0 | Status: DISCONTINUED | OUTPATIENT
Start: 2025-02-16 | End: 2025-02-16

## 2025-02-16 RX ORDER — METOPROLOL SUCCINATE 50 MG/1
50 TABLET, EXTENDED RELEASE ORAL ONCE
Refills: 0 | Status: DISCONTINUED | OUTPATIENT
Start: 2025-02-16 | End: 2025-02-16

## 2025-02-16 RX ORDER — METOPROLOL SUCCINATE 50 MG/1
5 TABLET, EXTENDED RELEASE ORAL ONCE
Refills: 0 | Status: COMPLETED | OUTPATIENT
Start: 2025-02-16 | End: 2025-02-16

## 2025-02-16 RX ORDER — METOPROLOL SUCCINATE 50 MG/1
5 TABLET, EXTENDED RELEASE ORAL ONCE
Refills: 0 | Status: DISCONTINUED | OUTPATIENT
Start: 2025-02-16 | End: 2025-02-16

## 2025-02-16 RX ORDER — ACETAMINOPHEN 500 MG/5ML
1000 LIQUID (ML) ORAL ONCE
Refills: 0 | Status: COMPLETED | OUTPATIENT
Start: 2025-02-16 | End: 2025-02-16

## 2025-02-16 RX ORDER — METOPROLOL SUCCINATE 50 MG/1
25 TABLET, EXTENDED RELEASE ORAL DAILY
Refills: 0 | Status: DISCONTINUED | OUTPATIENT
Start: 2025-02-16 | End: 2025-02-16

## 2025-02-16 RX ORDER — METOPROLOL SUCCINATE 50 MG/1
50 TABLET, EXTENDED RELEASE ORAL EVERY 12 HOURS
Refills: 0 | Status: DISCONTINUED | OUTPATIENT
Start: 2025-02-16 | End: 2025-02-16

## 2025-02-16 RX ORDER — METOPROLOL SUCCINATE 50 MG/1
100 TABLET, EXTENDED RELEASE ORAL
Refills: 0 | Status: DISCONTINUED | OUTPATIENT
Start: 2025-02-16 | End: 2025-02-24

## 2025-02-16 RX ORDER — METOPROLOL SUCCINATE 50 MG/1
25 TABLET, EXTENDED RELEASE ORAL EVERY 12 HOURS
Refills: 0 | Status: DISCONTINUED | OUTPATIENT
Start: 2025-02-16 | End: 2025-02-16

## 2025-02-16 RX ORDER — METOPROLOL SUCCINATE 50 MG/1
50 TABLET, EXTENDED RELEASE ORAL ONCE
Refills: 0 | Status: COMPLETED | OUTPATIENT
Start: 2025-02-16 | End: 2025-02-16

## 2025-02-16 RX ORDER — AMOXICILLIN AND CLAVULANATE POTASSIUM 500; 125 MG/1; MG/1
1 TABLET, FILM COATED ORAL
Refills: 0 | Status: COMPLETED | OUTPATIENT
Start: 2025-02-16 | End: 2025-02-19

## 2025-02-16 RX ADMIN — CEFEPIME 100 MILLIGRAM(S): 2 INJECTION, POWDER, FOR SOLUTION INTRAVENOUS at 13:53

## 2025-02-16 RX ADMIN — METOPROLOL SUCCINATE 5 MILLIGRAM(S): 50 TABLET, EXTENDED RELEASE ORAL at 13:29

## 2025-02-16 RX ADMIN — INSULIN LISPRO 0: 100 INJECTION, SOLUTION INTRAVENOUS; SUBCUTANEOUS at 12:49

## 2025-02-16 RX ADMIN — Medication 1000 MILLIGRAM(S): at 13:53

## 2025-02-16 RX ADMIN — ATORVASTATIN CALCIUM 20 MILLIGRAM(S): 80 TABLET, FILM COATED ORAL at 22:34

## 2025-02-16 RX ADMIN — Medication 1 TABLET(S): at 12:39

## 2025-02-16 RX ADMIN — Medication 220 MILLIGRAM(S): at 13:55

## 2025-02-16 RX ADMIN — Medication 650 MILLIGRAM(S): at 12:39

## 2025-02-16 RX ADMIN — Medication 40 MILLIGRAM(S): at 18:00

## 2025-02-16 RX ADMIN — METOPROLOL SUCCINATE 50 MILLIGRAM(S): 50 TABLET, EXTENDED RELEASE ORAL at 13:51

## 2025-02-16 RX ADMIN — CEFEPIME 100 MILLIGRAM(S): 2 INJECTION, POWDER, FOR SOLUTION INTRAVENOUS at 06:17

## 2025-02-16 RX ADMIN — LACTULOSE 10 GRAM(S): 10 SOLUTION ORAL at 06:16

## 2025-02-16 RX ADMIN — POLYETHYLENE GLYCOL 3350 17 GRAM(S): 17 POWDER, FOR SOLUTION ORAL at 12:52

## 2025-02-16 RX ADMIN — METOPROLOL SUCCINATE 100 MILLIGRAM(S): 50 TABLET, EXTENDED RELEASE ORAL at 18:00

## 2025-02-16 RX ADMIN — Medication 650 MILLIGRAM(S): at 13:54

## 2025-02-16 RX ADMIN — METOPROLOL SUCCINATE 5 MILLIGRAM(S): 50 TABLET, EXTENDED RELEASE ORAL at 04:58

## 2025-02-16 RX ADMIN — Medication 400 MILLIGRAM(S): at 13:29

## 2025-02-16 RX ADMIN — AMLODIPINE BESYLATE 5 MILLIGRAM(S): 10 TABLET ORAL at 06:16

## 2025-02-16 RX ADMIN — METOPROLOL SUCCINATE 12.5 MILLIGRAM(S): 50 TABLET, EXTENDED RELEASE ORAL at 12:40

## 2025-02-16 RX ADMIN — AMOXICILLIN AND CLAVULANATE POTASSIUM 1 TABLET(S): 500; 125 TABLET, FILM COATED ORAL at 18:01

## 2025-02-16 RX ADMIN — Medication 500 MILLIGRAM(S): at 12:40

## 2025-02-16 RX ADMIN — Medication 40 MILLIGRAM(S): at 06:16

## 2025-02-16 NOTE — PROGRESS NOTE ADULT - PROBLEM SELECTOR PLAN 11
Home meds: lipitor 20mg qd     - continue home statin Home meds: bisoprolol 10mg qd     - hold bisoprolol for now  - start amlodipine 5mg qd

## 2025-02-16 NOTE — PROGRESS NOTE ADULT - PROBLEM SELECTOR PLAN 10
Home meds: bisoprolol 10mg qd     - hold bisoprolol for now  - start amlodipine 5mg qd A1c 7.3% in 1/2025.     - ALBA premeal and bedtime   - hypoglycemia protocol

## 2025-02-16 NOTE — PROGRESS NOTE ADULT - PROBLEM SELECTOR PLAN 6
Previous adm (1/8-16) for sacral OM, rec for PICC line on dc for IV abx however pt refused and discharged on augmentin w/ plan to complete 6 week course (until 2/19).   Gen surg performed bedside superficial wound debridement; wound looks clean.     - f/u wound care recs -> wound vac placed   - ID consult given persistent leukocytosis since admission, concern for PO Augmentin failure RVP + influenza.     - tamiflu (2/11-2/15 )  x 5 days   - droplet isolation

## 2025-02-16 NOTE — PROGRESS NOTE ADULT - PROBLEM SELECTOR PLAN 3
Patient with no hx of afib, went into afib w/RVR. HR increased to 160s, suspicion patient might have had a bleed, although patient denies bloody stool and vomiting. Was given 5mg Lopressor    Plan:  - Continue to monitor for events  - Start Metoprolol succinate 25mg Patient with no hx of afib, went into afib w/RVR. HR increased to 160s, suspicion patient might have had a bleed, although patient denies bloody stool and vomiting. Was given 5mg Lopressor    Plan:  - Continue to monitor for events  - Start Metoprolol Tartrate 12.5mg BID Patient with no hx of afib, went into afib w/RVR. HR increased to 160s, suspicion patient might have had a bleed, although patient denies bloody stool and vomiting. Was given 5mg Lopressor    Plan:  - Continue to monitor for events  - Start Metoprolol Tartrate 12.5mg BID  - [id number] score: 5  - HAS-BLED score: 3  - Holding off on AC due to UGIB Patient with no hx of afib, went into afib w/RVR. HR increased to 160s, suspicion patient might have had a bleed, although patient denies bloody stool and vomiting. Was given 5mg Lopressor    Plan:  - Continue to monitor for events  - Start Metoprolol Tartrate 100mg BID which is equivalent to her home dose BB  - [id number] score: 5  - HAS-BLED score: 3  - Holding off on AC due to UGIB

## 2025-02-16 NOTE — PROGRESS NOTE ADULT - PROBLEM SELECTOR PLAN 7
K 6.5 -> 5.5, s/p shift x 2 w/ insulin 5 units.   Improved.     - daily BMP Previous adm (1/8-16) for sacral OM, rec for PICC line on dc for IV abx however pt refused and discharged on augmentin w/ plan to complete 6 week course (until 2/19).   Gen surg performed bedside superficial wound debridement; wound looks clean.     - f/u wound care recs -> wound vac placed   - ID consult given persistent leukocytosis since admission, concern for PO Augmentin failure

## 2025-02-16 NOTE — PROGRESS NOTE ADULT - ATTENDING COMMENTS
80F PMHx HTN, T2DM, PAD, lymphedema, arthritis, Sacral pressure ulcer with OM refusing IV abx on PO, and bed-bound status presenting with diarrhea and coffee ground emesis. Found to hypotensive and anemic Hgb 6.0.    #Acute blood loss anemia  #UGIB  #Hypotension  - S/p EGD 2/12 demonstrating esophagitis and multiple duodenal ulcers  - s/p Protonix gtt x 72 hrs ; transitioned to PO BID for total of 8 weeks  - Advance diet as tolerated  - Trend CBC and transfuse if Hgb less than 7, monitor for recurrence of bleeding  - S/p 5u PRBC total this admission, last transfusion 2/12  - Appreciate GI recs    #Sepsis  #Influenza A  #Sacral and coccyx OM  - CXR neg, CT abdomen/pelvis with possible UTI. Also with Sacral and coccyx OM  - C/w cefepime, transition to Augmentin 2/16  - Tamiflu for influenza until 2/15  - Wound care following and s/p wound vac. F/u CM home wound vac.     #Troponin elevation  - No chest pain, suspect demand ischemic in the setting of hypotension and anemia    #New onset Afib w/RVR  transiently improved s/p IV fluids  likely 2/2 constipation ( pt has had no bm x 7 days)  on aggressive bowel regimen ; finally had large bm this afternoon per RN  telemetry reviewed ; now sinus tach  restart home med Bisoprolol  hold off AC given GI bleed    Dispo: Discharge home once DME set up

## 2025-02-16 NOTE — CHART NOTE - NSCHARTNOTEFT_GEN_A_CORE
Resident called to bedside stating pt was in Afib w/ RVR w/ HR sustaining in 130s-150s for at least 20 min. Lopressor 5mg IV was ordered; however, pt began refusing the med after experiencing some pain as Lopressor was about to be pushed into the IV site. Pt refused Lopressor again when nurse offered to use a different IV site.    Spoke to pt at bedside about risks of having uncontrolled HR while in Afib. Pt expressed understanding of the risks but continued to refuse the medication. When asked if there was a reason she did not want the Lopressor, pt refused to clarify, stating that she just did not want the medication and wanted to be left alone. Pt mentating well, denied any symptoms. BP stable at 135/70, satting 97% on RA.    Pt continued to be monitored via AirStrip. HR lowered to 100s-110s at 4:15AM. Resident called to bedside stating pt was in Afib w/ RVR w/ HR sustaining in 130s-150s for at least 20 min around 3:20AM. Lopressor 5mg IV was ordered; however, pt began refusing the med after experiencing some pain as Lopressor was about to be pushed into the IV site. Pt refused Lopressor again when nurse offered to use a different IV site.    Spoke to pt at bedside about risks of having uncontrolled HR while in Afib. Pt expressed understanding of the risks but continued to refuse the medication. When asked if there was a reason she did not want the Lopressor, pt refused to clarify, stating that she just did not want the medication and wanted to be left alone. Pt mentating well, denied any symptoms. BP stable at 135/70, satting 97% on RA.    Pt continued to be monitored via AirStrip. HR lowered to 100s-110s at 4:15AM. Resident called to bedside stating pt was in Afib w/ RVR w/ HR sustaining in 130s-150s for at least 20 min around 3:20AM. Lopressor 5mg IV was ordered; however, pt began refusing the med after experiencing some pain as Lopressor was about to be pushed into the IV site. Pt refused Lopressor again when nurse offered to use a different IV site.    Spoke to pt at bedside about risks of having uncontrolled HR while in Afib. Pt expressed understanding of the risks but continued to refuse the medication. When asked if there was a reason she did not want the Lopressor, pt refused to clarify, stating that she just did not want the medication and wanted to be left alone. Pt denied any symptoms. BP stable at 135/70, satting 97% on RA.    Pt continued to be monitored via AirStrip. HR lowered to 100s-110s at 4:15AM. However, HR began sustaining in 160s around 4:30AM. Spoke to pt at bedside again about risks of not receiving Lopressor to control her HR- pt refused again, including taking med orally instead of IV. Pt was AAOx0-1 - responded to name but refused/unable to state her name, location, year, or birthday. Pt denied any pain, palpitations, chest pain, or dizziness. Pt stated again that she understood the risks of not receiving the medication but refused/was unable to repeat back what the risks were. When asked if there was a specific reason as to why she did not want med, pt again was unable to clarify why. Given that pt was unable to demonstrate capacity and HR was still sustaining in 150s-160s, pt was given IV Lopressor 5mg while her R arm was temporarily restrained in order to ensure safe delivery of the medication. After med was given, pt continued to deny any pain or any other symptoms. Resident called to bedside stating pt was in Afib w/ RVR w/ HR sustaining in 130s-150s for at least 20 min around 3:20AM. Lopressor 5mg IV was ordered; however, pt began refusing the med after experiencing some pain as Lopressor was about to be pushed into the IV site. Pt refused Lopressor again when nurse offered to use a different IV site.    Spoke to pt at bedside about risks of having uncontrolled HR while in Afib. Pt expressed understanding of the risks but continued to refuse the medication. When asked if there was a reason she did not want the Lopressor, pt refused to clarify, stating that she just did not want the medication and wanted to be left alone. Pt denied any symptoms. BP stable at 135/70, satting 97% on RA.    Pt continued to be monitored via AirStrip. HR lowered to 100s-110s at 4:15AM. However, HR began sustaining in 160s around 4:30AM. Spoke to pt at bedside again about risks of not receiving Lopressor to control her HR- pt refused again, including taking med orally instead of IV. Pt was AAOx0-1 - responded to name but refused/unable to state her name, location, year, or birthday. Pt denied any pain, palpitations, chest pain, or dizziness. Pt stated again that she understood the risks of not receiving the medication but refused/was unable to repeat back what the risks were. When asked if there was a specific reason as to why she did not want med, pt again was unable to clarify why. Given that pt was unable to demonstrate capacity and HR was still sustaining in 150s-160s, pt was given IV Lopressor 5mg while her R arm was temporarily restrained in order to ensure safe delivery of the medication. After med was given, pt continued to deny any pain or any other symptoms. HR improved to 70s.

## 2025-02-16 NOTE — PROGRESS NOTE ADULT - PROBLEM SELECTOR PLAN 12
DVT ppx: SCDs, hold in setting of GIB   Diet: Soft and Bite sized, will advance as tolerated  Dispo: pending clinical course, PT eval Home meds: lipitor 20mg qd     - continue home statin

## 2025-02-16 NOTE — PROGRESS NOTE ADULT - PROBLEM SELECTOR PLAN 5
RVP + influenza.     - tamiflu (2/11-2/15 )  x 5 days   - droplet isolation Patient has not had a bowel movement in over 7 days. Could be causing her Afib    Plan:  - Miralax daily  - Started Lactulose 10g BID  - Started Magnesium Citrate   - Consider enema if above fails

## 2025-02-16 NOTE — PROGRESS NOTE ADULT - PROBLEM SELECTOR PLAN 9
A1c 7.3% in 1/2025.     - ALBA premeal and bedtime   - hypoglycemia protocol Trop 64 -> 55. Likely in setting of demand. Patient currently asymptomatic and w/o EKG changes.     - monitor for chest pain

## 2025-02-16 NOTE — PROGRESS NOTE ADULT - SUBJECTIVE AND OBJECTIVE BOX
Patient is a 80y old  Female who presents with a chief complaint of UGIB.     Subjective: Overnight, the patient went into afib w/RVR, HR increased to the 160s, was initially refusing lopressor, however, eventually the patient received the medication and HR decreased to 80s. Denied any dizziness, lightheadedness, chest pain, sob, ab pain, n/v. No episodes of melena.       Objective:  Vital Signs Last 24 Hrs  T(C): 37.1 (16 Feb 2025 03:30), Max: 37.1 (16 Feb 2025 03:30)  T(F): 98.8 (16 Feb 2025 03:30), Max: 98.8 (16 Feb 2025 03:30)  HR: 136 (16 Feb 2025 03:30) (88 - 142)  BP: 135/70 (16 Feb 2025 03:30) (98/67 - 167/71)  BP(mean): --  RR: 18 (16 Feb 2025 03:30) (18 - 18)  SpO2: 95% (16 Feb 2025 03:30) (95% - 95%)    Parameters below as of 16 Feb 2025 03:30  Patient On (Oxygen Delivery Method): room air      I&O's Summary    14 Feb 2025 07:01  -  15 Feb 2025 07:00  --------------------------------------------------------  IN: 580 mL / OUT: 950 mL / NET: -370 mL    15 Feb 2025 07:01  -  16 Feb 2025 06:37  --------------------------------------------------------  IN: 360 mL / OUT: 400 mL / NET: -40 mL        PHYSICAL EXAM:  GENERAL: NAD, chronically ill appearing, pale   HEAD: Atraumatic, Normocephalic  EYES: EOMI, PERRLA, conjunctiva and sclera clear  ENMT: Moist mucous membranes  NECK: Supple, trachea midline   NERVOUS SYSTEM:  Alert & Oriented X3, Good concentration  CHEST/LUNG: Clear to auscultation bilaterally on anterior exam; No rales, rhonchi, wheezing, or rubs  HEART: Regular rate and rhythm; No murmurs, rubs, or gallops  ABDOMEN: Soft, Nontender, Nondistended; Bowel sounds present  EXTREMITIES:  2+ Peripheral Pulses, nonpitting edema b/l (unchanged from prior)   SKIN: Sacral Ulcer noted      MEDICATIONS  (STANDING):  amLODIPine   Tablet 5 milliGRAM(s) Oral daily  ascorbic acid 500 milliGRAM(s) Oral daily  atorvastatin 20 milliGRAM(s) Oral at bedtime  cefepime   IVPB 2000 milliGRAM(s) IV Intermittent every 8 hours  cefepime   IVPB      dextrose 5%. 1000 milliLiter(s) (50 mL/Hr) IV Continuous <Continuous>  dextrose 5%. 1000 milliLiter(s) (100 mL/Hr) IV Continuous <Continuous>  dextrose 50% Injectable 25 Gram(s) IV Push once  dextrose 50% Injectable 12.5 Gram(s) IV Push once  dextrose 50% Injectable 25 Gram(s) IV Push once  glucagon  Injectable 1 milliGRAM(s) IntraMuscular once  influenza  Vaccine (HIGH DOSE) 0.5 milliLiter(s) IntraMuscular once  insulin lispro (ADMELOG) corrective regimen sliding scale   SubCutaneous three times a day before meals  insulin lispro (ADMELOG) corrective regimen sliding scale   SubCutaneous at bedtime  lactulose Syrup 10 Gram(s) Oral two times a day  metoprolol succinate ER 25 milliGRAM(s) Oral daily  multivitamin/minerals 1 Tablet(s) Oral daily  pantoprazole    Tablet 40 milliGRAM(s) Oral two times a day  polyethylene glycol 3350 17 Gram(s) Oral daily  zinc sulfate 220 milliGRAM(s) Oral daily    MEDICATIONS  (PRN):  acetaminophen     Tablet .. 650 milliGRAM(s) Oral every 6 hours PRN Temp greater or equal to 38C (100.4F), Mild Pain (1 - 3)  dextrose Oral Gel 15 Gram(s) Oral once PRN Blood Glucose LESS THAN 70 milliGRAM(s)/deciliter  melatonin 3 milliGRAM(s) Oral at bedtime PRN Insomnia        LABS:                                11.3   15.49 )-----------( 381      ( 15 Feb 2025 09:55 )             35.9       02-15    138  |  107  |  27[H]  ----------------------------<  113[H]  4.6   |  21[L]  |  0.93    Ca    8.6      15 Feb 2025 09:55  Phos  1.9     02-15  Mg     1.6     02-15                         Culture Results:   Culture - Blood (collected 11 Feb 2025 09:49)  Source: .Blood BLOOD  Preliminary Report (13 Feb 2025 17:01):    No growth at 48 Hours    Culture - Blood (collected 11 Feb 2025 09:39)  Source: .Blood BLOOD  Preliminary Report (13 Feb 2025 17:01):    No growth at 48 Hours      CAPILLARY BLOOD GLUCOSE    POCT Blood Glucose.: 123 mg/dL (14 Feb 2025 22:38)  POCT Blood Glucose.: 106 mg/dL (13 Feb 2025 21:35)  POCT Blood Glucose.: 133 mg/dL (13 Feb 2025 06:56)  POCT Blood Glucose.: 160 mg/dL (13 Feb 2025 01:40)  POCT Blood Glucose.: 123 mg/dL (12 Feb 2025 17:05)  POCT Blood Glucose.: 148 mg/dL (12 Feb 2025 12:01)    RADIOLOGY & ADDITIONAL TESTS:  < from: Upper Endoscopy (02.12.25 @ 17:55) >                                                                                                        Findings:       LA Grade B (one or more mucosal breaks greater than 5 mm, not extending between the tops of        two mucosal folds) esophagitis with no bleeding was found at the gastroesophageal junction.       The gastroesophageal flap valve was visualized endoscopically and classified as Hill Grade       III (minimal fold, loose to endoscope, hiatal hernia likely).       A small hiatal hernia was present.       Thick mucus in the stomach but otherwise, the entire examined stomach was normal.       Three non-bleeding cratered duodenal ulcers with a flat pigmented spot (Alex Class IIc)        were found in the duodenal bulb. The largest lesion was 30 mm in largest dimension. There was        associated distortion of the pyloric channel.       The exam of the duodenum was otherwise normal.    < end of copied text >

## 2025-02-16 NOTE — PROGRESS NOTE ADULT - PROBLEM SELECTOR PLAN 4
WBC 22.8-> 31.86-> 15.5. Improving   Likely multifactorial 2/2 inflammatory response in setting of GIB, chronic sacral OM, influenza A.   CXR w/ clear lungs, blood cx x2 neg, U/A, urine cx negative.     - ID consult   - abx: cefepime; planning to switch back to Augmentin (2/16)   - tamiflu for flu A   - GI PCR if continued diarrhea   - trend fever curve, WBC Likely multifactorial 2/2 inflammatory response in setting of GIB, chronic sacral OM, influenza A.   CXR w/ clear lungs, blood cx x2 neg, U/A, urine cx negative.     - ID consult   - abx: cefepime; planning to switch back to Augmentin (2/16)   - tamiflu for flu A   - GI PCR if continued diarrhea   - trend fever curve, WBC

## 2025-02-16 NOTE — PROGRESS NOTE ADULT - TIME BILLING
reviewing tests and imaging, independently obtaining a history, performing a physical examination, discussing the plan with the patient, ordering medications/tests, documenting clinical information, and coordinating care. This excludes any time spent on teaching.

## 2025-02-16 NOTE — PROGRESS NOTE ADULT - PROBLEM SELECTOR PLAN 8
Trop 64 -> 55. Likely in setting of demand. Patient currently asymptomatic and w/o EKG changes.     - monitor for chest pain K 6.5 -> 5.5, s/p shift x 2 w/ insulin 5 units.   Improved.     - daily BMP

## 2025-02-17 LAB
ANION GAP SERPL CALC-SCNC: 11 MMOL/L — SIGNIFICANT CHANGE UP (ref 5–17)
BUN SERPL-MCNC: 24 MG/DL — HIGH (ref 7–23)
CALCIUM SERPL-MCNC: 8.6 MG/DL — SIGNIFICANT CHANGE UP (ref 8.4–10.5)
CHLORIDE SERPL-SCNC: 107 MMOL/L — SIGNIFICANT CHANGE UP (ref 96–108)
CO2 SERPL-SCNC: 19 MMOL/L — LOW (ref 22–31)
CREAT SERPL-MCNC: 0.89 MG/DL — SIGNIFICANT CHANGE UP (ref 0.5–1.3)
EGFR: 65 ML/MIN/1.73M2 — SIGNIFICANT CHANGE UP
GLUCOSE BLDC GLUCOMTR-MCNC: 108 MG/DL — HIGH (ref 70–99)
GLUCOSE BLDC GLUCOMTR-MCNC: 120 MG/DL — HIGH (ref 70–99)
GLUCOSE BLDC GLUCOMTR-MCNC: 177 MG/DL — HIGH (ref 70–99)
GLUCOSE SERPL-MCNC: 98 MG/DL — SIGNIFICANT CHANGE UP (ref 70–99)
HCT VFR BLD CALC: 35.9 % — SIGNIFICANT CHANGE UP (ref 34.5–45)
HGB BLD-MCNC: 11.3 G/DL — LOW (ref 11.5–15.5)
MAGNESIUM SERPL-MCNC: 1.9 MG/DL — SIGNIFICANT CHANGE UP (ref 1.6–2.6)
MCHC RBC-ENTMCNC: 29 PG — SIGNIFICANT CHANGE UP (ref 27–34)
MCHC RBC-ENTMCNC: 31.5 G/DL — LOW (ref 32–36)
MCV RBC AUTO: 92.1 FL — SIGNIFICANT CHANGE UP (ref 80–100)
NRBC BLD AUTO-RTO: 0 /100 WBCS — SIGNIFICANT CHANGE UP (ref 0–0)
PHOSPHATE SERPL-MCNC: 2 MG/DL — LOW (ref 2.5–4.5)
PLATELET # BLD AUTO: 395 K/UL — SIGNIFICANT CHANGE UP (ref 150–400)
POTASSIUM SERPL-MCNC: 4.2 MMOL/L — SIGNIFICANT CHANGE UP (ref 3.5–5.3)
POTASSIUM SERPL-SCNC: 4.2 MMOL/L — SIGNIFICANT CHANGE UP (ref 3.5–5.3)
RBC # BLD: 3.9 M/UL — SIGNIFICANT CHANGE UP (ref 3.8–5.2)
RBC # FLD: 17.9 % — HIGH (ref 10.3–14.5)
SODIUM SERPL-SCNC: 137 MMOL/L — SIGNIFICANT CHANGE UP (ref 135–145)
WBC # BLD: 15.65 K/UL — HIGH (ref 3.8–10.5)
WBC # FLD AUTO: 15.65 K/UL — HIGH (ref 3.8–10.5)

## 2025-02-17 PROCEDURE — 70450 CT HEAD/BRAIN W/O DYE: CPT | Mod: 26

## 2025-02-17 PROCEDURE — 99233 SBSQ HOSP IP/OBS HIGH 50: CPT

## 2025-02-17 RX ORDER — SODIUM PHOSPHATE,DIBASIC DIHYD
15 POWDER (GRAM) MISCELLANEOUS ONCE
Refills: 0 | Status: COMPLETED | OUTPATIENT
Start: 2025-02-17 | End: 2025-02-17

## 2025-02-17 RX ADMIN — POLYETHYLENE GLYCOL 3350 17 GRAM(S): 17 POWDER, FOR SOLUTION ORAL at 15:20

## 2025-02-17 RX ADMIN — AMOXICILLIN AND CLAVULANATE POTASSIUM 1 TABLET(S): 500; 125 TABLET, FILM COATED ORAL at 19:05

## 2025-02-17 RX ADMIN — Medication 220 MILLIGRAM(S): at 13:01

## 2025-02-17 RX ADMIN — Medication 63.75 MILLIMOLE(S): at 09:19

## 2025-02-17 RX ADMIN — METOPROLOL SUCCINATE 100 MILLIGRAM(S): 50 TABLET, EXTENDED RELEASE ORAL at 19:06

## 2025-02-17 RX ADMIN — AMLODIPINE BESYLATE 5 MILLIGRAM(S): 10 TABLET ORAL at 05:47

## 2025-02-17 RX ADMIN — INSULIN LISPRO 1: 100 INJECTION, SOLUTION INTRAVENOUS; SUBCUTANEOUS at 13:01

## 2025-02-17 RX ADMIN — AMOXICILLIN AND CLAVULANATE POTASSIUM 1 TABLET(S): 500; 125 TABLET, FILM COATED ORAL at 05:47

## 2025-02-17 RX ADMIN — Medication 40 MILLIGRAM(S): at 05:47

## 2025-02-17 RX ADMIN — ATORVASTATIN CALCIUM 20 MILLIGRAM(S): 80 TABLET, FILM COATED ORAL at 22:01

## 2025-02-17 RX ADMIN — Medication 500 MILLIGRAM(S): at 12:58

## 2025-02-17 RX ADMIN — Medication 1 TABLET(S): at 12:57

## 2025-02-17 RX ADMIN — Medication 40 MILLIGRAM(S): at 19:06

## 2025-02-17 RX ADMIN — METOPROLOL SUCCINATE 100 MILLIGRAM(S): 50 TABLET, EXTENDED RELEASE ORAL at 05:47

## 2025-02-17 NOTE — PROGRESS NOTE ADULT - PROBLEM SELECTOR PLAN 2
Hgb 6 on adm, now s/p 5u pRBC since admission.   2/2 to UGI from ulcers.   baseline Hgb ~9.     PLAN:  - transfuse for Hgb > 8  - maintain active T&S

## 2025-02-17 NOTE — PROGRESS NOTE ADULT - PROBLEM SELECTOR PLAN 1
Multiple episodes of melena and coffee-ground emesis x 1 day, associated w/ hypotension and acute drop in Hgb. Underwent EGD w/ three nonbleeding duodenal ulcers. CT neg active GI bleed.   - s/p Protonix gtt (ending 2/15)    PLAN:   - CBC BID  - Protonix 40 mg BID IV  - Advance diet as tolerated  - f/u GI recs -> H pylori serology tests  (pending collection on 2/13)  - maintain active T&S, transfuse for Hgb >8

## 2025-02-17 NOTE — PROGRESS NOTE ADULT - PROBLEM SELECTOR PLAN 4
Likely multifactorial 2/2 inflammatory response in setting of GIB, chronic sacral OM, influenza A.   CXR w/ clear lungs, blood cx x2 neg, U/A, urine cx negative.   - s/p cefepime 2/11-2/16   - s/p tamiflu 2/11-2/15 for Flu A    PLAN:   - cw Augmentin (2/16- )   **f/u with ID as she was treated for OM of the sacrum last admission, was supposed to end abx course on 2/19, no recent cultures positive and only had Flu throughout this course   - GI PCR if continued diarrhea   - trend fever curve, WBC

## 2025-02-17 NOTE — PROGRESS NOTE ADULT - ASSESSMENT
This is an 81 y/o F with PMHx, HTN, lymphedema, T2DM, sacral decubitus ulcer s/p debridement (2024) and recent adm for sacral OM who presented to the ED for coffee-ground emesis and melena x 1 day, admitted for acute blood loss anemia 2/2 to UGIB. Also found to be flu positive.  81 y/o F with PMHx, HTN, lymphedema, T2DM, sacral decubitus ulcer s/p debridement (2024) and recent adm for sacral OM who presented to the ED for coffee-ground emesis and melena x 1 day, admitted for acute blood loss anemia 2/2 to UGIB. Course c/b flu positive, Afib rvr, and possible delirium with concern for stroke. Currently pending CT head non contrast.

## 2025-02-17 NOTE — PROGRESS NOTE ADULT - ATTENDING COMMENTS
80F PMHx HTN, T2DM, PAD, lymphedema, arthritis, Sacral pressure ulcer with OM refusing IV abx on PO, and bed-bound status presenting with diarrhea and coffee ground emesis. Found to hypotensive and anemic Hgb 6.0.    #Acute blood loss anemia  #UGIB  #Hypotension  - S/p EGD 2/12 demonstrating esophagitis and multiple duodenal ulcers  - s/p Protonix gtt x 72 hrs ; transitioned to PO BID for total of 8 weeks  - Advance diet as tolerated  - Trend CBC and transfuse if Hgb less than 7, monitor for recurrence of bleeding  - S/p 5u PRBC total this admission, last transfusion 2/12  - Appreciate GI recs    #Sepsis  #Influenza A  #Sacral and coccyx OM  - CXR neg, CT abdomen/pelvis with possible UTI. Also with Sacral and coccyx OM  -  transition to Augmentin 2/16 concern for cefepime tox   - CT head (ordered) for AMS  - Tamiflu for influenza until 2/15  - Wound care following and s/p wound vac. F/u CM home wound vac.     #Troponin elevation  - No chest pain, suspect demand ischemic in the setting of hypotension and anemia    #New onset Afib w/RVR  transiently improved s/p IV fluids  likely 2/2 constipation ( pt has had no bm x 7 days)  on aggressive bowel regimen ; finally had large bm this afternoon per RN  telemetry reviewed ; now sinus tach  restart home med Bisoprolol  hold off AC given GI bleed    Dispo: Discharge home w. DME on hold due to change AMS

## 2025-02-17 NOTE — PROGRESS NOTE ADULT - PROBLEM SELECTOR PLAN 3
Patient with no hx of afib, went into afib w/RVR. HR increased to 160s, suspicion patient might have had a bleed, although patient denies bloody stool and vomiting. Was given 5mg Lopressor    Plan:  - Continue to monitor for events  - Start Metoprolol Tartrate 100mg BID which is equivalent to her home dose BB  - [id number] score: 5  - HAS-BLED score: 3  - Holding off on AC due to UGIB Patient with no hx of afib, went into afib w/RVR. HR increased to 160s, suspicion patient might have had a bleed, although patient denies bloody stool and vomiting. Was given 5mg Lopressor  - also noted by various providers and daughter that the patient seems mildly delirious     Plan:  - CT head 2/17 ordered to rule out stroke   - Continue to monitor for events  - Start Metoprolol Tartrate 100mg BID which is equivalent to her home dose BB  - [id number] score: 5  - HAS-BLED score: 3  - Holding off on AC due to UGIB

## 2025-02-17 NOTE — PROGRESS NOTE ADULT - PROBLEM SELECTOR PLAN 5
Patient has not had a bowel movement in over 7 days. Could be causing her Afib    Plan:  - Miralax daily  - Started Lactulose 10g BID  - Started Magnesium Citrate   - Consider enema if above fails

## 2025-02-17 NOTE — PROGRESS NOTE ADULT - SUBJECTIVE AND OBJECTIVE BOX
******************  Authored By: Kentrell Delgado MD, PGY1  MS Teams Preferred  ******************  INTERVAL HPI/OVERNIGHT EVENTS:  Pt seen and examined at bedside. No acute overnight events or complaints.    Brief Daily Plan:  -    VITAL SIGNS:  T(F): 98.7 (02-17-25 @ 04:49)  HR: 80 (02-17-25 @ 04:49)  BP: 151/66 (02-17-25 @ 04:49)  RR: 18 (02-17-25 @ 04:49)  SpO2: 96% (02-17-25 @ 04:49)  Wt(kg): --    CAPILLARY BLOOD GLUCOSE      POCT Blood Glucose.: 140 mg/dL (16 Feb 2025 21:28)      PHYSICAL EXAM:    GENERAL: NAD, chronically ill appearing, pale   HEAD: Atraumatic, Normocephalic  EYES: EOMI, PERRLA, conjunctiva and sclera clear  ENMT: Moist mucous membranes  NECK: Supple, trachea midline   NERVOUS SYSTEM:  Alert & Oriented X3, Good concentration  CHEST/LUNG: Clear to auscultation bilaterally on anterior exam; No rales, rhonchi, wheezing, or rubs  HEART: Regular rate and rhythm; No murmurs, rubs, or gallops  ABDOMEN: Soft, Nontender, Nondistended; Bowel sounds present  EXTREMITIES:  2+ Peripheral Pulses, nonpitting edema b/l (unchanged from prior)   SKIN: Sacral Ulcer     MEDICATIONS  (STANDING):  amLODIPine   Tablet 5 milliGRAM(s) Oral daily  amoxicillin  875 milliGRAM(s)/clavulanate 1 Tablet(s) Oral two times a day  ascorbic acid 500 milliGRAM(s) Oral daily  atorvastatin 20 milliGRAM(s) Oral at bedtime  dextrose 5%. 1000 milliLiter(s) (50 mL/Hr) IV Continuous <Continuous>  dextrose 5%. 1000 milliLiter(s) (100 mL/Hr) IV Continuous <Continuous>  dextrose 50% Injectable 25 Gram(s) IV Push once  dextrose 50% Injectable 12.5 Gram(s) IV Push once  dextrose 50% Injectable 25 Gram(s) IV Push once  glucagon  Injectable 1 milliGRAM(s) IntraMuscular once  influenza  Vaccine (HIGH DOSE) 0.5 milliLiter(s) IntraMuscular once  insulin lispro (ADMELOG) corrective regimen sliding scale   SubCutaneous three times a day before meals  insulin lispro (ADMELOG) corrective regimen sliding scale   SubCutaneous at bedtime  lactulose Syrup 10 Gram(s) Oral two times a day  metoprolol tartrate 100 milliGRAM(s) Oral two times a day  multivitamin/minerals 1 Tablet(s) Oral daily  pantoprazole    Tablet 40 milliGRAM(s) Oral two times a day  polyethylene glycol 3350 17 Gram(s) Oral daily  zinc sulfate 220 milliGRAM(s) Oral daily    MEDICATIONS  (PRN):  acetaminophen     Tablet .. 650 milliGRAM(s) Oral every 6 hours PRN Temp greater or equal to 38C (100.4F), Mild Pain (1 - 3)  dextrose Oral Gel 15 Gram(s) Oral once PRN Blood Glucose LESS THAN 70 milliGRAM(s)/deciliter  melatonin 3 milliGRAM(s) Oral at bedtime PRN Insomnia      Allergies    No Known Allergies    Intolerances        LABS:                        11.3   15.49 )-----------( 381      ( 15 Feb 2025 09:55 )             35.9     02-15    138  |  107  |  27[H]  ----------------------------<  113[H]  4.6   |  21[L]  |  0.93    Ca    8.6      15 Feb 2025 09:55  Phos  1.9     02-15  Mg     1.6     02-15        Urinalysis Basic - ( 15 Feb 2025 09:55 )    Color: x / Appearance: x / SG: x / pH: x  Gluc: 113 mg/dL / Ketone: x  / Bili: x / Urobili: x   Blood: x / Protein: x / Nitrite: x   Leuk Esterase: x / RBC: x / WBC x   Sq Epi: x / Non Sq Epi: x / Bacteria: x          RADIOLOGY & ADDITIONAL TESTS:  Reviewed    ******************   ******************  Authored By: Kentrell Delgado MD, PGY1  MS Teams Preferred  ******************  INTERVAL HPI/OVERNIGHT EVENTS:  Pt seen and examined at bedside. No acute overnight events or complaints. No RVR events on tele overnight    Brief Daily Plan:  - Will try to collect h pylori serology tests  - Consider when to start AC for afib     VITAL SIGNS:  T(F): 98.7 (02-17-25 @ 04:49)  HR: 80 (02-17-25 @ 04:49)  BP: 151/66 (02-17-25 @ 04:49)  RR: 18 (02-17-25 @ 04:49)  SpO2: 96% (02-17-25 @ 04:49)  Wt(kg): --    CAPILLARY BLOOD GLUCOSE      POCT Blood Glucose.: 140 mg/dL (16 Feb 2025 21:28)      PHYSICAL EXAM:    GENERAL: NAD, chronically ill appearing, pale   HEAD: Atraumatic, Normocephalic  EYES: EOMI, PERRLA, conjunctiva and sclera clear  ENMT: Moist mucous membranes  NECK: Supple, trachea midline   NERVOUS SYSTEM:  Alert & Oriented X3, Good concentration  CHEST/LUNG: Clear to auscultation bilaterally on anterior exam; No rales, rhonchi, wheezing, or rubs  HEART: Regular rate and rhythm; No murmurs, rubs, or gallops  ABDOMEN: Soft, Nontender, Nondistended; Bowel sounds present  EXTREMITIES:  2+ Peripheral Pulses, nonpitting edema b/l (unchanged from prior)   SKIN: Sacral Ulcer     MEDICATIONS  (STANDING):  amLODIPine   Tablet 5 milliGRAM(s) Oral daily  amoxicillin  875 milliGRAM(s)/clavulanate 1 Tablet(s) Oral two times a day  ascorbic acid 500 milliGRAM(s) Oral daily  atorvastatin 20 milliGRAM(s) Oral at bedtime  dextrose 5%. 1000 milliLiter(s) (50 mL/Hr) IV Continuous <Continuous>  dextrose 5%. 1000 milliLiter(s) (100 mL/Hr) IV Continuous <Continuous>  dextrose 50% Injectable 25 Gram(s) IV Push once  dextrose 50% Injectable 12.5 Gram(s) IV Push once  dextrose 50% Injectable 25 Gram(s) IV Push once  glucagon  Injectable 1 milliGRAM(s) IntraMuscular once  influenza  Vaccine (HIGH DOSE) 0.5 milliLiter(s) IntraMuscular once  insulin lispro (ADMELOG) corrective regimen sliding scale   SubCutaneous three times a day before meals  insulin lispro (ADMELOG) corrective regimen sliding scale   SubCutaneous at bedtime  lactulose Syrup 10 Gram(s) Oral two times a day  metoprolol tartrate 100 milliGRAM(s) Oral two times a day  multivitamin/minerals 1 Tablet(s) Oral daily  pantoprazole    Tablet 40 milliGRAM(s) Oral two times a day  polyethylene glycol 3350 17 Gram(s) Oral daily  zinc sulfate 220 milliGRAM(s) Oral daily    MEDICATIONS  (PRN):  acetaminophen     Tablet .. 650 milliGRAM(s) Oral every 6 hours PRN Temp greater or equal to 38C (100.4F), Mild Pain (1 - 3)  dextrose Oral Gel 15 Gram(s) Oral once PRN Blood Glucose LESS THAN 70 milliGRAM(s)/deciliter  melatonin 3 milliGRAM(s) Oral at bedtime PRN Insomnia      Allergies    No Known Allergies    Intolerances        LABS:                        11.3   15.49 )-----------( 381      ( 15 Feb 2025 09:55 )             35.9     02-15    138  |  107  |  27[H]  ----------------------------<  113[H]  4.6   |  21[L]  |  0.93    Ca    8.6      15 Feb 2025 09:55  Phos  1.9     02-15  Mg     1.6     02-15        Urinalysis Basic - ( 15 Feb 2025 09:55 )    Color: x / Appearance: x / SG: x / pH: x  Gluc: 113 mg/dL / Ketone: x  / Bili: x / Urobili: x   Blood: x / Protein: x / Nitrite: x   Leuk Esterase: x / RBC: x / WBC x   Sq Epi: x / Non Sq Epi: x / Bacteria: x          RADIOLOGY & ADDITIONAL TESTS:  Reviewed    ******************   ******************  Authored By: Kentrell Delgado MD, PGY1  MS Teams Preferred  ******************  INTERVAL HPI/OVERNIGHT EVENTS:  Pt seen and examined at bedside. No acute overnight events or complaints. No RVR events on tele overnight. Patient on exam only saying yes or no inappropriately and not necessarily answering questions, but is making appropriate eye contact.     Brief Daily Plan:  - CT head non contrast to rule out stroke iso afib rvr  - Will try to collect h pylori serology tests  - Consider when to start AC for afib     VITAL SIGNS:  T(F): 98.7 (02-17-25 @ 04:49)  HR: 80 (02-17-25 @ 04:49)  BP: 151/66 (02-17-25 @ 04:49)  RR: 18 (02-17-25 @ 04:49)  SpO2: 96% (02-17-25 @ 04:49)  Wt(kg): --    CAPILLARY BLOOD GLUCOSE      POCT Blood Glucose.: 140 mg/dL (16 Feb 2025 21:28)      PHYSICAL EXAM:    GENERAL: NAD, chronically ill appearing, pale   HEAD: Atraumatic, Normocephalic  EYES: EOMI, PERRLA, conjunctiva and sclera clear  ENMT: Moist mucous membranes  NECK: Supple, trachea midline   NERVOUS SYSTEM:  Alert & Oriented X1, Good eye contact, not answering questions appropriately   CHEST/LUNG: Clear to auscultation bilaterally on anterior exam; No rales, rhonchi, wheezing, or rubs  HEART: Regular rate and rhythm; No murmurs, rubs, or gallops  ABDOMEN: Soft, Nontender, Nondistended; Bowel sounds present  EXTREMITIES:  2+ Peripheral Pulses, nonpitting edema b/l (unchanged from prior)   SKIN: Sacral Ulcer     MEDICATIONS  (STANDING):  amLODIPine   Tablet 5 milliGRAM(s) Oral daily  amoxicillin  875 milliGRAM(s)/clavulanate 1 Tablet(s) Oral two times a day  ascorbic acid 500 milliGRAM(s) Oral daily  atorvastatin 20 milliGRAM(s) Oral at bedtime  dextrose 5%. 1000 milliLiter(s) (50 mL/Hr) IV Continuous <Continuous>  dextrose 5%. 1000 milliLiter(s) (100 mL/Hr) IV Continuous <Continuous>  dextrose 50% Injectable 25 Gram(s) IV Push once  dextrose 50% Injectable 12.5 Gram(s) IV Push once  dextrose 50% Injectable 25 Gram(s) IV Push once  glucagon  Injectable 1 milliGRAM(s) IntraMuscular once  influenza  Vaccine (HIGH DOSE) 0.5 milliLiter(s) IntraMuscular once  insulin lispro (ADMELOG) corrective regimen sliding scale   SubCutaneous three times a day before meals  insulin lispro (ADMELOG) corrective regimen sliding scale   SubCutaneous at bedtime  lactulose Syrup 10 Gram(s) Oral two times a day  metoprolol tartrate 100 milliGRAM(s) Oral two times a day  multivitamin/minerals 1 Tablet(s) Oral daily  pantoprazole    Tablet 40 milliGRAM(s) Oral two times a day  polyethylene glycol 3350 17 Gram(s) Oral daily  zinc sulfate 220 milliGRAM(s) Oral daily    MEDICATIONS  (PRN):  acetaminophen     Tablet .. 650 milliGRAM(s) Oral every 6 hours PRN Temp greater or equal to 38C (100.4F), Mild Pain (1 - 3)  dextrose Oral Gel 15 Gram(s) Oral once PRN Blood Glucose LESS THAN 70 milliGRAM(s)/deciliter  melatonin 3 milliGRAM(s) Oral at bedtime PRN Insomnia      Allergies    No Known Allergies    Intolerances        LABS:                        11.3   15.49 )-----------( 381      ( 15 Feb 2025 09:55 )             35.9     02-15    138  |  107  |  27[H]  ----------------------------<  113[H]  4.6   |  21[L]  |  0.93    Ca    8.6      15 Feb 2025 09:55  Phos  1.9     02-15  Mg     1.6     02-15        Urinalysis Basic - ( 15 Feb 2025 09:55 )    Color: x / Appearance: x / SG: x / pH: x  Gluc: 113 mg/dL / Ketone: x  / Bili: x / Urobili: x   Blood: x / Protein: x / Nitrite: x   Leuk Esterase: x / RBC: x / WBC x   Sq Epi: x / Non Sq Epi: x / Bacteria: x          RADIOLOGY & ADDITIONAL TESTS:  Reviewed    ******************

## 2025-02-17 NOTE — PROGRESS NOTE ADULT - PROBLEM SELECTOR PLAN 12
Home meds: lipitor 20mg qd     - continue home statin Home meds: lipitor 20mg qd   - continue home statin    DVT PPX: none iso acute bleed  DIET: DASH TLC  Dispo: home with PT wound care 3x/week

## 2025-02-17 NOTE — PROGRESS NOTE ADULT - PROBLEM SELECTOR PLAN 7
Previous adm (1/8-16) for sacral OM, rec for PICC line on dc for IV abx however pt refused and discharged on augmentin w/ plan to complete 6 week course (until 2/19).   Gen surg performed bedside superficial wound debridement; wound looks clean.     PLAN:   - f/u wound care recs -> wound vac placed   - ID consult given persistent leukocytosis since admission, concern for PO Augmentin failure

## 2025-02-18 DIAGNOSIS — R41.0 DISORIENTATION, UNSPECIFIED: ICD-10-CM

## 2025-02-18 LAB
ANION GAP SERPL CALC-SCNC: 10 MMOL/L — SIGNIFICANT CHANGE UP (ref 5–17)
BUN SERPL-MCNC: 20 MG/DL — SIGNIFICANT CHANGE UP (ref 7–23)
CALCIUM SERPL-MCNC: 8.4 MG/DL — SIGNIFICANT CHANGE UP (ref 8.4–10.5)
CHLORIDE SERPL-SCNC: 108 MMOL/L — SIGNIFICANT CHANGE UP (ref 96–108)
CO2 SERPL-SCNC: 18 MMOL/L — LOW (ref 22–31)
CREAT SERPL-MCNC: 0.79 MG/DL — SIGNIFICANT CHANGE UP (ref 0.5–1.3)
EGFR: 75 ML/MIN/1.73M2 — SIGNIFICANT CHANGE UP
GLUCOSE BLDC GLUCOMTR-MCNC: 187 MG/DL — HIGH (ref 70–99)
GLUCOSE SERPL-MCNC: 139 MG/DL — HIGH (ref 70–99)
HCT VFR BLD CALC: 36.9 % — SIGNIFICANT CHANGE UP (ref 34.5–45)
HGB BLD-MCNC: 11.3 G/DL — LOW (ref 11.5–15.5)
MAGNESIUM SERPL-MCNC: 1.7 MG/DL — SIGNIFICANT CHANGE UP (ref 1.6–2.6)
MCHC RBC-ENTMCNC: 28.6 PG — SIGNIFICANT CHANGE UP (ref 27–34)
MCHC RBC-ENTMCNC: 30.6 G/DL — LOW (ref 32–36)
MCV RBC AUTO: 93.4 FL — SIGNIFICANT CHANGE UP (ref 80–100)
NRBC BLD AUTO-RTO: 0 /100 WBCS — SIGNIFICANT CHANGE UP (ref 0–0)
PHOSPHATE SERPL-MCNC: 2 MG/DL — LOW (ref 2.5–4.5)
PLATELET # BLD AUTO: 382 K/UL — SIGNIFICANT CHANGE UP (ref 150–400)
POTASSIUM SERPL-MCNC: 4.1 MMOL/L — SIGNIFICANT CHANGE UP (ref 3.5–5.3)
POTASSIUM SERPL-SCNC: 4.1 MMOL/L — SIGNIFICANT CHANGE UP (ref 3.5–5.3)
RBC # BLD: 3.95 M/UL — SIGNIFICANT CHANGE UP (ref 3.8–5.2)
RBC # FLD: 18 % — HIGH (ref 10.3–14.5)
SODIUM SERPL-SCNC: 136 MMOL/L — SIGNIFICANT CHANGE UP (ref 135–145)
WBC # BLD: 14.94 K/UL — HIGH (ref 3.8–10.5)
WBC # FLD AUTO: 14.94 K/UL — HIGH (ref 3.8–10.5)

## 2025-02-18 PROCEDURE — 99233 SBSQ HOSP IP/OBS HIGH 50: CPT | Mod: GC

## 2025-02-18 RX ORDER — AMLODIPINE BESYLATE 10 MG/1
10 TABLET ORAL DAILY
Refills: 0 | Status: DISCONTINUED | OUTPATIENT
Start: 2025-02-19 | End: 2025-02-24

## 2025-02-18 RX ORDER — AMLODIPINE BESYLATE 10 MG/1
5 TABLET ORAL ONCE
Refills: 0 | Status: COMPLETED | OUTPATIENT
Start: 2025-02-18 | End: 2025-02-18

## 2025-02-18 RX ORDER — SOD PHOS DI, MONO/K PHOS MONO 250 MG
1 TABLET ORAL EVERY 6 HOURS
Refills: 0 | Status: COMPLETED | OUTPATIENT
Start: 2025-02-18 | End: 2025-02-18

## 2025-02-18 RX ADMIN — AMOXICILLIN AND CLAVULANATE POTASSIUM 1 TABLET(S): 500; 125 TABLET, FILM COATED ORAL at 17:18

## 2025-02-18 RX ADMIN — Medication 220 MILLIGRAM(S): at 12:44

## 2025-02-18 RX ADMIN — AMLODIPINE BESYLATE 5 MILLIGRAM(S): 10 TABLET ORAL at 06:05

## 2025-02-18 RX ADMIN — Medication 1 TABLET(S): at 12:44

## 2025-02-18 RX ADMIN — Medication 40 MILLIGRAM(S): at 06:06

## 2025-02-18 RX ADMIN — Medication 1 PACKET(S): at 17:16

## 2025-02-18 RX ADMIN — METOPROLOL SUCCINATE 100 MILLIGRAM(S): 50 TABLET, EXTENDED RELEASE ORAL at 06:06

## 2025-02-18 RX ADMIN — AMOXICILLIN AND CLAVULANATE POTASSIUM 1 TABLET(S): 500; 125 TABLET, FILM COATED ORAL at 06:06

## 2025-02-18 RX ADMIN — Medication 40 MILLIGRAM(S): at 17:19

## 2025-02-18 RX ADMIN — METOPROLOL SUCCINATE 100 MILLIGRAM(S): 50 TABLET, EXTENDED RELEASE ORAL at 17:19

## 2025-02-18 RX ADMIN — Medication 1 PACKET(S): at 13:21

## 2025-02-18 RX ADMIN — Medication 500 MILLIGRAM(S): at 12:43

## 2025-02-18 RX ADMIN — AMLODIPINE BESYLATE 5 MILLIGRAM(S): 10 TABLET ORAL at 17:15

## 2025-02-18 RX ADMIN — ATORVASTATIN CALCIUM 20 MILLIGRAM(S): 80 TABLET, FILM COATED ORAL at 21:23

## 2025-02-18 NOTE — PROGRESS NOTE ADULT - PROBLEM SELECTOR PLAN 2
Hgb 6 on adm, now s/p 5u pRBC since admission.   2/2 to UGI from ulcers.   baseline Hgb ~9.     PLAN:  - transfuse for Hgb > 8  - maintain active T&S Patient noted to be acutely altered on 2/16 and 2/17. Responding inappropriately to questions and just saying yes while not having meaningful conversations. No focal deficits.   DDx: Acute ischemia iso Afib RVR vs hospital acquired delirium vs urinary tract infection iso chronic bedbound with OM  - CT head 2/17 negative for acute changes   - Patient without any dysuria or increased frequency  - Mental status improved on 2/18 after changing to a darker room and having better sleep    PLAN:   - Given mental status is improving and no ischemic changes acutely, will continue to monitor mental status with family  - Discuss need with family for AC benefits the decrease in strokes but the risks of AC in this patient with acute bleeds

## 2025-02-18 NOTE — PROGRESS NOTE ADULT - ASSESSMENT
Pharmacokinetic Assessment Follow Up: IV Vancomycin    Vancomycin serum concentration assessment(s):    The trough level was drawn late but can be used to guide therapy at this time. The measurement is within the desired definitive target range of 15 to 20 mcg/mL.    Vancomycin Regimen Plan:    Continue regimen to Vancomycin 1250 mg IV every 24 hours with next serum trough concentration measured at 0530 prior to 3rd  dose on 6/25    Drug levels (last 3 results):  Recent Labs   Lab Result Units 06/23/20  0246   Vancomycin-Trough ug/mL 15.1       Pharmacy will continue to follow and monitor vancomycin.    Please contact pharmacy at extension [phone] for questions regarding this assessment.    Thank you for the consult,   Mekhi Meehan       Patient brief summary:  Patito Hudson is a 63 y.o. female initiated on antimicrobial therapy with IV Vancomycin for treatment of bacteremia    The patient's current regimen is 1250mg every 24 hours    Drug Allergies:   Review of patient's allergies indicates:   Allergen Reactions    Codeine Hives and Nausea Only    Linagliptin Swelling     (Trajenta)    Keflex [cephalexin]     Sulfa (sulfonamide antibiotics)     Neosporin [benzalkonium chloride] Rash       Actual Body Weight:   66.2kg    Renal Function:   Estimated Creatinine Clearance: 75 mL/min (based on SCr of 0.7 mg/dL).,     Dialysis Method (if applicable):  N/A    CBC (last 72 hours):  Recent Labs   Lab Result Units 06/20/20  0454 06/20/20  1228 06/21/20  0440 06/22/20  0335 06/23/20  0240   WBC K/uL 8.20 8.67 7.96 7.30 12.20   Hemoglobin g/dL 8.5* 9.7* 9.0* 8.4* 7.8*   Hematocrit % 28.0* 31.6* 30.3* 28.5* 26.1*   Platelets K/uL 275 256 268 255 253   Gran% % 62.9 69.7 51.1 47.8 76.8*   Lymph% % 27.6 23.2 38.2 40.3 16.5*   Mono% % 6.0 5.1 7.3 6.7 4.7   Eosinophil% % 2.9 1.3 2.5 4.1 1.2   Basophil% % 0.5 0.5 0.6 0.8 0.4   Differential Method  Automated Automated Automated Automated Automated       Metabolic Panel  (last 72 hours):  Recent Labs   Lab Result Units 06/20/20  0454 06/20/20  1228 06/21/20  0440 06/22/20  0335   Sodium mmol/L 137 136 140 137   Potassium mmol/L 2.9* 3.4* 4.4 3.3*   Chloride mmol/L 101 97 98 97   CO2 mmol/L 30* 30* 33* 30*   Glucose mg/dL 73 150* 121* 226*   BUN, Bld mg/dL 7* 7* 7* 9   Creatinine mg/dL 0.6 0.7 0.7 0.7   Albumin g/dL 1.5*  --  1.8* 1.7*   Total Bilirubin mg/dL 0.4  --  0.4 0.3   Alkaline Phosphatase U/L 64  --  70 65   AST U/L 13  --  13 11   ALT U/L <5*  --  <5* <5*   Magnesium mg/dL 1.4*  --  1.7 1.9   Phosphorus mg/dL 4.3  --  4.0 3.8       Vancomycin Administrations:  vancomycin given in the last 96 hours                   vancomycin 1.25 g in dextrose 5% 250 mL IVPB (ready to mix) (mg) 1,250 mg New Bag 06/22/20 0220     1,250 mg New Bag 06/21/20 0132    vancomycin in dextrose 5 % 1 gram/250 mL IVPB 1,000 mg (mg) 1,000 mg New Bag 06/20/20 0152                Microbiologic Results:  Microbiology Results (last 7 days)     Procedure Component Value Units Date/Time    Blood culture [[id number]] Collected: 06/18/20 1629    Order Status: Completed Specimen: Blood Updated: 06/22/20 1812     Blood Culture, Routine No Growth to date      No Growth to date      No Growth to date      No Growth to date      No Growth to date    Narrative:      From 2 different sites 30 minutes apart    Blood culture [[id number]] Collected: 06/18/20 1621    Order Status: Completed Specimen: Blood Updated: 06/22/20 1812     Blood Culture, Routine No Growth to date      No Growth to date      No Growth to date      No Growth to date      No Growth to date    Narrative:      From 2 different sites 30 minutes apart         81 y/o F with PMHx, HTN, lymphedema, T2DM, sacral decubitus ulcer s/p debridement (2024) and recent adm for sacral OM who presented to the ED for coffee-ground emesis and melena x 1 day, admitted for acute blood loss anemia 2/2 to UGIB. Course c/b flu positive, Afib rvr, and possible delirium with concern for stroke. Currently pending CT head non contrast.  79 y/o F with PMHx, HTN, lymphedema, T2DM, sacral decubitus ulcer s/p debridement (2024) and recent adm for sacral OM who presented to the ED for coffee-ground emesis and melena x 1 day, admitted for acute blood loss anemia 2/2 to UGIB. Course c/b flu positive, Afib rvr, and possible delirium with concern for stroke. CT head did not show any acute changes.

## 2025-02-18 NOTE — PROGRESS NOTE ADULT - ATTENDING COMMENTS
80F PMHx HTN, T2DM, PAD, lymphedema, arthritis, Sacral pressure ulcer with OM refusing IV abx on PO, and bed-bound status presenting with diarrhea and coffee ground emesis. Found to hypotensive and anemic Hgb 6.0.    #Acute blood loss anemia  #UGIB 2/2 duodenal ulcer  #Hypotension  - S/p EGD 2/12 demonstrating esophagitis and multiple duodenal ulcers  - s/p Protonix gtt x 72 hrs ; transitioned to PO BID for total of 8 weeks  - Trend CBC and transfuse if Hgb less than 7, monitor for recurrence of bleeding  - S/p 5u PRBC total this admission, last transfusion 2/12  - F/u GI outpatient    #Sepsis  #Influenza A  #Sacral and coccyx OM  - CXR neg, CT abdomen/pelvis with possible UTI. Also with Sacral and coccyx OM  -  transition to Augmentin 2/16 concern for cefepime tox. Last day 2/19  - s/p Tamiflu for influenza 2/15  - Wound care following and s/p wound vac. F/u CM home wound vac.     #New onset Afib w/RVR  - Lopressor 100mg BID  - CHADS VAsc 5: hold off AC given GI bleed    Time spent: 54 minutes  - Reviewing, and interpreting labs and testing.  - Independently obtaining a review of systems and performing a physical exam  - Reviewing consultant documentation/recommendations in addition to discussing plan of care with consultants.  - Counselling and educating patient and family regarding interpretation of aforementioned items and plan of care.  - This excludes time spent teaching residents/medical students

## 2025-02-18 NOTE — PROGRESS NOTE ADULT - PROBLEM SELECTOR PLAN 8
K 6.5 -> 5.5, s/p shift x 2 w/ insulin 5 units.   Improved.     - daily BMP Previous adm (1/8-16) for sacral OM, rec for PICC line on dc for IV abx however pt refused and discharged on augmentin w/ plan to complete 6 week course (until 2/19).   Gen surg performed bedside superficial wound debridement; wound looks clean.     PLAN:   - f/u wound care recs -> wound vac placed   - ID consult given persistent leukocytosis since admission, concern for PO Augmentin failure

## 2025-02-18 NOTE — PROGRESS NOTE ADULT - PROBLEM SELECTOR PLAN 12
Home meds: lipitor 20mg qd   - continue home statin    DVT PPX: none iso acute bleed  DIET: DASH TLC  Dispo: home with PT wound care 3x/week Home meds: lipitor 20mg qd   - continue home statin    DVT PPX: none iso acute bleed  DIET: DASH TLC  Dispo: home with PT wound care 3x/week  Delirium: CT head negative 2/17 for acute intracranial changes

## 2025-02-18 NOTE — PROGRESS NOTE ADULT - SUBJECTIVE AND OBJECTIVE BOX
******************  Authored By: Kentrell Delgado MD, PGY1  MS Teams Preferred  ******************  INTERVAL HPI/OVERNIGHT EVENTS:  Pt seen and examined at bedside. No acute overnight events or complaints.    Brief Daily Plan:  -    VITAL SIGNS:  T(F): 99.1 (02-17-25 @ 19:55)  HR: 63 (02-17-25 @ 19:55)  BP: 148/75 (02-17-25 @ 19:55)  RR: 18 (02-17-25 @ 19:55)  SpO2: 94% (02-17-25 @ 19:55)  Wt(kg): --    CAPILLARY BLOOD GLUCOSE    POCT Blood Glucose.: 120 mg/dL (17 Feb 2025 22:00)    PHYSICAL EXAM:    GENERAL: NAD, chronically ill appearing, pale   HEAD: Atraumatic, Normocephalic  EYES: EOMI, PERRLA, conjunctiva and sclera clear  ENMT: Moist mucous membranes  NECK: Supple, trachea midline   NERVOUS SYSTEM:  Alert & Oriented X1, Good eye contact, not answering questions appropriately   CHEST/LUNG: Clear to auscultation bilaterally on anterior exam; No rales, rhonchi, wheezing, or rubs  HEART: Regular rate and rhythm; No murmurs, rubs, or gallops  ABDOMEN: Soft, Nontender, Nondistended; Bowel sounds present  EXTREMITIES:  2+ Peripheral Pulses, nonpitting edema b/l (unchanged from prior)   SKIN: Sacral Ulcer     MEDICATIONS  (STANDING):  amLODIPine   Tablet 5 milliGRAM(s) Oral daily  amoxicillin  875 milliGRAM(s)/clavulanate 1 Tablet(s) Oral two times a day  ascorbic acid 500 milliGRAM(s) Oral daily  atorvastatin 20 milliGRAM(s) Oral at bedtime  dextrose 5%. 1000 milliLiter(s) (100 mL/Hr) IV Continuous <Continuous>  dextrose 5%. 1000 milliLiter(s) (50 mL/Hr) IV Continuous <Continuous>  dextrose 50% Injectable 25 Gram(s) IV Push once  dextrose 50% Injectable 12.5 Gram(s) IV Push once  dextrose 50% Injectable 25 Gram(s) IV Push once  glucagon  Injectable 1 milliGRAM(s) IntraMuscular once  influenza  Vaccine (HIGH DOSE) 0.5 milliLiter(s) IntraMuscular once  insulin lispro (ADMELOG) corrective regimen sliding scale   SubCutaneous three times a day before meals  insulin lispro (ADMELOG) corrective regimen sliding scale   SubCutaneous at bedtime  lactulose Syrup 10 Gram(s) Oral two times a day  metoprolol tartrate 100 milliGRAM(s) Oral two times a day  multivitamin/minerals 1 Tablet(s) Oral daily  pantoprazole    Tablet 40 milliGRAM(s) Oral two times a day  polyethylene glycol 3350 17 Gram(s) Oral daily  zinc sulfate 220 milliGRAM(s) Oral daily    MEDICATIONS  (PRN):  acetaminophen     Tablet .. 650 milliGRAM(s) Oral every 6 hours PRN Temp greater or equal to 38C (100.4F), Mild Pain (1 - 3)  dextrose Oral Gel 15 Gram(s) Oral once PRN Blood Glucose LESS THAN 70 milliGRAM(s)/deciliter  melatonin 3 milliGRAM(s) Oral at bedtime PRN Insomnia      Allergies    No Known Allergies    Intolerances        LABS:                        11.3   15.65 )-----------( 395      ( 17 Feb 2025 06:51 )             35.9     02-17    137  |  107  |  24[H]  ----------------------------<  98  4.2   |  19[L]  |  0.89    Ca    8.6      17 Feb 2025 06:50  Phos  2.0     02-17  Mg     1.9     02-17        Urinalysis Basic - ( 17 Feb 2025 06:50 )    Color: x / Appearance: x / SG: x / pH: x  Gluc: 98 mg/dL / Ketone: x  / Bili: x / Urobili: x   Blood: x / Protein: x / Nitrite: x   Leuk Esterase: x / RBC: x / WBC x   Sq Epi: x / Non Sq Epi: x / Bacteria: x          RADIOLOGY & ADDITIONAL TESTS:  Reviewed    ******************   ******************  Authored By: Kentrell Delgado MD, PGY1  MS Teams Preferred  ******************  INTERVAL HPI/OVERNIGHT EVENTS:  Pt seen and examined at bedside. No acute overnight events or complaints.    Brief Daily Plan:  - CT head did not show any changes    VITAL SIGNS:  T(F): 99.1 (02-17-25 @ 19:55)  HR: 63 (02-17-25 @ 19:55)  BP: 148/75 (02-17-25 @ 19:55)  RR: 18 (02-17-25 @ 19:55)  SpO2: 94% (02-17-25 @ 19:55)  Wt(kg): --    CAPILLARY BLOOD GLUCOSE    POCT Blood Glucose.: 120 mg/dL (17 Feb 2025 22:00)    PHYSICAL EXAM:    GENERAL: NAD, chronically ill appearing, pale   HEAD: Atraumatic, Normocephalic  EYES: EOMI, PERRLA, conjunctiva and sclera clear  ENMT: Moist mucous membranes  NECK: Supple, trachea midline   NERVOUS SYSTEM:  Alert & Oriented X1, Good eye contact, not answering questions appropriately   CHEST/LUNG: Clear to auscultation bilaterally on anterior exam; No rales, rhonchi, wheezing, or rubs  HEART: Regular rate and rhythm; No murmurs, rubs, or gallops  ABDOMEN: Soft, Nontender, Nondistended; Bowel sounds present  EXTREMITIES:  2+ Peripheral Pulses, nonpitting edema b/l (unchanged from prior)   SKIN: Sacral Ulcer     MEDICATIONS  (STANDING):  amLODIPine   Tablet 5 milliGRAM(s) Oral daily  amoxicillin  875 milliGRAM(s)/clavulanate 1 Tablet(s) Oral two times a day  ascorbic acid 500 milliGRAM(s) Oral daily  atorvastatin 20 milliGRAM(s) Oral at bedtime  dextrose 5%. 1000 milliLiter(s) (100 mL/Hr) IV Continuous <Continuous>  dextrose 5%. 1000 milliLiter(s) (50 mL/Hr) IV Continuous <Continuous>  dextrose 50% Injectable 25 Gram(s) IV Push once  dextrose 50% Injectable 12.5 Gram(s) IV Push once  dextrose 50% Injectable 25 Gram(s) IV Push once  glucagon  Injectable 1 milliGRAM(s) IntraMuscular once  influenza  Vaccine (HIGH DOSE) 0.5 milliLiter(s) IntraMuscular once  insulin lispro (ADMELOG) corrective regimen sliding scale   SubCutaneous three times a day before meals  insulin lispro (ADMELOG) corrective regimen sliding scale   SubCutaneous at bedtime  lactulose Syrup 10 Gram(s) Oral two times a day  metoprolol tartrate 100 milliGRAM(s) Oral two times a day  multivitamin/minerals 1 Tablet(s) Oral daily  pantoprazole    Tablet 40 milliGRAM(s) Oral two times a day  polyethylene glycol 3350 17 Gram(s) Oral daily  zinc sulfate 220 milliGRAM(s) Oral daily    MEDICATIONS  (PRN):  acetaminophen     Tablet .. 650 milliGRAM(s) Oral every 6 hours PRN Temp greater or equal to 38C (100.4F), Mild Pain (1 - 3)  dextrose Oral Gel 15 Gram(s) Oral once PRN Blood Glucose LESS THAN 70 milliGRAM(s)/deciliter  melatonin 3 milliGRAM(s) Oral at bedtime PRN Insomnia      Allergies    No Known Allergies    Intolerances        LABS:                        11.3   15.65 )-----------( 395      ( 17 Feb 2025 06:51 )             35.9     02-17    137  |  107  |  24[H]  ----------------------------<  98  4.2   |  19[L]  |  0.89    Ca    8.6      17 Feb 2025 06:50  Phos  2.0     02-17  Mg     1.9     02-17        Urinalysis Basic - ( 17 Feb 2025 06:50 )    Color: x / Appearance: x / SG: x / pH: x  Gluc: 98 mg/dL / Ketone: x  / Bili: x / Urobili: x   Blood: x / Protein: x / Nitrite: x   Leuk Esterase: x / RBC: x / WBC x   Sq Epi: x / Non Sq Epi: x / Bacteria: x          RADIOLOGY & ADDITIONAL TESTS:  Reviewed    ******************   ******************  Authored By: Kentrell Delgado MD, PGY1  MS Teams Preferred  ******************  INTERVAL HPI/OVERNIGHT EVENTS:  Pt seen and examined at bedside. No acute overnight events or complaints.    Brief Daily Plan:  - CT head did not show any changes  - Patient much more alert and oriented during exam today  - In darker room and had better sleep  - f/u with family for possibility of AC use, if agreeable will reach out to GI       VITAL SIGNS:  T(F): 99.1 (02-17-25 @ 19:55)  HR: 63 (02-17-25 @ 19:55)  BP: 148/75 (02-17-25 @ 19:55)  RR: 18 (02-17-25 @ 19:55)  SpO2: 94% (02-17-25 @ 19:55)  Wt(kg): --    CAPILLARY BLOOD GLUCOSE    POCT Blood Glucose.: 120 mg/dL (17 Feb 2025 22:00)    PHYSICAL EXAM:    GENERAL: NAD, chronically ill appearing, pale   HEAD: Atraumatic, Normocephalic  EYES: EOMI, PERRLA, conjunctiva and sclera clear  ENMT: Moist mucous membranes  NECK: Supple, trachea midline   NERVOUS SYSTEM:  Alert & Oriented X3, Good eye contact and conversational.   CHEST/LUNG: Clear to auscultation bilaterally on anterior exam; No rales, rhonchi, wheezing, or rubs  HEART: Regular rate and rhythm; No murmurs, rubs, or gallops  ABDOMEN: Soft, Nontender, Nondistended; Bowel sounds present  EXTREMITIES:  2+ Peripheral Pulses, nonpitting edema b/l (unchanged from prior)   SKIN: Sacral Ulcer     MEDICATIONS  (STANDING):  amLODIPine   Tablet 5 milliGRAM(s) Oral daily  amoxicillin  875 milliGRAM(s)/clavulanate 1 Tablet(s) Oral two times a day  ascorbic acid 500 milliGRAM(s) Oral daily  atorvastatin 20 milliGRAM(s) Oral at bedtime  dextrose 5%. 1000 milliLiter(s) (100 mL/Hr) IV Continuous <Continuous>  dextrose 5%. 1000 milliLiter(s) (50 mL/Hr) IV Continuous <Continuous>  dextrose 50% Injectable 25 Gram(s) IV Push once  dextrose 50% Injectable 12.5 Gram(s) IV Push once  dextrose 50% Injectable 25 Gram(s) IV Push once  glucagon  Injectable 1 milliGRAM(s) IntraMuscular once  influenza  Vaccine (HIGH DOSE) 0.5 milliLiter(s) IntraMuscular once  insulin lispro (ADMELOG) corrective regimen sliding scale   SubCutaneous three times a day before meals  insulin lispro (ADMELOG) corrective regimen sliding scale   SubCutaneous at bedtime  lactulose Syrup 10 Gram(s) Oral two times a day  metoprolol tartrate 100 milliGRAM(s) Oral two times a day  multivitamin/minerals 1 Tablet(s) Oral daily  pantoprazole    Tablet 40 milliGRAM(s) Oral two times a day  polyethylene glycol 3350 17 Gram(s) Oral daily  zinc sulfate 220 milliGRAM(s) Oral daily    MEDICATIONS  (PRN):  acetaminophen     Tablet .. 650 milliGRAM(s) Oral every 6 hours PRN Temp greater or equal to 38C (100.4F), Mild Pain (1 - 3)  dextrose Oral Gel 15 Gram(s) Oral once PRN Blood Glucose LESS THAN 70 milliGRAM(s)/deciliter  melatonin 3 milliGRAM(s) Oral at bedtime PRN Insomnia      Allergies    No Known Allergies    Intolerances        LABS:                        11.3   15.65 )-----------( 395      ( 17 Feb 2025 06:51 )             35.9     02-17    137  |  107  |  24[H]  ----------------------------<  98  4.2   |  19[L]  |  0.89    Ca    8.6      17 Feb 2025 06:50  Phos  2.0     02-17  Mg     1.9     02-17        Urinalysis Basic - ( 17 Feb 2025 06:50 )    Color: x / Appearance: x / SG: x / pH: x  Gluc: 98 mg/dL / Ketone: x  / Bili: x / Urobili: x   Blood: x / Protein: x / Nitrite: x   Leuk Esterase: x / RBC: x / WBC x   Sq Epi: x / Non Sq Epi: x / Bacteria: x          RADIOLOGY & ADDITIONAL TESTS:  Reviewed    ******************   ******************  Authored By: Kentrell Delgado MD, PGY1  MS Teams Preferred  ******************  INTERVAL HPI/OVERNIGHT EVENTS:  Pt seen and examined at bedside. No acute overnight events or complaints.    Brief Daily Plan:  - CT head did not show any changes  - Patient much more alert and oriented during exam today  - In darker room and had better sleep  - f/u with family for possibility of AC use, if agreeable will reach out to GI   - Increased amlodipine to 10 mg for elevated pressures      VITAL SIGNS:  T(F): 99.1 (02-17-25 @ 19:55)  HR: 63 (02-17-25 @ 19:55)  BP: 148/75 (02-17-25 @ 19:55)  RR: 18 (02-17-25 @ 19:55)  SpO2: 94% (02-17-25 @ 19:55)  Wt(kg): --    CAPILLARY BLOOD GLUCOSE    POCT Blood Glucose.: 120 mg/dL (17 Feb 2025 22:00)    PHYSICAL EXAM:    GENERAL: NAD, chronically ill appearing, pale   HEAD: Atraumatic, Normocephalic  EYES: EOMI, PERRLA, conjunctiva and sclera clear  ENMT: Moist mucous membranes  NECK: Supple, trachea midline   NERVOUS SYSTEM:  Alert & Oriented X3, Good eye contact and conversational.   CHEST/LUNG: Clear to auscultation bilaterally on anterior exam; No rales, rhonchi, wheezing, or rubs  HEART: Regular rate and rhythm; No murmurs, rubs, or gallops  ABDOMEN: Soft, Nontender, Nondistended; Bowel sounds present  EXTREMITIES:  2+ Peripheral Pulses, nonpitting edema b/l (unchanged from prior)   SKIN: Sacral Ulcer     MEDICATIONS  (STANDING):  amLODIPine   Tablet 5 milliGRAM(s) Oral daily  amoxicillin  875 milliGRAM(s)/clavulanate 1 Tablet(s) Oral two times a day  ascorbic acid 500 milliGRAM(s) Oral daily  atorvastatin 20 milliGRAM(s) Oral at bedtime  dextrose 5%. 1000 milliLiter(s) (100 mL/Hr) IV Continuous <Continuous>  dextrose 5%. 1000 milliLiter(s) (50 mL/Hr) IV Continuous <Continuous>  dextrose 50% Injectable 25 Gram(s) IV Push once  dextrose 50% Injectable 12.5 Gram(s) IV Push once  dextrose 50% Injectable 25 Gram(s) IV Push once  glucagon  Injectable 1 milliGRAM(s) IntraMuscular once  influenza  Vaccine (HIGH DOSE) 0.5 milliLiter(s) IntraMuscular once  insulin lispro (ADMELOG) corrective regimen sliding scale   SubCutaneous three times a day before meals  insulin lispro (ADMELOG) corrective regimen sliding scale   SubCutaneous at bedtime  lactulose Syrup 10 Gram(s) Oral two times a day  metoprolol tartrate 100 milliGRAM(s) Oral two times a day  multivitamin/minerals 1 Tablet(s) Oral daily  pantoprazole    Tablet 40 milliGRAM(s) Oral two times a day  polyethylene glycol 3350 17 Gram(s) Oral daily  zinc sulfate 220 milliGRAM(s) Oral daily    MEDICATIONS  (PRN):  acetaminophen     Tablet .. 650 milliGRAM(s) Oral every 6 hours PRN Temp greater or equal to 38C (100.4F), Mild Pain (1 - 3)  dextrose Oral Gel 15 Gram(s) Oral once PRN Blood Glucose LESS THAN 70 milliGRAM(s)/deciliter  melatonin 3 milliGRAM(s) Oral at bedtime PRN Insomnia      Allergies    No Known Allergies    Intolerances        LABS:                        11.3   15.65 )-----------( 395      ( 17 Feb 2025 06:51 )             35.9     02-17    137  |  107  |  24[H]  ----------------------------<  98  4.2   |  19[L]  |  0.89    Ca    8.6      17 Feb 2025 06:50  Phos  2.0     02-17  Mg     1.9     02-17        Urinalysis Basic - ( 17 Feb 2025 06:50 )    Color: x / Appearance: x / SG: x / pH: x  Gluc: 98 mg/dL / Ketone: x  / Bili: x / Urobili: x   Blood: x / Protein: x / Nitrite: x   Leuk Esterase: x / RBC: x / WBC x   Sq Epi: x / Non Sq Epi: x / Bacteria: x          RADIOLOGY & ADDITIONAL TESTS:  Reviewed    ******************

## 2025-02-18 NOTE — PROGRESS NOTE ADULT - ATTENDING COMMENTS
80F PMHx HTN, T2DM, PAD, lymphedema, arthritis, Sacral pressure ulcer with OM refusing IV abx on PO, and bed-bound status presenting with diarrhea and coffee ground emesis. Found to hypotensive and anemic Hgb 6.0.    #New onset Afib w/RVR  - Off home Bisoprolol and started on metoprolol tartrate 100 mf BID  - CHADS vasc 5, HASBLED 3. Discussed risks and benefits with patient and she is open to low dose Xarelto (was on it in the past for DVT) once cleared by GI. WIll f/u GI recs.    #Acute blood loss anemia  #UGIB 2/2 duodenal ulcer  #Hypotension  - S/p EGD 2/12 demonstrating esophagitis and multiple duodenal ulcers  - s/p Protonix gtt x 72 hrs ; transitioned to PO BID for total of 8 weeks  - Trend CBC and transfuse if Hgb less than 7, monitor for recurrence of bleeding  - S/p 5u PRBC total this admission, last transfusion 2/12  - F/u GI outpatient    #Sepsis  #Influenza A  #Sacral and coccyx OM  - CXR neg, CT abdomen/pelvis with possible UTI. Also with Sacral and coccyx OM  -  transition to Augmentin 2/16 concern for cefepime tox. Last day 2/19. S/p Tamiflu last day 2/15  - Wound care following and s/p wound vac. F/u CM home wound vac.     Dispo: Discharge home pending DME wound vac.     Time spent: 54 minutes  - Reviewing, and interpreting labs and testing.  - Independently obtaining a review of systems and performing a physical exam  - Reviewing consultant documentation/recommendations in addition to discussing plan of care with consultants.  - Counselling and educating patient and family regarding interpretation of aforementioned items and plan of care.  - This excludes time spent teaching residents/medical students

## 2025-02-18 NOTE — PROGRESS NOTE ADULT - ASSESSMENT
79 y hx  HTN, lymphedema, b/l arthritis, recent hospitalization 1/8/2025-1/16/2025 for weakness and sacral wound management where she was dx with OM of sacrum in the setting of elevated inflammatory markers and leukocytosis placed on zosyn and switched to ertapenem on discharge and was recommended for a PICC line, pt refused IV abx and was sent home on Augmentin 875/125mg to complete 6 weeks on 2/19.(no culture data) She was also treated with diflucan for candidiasis of skin. She now presents for nausea vomiting and bloody diarrhea from nursing home. ID consulted for Leukocytosis.  +flu A on tamiflu 2/11-->  2/11 bld cx neg  2/12 Endoscopy: No active bleeding but source of anemia and melena likely 2/2 duodenal bulb ulcers  Sacral Stage 4 pressure injury-Vac in place as per wound care note--> 9.0cm x 7.0cm x 2.0cm moist pale tissue w/ some slough serosanguinous drainage no exposed bone No odor, increased warmth, tenderness, induration, fluctuance, nor crepitus, Fungal Moisture Associated Dermatitis Back/ Flank/ Buttock/ Thighs/ Groin/ Pannus/axilla- improving, Moisture Dermatitis Under Breasts    02/14: Blood cx NTD, exam unrevealing, and WBC ~same range. Now with thrombocytosis suggesting reactive component.  02/18: Modest persistent leuykocytosis but no clear or convincing evidence of intection on the chris of history or exam. VAC remains in situ on sacrum. S/P course of Rx vs., Flu    Suggestions--  Trend WBC  Local care to sacrum  Continue Kathy Mayes MD  Attending Physician  [facility]  Division of Infectious Diseases  [phone]

## 2025-02-18 NOTE — PROGRESS NOTE ADULT - PROBLEM SELECTOR PLAN 3
Patient with no hx of afib, went into afib w/RVR. HR increased to 160s, suspicion patient might have had a bleed, although patient denies bloody stool and vomiting. Was given 5mg Lopressor  - also noted by various providers and daughter that the patient seems mildly delirious     Plan:  - CT head 2/17 ordered to rule out stroke   - Continue to monitor for events  - Start Metoprolol Tartrate 100mg BID which is equivalent to her home dose BB  - [id number] score: 5  - HAS-BLED score: 3  - Holding off on AC due to UGIB Hgb 6 on adm, now s/p 5u pRBC since admission.   2/2 to UGI from ulcers.   baseline Hgb ~9.     PLAN:  - transfuse for Hgb > 8  - maintain active T&S

## 2025-02-18 NOTE — PROGRESS NOTE ADULT - SUBJECTIVE AND OBJECTIVE BOX
[facility]  Division of Infectious Diseases  [phone]    Name: VICENTA DAVIS  Age: 80y  Gender: Female  MRN: [id number]    Interval History--  Notes reviewed.     Past Medical History--  Hypertension    Diabetes    Back pain    Lymphedema of both lower extremities    HLD (hyperlipidemia)    Sacral osteomyelitis    History of cholecystectomy        For details regarding the patient's social history, family history, and other miscellaneous elements, please refer the initial infectious diseases consultation and/or the admitting history and physical examination for this admission.    Allergies    No Known Allergies    Intolerances        Medications--  Antibiotics:  amoxicillin  875 milliGRAM(s)/clavulanate 1 Tablet(s) Oral two times a day    Immunologic:  influenza  Vaccine (HIGH DOSE) 0.5 milliLiter(s) IntraMuscular once    Other:  acetaminophen     Tablet .. PRN  amLODIPine   Tablet  ascorbic acid  atorvastatin  dextrose 5%.  dextrose 5%.  dextrose 50% Injectable  dextrose 50% Injectable  dextrose 50% Injectable  dextrose Oral Gel PRN  glucagon  Injectable  insulin lispro (ADMELOG) corrective regimen sliding scale  insulin lispro (ADMELOG) corrective regimen sliding scale  lactulose Syrup  melatonin PRN  metoprolol tartrate  multivitamin/minerals  pantoprazole    Tablet  polyethylene glycol 3350  zinc sulfate      Review of Systems--  A 10-point review of systems was obtained.     Pertinent positives and negatives--  Constitutional: No fevers. No Chills. No Rigors.   Cardiovascular: No chest pain. No palpitations.  Respiratory: No shortness of breath. No cough.  Gastrointestinal: No nausea or vomiting. No diarrhea or constipation.   Psychiatric: Pleasant. Appropriate affect.    Review of systems otherwise negative except as previously noted.    Physical Examination--  Vital Signs: T(F): 99.1 (02-18-25 @ 05:53), Max: 99.1 (02-17-25 @ 19:55)  HR: 80 (02-18-25 @ 05:53)  BP: 176/76 (02-18-25 @ 05:53)  RR: 18 (02-18-25 @ 05:53)  SpO2: 95% (02-18-25 @ 05:53)  Wt(kg): --  General: Nontoxic-appearing Female in no acute distress.  HEENT: AT/NC. PERRL. EOMI. Anicteric. Conjunctiva pink and moist. Oropharynx clear. Dentition fair.  Neck: Not rigid. No sense of mass.  Nodes: None palpable.  Lungs: Clear bilaterally without rales, wheezing or rhonchi  Heart: Regular rate and rhythm. No Murmur. No rub. No gallop. No palpable thrill.  Abdomen: Bowel sounds present and normoactive. Soft. Nondistended. Nontender. No sense of mass. No organomegaly.  Back: No spinal tenderness. No costovertebral angle tenderness.   Extremities: No cyanosis or clubbing. No edema.   Skin: Warm. Dry. Good turgor. No rash. No vasculitic stigmata.  Psychiatric: Appropriate affect and mood for situation.         Laboratory Studies--  CBC                        11.3   15.65 )-----------( 395      ( 17 Feb 2025 06:51 )             35.9       Chemistries  02-17    137  |  107  |  24[H]  ----------------------------<  98  4.2   |  19[L]  |  0.89    Ca    8.6      17 Feb 2025 06:50  Phos  2.0     02-17  Mg     1.9     02-17        Culture Data           [facility]  Division of Infectious Diseases  [phone]    Name: VICENTA DAVIS  Age: 80y  Gender: Female  MRN: [id number]    Interval History--  Notes reviewed. Seen earlier today. No complaints. Refusing to wear her telemetry monitor, doesn't have a reason why. "I don;t want to"    Past Medical History--  Hypertension    Diabetes    Back pain    Lymphedema of both lower extremities    HLD (hyperlipidemia)    Sacral osteomyelitis    History of cholecystectomy        For details regarding the patient's social history, family history, and other miscellaneous elements, please refer the initial infectious diseases consultation and/or the admitting history and physical examination for this admission.    Allergies    No Known Allergies    Intolerances        Medications--  Antibiotics:  amoxicillin  875 milliGRAM(s)/clavulanate 1 Tablet(s) Oral two times a day    Immunologic:  influenza  Vaccine (HIGH DOSE) 0.5 milliLiter(s) IntraMuscular once    Other:  acetaminophen     Tablet .. PRN  amLODIPine   Tablet  ascorbic acid  atorvastatin  dextrose 5%.  dextrose 5%.  dextrose 50% Injectable  dextrose 50% Injectable  dextrose 50% Injectable  dextrose Oral Gel PRN  glucagon  Injectable  insulin lispro (ADMELOG) corrective regimen sliding scale  insulin lispro (ADMELOG) corrective regimen sliding scale  lactulose Syrup  melatonin PRN  metoprolol tartrate  multivitamin/minerals  pantoprazole    Tablet  polyethylene glycol 3350  zinc sulfate      Review of Systems--  A 10-point review of systems was obtained.   Review of systems otherwise unchanged compared to prior visit except as previously noted.    Physical Examination--  Vital Signs: T(F): 99.1 (02-18-25 @ 05:53), Max: 99.1 (02-17-25 @ 19:55)  HR: 80 (02-18-25 @ 05:53)  BP: 176/76 (02-18-25 @ 05:53)  RR: 18 (02-18-25 @ 05:53)  SpO2: 95% (02-18-25 @ 05:53)  Wt(kg): --  General: Nontoxic-appearing Female in no acute distress.  HEENT: AT/NC. Anicteric. Conjunctiva pink and moist. Oropharynx clear.   Neck: Not rigid. No sense of mass.  Nodes: None palpable.  Lungs: Decreased BS B no RWR  Heart: Regular rate and rhythm.   Abdomen: Bowel sounds present and normoactive. Soft. Nondistended. Nontender.  Extremities: No cyanosis or clubbing. 1+ edema.   Skin: Warm. Dry. Good turgor. No rash. No vasculitic stigmata.  Psychiatric: Appropriate affect and mood for situation.         Laboratory Studies--  CBC                        11.3   15.65 )-----------( 395      ( 17 Feb 2025 06:51 )             35.9       Chemistries  02-17    137  |  107  |  24[H]  ----------------------------<  98  4.2   |  19[L]  |  0.89    Ca    8.6      17 Feb 2025 06:50  Phos  2.0     02-17  Mg     1.9     02-17        Culture Data

## 2025-02-18 NOTE — PROGRESS NOTE ADULT - PROBLEM SELECTOR PLAN 5
Patient has not had a bowel movement in over 7 days. Could be causing her Afib    Plan:  - Miralax daily  - Started Lactulose 10g BID  - Started Magnesium Citrate   - Consider enema if above fails Likely multifactorial 2/2 inflammatory response in setting of GIB, chronic sacral OM, influenza A.   CXR w/ clear lungs, blood cx x2 neg, U/A, urine cx negative.   - s/p cefepime 2/11-2/16   - s/p tamiflu 2/11-2/15 for Flu A    PLAN:   - cw Augmentin (2/16- )   **f/u with ID as she was treated for OM of the sacrum last admission, was supposed to end abx course on 2/19, no recent cultures positive and only had Flu throughout this course   - GI PCR if continued diarrhea   - trend fever curve, WBC

## 2025-02-18 NOTE — PROGRESS NOTE ADULT - PROBLEM SELECTOR PLAN 4
Likely multifactorial 2/2 inflammatory response in setting of GIB, chronic sacral OM, influenza A.   CXR w/ clear lungs, blood cx x2 neg, U/A, urine cx negative.   - s/p cefepime 2/11-2/16   - s/p tamiflu 2/11-2/15 for Flu A    PLAN:   - cw Augmentin (2/16- )   **f/u with ID as she was treated for OM of the sacrum last admission, was supposed to end abx course on 2/19, no recent cultures positive and only had Flu throughout this course   - GI PCR if continued diarrhea   - trend fever curve, WBC Patient with no hx of afib, went into afib w/RVR. HR increased to 160s, suspicion patient might have had a bleed, although patient denies bloody stool and vomiting. Was given 5mg Lopressor  - also noted by various providers and daughter that the patient seems mildly delirious     Plan:  - CT head 2/17 ordered to rule out stroke   - Continue to monitor for events  - Start Metoprolol Tartrate 100mg BID which is equivalent to her home dose BB  - [id number] score: 5  - HAS-BLED score: 3  - Holding off on AC due to UGIB

## 2025-02-18 NOTE — PROGRESS NOTE ADULT - PROBLEM SELECTOR PLAN 7
Previous adm (1/8-16) for sacral OM, rec for PICC line on dc for IV abx however pt refused and discharged on augmentin w/ plan to complete 6 week course (until 2/19).   Gen surg performed bedside superficial wound debridement; wound looks clean.     PLAN:   - f/u wound care recs -> wound vac placed   - ID consult given persistent leukocytosis since admission, concern for PO Augmentin failure RVP + influenza.     - tamiflu (2/11-2/15 )  x 5 days   - droplet isolation

## 2025-02-19 DIAGNOSIS — L30.4 ERYTHEMA INTERTRIGO: ICD-10-CM

## 2025-02-19 LAB — GLUCOSE BLDC GLUCOMTR-MCNC: 169 MG/DL — HIGH (ref 70–99)

## 2025-02-19 PROCEDURE — 99232 SBSQ HOSP IP/OBS MODERATE 35: CPT

## 2025-02-19 PROCEDURE — 93010 ELECTROCARDIOGRAM REPORT: CPT | Mod: 76

## 2025-02-19 PROCEDURE — 99233 SBSQ HOSP IP/OBS HIGH 50: CPT | Mod: GC

## 2025-02-19 PROCEDURE — G0545: CPT

## 2025-02-19 RX ORDER — FLUCONAZOLE 150 MG
200 TABLET ORAL DAILY
Refills: 0 | Status: COMPLETED | OUTPATIENT
Start: 2025-02-19 | End: 2025-02-23

## 2025-02-19 RX ORDER — FLUCONAZOLE 150 MG
150 TABLET ORAL
Refills: 0 | Status: DISCONTINUED | OUTPATIENT
Start: 2025-02-19 | End: 2025-02-19

## 2025-02-19 RX ORDER — SOD PHOS DI, MONO/K PHOS MONO 250 MG
1 TABLET ORAL EVERY 6 HOURS
Refills: 0 | Status: COMPLETED | OUTPATIENT
Start: 2025-02-19 | End: 2025-02-19

## 2025-02-19 RX ORDER — RIVAROXABAN 10 MG/1
20 TABLET, FILM COATED ORAL
Refills: 0 | Status: DISCONTINUED | OUTPATIENT
Start: 2025-02-19 | End: 2025-02-24

## 2025-02-19 RX ORDER — NYSTATIN 100000 [USP'U]/G
1 CREAM TOPICAL EVERY 12 HOURS
Refills: 0 | Status: DISCONTINUED | OUTPATIENT
Start: 2025-02-19 | End: 2025-02-24

## 2025-02-19 RX ORDER — SODIUM HYPOCHLORITE 0.12 MG/ML
1 SOLUTION TOPICAL DAILY
Refills: 0 | Status: DISCONTINUED | OUTPATIENT
Start: 2025-02-19 | End: 2025-02-24

## 2025-02-19 RX ORDER — POLYETHYLENE GLYCOL 3350 17 G/17G
17 POWDER, FOR SOLUTION ORAL DAILY
Refills: 0 | Status: DISCONTINUED | OUTPATIENT
Start: 2025-02-19 | End: 2025-02-24

## 2025-02-19 RX ADMIN — Medication 220 MILLIGRAM(S): at 12:01

## 2025-02-19 RX ADMIN — Medication 500 MILLIGRAM(S): at 12:01

## 2025-02-19 RX ADMIN — RIVAROXABAN 20 MILLIGRAM(S): 10 TABLET, FILM COATED ORAL at 17:03

## 2025-02-19 RX ADMIN — ATORVASTATIN CALCIUM 20 MILLIGRAM(S): 80 TABLET, FILM COATED ORAL at 21:13

## 2025-02-19 RX ADMIN — Medication 1 PACKET(S): at 12:05

## 2025-02-19 RX ADMIN — Medication 200 MILLIGRAM(S): at 17:03

## 2025-02-19 RX ADMIN — Medication 1 PACKET(S): at 17:03

## 2025-02-19 RX ADMIN — AMOXICILLIN AND CLAVULANATE POTASSIUM 1 TABLET(S): 500; 125 TABLET, FILM COATED ORAL at 05:25

## 2025-02-19 RX ADMIN — METOPROLOL SUCCINATE 100 MILLIGRAM(S): 50 TABLET, EXTENDED RELEASE ORAL at 05:25

## 2025-02-19 RX ADMIN — SODIUM HYPOCHLORITE 1 APPLICATION(S): 0.12 SOLUTION TOPICAL at 12:04

## 2025-02-19 RX ADMIN — Medication 1 TABLET(S): at 12:01

## 2025-02-19 RX ADMIN — Medication 40 MILLIGRAM(S): at 05:25

## 2025-02-19 RX ADMIN — AMLODIPINE BESYLATE 10 MILLIGRAM(S): 10 TABLET ORAL at 05:25

## 2025-02-19 RX ADMIN — METOPROLOL SUCCINATE 100 MILLIGRAM(S): 50 TABLET, EXTENDED RELEASE ORAL at 17:04

## 2025-02-19 RX ADMIN — Medication 40 MILLIGRAM(S): at 17:04

## 2025-02-19 RX ADMIN — NYSTATIN 1 APPLICATION(S): 100000 CREAM TOPICAL at 17:07

## 2025-02-19 RX ADMIN — AMOXICILLIN AND CLAVULANATE POTASSIUM 1 TABLET(S): 500; 125 TABLET, FILM COATED ORAL at 17:03

## 2025-02-19 NOTE — CHART NOTE - NSCHARTNOTEFT_GEN_A_CORE
Patient requires Group 2 Low Air Loss Mattress secondary to the beneficiary has large Stage IV pressure ulcers on the sacrum L89.154.

## 2025-02-19 NOTE — PROGRESS NOTE ADULT - PROBLEM SELECTOR PLAN 8
Previous adm (1/8-16) for sacral OM, rec for PICC line on dc for IV abx however pt refused and discharged on augmentin w/ plan to complete 6 week course (until 2/19).   Gen surg performed bedside superficial wound debridement; wound looks clean.     PLAN:   - f/u wound care recs -> wound vac placed   - ID consult given persistent leukocytosis since admission, concern for PO Augmentin failure Previous adm (1/8-16) for sacral OM, rec for PICC line on dc for IV abx however pt refused and discharged on augmentin w/ plan to complete 6 week course (until 2/19).   Gen surg performed bedside superficial wound debridement; wound looks clean.     PLAN:   - f/u wound care recs -> wound vac placed   - will need wound vac at home  - complete abx on 2/19

## 2025-02-19 NOTE — PROGRESS NOTE ADULT - PROBLEM SELECTOR PLAN 2
Patient noted to be acutely altered on 2/16 and 2/17. Responding inappropriately to questions and just saying yes while not having meaningful conversations. No focal deficits.   DDx: Acute ischemia iso Afib RVR vs hospital acquired delirium vs urinary tract infection iso chronic bedbound with OM  - CT head 2/17 negative for acute changes   - Patient without any dysuria or increased frequency  - Mental status improved on 2/18 after changing to a darker room and having better sleep    PLAN:   - Given mental status is improving and no ischemic changes acutely, will continue to monitor mental status with family  - Discuss need with family for AC benefits the decrease in strokes but the risks of AC in this patient with acute bleeds Multiple episodes of melena and coffee-ground emesis x 1 day, associated w/ hypotension and acute drop in Hgb. Underwent EGD w/ three nonbleeding duodenal ulcers. CT neg active GI bleed.   - s/p Protonix gtt (ending 2/15)    PLAN:   - CBC qd  - Protonix 40 mg BID PO   - Advance diet as tolerated  - f/u GI recs -> H pylori serology tests (reordered)  - maintain active T&S, transfuse for Hgb >8

## 2025-02-19 NOTE — PROGRESS NOTE ADULT - PROBLEM SELECTOR PROBLEM 9
HGB 11.3 07/28/2020    HCT 34.0 07/28/2020    MCV 90.7 07/28/2020    MCH 30.1 07/28/2020    MCHC 33.2 07/28/2020    RDW 12.7 07/28/2020     07/28/2020    MPV 8.9 07/28/2020     CMP:    Lab Results   Component Value Date     07/27/2020    K 4.4 07/27/2020    K 4.1 07/19/2020    CL 97 07/27/2020    CO2 28 07/27/2020    BUN 19 07/27/2020    CREATININE 0.8 07/27/2020    GFRAA >60 07/27/2020    LABGLOM >60 07/27/2020    GLUCOSE 109 07/27/2020    PROT 7.1 07/19/2020    LABALBU 4.4 07/19/2020    CALCIUM 9.3 07/27/2020    BILITOT 0.5 07/19/2020    ALKPHOS 74 07/19/2020    AST 24 07/19/2020    ALT 22 07/19/2020     .   7/22/2020 09:22   CRP 41.4 (H)      7/22/2020 09:22   Sed Rate 90 (H)      7/21/2020 01:22   Body Fluid Culture (Knee) Strep agalactiae (Beta Strep Group B) (A)      7/22/2020 09:22   Hemoglobin A1C 10.1 (H)      7/22/2020 17:00   Human Rhinovirus/Enterovirus by PCR DETECTED (A)     Order Date:  7/23/2020 11:15 AM    EXAM: CT LUMBAR SPINE W WO CONTRAST    INDICATION: Severe back pain. Recent septic arthritis. COMPARISON: 20 January 2015    FINDINGS:    No acute fracture or dislocation is seen. There is moderate spinal    stenosis at L3-4 L4-5 relate disc bulging and facet arthropathy. There    are no endplate erosions to suggest infection.         Postcontrast there is no evidence of any abnormal enhancement to    suggest infection or otherwise. Impression    Moderate spinal canal stenosis related to disc bulging and facet    arthropathy at L3-4 and L4-5. No evidence of spinal infection. Student's Assessment and Plan     Ghada Pardo is a 62 y.o. male with a past medical history of type 2 DM managed with insulin, peripheral neuropathy, OA of bilateral knees, lumbar stenosis, and HLD. He presented on 7/21 with bilateral knee and hip pain as well as low back pain. This pain is chronic in nature for him but worsened acutely after a recent fall.  X-rays showed effusion in the right knee and bilateral knee OA. Bilateral arthroscopy with washout was done with findings of purulent fluid in both knees. Cultures grew GBS. Open irrigation and washout completed on 2/27    1. Bilateral knee septic arthritis  Right knee arthrocentesis on 7/21 with 13,700 WBC, 92% neutrophils, and no crystals. Currently afebrile. WBC at 14.9, down from 23.8 on 7/24. Blood cx's no growth to date. S/P bilateral knee arthroscopy, drainage, and debridement on 7/23. Thick, purulent fluid was drained from both knees. Fluid cultures from both knees grew GBS. Patient underwent open irrigation and drainage of hematoma on 7/27. Gram stain at that time showed no organisms.  - Ortho and ID following  - Underwent open irrigation and washout yesterday  - Continue Ceftriaxone 2 g IV Q24H for GBS (sensitivities confirmed), anticipate 4 weeks of treatment  - Blood cultures negative at 5 days  - Oxycodone and Morphine PRN for pain    2. Left hip pain  May be 2/2 septic arthritis vs referred pain from knee. Hip XR revealed mild OA in both hip joints. No acute process was noted. Concern for septic arthritis is low. - Continue to monitor  - Evaluate after resolution of knee pain to elucidate etiology  - Oxycodone and Morphine PRN for pain    3. Bilateral lower extremity and lumbar pain  -2/2 diabetic neuropathy vs chronic lumbar stenosis. No radicular pain or parasthesia. Decreased ROM 2/2 pain. Bowel and bladder function is intact. No saddle anesthesia noted. - Continue Gabapentin to 300 mg TID  - Oxycodone and Morphine PRN for pain      4. Type 2 DM - poorly contolled  HbgA1c 10.1. Blood glucose spiked to 207 this AM and low 200s overnight. On 35u Lantus. Was taking 30u Lantus every other day at home. - Continue Lantus 35u QHS  - Continue to monitor glucose    5. Right fifth toe pain and wound likely 2/2 diabetic neuropathy  Likely 2/2 diabetic neuropathy. Small wound present on left 5th digit.  Patient believes it could have been caused by poor shoe fitting.  - Podiatry consulted, appreciate recs. - Awaiting vascular studies, COVID 19 negative    6. Opioid-induced constipation  - Senokot BID     7. Rhinovirus infection/colonization  - COVID 19 negative    8. HLD  - Atorvastatin 20 mg daily    9. Bilateral knee OA    10. GERD  - Protonix 40 mg daily     11. Hx of Bipolar disorder - stable  - Lamictal 25 mg daily, Effexor 75 mg daily    12. Hx of lumbar spinal stenosis  1/20/2015 CT L-spine: Spinal canal stenosis of severe degree at the L4-L5 level and   of a moderate to severe degree at L3-L4 and L5-S1 levels. CT L-spine with moderate spinal canal stenosis at L3-4 and L4-5.     DVT prophylaxis/GI prophylaxis: ASA and Lovenox/Protonix  Disposition: Continue current management    Manuel Resendez,  Medical Student  Attending Physician: Dr. Gerhardt Griffith Hyperkalemia

## 2025-02-19 NOTE — PROGRESS NOTE ADULT - PROBLEM SELECTOR PLAN 5
Likely multifactorial 2/2 inflammatory response in setting of GIB, chronic sacral OM, influenza A.   CXR w/ clear lungs, blood cx x2 neg, U/A, urine cx negative.   - s/p cefepime 2/11-2/16   - s/p tamiflu 2/11-2/15 for Flu A    PLAN:   - cw Augmentin (2/16- )   **f/u with ID as she was treated for OM of the sacrum last admission, was supposed to end abx course on 2/19, no recent cultures positive and only had Flu throughout this course   - GI PCR if continued diarrhea   - trend fever curve, WBC Likely multifactorial 2/2 inflammatory response in setting of GIB, chronic sacral OM, influenza A.   CXR w/ clear lungs, blood cx x2 neg, U/A, urine cx negative.   - s/p cefepime 2/11-2/16   - s/p tamiflu 2/11-2/15 for Flu A    PLAN:   - cw Augmentin (2/16- will complete on 2/19 per ID)  - GI PCR if continued diarrhea   - trend fever curve, WBC

## 2025-02-19 NOTE — PROGRESS NOTE ADULT - PROBLEM SELECTOR PLAN 11
Home meds: bisoprolol 10mg qd     - hold bisoprolol for now  - start amlodipine 5mg qd Home meds: bisoprolol 10mg qd     - hold bisoprolol for now  - increased Amlodipine to 10 mg on 2/18 for better control of BP

## 2025-02-19 NOTE — PROGRESS NOTE ADULT - ASSESSMENT
79 y hx  HTN, lymphedema, b/l arthritis, recent hospitalization 1/8/2025-1/16/2025 for weakness and sacral wound management where she was dx with OM of sacrum in the setting of elevated inflammatory markers and leukocytosis placed on zosyn and switched to ertapenem on discharge and was recommended for a PICC line, pt refused IV abx and was sent home on Augmentin 875/125mg to complete 6 weeks on 2/19.(no culture data) She was also treated with diflucan for candidiasis of skin. She now presents for nausea vomiting and bloody diarrhea from nursing home. ID consulted for Leukocytosis.  +flu A on tamiflu 2/11-->  2/11 bld cx neg  2/12 Endoscopy: No active bleeding but source of anemia and melena likely 2/2 duodenal bulb ulcers  Sacral Stage 4 pressure injury-Vac in place as per wound care note--> 9.0cm x 7.0cm x 2.0cm moist pale tissue w/ some slough serosanguinous drainage no exposed bone No odor, increased warmth, tenderness, induration, fluctuance, nor crepitus, Fungal Moisture Associated Dermatitis Back/ Flank/ Buttock/ Thighs/ Groin/ Pannus/axilla- improving, Moisture Dermatitis Under Breasts    02/14: Blood cx NTD, exam unrevealing, and WBC ~same range. Now with thrombocytosis suggesting reactive component.  02/18: Modest persistent leuykocytosis but no clear or convincing evidence of intection on the chris of history or exam. VAC remains in situ on sacrum. S/P course of Rx vs., Flu  02/19: Overall stable. No new labs. Rash from dependenc/moisture, Needs good local care but unless QTc unacceptable would reinstate treatement with fluconazole 200mg po daily along with topical rx. Would complete augmentin after todays doses, as planned, and then manage expectantly    Suggestions--  Trend WBC  Brief course of fluconazole to jump start fungal Rx  Local care to sacrum  Augmetnin as above  Please recall PRN. I'll sign off at this time.   Reviewed with primary team  Thank you for the courtesy of this referral.    Richmond Mayes MD  Attending Physician  [facility]  Division of Infectious Diseases  [phone]

## 2025-02-19 NOTE — PROGRESS NOTE ADULT - PROBLEM SELECTOR PLAN 4
Patient with no hx of afib, went into afib w/RVR. HR increased to 160s, suspicion patient might have had a bleed, although patient denies bloody stool and vomiting. Was given 5mg Lopressor  - also noted by various providers and daughter that the patient seems mildly delirious     Plan:  - CT head 2/17 ordered to rule out stroke   - Continue to monitor for events  - Start Metoprolol Tartrate 100mg BID which is equivalent to her home dose BB  - [id number] score: 5  - HAS-BLED score: 3  - Holding off on AC due to UGIB Hgb 6 on adm, now s/p 5u pRBC since admission.   2/2 to UGI from ulcers.   baseline Hgb ~9.     PLAN:  - transfuse for Hgb > 8  - maintain active T&S

## 2025-02-19 NOTE — PROGRESS NOTE ADULT - ASSESSMENT
79 y/o F with PMHx, HTN, lymphedema, T2DM, sacral decubitus ulcer s/p debridement (2024) and recent adm for sacral OM who presented to the ED for coffee-ground emesis and melena x 1 day, admitted for acute blood loss anemia 2/2 to UGIB. Course c/b flu positive, Afib rvr, and possible delirium with concern for stroke. CT head did not show any acute changes. Pending wound vac and HHA approval prior to DC. Also whether or not to AC for decreasing stroke risk vs increasing risk of GI bleed.       Wound Consult requested to assist w/ management of:  Sacral Stage 4 pressure injury  Fungal Moisture Associated Dermatitis Back/ Flank/ Buttock/ Thighs/ Groin/ Pannus/axilla- improving  Moisture Dermatitis Under Breasts    Sacral wound- Pack w/ 1/4 strength packing BID  Buttocks/ Sacrum Crusting w/ Nystatin + CAVILON BID and prn soiling        Continue w/ attends under pads and Pericare maintenance w/ purewick care as per protocol  Bilateral Groin, Pannus, & Breast skin Folds/axillas- after Cleaning= Tuck in INTERDry AG QD  A/P CT as noted above  Abx per Medicine/ ID      Pt started on Oral Antifungal- monitor for improvement  Moisturize intact skin w/ SWEEN cream BID  Nutrition Consult for optimization        encourage high quality protein, josephine/ prosource, MVI & Vit C to promote wound healing  Hyperglycemia - ADA diet and FS w/ ISS, consider HgA1c- pt non compliant "diet controlled"  Continue turning and positioning w/ offloading assistive devices as per protocol  Waffle Cushion to chair when oob to chair  Continue w/ low air loss pressure redistribution bed surface   Pt will need Group 2 mattress on hospital bed and ROHO cushion for wheel chair upon discharge home  Care as per medicine, will follow w/ you  Upon discharge f/u as outpatient at W[facility] [address] [facility] [phone]  D/w team, RN, & attng  Thank you for this consult  Yolanda Ramos PA-C CWS [id number]  Nights/ Weekends/ Holidays please call:  General Surgery Consult pager ([phone]) for emergencies  Wound PT for multilayer leg wrapping or VAC issues (x [phone])   I spent 35minutes face to face w/ this pt of which more than 50% of the time was spent counseling & coordinating care of this pt.

## 2025-02-19 NOTE — PROGRESS NOTE ADULT - ASSESSMENT
81 y/o F with PMHx, HTN, lymphedema, T2DM, sacral decubitus ulcer s/p debridement (2024) and recent adm for sacral OM who presented to the ED for coffee-ground emesis and melena x 1 day, admitted for acute blood loss anemia 2/2 to UGIB. Course c/b flu positive, Afib rvr, and possible delirium with concern for stroke. CT head did not show any acute changes.  81 y/o F with PMHx, HTN, lymphedema, T2DM, sacral decubitus ulcer s/p debridement (2024) and recent adm for sacral OM who presented to the ED for coffee-ground emesis and melena x 1 day, admitted for acute blood loss anemia 2/2 to UGIB. Course c/b flu positive, Afib rvr, and possible delirium with concern for stroke. CT head did not show any acute changes. Pending wound vac and HHA approval prior to DC. Also whether or not to AC for decreasing stroke risk vs increasing risk of GI bleed.

## 2025-02-19 NOTE — PROGRESS NOTE ADULT - SUBJECTIVE AND OBJECTIVE BOX
[facility]-- WOUND TEAM -- FOLLOW UP NOTE  --------------------------------------------------------------------------------    24 hour events/subjective:          Diet:  Diet, DASH/TLC:   Sodium & Cholesterol Restricted (02-16-25 @ 11:25)      ROS: General/ SKIN/ MSK/  GI see HPI  all other systems negative      ALLERGIES & MEDICATIONS  --------------------------------------------------------------------------------      No Known Allergies      STANDING INPATIENT MEDICATIONS  amLODIPine Tablet 10 milliGRAM(s) Oral daily  amoxicillin  875 milliGRAM(s)/clavulanate 1 Tablet(s) Oral two times a day  ascorbic acid 500 milliGRAM(s) Oral daily  atorvastatin 20 milliGRAM(s) Oral at bedtime  Dakins Solution - 1/4 Strength 1 Application(s) Topical daily  dextrose 5%. 1000 milliLiter(s) IV Continuous <Continuous>  dextrose 5%. 1000 milliLiter(s) IV Continuous <Continuous>  dextrose 50% Injectable 25 Gram(s) IV Push once  dextrose 50% Injectable 12.5 Gram(s) IV Push once  dextrose 50% Injectable 25 Gram(s) IV Push once  glucagon  Injectable 1 milliGRAM(s) IntraMuscular once  influenza  Vaccine (HIGH DOSE) 0.5 milliLiter(s) IntraMuscular once  insulin lispro (ADMELOG) corrective regimen sliding scale   SubCutaneous three times a day before meals  insulin lispro (ADMELOG) corrective regimen sliding scale   SubCutaneous at bedtime  lactulose Syrup 10 Gram(s) Oral two times a day  metoprolol tartrate 100 milliGRAM(s) Oral two times a day  multivitamin/minerals 1 Tablet(s) Oral daily  nystatin Powder 1 Application(s) Topical every 12 hours  pantoprazole    Tablet 40 milliGRAM(s) Oral two times a day  potassium phosphate / sodium phosphate Powder (PHOS-NaK) 1 Packet(s) Oral every 6 hours  rivaroxaban 20 milliGRAM(s) Oral with dinner  zinc sulfate 220 milliGRAM(s) Oral daily      PRN INPATIENT MEDICATION  acetaminophen Tablet 650 milliGRAM(s) Oral every 6 hours PRN  dextrose Oral Gel 15 Gram(s) Oral once PRN  melatonin 3 milliGRAM(s) Oral at bedtime PRN  polyethylene glycol 3350 17 Gram(s) Oral daily PRN        VITALS/PHYSICAL EXAM  --------------------------------------------------------------------------------  T(C): 36.4 (02-19-25 @ 11:19), Max: 36.9 (02-18-25 @ 20:23)  HR: 67 (02-19-25 @ 11:19) (66 - 74)  BP: 133/60 (02-19-25 @ 11:19) (125/72 - 133/60)  RR: 18 (02-19-25 @ 11:19) (18 - 18)  SpO2: 96% (02-19-25 @ 11:19) (96% - 98%)  Wt(kg): --                    LABS/ CULTURES/ RADIOLOGY:              11.3   14.94 >-----------<  382      [02-18-25 @ 11:43]              36.9     136  |  108  |  20  ----------------------------<  139      [02-18-25 @ 11:43]  4.1   |  18  |  0.79        Ca     8.4     [02-18-25 @ 11:43]      Mg     1.7     [02-18-25 @ 11:43]      Phos  2.0     [02-18-25 @ 11:43]        CAPILLARY BLOOD GLUCOSE  POCT Blood Glucose.: 187 mg/dL (18 Feb 2025 22:06)    A1C with Estimated Average Glucose Result: 7.3 % (01-09-25 @ 10:04)   [facility]-- WOUND TEAM -- FOLLOW UP NOTE  --------------------------------------------------------------------------------    24 hour events/subjective:    alert  afebrile  tolerating po w/o n/v  incontinent  encouraged mobility  less pain, no odor/ excess drainage      Diet:  Diet, DASH/TLC:   Sodium & Cholesterol Restricted (02-16-25 @ 11:25)      ROS: General/ SKIN/ MSK/  GI see HPI  all other systems negative      ALLERGIES & MEDICATIONS  --------------------------------------------------------------------------------      No Known Allergies      STANDING INPATIENT MEDICATIONS  amLODIPine Tablet 10 milliGRAM(s) Oral daily  amoxicillin  875 milliGRAM(s)/clavulanate 1 Tablet(s) Oral two times a day  ascorbic acid 500 milliGRAM(s) Oral daily  atorvastatin 20 milliGRAM(s) Oral at bedtime  Dakins Solution - 1/4 Strength 1 Application(s) Topical daily  dextrose 5%. 1000 milliLiter(s) IV Continuous <Continuous>  dextrose 5%. 1000 milliLiter(s) IV Continuous <Continuous>  dextrose 50% Injectable 25 Gram(s) IV Push once  dextrose 50% Injectable 12.5 Gram(s) IV Push once  dextrose 50% Injectable 25 Gram(s) IV Push once  glucagon  Injectable 1 milliGRAM(s) IntraMuscular once  influenza  Vaccine (HIGH DOSE) 0.5 milliLiter(s) IntraMuscular once  insulin lispro (ADMELOG) corrective regimen sliding scale   SubCutaneous three times a day before meals  insulin lispro (ADMELOG) corrective regimen sliding scale   SubCutaneous at bedtime  lactulose Syrup 10 Gram(s) Oral two times a day  metoprolol tartrate 100 milliGRAM(s) Oral two times a day  multivitamin/minerals 1 Tablet(s) Oral daily  nystatin Powder 1 Application(s) Topical every 12 hours  pantoprazole    Tablet 40 milliGRAM(s) Oral two times a day  potassium phosphate / sodium phosphate Powder (PHOS-NaK) 1 Packet(s) Oral every 6 hours  rivaroxaban 20 milliGRAM(s) Oral with dinner  zinc sulfate 220 milliGRAM(s) Oral daily      PRN INPATIENT MEDICATION  acetaminophen Tablet 650 milliGRAM(s) Oral every 6 hours PRN  dextrose Oral Gel 15 Gram(s) Oral once PRN  melatonin 3 milliGRAM(s) Oral at bedtime PRN  polyethylene glycol 3350 17 Gram(s) Oral daily PRN        VITALS/PHYSICAL EXAM  --------------------------------------------------------------------------------  T(C): 36.4 (02-19-25 @ 11:19), Max: 36.9 (02-18-25 @ 20:23)  HR: 67 (02-19-25 @ 11:19) (66 - 74)  BP: 133/60 (02-19-25 @ 11:19) (125/72 - 133/60)  RR: 18 (02-19-25 @ 11:19) (18 - 18)  SpO2: 96% (02-19-25 @ 11:19) (96% - 98%)  Wt(kg): --      NAD,   A&Ox3, MO, frail,  WD/ WN/ WG  [facility] P500 bed   HEENT:  NC/AT, EOMI, sclera clear, mucosa moist, throat clear, trachea midline, neck supple  Respiratory: nonlabored w/ equal chest rise  Gastrointestinal: soft NT/ND   : (+) purewick cath  Neurology:  weakened strength & sensation grossly intact  Psych: appropriate, anxious  Musculoskeletal: FROM, no deformities/ contractures  Vascular: BLE equally warm,  no cyanosis, clubbing, nor acute ischemia         BLE edema equal         BLE DP/PT pulses palpable  Skin: thin, dry, pale, frail,  ecchymosis w/o hematoma  Sacral Stage 4 pressure injury  moist red granular tissue w/ serosanguinous drainage     9.5cm x 9cm x 3.5cm     no exposed bone     periwound skin up the back into the flanks, down into posterior thighs & anteriorly into thigh/ groin & pannus skin fold regions as well as b/l axilla       w/ dull erythema and flaky skin w/ satellite lesions  Under Bilateral breast dull linear erythematous skin changes w/o blistering or weeping  No odor, erythema, increased warmth, tenderness, induration, fluctuance, nor crepitus        LABS/ CULTURES/ RADIOLOGY:              11.3   14.94 >-----------<  382      [02-18-25 @ 11:43]              36.9     136  |  108  |  20  ----------------------------<  139      [02-18-25 @ 11:43]  4.1   |  18  |  0.79        Ca     8.4     [02-18-25 @ 11:43]      Mg     1.7     [02-18-25 @ 11:43]      Phos  2.0     [02-18-25 @ 11:43]        CAPILLARY BLOOD GLUCOSE  POCT Blood Glucose.: 187 mg/dL (18 Feb 2025 22:06)    A1C with Estimated Average Glucose Result: 7.3 % (01-09-25 @ 10:04)      ACC: [id number] EXAM:  CT ABDOMEN AND PELVIS IC   ORDERED BY:  ORLANDO BOOKER     PROCEDURE DATE:  02/11/2025          INTERPRETATION:  CLINICAL INFORMATION: Melena, history of sacral   decubitus ulcer    COMPARISON: CT 1/10/2025    CONTRAST/COMPLICATIONS:  IV Contrast: Omnipaque 350  90 cc administered   10 cc discarded  Oral Contrast: NONE  .    PROCEDURE:  CT of the Abdomen and Pelvis was performed.  Precontrast, Arterial and Delayed phases were performed.  Sagittal and coronal reformats were performed.    FINDINGS: Streak artifact from the patient's arms.  LOWER CHEST: Aortic and coronary artery calcifications.    LIVER: Within normal limits.  BILE DUCTS: Normal caliber.  GALLBLADDER: Cholecystectomy.  SPLEEN: Within normal limits.  PANCREAS: Within normal limits.  ADRENALS: Within normal limits.  KIDNEYS/URETERS: Within normal limits.    BLADDER: Right lateral bladder wall thickening/mucosal hyperemia is noted.  REPRODUCTIVE ORGANS: Uterus and adnexa within normal limits.    BOWEL: No evidence of active GI bleeding. Colonic diverticulosis without   evidence of acute diverticulitis. No bowel wall thickening or   obstruction. Appendix  is normal.  PERITONEUM/RETROPERITONEUM: No ascites or pneumoperitoneum.  VESSELS: Diffuse atherosclerotic disease throughout the abdominal aorta   and its branches.  LYMPH NODES: No lymphadenopathy.  ABDOMINAL WALL: Sacral decubitus ulcer again noted with erosion of the   underlying lower sacrum and coccyx, concerning for osteomyelitis.  BONES: Degenerative changes. Scoliosis.    IMPRESSION:  No evidence of active GI bleed. No bowel wall thickening or acute GI   findings.    Nonspecific right lateral bladder wall thickening. Correlate with   urinalysis and possible cystitis and as necessary, cystoscopy to assess   for underlying bladder mass.    Sacral decubitus ulcer again noted with erosive changes of the underlying   lower sacrum and coccyx, concerning for osteomyelitis.

## 2025-02-19 NOTE — PROGRESS NOTE ADULT - PROBLEM SELECTOR PLAN 7
RVP + influenza.     - tamiflu (2/11-2/15 )  x 5 days   - droplet isolation RVP + influenza.     - tamiflu (2/11-2/15) x 5 days   - droplet isolation

## 2025-02-19 NOTE — PROGRESS NOTE ADULT - PROBLEM SELECTOR PLAN 6
Patient has not had a bowel movement in over 7 days. Could be causing her Afib    Plan:  - Miralax daily  - Started Lactulose 10g BID  - Started Magnesium Citrate   - Consider enema if above fails Patient has not had a bowel movement in over 7 days. Could be causing her Afib    Plan:  - Miralax daily  - cw Lactulose 10g BID  - cw Magnesium Citrate   - Consider enema if above fails

## 2025-02-19 NOTE — PROGRESS NOTE ADULT - PROBLEM SELECTOR PLAN 1
Multiple episodes of melena and coffee-ground emesis x 1 day, associated w/ hypotension and acute drop in Hgb. Underwent EGD w/ three nonbleeding duodenal ulcers. CT neg active GI bleed.   - s/p Protonix gtt (ending 2/15)    PLAN:   - CBC BID  - Protonix 40 mg BID IV  - Advance diet as tolerated  - f/u GI recs -> H pylori serology tests  (pending collection on 2/13)  - maintain active T&S, transfuse for Hgb >8 Patient with no hx of afib, went into afib w/RVR. HR increased to 160s, suspicion patient might have had a bleed, although patient denies bloody stool and vomiting. Was given 5mg Lopressor  - also noted by various providers and daughter that the patient seems mildly delirious   - 2/17 CT head negative for signs of stroke.     Plan:  - f/u GI if okay to start on AC for benefit of preventing stroke but cautious iso recent UGIB  - Continue to monitor for events  - Start Metoprolol Tartrate 100mg BID which is equivalent to her home dose BB  - [id number] score: 5  - HAS-BLED score: 3 Patient with no hx of afib, went into afib w/RVR. HR increased to 160s, suspicion patient might have had a bleed, although patient denies bloody stool and vomiting. Was given 5mg Lopressor  - also noted by various providers and daughter that the patient seems mildly delirious was initially concerning for embolism   - 2/17 CT head negative for signs of stroke.   - 2/18 discussed risks of stroke with AFIB when not on anticoagulation vs the risks of placing the pt on AC and increasing bleed risk with daughter Ling. She understands the risks of the AC and would like to proceed with anticoagulation if it was okay with the GI team considering she was here GI bleed.  - 2/19: GI team is okay with AC given that hgb has been stable in the 11s and no new onset of blood in stool.     Plan:  - Will start on maintenance dose Xarelto for decreasing stroke risk 2/19   - Continue to monitor for events  - Start Metoprolol Tartrate 100mg BID which is equivalent to her home dose BB  - [id number] score: 5  - HAS-BLED score: 3

## 2025-02-19 NOTE — PROGRESS NOTE ADULT - ATTENDING COMMENTS
80F PMHx HTN, T2DM, PAD, lymphedema, arthritis, Sacral pressure ulcer with OM refusing IV abx on PO, and bed-bound status presenting with diarrhea and coffee ground emesis. Found to hypotensive and anemic Hgb 6.0.    #Acute blood loss anemia  #UGIB 2/2 duodenal ulcer  #Hypotension  - S/p EGD 2/12 demonstrating esophagitis and multiple duodenal ulcers  - s/p Protonix gtt x 72 hrs ; transitioned to PO BID for total of 8 weeks  - Trend CBC and transfuse if Hgb less than 7, monitor for recurrence of bleeding  - S/p 5u PRBC total this admission, last transfusion 2/12  - F/u GI outpatient    #Sepsis  #Influenza A  #Sacral and coccyx OM  - CXR neg, CT abdomen/pelvis with possible UTI. Also with Sacral and coccyx OM  -  transition to Augmentin 2/16 concern for cefepime tox. Last day 2/19.  - s/p Tamiflu for influenza 2/15  - Wound care following and s/p wound vac, now off? f/u with wound care if michael to dc with wound vac    #New onset Afib w/RVR  - Lopressor 100mg BID  - CHADS VAsc 5: OK for AC per GI. Start low dose xarelto.     #Intertrigo, Fungal  - per ID plan to start Fluconazole PO, obtain EKG to assess QTc. LFT in AM    Time spent: 57 minutes  - Reviewing, and interpreting labs and testing.  - Independently obtaining a review of systems and performing a physical exam  - Reviewing consultant documentation/recommendations in addition to discussing plan of care with consultants.  - Counselling and educating patient and family regarding interpretation of aforementioned items and plan of care.  - This excludes time spent teaching residents/medical students . 80F PMHx HTN, T2DM, PAD, lymphedema, arthritis, Sacral pressure ulcer with OM refusing IV abx on PO, and bed-bound status presenting with diarrhea and coffee ground emesis. Found to hypotensive and anemic Hgb 6.0.    #Acute blood loss anemia  #UGIB 2/2 duodenal ulcer  #Hypotension  - S/p EGD 2/12 demonstrating esophagitis and multiple duodenal ulcers  - s/p Protonix gtt x 72 hrs ; transitioned to PO BID for total of 8 weeks  - Trend CBC and transfuse if Hgb less than 7, monitor for recurrence of bleeding  - S/p 5u PRBC total this admission, last transfusion 2/12  - F/u GI outpatient  - Montior for s/s of bleeding after starting AC    #Sepsis  #Influenza A  #Sacral and coccyx OM  - CXR neg, CT abdomen/pelvis with possible UTI. Also with Sacral and coccyx OM  -  transition to Augmentin 2/16 concern for cefepime tox. Last day 2/19.  - s/p Tamiflu for influenza 2/15  - Wound care following and s/p wound vac, now off? f/u with wound care if michael to dc with wound vac    #New onset Afib w/RVR  - Lopressor 100mg BID  - CHADS VAsc 5: OK for AC per GI. Start low dose xarelto.     #Intertrigo, Fungal  - per ID plan to start Fluconazole PO, obtain EKG to assess QTc. LFT in AM    Dispo: Home with HH, will need to clarify need for wound vac with PT WC.     Time spent: 57 minutes  - Reviewing, and interpreting labs and testing.  - Independently obtaining a review of systems and performing a physical exam  - Reviewing consultant documentation/recommendations in addition to discussing plan of care with consultants.  - Counselling and educating patient and family regarding interpretation of aforementioned items and plan of care.  - This excludes time spent teaching residents/medical students .

## 2025-02-19 NOTE — PROGRESS NOTE ADULT - SUBJECTIVE AND OBJECTIVE BOX
[facility]  Division of Infectious Diseases  [phone]    Name: VICENTA DAVIS  Age: 80y  Gender: Female  MRN: [id number]    Interval History--  Notes reviewed.     Past Medical History--  Hypertension    Diabetes    Back pain    Lymphedema of both lower extremities    HLD (hyperlipidemia)    Sacral osteomyelitis    History of cholecystectomy        For details regarding the patient's social history, family history, and other miscellaneous elements, please refer the initial infectious diseases consultation and/or the admitting history and physical examination for this admission.    Allergies    No Known Allergies    Intolerances        Medications--  Antibiotics:  amoxicillin  875 milliGRAM(s)/clavulanate 1 Tablet(s) Oral two times a day    Immunologic:  influenza  Vaccine (HIGH DOSE) 0.5 milliLiter(s) IntraMuscular once    Other:  acetaminophen     Tablet .. PRN  amLODIPine   Tablet  ascorbic acid  atorvastatin  Dakins Solution - 1/4 Strength  dextrose 5%.  dextrose 5%.  dextrose 50% Injectable  dextrose 50% Injectable  dextrose 50% Injectable  dextrose Oral Gel PRN  glucagon  Injectable  insulin lispro (ADMELOG) corrective regimen sliding scale  insulin lispro (ADMELOG) corrective regimen sliding scale  lactulose Syrup  melatonin PRN  metoprolol tartrate  multivitamin/minerals  nystatin Powder  pantoprazole    Tablet  polyethylene glycol 3350  potassium phosphate / sodium phosphate Powder (PHOS-NaK)  zinc sulfate      Review of Systems--  A 10-point review of systems was obtained.     Pertinent positives and negatives--  Constitutional: No fevers. No Chills. No Rigors.   Cardiovascular: No chest pain. No palpitations.  Respiratory: No shortness of breath. No cough.  Gastrointestinal: No nausea or vomiting. No diarrhea or constipation.   Psychiatric: Pleasant. Appropriate affect.    Review of systems otherwise negative except as previously noted.    Physical Examination--  Vital Signs: T(F): 97.5 (02-19-25 @ 04:41), Max: 98.5 (02-18-25 @ 20:23)  HR: 70 (02-19-25 @ 04:41)  BP: 130/75 (02-19-25 @ 04:41)  RR: 18 (02-19-25 @ 04:41)  SpO2: 98% (02-19-25 @ 04:41)  Wt(kg): --  General: Nontoxic-appearing Female in no acute distress.  HEENT: AT/NC. PERRL. EOMI. Anicteric. Conjunctiva pink and moist. Oropharynx clear. Dentition fair.  Neck: Not rigid. No sense of mass.  Nodes: None palpable.  Lungs: Clear bilaterally without rales, wheezing or rhonchi  Heart: Regular rate and rhythm. No Murmur. No rub. No gallop. No palpable thrill.  Abdomen: Bowel sounds present and normoactive. Soft. Nondistended. Nontender. No sense of mass. No organomegaly.  Back: No spinal tenderness. No costovertebral angle tenderness.   Extremities: No cyanosis or clubbing. No edema.   Skin: Warm. Dry. Good turgor. No rash. No vasculitic stigmata.  Psychiatric: Appropriate affect and mood for situation.         Laboratory Studies--  CBC                        11.3   14.94 )-----------( 382      ( 18 Feb 2025 11:43 )             36.9       Chemistries  02-18    136  |  108  |  20  ----------------------------<  139[H]  4.1   |  18[L]  |  0.79    Ca    8.4      18 Feb 2025 11:43  Phos  2.0     02-18  Mg     1.7     02-18        Culture Data           [facility]  Division of Infectious Diseases  [phone]    Name: VICENTA DAVIS  Age: 80y  Gender: Female  MRN: [id number]    Interval History--  Notes reviewed. Seen earlier today. Feeling ok. No new complaints.     Past Medical History--  Hypertension    Diabetes    Back pain    Lymphedema of both lower extremities    HLD (hyperlipidemia)    Sacral osteomyelitis    History of cholecystectomy        For details regarding the patient's social history, family history, and other miscellaneous elements, please refer the initial infectious diseases consultation and/or the admitting history and physical examination for this admission.    Allergies    No Known Allergies    Intolerances        Medications--  Antibiotics:  amoxicillin  875 milliGRAM(s)/clavulanate 1 Tablet(s) Oral two times a day    Immunologic:  influenza  Vaccine (HIGH DOSE) 0.5 milliLiter(s) IntraMuscular once    Other:  acetaminophen     Tablet .. PRN  amLODIPine   Tablet  ascorbic acid  atorvastatin  Dakins Solution - 1/4 Strength  dextrose 5%.  dextrose 5%.  dextrose 50% Injectable  dextrose 50% Injectable  dextrose 50% Injectable  dextrose Oral Gel PRN  glucagon  Injectable  insulin lispro (ADMELOG) corrective regimen sliding scale  insulin lispro (ADMELOG) corrective regimen sliding scale  lactulose Syrup  melatonin PRN  metoprolol tartrate  multivitamin/minerals  nystatin Powder  pantoprazole    Tablet  polyethylene glycol 3350  potassium phosphate / sodium phosphate Powder (PHOS-NaK)  zinc sulfate      Review of Systems--  A 10-point review of systems was obtained.   Review of systems otherwise negative except as previously noted.    Physical Examination--  Vital Signs: T(F): 97.5 (02-19-25 @ 04:41), Max: 98.5 (02-18-25 @ 20:23)  HR: 70 (02-19-25 @ 04:41)  BP: 130/75 (02-19-25 @ 04:41)  RR: 18 (02-19-25 @ 04:41)  SpO2: 98% (02-19-25 @ 04:41)  Wt(kg): --  General: Nontoxic-appearing Female in no acute distress.  HEENT: AT/NC. Anicteric. Conjunctiva pink and moist. Oropharynx clear.   Neck: Not rigid. No sense of mass.  Nodes: None palpable.  Lungs: Decreased BS B no RWR  Heart: Regular rate and rhythm.   Abdomen: Bowel sounds present and normoactive. Soft. Nondistended. Nontender.  Back: extensive rash, sharply demarcated, superificial desquamation, red nonblanching, with satellite lesions. Sacral sore 25%granular/75%fibrinous exudate. VAC not present when patient turned (unclear when removed).  Extremities: No cyanosis or clubbing. 1+ edema.   Skin: Warm. Dry. Good turgor. No other rash. No vasculitic stigmata.  Psychiatric: Appropriate affect and mood for situation.         Laboratory Studies--  CBC                        11.3   14.94 )-----------( 382      ( 18 Feb 2025 11:43 )             36.9       Chemistries  02-18    136  |  108  |  20  ----------------------------<  139[H]  4.1   |  18[L]  |  0.79    Ca    8.4      18 Feb 2025 11:43  Phos  2.0     02-18  Mg     1.7     02-18        Culture Data  No new data

## 2025-02-19 NOTE — PROGRESS NOTE ADULT - SUBJECTIVE AND OBJECTIVE BOX
******************  Authored By: Kentrell Delgado MD, PGY1  MS Teams Preferred  ******************  INTERVAL HPI/OVERNIGHT EVENTS:  Pt seen and examined at bedside. No acute overnight events or complaints.    Brief Daily Plan:  -    VITAL SIGNS:  T(F): 97.5 (02-19-25 @ 04:41)  HR: 70 (02-19-25 @ 04:41)  BP: 130/75 (02-19-25 @ 04:41)  RR: 18 (02-19-25 @ 04:41)  SpO2: 98% (02-19-25 @ 04:41)  Wt(kg): --    CAPILLARY BLOOD GLUCOSE      POCT Blood Glucose.: 187 mg/dL (18 Feb 2025 22:06)      PHYSICAL EXAM:    GENERAL: NAD, chronically ill appearing, pale   HEAD: Atraumatic, Normocephalic  EYES: EOMI, PERRLA, conjunctiva and sclera clear  ENMT: Moist mucous membranes  NECK: Supple, trachea midline   NERVOUS SYSTEM:  Alert & Oriented X3, Good eye contact and conversational.   CHEST/LUNG: Clear to auscultation bilaterally on anterior exam; No rales, rhonchi, wheezing, or rubs  HEART: Regular rate and rhythm; No murmurs, rubs, or gallops  ABDOMEN: Soft, Nontender, Nondistended; Bowel sounds present  EXTREMITIES:  2+ Peripheral Pulses, nonpitting edema b/l (unchanged from prior)   SKIN: Sacral Ulcer     MEDICATIONS  (STANDING):  amLODIPine   Tablet 10 milliGRAM(s) Oral daily  amoxicillin  875 milliGRAM(s)/clavulanate 1 Tablet(s) Oral two times a day  ascorbic acid 500 milliGRAM(s) Oral daily  atorvastatin 20 milliGRAM(s) Oral at bedtime  dextrose 5%. 1000 milliLiter(s) (100 mL/Hr) IV Continuous <Continuous>  dextrose 5%. 1000 milliLiter(s) (50 mL/Hr) IV Continuous <Continuous>  dextrose 50% Injectable 25 Gram(s) IV Push once  dextrose 50% Injectable 12.5 Gram(s) IV Push once  dextrose 50% Injectable 25 Gram(s) IV Push once  glucagon  Injectable 1 milliGRAM(s) IntraMuscular once  influenza  Vaccine (HIGH DOSE) 0.5 milliLiter(s) IntraMuscular once  insulin lispro (ADMELOG) corrective regimen sliding scale   SubCutaneous three times a day before meals  insulin lispro (ADMELOG) corrective regimen sliding scale   SubCutaneous at bedtime  lactulose Syrup 10 Gram(s) Oral two times a day  metoprolol tartrate 100 milliGRAM(s) Oral two times a day  multivitamin/minerals 1 Tablet(s) Oral daily  pantoprazole    Tablet 40 milliGRAM(s) Oral two times a day  polyethylene glycol 3350 17 Gram(s) Oral daily  zinc sulfate 220 milliGRAM(s) Oral daily    MEDICATIONS  (PRN):  acetaminophen     Tablet .. 650 milliGRAM(s) Oral every 6 hours PRN Temp greater or equal to 38C (100.4F), Mild Pain (1 - 3)  dextrose Oral Gel 15 Gram(s) Oral once PRN Blood Glucose LESS THAN 70 milliGRAM(s)/deciliter  melatonin 3 milliGRAM(s) Oral at bedtime PRN Insomnia      Allergies    No Known Allergies    Intolerances        LABS:                        11.3   14.94 )-----------( 382      ( 18 Feb 2025 11:43 )             36.9     02-18    136  |  108  |  20  ----------------------------<  139[H]  4.1   |  18[L]  |  0.79    Ca    8.4      18 Feb 2025 11:43  Phos  2.0     02-18  Mg     1.7     02-18        Urinalysis Basic - ( 18 Feb 2025 11:43 )    Color: x / Appearance: x / SG: x / pH: x  Gluc: 139 mg/dL / Ketone: x  / Bili: x / Urobili: x   Blood: x / Protein: x / Nitrite: x   Leuk Esterase: x / RBC: x / WBC x   Sq Epi: x / Non Sq Epi: x / Bacteria: x          RADIOLOGY & ADDITIONAL TESTS:  Reviewed    ******************   ******************  Authored By: Kentrell Delgado MD, PGY1  MS Teams Preferred  ******************  INTERVAL HPI/OVERNIGHT EVENTS:  Pt seen and examined at bedside. No acute overnight events or complaints.    Brief Daily Plan:  - pending sw for wound vac and HHA set up  - f/u GI for AC recs     VITAL SIGNS:  T(F): 97.5 (02-19-25 @ 04:41)  HR: 70 (02-19-25 @ 04:41)  BP: 130/75 (02-19-25 @ 04:41)  RR: 18 (02-19-25 @ 04:41)  SpO2: 98% (02-19-25 @ 04:41)  Wt(kg): --    CAPILLARY BLOOD GLUCOSE      POCT Blood Glucose.: 187 mg/dL (18 Feb 2025 22:06)      PHYSICAL EXAM:    GENERAL: NAD, chronically ill appearing, pale   HEAD: Atraumatic, Normocephalic  EYES: EOMI, PERRLA, conjunctiva and sclera clear  ENMT: Moist mucous membranes  NECK: Supple, trachea midline   NERVOUS SYSTEM:  Alert & Oriented X3, Good eye contact and conversational.   CHEST/LUNG: Clear to auscultation bilaterally on anterior exam; No rales, rhonchi, wheezing, or rubs  HEART: Regular rate and rhythm; No murmurs, rubs, or gallops  ABDOMEN: Soft, Nontender, Nondistended; Bowel sounds present  EXTREMITIES:  2+ Peripheral Pulses, nonpitting edema b/l (unchanged from prior)   SKIN: Sacral Ulcer     MEDICATIONS  (STANDING):  amLODIPine   Tablet 10 milliGRAM(s) Oral daily  amoxicillin  875 milliGRAM(s)/clavulanate 1 Tablet(s) Oral two times a day  ascorbic acid 500 milliGRAM(s) Oral daily  atorvastatin 20 milliGRAM(s) Oral at bedtime  dextrose 5%. 1000 milliLiter(s) (100 mL/Hr) IV Continuous <Continuous>  dextrose 5%. 1000 milliLiter(s) (50 mL/Hr) IV Continuous <Continuous>  dextrose 50% Injectable 25 Gram(s) IV Push once  dextrose 50% Injectable 12.5 Gram(s) IV Push once  dextrose 50% Injectable 25 Gram(s) IV Push once  glucagon  Injectable 1 milliGRAM(s) IntraMuscular once  influenza  Vaccine (HIGH DOSE) 0.5 milliLiter(s) IntraMuscular once  insulin lispro (ADMELOG) corrective regimen sliding scale   SubCutaneous three times a day before meals  insulin lispro (ADMELOG) corrective regimen sliding scale   SubCutaneous at bedtime  lactulose Syrup 10 Gram(s) Oral two times a day  metoprolol tartrate 100 milliGRAM(s) Oral two times a day  multivitamin/minerals 1 Tablet(s) Oral daily  pantoprazole    Tablet 40 milliGRAM(s) Oral two times a day  polyethylene glycol 3350 17 Gram(s) Oral daily  zinc sulfate 220 milliGRAM(s) Oral daily    MEDICATIONS  (PRN):  acetaminophen     Tablet .. 650 milliGRAM(s) Oral every 6 hours PRN Temp greater or equal to 38C (100.4F), Mild Pain (1 - 3)  dextrose Oral Gel 15 Gram(s) Oral once PRN Blood Glucose LESS THAN 70 milliGRAM(s)/deciliter  melatonin 3 milliGRAM(s) Oral at bedtime PRN Insomnia      Allergies    No Known Allergies    Intolerances        LABS:                        11.3   14.94 )-----------( 382      ( 18 Feb 2025 11:43 )             36.9     02-18    136  |  108  |  20  ----------------------------<  139[H]  4.1   |  18[L]  |  0.79    Ca    8.4      18 Feb 2025 11:43  Phos  2.0     02-18  Mg     1.7     02-18        Urinalysis Basic - ( 18 Feb 2025 11:43 )    Color: x / Appearance: x / SG: x / pH: x  Gluc: 139 mg/dL / Ketone: x  / Bili: x / Urobili: x   Blood: x / Protein: x / Nitrite: x   Leuk Esterase: x / RBC: x / WBC x   Sq Epi: x / Non Sq Epi: x / Bacteria: x          RADIOLOGY & ADDITIONAL TESTS:  Reviewed    ******************   ******************  Authored By: Kentrell Delgado MD, PGY1  MS Teams Preferred  ******************  INTERVAL HPI/OVERNIGHT EVENTS:  Pt seen and examined at bedside. No acute overnight events or complaints.    Brief Daily Plan:  - pending sw for wound vac and HHA set up  - f/u GI for AC recs   - call pharmacy for updated recs     VITAL SIGNS:  T(F): 97.5 (02-19-25 @ 04:41)  HR: 70 (02-19-25 @ 04:41)  BP: 130/75 (02-19-25 @ 04:41)  RR: 18 (02-19-25 @ 04:41)  SpO2: 98% (02-19-25 @ 04:41)  Wt(kg): --    CAPILLARY BLOOD GLUCOSE    POCT Blood Glucose.: 187 mg/dL (18 Feb 2025 22:06)    PHYSICAL EXAM:    GENERAL: NAD, chronically ill appearing, pale   HEAD: Atraumatic, Normocephalic  EYES: EOMI, PERRLA, conjunctiva and sclera clear  ENMT: Moist mucous membranes  NECK: Supple, trachea midline   NERVOUS SYSTEM:  Alert & Oriented X3, Good eye contact and conversational.   CHEST/LUNG: Clear to auscultation bilaterally on anterior exam; No rales, rhonchi, wheezing, or rubs  HEART: Regular rate and rhythm; No murmurs, rubs, or gallops  ABDOMEN: Soft, Nontender, Nondistended; Bowel sounds present  EXTREMITIES:  2+ Peripheral Pulses, nonpitting edema b/l (unchanged from prior)   SKIN: Sacral Ulcer     MEDICATIONS  (STANDING):  amLODIPine   Tablet 10 milliGRAM(s) Oral daily  amoxicillin  875 milliGRAM(s)/clavulanate 1 Tablet(s) Oral two times a day  ascorbic acid 500 milliGRAM(s) Oral daily  atorvastatin 20 milliGRAM(s) Oral at bedtime  dextrose 5%. 1000 milliLiter(s) (100 mL/Hr) IV Continuous <Continuous>  dextrose 5%. 1000 milliLiter(s) (50 mL/Hr) IV Continuous <Continuous>  dextrose 50% Injectable 25 Gram(s) IV Push once  dextrose 50% Injectable 12.5 Gram(s) IV Push once  dextrose 50% Injectable 25 Gram(s) IV Push once  glucagon  Injectable 1 milliGRAM(s) IntraMuscular once  influenza  Vaccine (HIGH DOSE) 0.5 milliLiter(s) IntraMuscular once  insulin lispro (ADMELOG) corrective regimen sliding scale   SubCutaneous three times a day before meals  insulin lispro (ADMELOG) corrective regimen sliding scale   SubCutaneous at bedtime  lactulose Syrup 10 Gram(s) Oral two times a day  metoprolol tartrate 100 milliGRAM(s) Oral two times a day  multivitamin/minerals 1 Tablet(s) Oral daily  pantoprazole    Tablet 40 milliGRAM(s) Oral two times a day  polyethylene glycol 3350 17 Gram(s) Oral daily  zinc sulfate 220 milliGRAM(s) Oral daily    MEDICATIONS  (PRN):  acetaminophen     Tablet .. 650 milliGRAM(s) Oral every 6 hours PRN Temp greater or equal to 38C (100.4F), Mild Pain (1 - 3)  dextrose Oral Gel 15 Gram(s) Oral once PRN Blood Glucose LESS THAN 70 milliGRAM(s)/deciliter  melatonin 3 milliGRAM(s) Oral at bedtime PRN Insomnia    Allergies    No Known Allergies    Intolerances    LABS:                        11.3   14.94 )-----------( 382      ( 18 Feb 2025 11:43 )             36.9     02-18    136  |  108  |  20  ----------------------------<  139[H]  4.1   |  18[L]  |  0.79    Ca    8.4      18 Feb 2025 11:43  Phos  2.0     02-18  Mg     1.7     02-18    Urinalysis Basic - ( 18 Feb 2025 11:43 )    Color: x / Appearance: x / SG: x / pH: x  Gluc: 139 mg/dL / Ketone: x  / Bili: x / Urobili: x   Blood: x / Protein: x / Nitrite: x   Leuk Esterase: x / RBC: x / WBC x   Sq Epi: x / Non Sq Epi: x / Bacteria: x    RADIOLOGY & ADDITIONAL TESTS:  Reviewed    ******************

## 2025-02-20 LAB — GLUCOSE BLDC GLUCOMTR-MCNC: 212 MG/DL — HIGH (ref 70–99)

## 2025-02-20 PROCEDURE — 99233 SBSQ HOSP IP/OBS HIGH 50: CPT | Mod: GC

## 2025-02-20 RX ORDER — RIVAROXABAN 10 MG/1
1 TABLET, FILM COATED ORAL
Qty: 30 | Refills: 0
Start: 2025-02-20 | End: 2025-03-21

## 2025-02-20 RX ORDER — AMLODIPINE BESYLATE 10 MG/1
1 TABLET ORAL
Qty: 30 | Refills: 0
Start: 2025-02-20 | End: 2025-03-21

## 2025-02-20 RX ORDER — METOPROLOL SUCCINATE 50 MG/1
1 TABLET, EXTENDED RELEASE ORAL
Qty: 60 | Refills: 0
Start: 2025-02-20 | End: 2025-03-21

## 2025-02-20 RX ORDER — FLUCONAZOLE 150 MG
1 TABLET ORAL
Qty: 3 | Refills: 0
Start: 2025-02-20 | End: 2025-02-22

## 2025-02-20 RX ORDER — NYSTATIN 100000 [USP'U]/G
1 CREAM TOPICAL
Qty: 0 | Refills: 0 | DISCHARGE
Start: 2025-02-20

## 2025-02-20 RX ORDER — ATORVASTATIN CALCIUM 80 MG/1
1 TABLET, FILM COATED ORAL
Qty: 0 | Refills: 0 | DISCHARGE
Start: 2025-02-20

## 2025-02-20 RX ORDER — FLUCONAZOLE 150 MG
1 TABLET ORAL
Refills: 0 | DISCHARGE

## 2025-02-20 RX ADMIN — SODIUM HYPOCHLORITE 1 APPLICATION(S): 0.12 SOLUTION TOPICAL at 12:31

## 2025-02-20 RX ADMIN — Medication 1 TABLET(S): at 12:30

## 2025-02-20 RX ADMIN — Medication 200 MILLIGRAM(S): at 12:31

## 2025-02-20 RX ADMIN — Medication 500 MILLIGRAM(S): at 12:30

## 2025-02-20 RX ADMIN — NYSTATIN 1 APPLICATION(S): 100000 CREAM TOPICAL at 17:22

## 2025-02-20 RX ADMIN — Medication 40 MILLIGRAM(S): at 05:11

## 2025-02-20 RX ADMIN — METOPROLOL SUCCINATE 100 MILLIGRAM(S): 50 TABLET, EXTENDED RELEASE ORAL at 17:22

## 2025-02-20 RX ADMIN — RIVAROXABAN 20 MILLIGRAM(S): 10 TABLET, FILM COATED ORAL at 17:22

## 2025-02-20 RX ADMIN — Medication 220 MILLIGRAM(S): at 12:30

## 2025-02-20 RX ADMIN — METOPROLOL SUCCINATE 100 MILLIGRAM(S): 50 TABLET, EXTENDED RELEASE ORAL at 05:10

## 2025-02-20 RX ADMIN — ATORVASTATIN CALCIUM 20 MILLIGRAM(S): 80 TABLET, FILM COATED ORAL at 21:51

## 2025-02-20 RX ADMIN — LACTULOSE 10 GRAM(S): 10 SOLUTION ORAL at 17:23

## 2025-02-20 RX ADMIN — AMLODIPINE BESYLATE 10 MILLIGRAM(S): 10 TABLET ORAL at 05:11

## 2025-02-20 RX ADMIN — Medication 3 MILLIGRAM(S): at 01:14

## 2025-02-20 RX ADMIN — NYSTATIN 1 APPLICATION(S): 100000 CREAM TOPICAL at 05:12

## 2025-02-20 RX ADMIN — Medication 40 MILLIGRAM(S): at 17:22

## 2025-02-20 NOTE — PROGRESS NOTE ADULT - ATTENDING COMMENTS
80F PMHx HTN, T2DM, PAD, lymphedema, arthritis, Sacral pressure ulcer with OM refusing IV abx on PO, and bed-bound status presenting with diarrhea and coffee ground emesis. Found to hypotensive and anemic Hgb 6.0.    #Acute blood loss anemia  - S/p 5u PRBC total this admission, last transfusion 2/12  - Montior for s/s of bleeding after starting AC    #UGIB 2/2 duodenal ulcer  #Hypotension  - S/p EGD 2/12 demonstrating esophagitis and multiple duodenal ulcers  - Protonix PO BID for total of 8 weeks  - F/u GI outpatient    #Sepsis  #Influenza A  #Sacral and coccyx OM  - CXR neg, CT abdomen/pelvis with possible UTI. Also with Sacral and coccyx OM  -  transition to Augmentin 2/16 concern for cefepime tox. Last day 2/19.  - s/p Tamiflu for influenza 2/15  - Wound care following and s/p wound vac, now off? f/u with wound care if michael to dc with wound vac    #New onset Afib w/RVR  - Lopressor 100mg BID  - CHADS VAsc 5: OK for AC per GI. Started on Xarelto 2/20    #Intertrigo, Fungal  - ID recommending PO fluconazole, nustatin powder    Dispo: Medically cleared for discharge home with HH.    Time spent: 51 minutes  - Reviewing, and interpreting labs and testing.  - Independently obtaining a review of systems and performing a physical exam  - Reviewing consultant documentation/recommendations in addition to discussing plan of care with consultants.  - Counselling and educating patient and family regarding interpretation of aforementioned items and plan of care.  - This excludes time spent teaching residents/medical students .

## 2025-02-20 NOTE — PROGRESS NOTE ADULT - PROBLEM SELECTOR PLAN 4
Patient noted to be acutely altered on 2/16 and 2/17. Responding inappropriately to questions and just saying yes while not having meaningful conversations. No focal deficits.   DDx: Acute ischemia iso Afib RVR vs hospital acquired delirium vs urinary tract infection iso chronic bedbound with OM  - CT head 2/17 negative for acute changes   - Patient without any dysuria or increased frequency  - Mental status improved on 2/18 after changing to a darker room and having better sleep    PLAN:   - Given mental status is improving and no ischemic changes acutely, will continue to monitor mental status with family  - on AC

## 2025-02-20 NOTE — PROGRESS NOTE ADULT - PROBLEM SELECTOR PLAN 2
Previous adm (1/8-16) for sacral OM, rec for PICC line on dc for IV abx however pt refused and discharged on Augmentin s/p 6 week course   - Gen surg performed bedside superficial wound debridement; wound looks clean.   - EKG 2/19- QTC interval 425  - s/p abx 2/19 with Augmentin  - 2/19 air mattress requested     PLAN:   - 2/19 started fluconazole 200 mg qd for possible sacral fungal moisture dermatitis  - f/u wound care recs -> local care currently  - pending wound vac approval per social work

## 2025-02-20 NOTE — PROGRESS NOTE ADULT - ASSESSMENT
81 y/o F with PMHx, HTN, lymphedema, T2DM, sacral decubitus ulcer s/p debridement (2024) and recent adm for sacral OM who presented to the ED for coffee-ground emesis and melena x 1 day, admitted for acute blood loss anemia 2/2 to UGIB. Course c/b flu positive, Afib rvr, and possible delirium with concern for stroke. CT head did not show any acute changes. Pending wound vac and HHA approval prior to DC. Also whether or not to AC for decreasing stroke risk vs increasing risk of GI bleed.

## 2025-02-20 NOTE — PROGRESS NOTE ADULT - PROBLEM SELECTOR PLAN 11
Home meds: bisoprolol 10mg qd     - hold bisoprolol for now  - increased Amlodipine to 10 mg on 2/18 for better control of BP

## 2025-02-20 NOTE — PROGRESS NOTE ADULT - PROBLEM SELECTOR PLAN 5
Hgb 6 on adm, now s/p 5u pRBC since admission.   2/2 to UGI from ulcers.   baseline Hgb ~9.     PLAN:  - transfuse for Hgb > 8  - maintain active T&S  - on AC, family understands risks and benefits as above

## 2025-02-20 NOTE — PROGRESS NOTE ADULT - SUBJECTIVE AND OBJECTIVE BOX
******************  Authored By: Kentrell Delgado MD, PGY1  MS Teams Preferred  ******************  INTERVAL HPI/OVERNIGHT EVENTS:  Pt seen and examined at bedside. No acute overnight events or complaints.    Brief Daily Plan:  -    VITAL SIGNS:  T(F): 98.1 (02-19-25 @ 20:16)  HR: 67 (02-19-25 @ 20:16)  BP: 147/72 (02-19-25 @ 20:16)  RR: 18 (02-19-25 @ 20:16)  SpO2: 95% (02-19-25 @ 20:16)  Wt(kg): --    CAPILLARY BLOOD GLUCOSE    POCT Blood Glucose.: 169 mg/dL (19 Feb 2025 21:12)    PHYSICAL EXAM:    GENERAL: NAD, chronically ill appearing, pale   HEAD: Atraumatic, Normocephalic  EYES: EOMI, PERRLA, conjunctiva and sclera clear  ENMT: Moist mucous membranes  NECK: Supple, trachea midline   NERVOUS SYSTEM:  Alert & Oriented X3, Good eye contact and conversational.   CHEST/LUNG: Clear to auscultation bilaterally on anterior exam; No rales, rhonchi, wheezing, or rubs  HEART: Regular rate and rhythm; No murmurs, rubs, or gallops  ABDOMEN: Soft, Nontender, Nondistended; Bowel sounds present  EXTREMITIES:  2+ Peripheral Pulses, nonpitting edema b/l (unchanged from prior)   SKIN: Sacral Ulcer     MEDICATIONS  (STANDING):  amLODIPine   Tablet 10 milliGRAM(s) Oral daily  ascorbic acid 500 milliGRAM(s) Oral daily  atorvastatin 20 milliGRAM(s) Oral at bedtime  Dakins Solution - 1/4 Strength 1 Application(s) Topical daily  dextrose 5%. 1000 milliLiter(s) (50 mL/Hr) IV Continuous <Continuous>  dextrose 5%. 1000 milliLiter(s) (100 mL/Hr) IV Continuous <Continuous>  dextrose 50% Injectable 25 Gram(s) IV Push once  dextrose 50% Injectable 12.5 Gram(s) IV Push once  dextrose 50% Injectable 25 Gram(s) IV Push once  fluconAZOLE   Tablet 200 milliGRAM(s) Oral daily  glucagon  Injectable 1 milliGRAM(s) IntraMuscular once  influenza  Vaccine (HIGH DOSE) 0.5 milliLiter(s) IntraMuscular once  insulin lispro (ADMELOG) corrective regimen sliding scale   SubCutaneous three times a day before meals  insulin lispro (ADMELOG) corrective regimen sliding scale   SubCutaneous at bedtime  lactulose Syrup 10 Gram(s) Oral two times a day  metoprolol tartrate 100 milliGRAM(s) Oral two times a day  multivitamin/minerals 1 Tablet(s) Oral daily  nystatin Powder 1 Application(s) Topical every 12 hours  pantoprazole    Tablet 40 milliGRAM(s) Oral two times a day  rivaroxaban 20 milliGRAM(s) Oral with dinner  zinc sulfate 220 milliGRAM(s) Oral daily    MEDICATIONS  (PRN):  acetaminophen     Tablet .. 650 milliGRAM(s) Oral every 6 hours PRN Temp greater or equal to 38C (100.4F), Mild Pain (1 - 3)  dextrose Oral Gel 15 Gram(s) Oral once PRN Blood Glucose LESS THAN 70 milliGRAM(s)/deciliter  melatonin 3 milliGRAM(s) Oral at bedtime PRN Insomnia  polyethylene glycol 3350 17 Gram(s) Oral daily PRN Constipation      Allergies    No Known Allergies    Intolerances        LABS:                        11.3   14.94 )-----------( 382      ( 18 Feb 2025 11:43 )             36.9     02-18    136  |  108  |  20  ----------------------------<  139[H]  4.1   |  18[L]  |  0.79    Ca    8.4      18 Feb 2025 11:43  Phos  2.0     02-18  Mg     1.7     02-18        Urinalysis Basic - ( 18 Feb 2025 11:43 )    Color: x / Appearance: x / SG: x / pH: x  Gluc: 139 mg/dL / Ketone: x  / Bili: x / Urobili: x   Blood: x / Protein: x / Nitrite: x   Leuk Esterase: x / RBC: x / WBC x   Sq Epi: x / Non Sq Epi: x / Bacteria: x          RADIOLOGY & ADDITIONAL TESTS:  Reviewed    ******************   ******************  Authored By: Kentrell Delgado MD, PGY1  MS Teams Preferred  ******************  INTERVAL HPI/OVERNIGHT EVENTS:  Pt seen and examined at bedside. No acute overnight events or complaints.    Brief Daily Plan:  - cw fluconazole for a total of 5 days of treatment   - ppi for total of 8 weeks  - cw xarelto    VITAL SIGNS:  T(F): 98.1 (02-19-25 @ 20:16)  HR: 67 (02-19-25 @ 20:16)  BP: 147/72 (02-19-25 @ 20:16)  RR: 18 (02-19-25 @ 20:16)  SpO2: 95% (02-19-25 @ 20:16)  Wt(kg): --    CAPILLARY BLOOD GLUCOSE    POCT Blood Glucose.: 169 mg/dL (19 Feb 2025 21:12)    PHYSICAL EXAM:    GENERAL: NAD, chronically ill appearing, pale   HEAD: Atraumatic, Normocephalic  EYES: EOMI, PERRLA, conjunctiva and sclera clear  ENMT: Moist mucous membranes  NECK: Supple, trachea midline   NERVOUS SYSTEM:  Alert & Oriented X3, Good eye contact and conversational.   CHEST/LUNG: Clear to auscultation bilaterally on anterior exam; No rales, rhonchi, wheezing, or rubs  HEART: Regular rate and rhythm; No murmurs, rubs, or gallops  ABDOMEN: Soft, Nontender, Nondistended; Bowel sounds present  EXTREMITIES:  2+ Peripheral Pulses, nonpitting edema b/l (unchanged from prior)   SKIN: Sacral Ulcer     MEDICATIONS  (STANDING):  amLODIPine   Tablet 10 milliGRAM(s) Oral daily  ascorbic acid 500 milliGRAM(s) Oral daily  atorvastatin 20 milliGRAM(s) Oral at bedtime  Dakins Solution - 1/4 Strength 1 Application(s) Topical daily  dextrose 5%. 1000 milliLiter(s) (50 mL/Hr) IV Continuous <Continuous>  dextrose 5%. 1000 milliLiter(s) (100 mL/Hr) IV Continuous <Continuous>  dextrose 50% Injectable 25 Gram(s) IV Push once  dextrose 50% Injectable 12.5 Gram(s) IV Push once  dextrose 50% Injectable 25 Gram(s) IV Push once  fluconAZOLE   Tablet 200 milliGRAM(s) Oral daily  glucagon  Injectable 1 milliGRAM(s) IntraMuscular once  influenza  Vaccine (HIGH DOSE) 0.5 milliLiter(s) IntraMuscular once  insulin lispro (ADMELOG) corrective regimen sliding scale   SubCutaneous three times a day before meals  insulin lispro (ADMELOG) corrective regimen sliding scale   SubCutaneous at bedtime  lactulose Syrup 10 Gram(s) Oral two times a day  metoprolol tartrate 100 milliGRAM(s) Oral two times a day  multivitamin/minerals 1 Tablet(s) Oral daily  nystatin Powder 1 Application(s) Topical every 12 hours  pantoprazole    Tablet 40 milliGRAM(s) Oral two times a day  rivaroxaban 20 milliGRAM(s) Oral with dinner  zinc sulfate 220 milliGRAM(s) Oral daily    MEDICATIONS  (PRN):  acetaminophen     Tablet .. 650 milliGRAM(s) Oral every 6 hours PRN Temp greater or equal to 38C (100.4F), Mild Pain (1 - 3)  dextrose Oral Gel 15 Gram(s) Oral once PRN Blood Glucose LESS THAN 70 milliGRAM(s)/deciliter  melatonin 3 milliGRAM(s) Oral at bedtime PRN Insomnia  polyethylene glycol 3350 17 Gram(s) Oral daily PRN Constipation      Allergies    No Known Allergies    Intolerances        LABS:                        11.3   14.94 )-----------( 382      ( 18 Feb 2025 11:43 )             36.9     02-18    136  |  108  |  20  ----------------------------<  139[H]  4.1   |  18[L]  |  0.79    Ca    8.4      18 Feb 2025 11:43  Phos  2.0     02-18  Mg     1.7     02-18        Urinalysis Basic - ( 18 Feb 2025 11:43 )    Color: x / Appearance: x / SG: x / pH: x  Gluc: 139 mg/dL / Ketone: x  / Bili: x / Urobili: x   Blood: x / Protein: x / Nitrite: x   Leuk Esterase: x / RBC: x / WBC x   Sq Epi: x / Non Sq Epi: x / Bacteria: x          RADIOLOGY & ADDITIONAL TESTS:  Reviewed    ******************

## 2025-02-20 NOTE — PROGRESS NOTE ADULT - PROBLEM SELECTOR PLAN 7
Patient has not had a bowel movement in over 7 days. Could be causing her Afib    Plan:  - Miralax daily  - cw Lactulose 10g BID  - cw Magnesium Citrate   - Consider enema if above fails

## 2025-02-20 NOTE — PROGRESS NOTE ADULT - PROBLEM SELECTOR PLAN 1
Patient with no hx of afib, went into afib w/RVR. HR increased to 160s, suspicion patient might have had a bleed, although patient denies bloody stool and vomiting. Was given 5mg Lopressor.  - concern for delirium from embolism as below  - 2/17 CT head negative for signs of stroke.   - 2/18 discussed risks of stroke with AFIB when not on anticoagulation vs the risks of placing the pt on AC and increasing bleed risk with daughter Ling. She understands the risks of the AC and would like to proceed with anticoagulation if it was okay with the GI team considering she was here GI bleed.  - 2/19: GI team is okay with AC given that hgb has been stable in the 11s and no new onset of blood in stool.   - [id number] score: 5 | HAS-BLED score: 3    Plan:  - cw maintenance dose Xarelto for decreasing stroke risk 2/19   - ctm for events  - cw Metoprolol Tartrate 100mg BID which is equivalent to her home dose BB

## 2025-02-20 NOTE — PROGRESS NOTE ADULT - PROBLEM SELECTOR PLAN 3
Multiple episodes of melena and coffee-ground emesis x 1 day, associated w/ hypotension and acute drop in Hgb. Underwent EGD w/ three nonbleeding duodenal ulcers. CT neg active GI bleed.   - s/p Protonix gtt (ending 2/15)    PLAN:   - CBC qd  - Protonix 40 mg BID PO   - f/u GI recs -> H pylori serology tests (reordered)  - maintain active T&S, transfuse for Hgb >8

## 2025-02-20 NOTE — PROGRESS NOTE ADULT - PROBLEM SELECTOR PLAN 12
Home meds: lipitor 20mg qd   - continue home statin    #Need for prophylactic measure   DVT PPX: Xarelto 20 mg qd   DIET: CC  Dispo: home with PT wound care 3x/week

## 2025-02-20 NOTE — PROGRESS NOTE ADULT - PROBLEM SELECTOR PLAN 6
Likely multifactorial 2/2 inflammatory response in setting of GIB, chronic sacral OM, influenza A.   CXR w/ clear lungs, blood cx x2 neg, U/A, urine cx negative.   - s/p cefepime 2/11-2/16   - s/p tamiflu 2/11-2/15 for Flu A    PLAN:   - s/p Augmentin course  - GI PCR if continued diarrhea   - trend fever curve, WBC

## 2025-02-21 LAB
GLUCOSE BLDC GLUCOMTR-MCNC: 129 MG/DL — HIGH (ref 70–99)
GLUCOSE BLDC GLUCOMTR-MCNC: 270 MG/DL — HIGH (ref 70–99)

## 2025-02-21 PROCEDURE — 99233 SBSQ HOSP IP/OBS HIGH 50: CPT

## 2025-02-21 PROCEDURE — 99232 SBSQ HOSP IP/OBS MODERATE 35: CPT | Mod: GC

## 2025-02-21 RX ADMIN — Medication 40 MILLIGRAM(S): at 17:29

## 2025-02-21 RX ADMIN — Medication 200 MILLIGRAM(S): at 12:27

## 2025-02-21 RX ADMIN — METOPROLOL SUCCINATE 100 MILLIGRAM(S): 50 TABLET, EXTENDED RELEASE ORAL at 05:22

## 2025-02-21 RX ADMIN — METOPROLOL SUCCINATE 100 MILLIGRAM(S): 50 TABLET, EXTENDED RELEASE ORAL at 17:29

## 2025-02-21 RX ADMIN — Medication 1 TABLET(S): at 12:27

## 2025-02-21 RX ADMIN — AMLODIPINE BESYLATE 10 MILLIGRAM(S): 10 TABLET ORAL at 05:22

## 2025-02-21 RX ADMIN — Medication 500 MILLIGRAM(S): at 12:27

## 2025-02-21 RX ADMIN — NYSTATIN 1 APPLICATION(S): 100000 CREAM TOPICAL at 17:30

## 2025-02-21 RX ADMIN — SODIUM HYPOCHLORITE 1 APPLICATION(S): 0.12 SOLUTION TOPICAL at 12:27

## 2025-02-21 RX ADMIN — Medication 40 MILLIGRAM(S): at 05:22

## 2025-02-21 RX ADMIN — Medication 220 MILLIGRAM(S): at 12:27

## 2025-02-21 RX ADMIN — NYSTATIN 1 APPLICATION(S): 100000 CREAM TOPICAL at 05:24

## 2025-02-21 RX ADMIN — RIVAROXABAN 20 MILLIGRAM(S): 10 TABLET, FILM COATED ORAL at 17:30

## 2025-02-21 NOTE — PROGRESS NOTE ADULT - ATTENDING COMMENTS
80F PMHx HTN, T2DM, PAD, lymphedema, arthritis, Sacral pressure ulcer with OM refusing IV abx on PO, and bed-bound status presenting with diarrhea and coffee ground emesis. Found to hypotensive and anemic Hgb 6.0.    #Acute blood loss anemia  - S/p 5u PRBC total this admission, last transfusion 2/12  - Montior for s/s of bleeding after starting AC    #UGIB 2/2 duodenal ulcer  #Hypotension  - S/p EGD 2/12 demonstrating esophagitis and multiple duodenal ulcers  - Protonix PO BID for total of 8 weeks  - F/u GI outpatient    #Sepsis  #Influenza A  #Sacral and coccyx OM  - CXR neg, CT abdomen/pelvis with possible UTI. Also with Sacral and coccyx OM  -  transition to Augmentin 2/16 concern for cefepime tox. Last day 2/19.  - s/p Tamiflu for influenza 2/15  - Wound care following and s/p wound vac, now off? f/u with wound care if michael to dc with wound vac    #New onset Afib w/RVR  - Lopressor 100mg BID  - CHADS VAsc 5: OK for AC per GI. Started on Xarelto 2/20    #Intertrigo, Fungal  - ID recommending PO fluconazole, nystatin powder    Dispo: Medically cleared for discharge home with HH. Per CM pending HHA.     Time spent: 36 minutes  - Reviewing, and interpreting labs and testing.  - Independently obtaining a review of systems and performing a physical exam  - Reviewing consultant documentation/recommendations in addition to discussing plan of care with consultants.  - Counselling and educating patient and family regarding interpretation of aforementioned items and plan of care.  - This excludes time spent teaching residents/medical students .

## 2025-02-21 NOTE — PROGRESS NOTE ADULT - SUBJECTIVE AND OBJECTIVE BOX
[facility]-- WOUND TEAM -- FOLLOW UP NOTE  --------------------------------------------------------------------------------    24 hour events/subjective:      alert  afebrile  tolerating po w/o n/v  incontinent  encouraged mobility  unchanged pain, no odor/ excess drainage     Pt has been refusing Nystatin Crusting- insisting on Zn Oxide like cream     rash worsening and pain not improving     d/w pt importance of different tx and needs time to work     Pt willing to try Nystating Crusting with dressing care and PO Rx as per ID      Diet:  Diet, DASH/TLC:   Sodium & Cholesterol Restricted (02-16-25 @ 11:25)      ROS: General/ SKIN/ MSK/ GI see HPI  all other systems negative      ALLERGIES & MEDICATIONS  --------------------------------------------------------------------------------    No Known Allergies        STANDING INPATIENT MEDICATIONS  amLODIPine Tablet 10 milliGRAM(s) Oral daily  ascorbic acid 500 milliGRAM(s) Oral daily  atorvastatin 20 milliGRAM(s) Oral at bedtime  Dakins Solution - 1/4 Strength 1 Application(s) Topical daily  dextrose 5%. 1000 milliLiter(s) IV Continuous <Continuous>  dextrose 5%. 1000 milliLiter(s) IV Continuous <Continuous>  dextrose 50% Injectable 25 Gram(s) IV Push once  dextrose 50% Injectable 12.5 Gram(s) IV Push once  dextrose 50% Injectable 25 Gram(s) IV Push once  fluconAZOLE   Tablet 200 milliGRAM(s) Oral daily  glucagon  Injectable 1 milliGRAM(s) IntraMuscular once  influenza  Vaccine (HIGH DOSE) 0.5 milliLiter(s) IntraMuscular once  insulin lispro (ADMELOG) corrective regimen sliding scale   SubCutaneous three times a day before meals  insulin lispro (ADMELOG) corrective regimen sliding scale   SubCutaneous at bedtime  lactulose Syrup 10 Gram(s) Oral two times a day  metoprolol tartrate 100 milliGRAM(s) Oral two times a day  multivitamin/minerals 1 Tablet(s) Oral daily  nystatin Powder 1 Application(s) Topical every 12 hours  pantoprazole Tablet 40 milliGRAM(s) Oral two times a day  rivaroxaban 20 milliGRAM(s) Oral with dinner  zinc sulfate 220 milliGRAM(s) Oral daily      PRN INPATIENT MEDICATION  acetaminophen Tablet 650 milliGRAM(s) Oral every 6 hours PRN  dextrose Oral Gel 15 Gram(s) Oral once PRN  melatonin 3 milliGRAM(s) Oral at bedtime PRN  polyethylene glycol 3350 17 Gram(s) Oral daily PRN        VITALS/PHYSICAL EXAM  --------------------------------------------------------------------------------  T(C): 36.3 (02-21-25 @ 12:30), Max: 36.8 (02-20-25 @ 21:53)  HR: 75 (02-21-25 @ 12:30) (66 - 75)  BP: 148/71 (02-21-25 @ 12:30) (138/69 - 166/74)  RR: 18 (02-21-25 @ 12:30) (18 - 19)  SpO2: 95% (02-21-25 @ 12:30) (93% - 97%)  Wt(kg): --    NAD,   A&Ox3, MO, frail,  WD/ WN/ WG  [facility] P500 bed   HEENT:  NC/AT, EOMI, sclera clear, mucosa moist, throat clear, trachea midline, neck supple  Respiratory: nonlabored w/ equal chest rise  Gastrointestinal: soft NT/ND   : (+) purewick cath  Neurology:  weakened strength & sensation grossly intact  Psych: appropriate, anxious  Musculoskeletal: FROM, no deformities/ contractures  Vascular: BLE equally warm,  no cyanosis, clubbing, nor acute ischemia         BLE edema equal         BLE DP/PT pulses palpable  Skin: thin, dry, pale, frail,  ecchymosis w/o hematoma  Sacral Stage 4 pressure injury  moist red granular tissue w/ serosanguinous drainage     9.5cm x 9cm x 3.5cm     no exposed bone     periwound skin up the back into the flanks, down into posterior thighs & anteriorly into thigh/ groin & pannus skin fold regions as well as b/l axilla       w/ bright erythema and flaky skin w/ satellite lesions  Under Bilateral breast linear erythematous skin changes w/o blistering or weeping  No odor, erythema, increased warmth, tenderness, induration, fluctuance, nor crepitus        CAPILLARY BLOOD GLUCOSE  POCT Blood Glucose.: 129 mg/dL (21 Feb 2025 08:15)  POCT Blood Glucose.: 212 mg/dL (20 Feb 2025 21:40)      A1C with Estimated Average Glucose Result: 7.3 % (01-09-25 @ 10:04)

## 2025-02-21 NOTE — PROVIDER CONTACT NOTE (OTHER) - DATE AND TIME:
13-Feb-2025 16:28
21-Feb-2025 12:17
21-Feb-2025 18:01
21-Feb-2025 20:10
14-Feb-2025 22:58
15-Feb-2025 03:06
16-Feb-2025 03:45
16-Feb-2025 04:56
18-Feb-2025 13:50
15-Feb-2025 00:15
20-Feb-2025 09:42
15-Feb-2025 15:15

## 2025-02-21 NOTE — PROGRESS NOTE ADULT - PROBLEM SELECTOR PLAN 7
Addended by: Vick Del Castillo on: 2/9/7555 10:23 AM     Modules accepted: Orders Patient has not had a bowel movement in over 7 days. Could be causing her Afib    Plan:  - Miralax daily  - cw Lactulose 10g BID  - cw Magnesium Citrate   - Consider enema if above fails

## 2025-02-21 NOTE — PROVIDER CONTACT NOTE (OTHER) - SITUATION
Pt having a HR of 160-170s.
Pts wound vac dressing coming undone. Provider made aware. Reinforced dressing with Tegaderm.
Afib RVR HR 140s-160s
Pt refusing tele monitor after multiple attempts and FS for all meals.
Pt sustaining a HR of 130-150s with Afib RVR. Provider made aware, STAT order of lopressor placed. However pt refused medication. Pt education provided on medication by nurse and provider.
Pt refusing care
Pts HR in the 160s. Provider made aware.
Patient is a hard stick, ivs keep infiltrating
Pt refusing care
pt refuses tele monitor, pt has history of tele refuse
Afib RVR HR 140s-160s
Pt refusing care

## 2025-02-21 NOTE — PROVIDER CONTACT NOTE (OTHER) - NAME OF MD/NP/PA/DO NOTIFIED:
Marilia Castaneda
SUSAN Delgado
Chirag Villagran
Resident Jonny Hudson
Chirag Villagran
Marilia Olvera
SUSAN Delgado
Mitzi Reynoso
Resident Jonny Hudson
SUSAN Marinelli
Kentrell Delgado MD
NF T7 Chirag Villagran

## 2025-02-21 NOTE — PROVIDER CONTACT NOTE (OTHER) - ACTION/TREATMENT ORDERED:
Administer fluids as ordered.
Provider aware.
Provider made aware, no orders at this time.
picc line order placed
STAT EKG, IV Tylenol, LR bolus, and monitor VS after fluids.
Provider made aware, no orders at this time.
IV lopressor given
Will speak with patient and family in order to ensure med compliance.
Administer fluids as ordered.
provider notified, cont to educate

## 2025-02-21 NOTE — PROVIDER CONTACT NOTE (OTHER) - REASON
HR 160s
Pt refusing care
-150s
-170s
Afib RVR HR 140s-160s
Wound Vac
refuses tele
Afib RVR HR 140s-160s
IV access
Pt refusing care
Pt refusing care
Pt refusing tele monitor after multiple attempts and FS all meals.

## 2025-02-21 NOTE — PROVIDER CONTACT NOTE (OTHER) - BACKGROUND
GI hemorrhage
admit garrett GIB
admit garrett GIB
Admitted for GI bleed.
Pt admitted for gastrointestinal hemorrhage.
Admitted for GI bleed.
admit garrett GIB

## 2025-02-21 NOTE — PROGRESS NOTE ADULT - ASSESSMENT
81 y/o F with PMHx, HTN, lymphedema, T2DM, sacral decubitus ulcer s/p debridement (2024) and recent adm for sacral OM who presented to the ED for coffee-ground emesis and melena x 1 day, admitted for acute blood loss anemia 2/2 to UGIB. Course c/b flu positive, Afib rvr, and possible delirium with concern for stroke. CT head did not show any acute changes. Pending wound vac and HHA approval prior to DC. Also whether or not to AC for decreasing stroke risk vs increasing risk of GI bleed.       Wound Consult requested to assist w/ management of:  Sacral Stage 4 pressure injury  Fungal Moisture Associated Dermatitis Back/ Flank/ Buttock/ Thighs/ Groin/ Pannus/axilla- improving  Moisture Dermatitis Under Breasts    Sacral wound- Pack w/ 1/4 strength packing BID  Buttocks/ Sacrum Crusting w/ Nystatin + CAVILON BID and prn soiling        Continue w/ attends under pads and Pericare maintenance w/ purewick care as per protocol  Bilateral Groin, Pannus, & Breast skin Folds/axillas- after Cleaning= Tuck in INTERDry AG QD  A/P CT as noted above  Abx per Medicine/ ID      Pt started on Oral Antifungal- monitor for improvement  Moisturize intact skin w/ SWEEN cream BID  Nutrition Consult for optimization        encourage high quality protein, josephine/ prosource, MVI & Vit C to promote wound healing  Hyperglycemia - ADA diet and FS w/ ISS, consider HgA1c- pt non compliant "diet controlled"  Continue turning and positioning w/ offloading assistive devices as per protocol  Waffle Cushion to chair when oob to chair  Continue w/ low air loss pressure redistribution bed surface   Pt will need Group 2 mattress on hospital bed and ROHO cushion for wheel chair upon discharge home  Care as per medicine, will follow w/ you  Upon discharge f/u as outpatient at Wound [facility] [address] [facility] [phone]  D/w team, RN, & attng  Thank you for this consult  Yolanda Ramos PA-C CWS [id number]  Nights/ Weekends/ Holidays please call:  General Surgery Consult pager ([phone]) for emergencies  Wound PT for multilayer leg wrapping or VAC issues ([phone])   I spent 50minutes face to face w/ this pt of which more than 50% of the time was spent counseling & coordinating care of this pt.

## 2025-02-21 NOTE — PROGRESS NOTE ADULT - PROBLEM SELECTOR PLAN 1
Patient with no hx of afib, went into afib w/RVR. HR increased to 160s, suspicion patient might have had a bleed, although patient denies bloody stool and vomiting. Was given 5mg Lopressor.  - concern for delirium from embolism as below  - 2/17 CT head negative for signs of stroke.   - 2/18 discussed risks of stroke with AFIB when not on anticoagulation vs the risks of placing the pt on AC and increasing bleed risk with daughter Ling. She understands the risks of the AC and would like to proceed with anticoagulation if it was okay with the GI team considering she was here GI bleed.  - 2/19: GI team is okay with AC given that hgb has been stable in the 11s and no new onset of blood in stool.   - [id number] score: 5 | HAS-BLED score: 3    Plan:  - cw maintenance dose Xarelto for decreasing stroke risk 2/19   - ctm for events  - cw Metoprolol Tartrate 100mg BID which is equivalent to her home dose BB

## 2025-02-21 NOTE — PROGRESS NOTE ADULT - ASSESSMENT
79 y/o F with PMHx, HTN, lymphedema, T2DM, sacral decubitus ulcer s/p debridement (2024) and recent adm for sacral OM who presented to the ED for coffee-ground emesis and melena x 1 day, admitted for acute blood loss anemia 2/2 to UGIB. Course c/b flu positive, Afib rvr, and possible delirium with concern for stroke. CT head did not show any acute changes. Pending wound vac and HHA approval prior to DC. AC for decreasing stroke risk.

## 2025-02-21 NOTE — PROGRESS NOTE ADULT - SUBJECTIVE AND OBJECTIVE BOX
******************  Authored By: Kentrell Delgado MD, PGY1  MS Teams Preferred  ******************  INTERVAL HPI/OVERNIGHT EVENTS:  Pt seen and examined at bedside. No acute overnight events or complaints.    Brief Daily Plan:  - f/u sw on wound vacuum for dc    VITAL SIGNS:  T(F): 98.1 (02-21-25 @ 04:32)  HR: 70 (02-21-25 @ 04:32)  BP: 166/74 (02-21-25 @ 04:32)  RR: 18 (02-21-25 @ 04:32)  SpO2: 97% (02-21-25 @ 04:32)  Wt(kg): --    CAPILLARY BLOOD GLUCOSE    POCT Blood Glucose.: 212 mg/dL (20 Feb 2025 21:40)    PHYSICAL EXAM:    Constitutional: WDWN, NAD  HEENT: PERRL, EOMI, sclera non-icteric, neck supple, trachea midline, no masses, no JVD, MMM, good dentition  Respiratory: CTA b/l, good air entry b/l, no wheezing, no rhonchi, no rales, without accessory muscle use and no intercostal retractions  Cardiovascular: RRR, normal S1S2, no M/R/G  Gastrointestinal: soft, NTND, no masses palpable, BS normal  Extremities: Warm, well perfused, pulses equal bilateral upper and lower extremities, no edema, no clubbing. Capillary refill <2 sec  Neurological: AAOx3, CN Grossly intact  Skin: Normal temperature, warm, dry    MEDICATIONS  (STANDING):  amLODIPine   Tablet 10 milliGRAM(s) Oral daily  ascorbic acid 500 milliGRAM(s) Oral daily  atorvastatin 20 milliGRAM(s) Oral at bedtime  Dakins Solution - 1/4 Strength 1 Application(s) Topical daily  dextrose 5%. 1000 milliLiter(s) (50 mL/Hr) IV Continuous <Continuous>  dextrose 5%. 1000 milliLiter(s) (100 mL/Hr) IV Continuous <Continuous>  dextrose 50% Injectable 25 Gram(s) IV Push once  dextrose 50% Injectable 12.5 Gram(s) IV Push once  dextrose 50% Injectable 25 Gram(s) IV Push once  fluconAZOLE   Tablet 200 milliGRAM(s) Oral daily  glucagon  Injectable 1 milliGRAM(s) IntraMuscular once  influenza  Vaccine (HIGH DOSE) 0.5 milliLiter(s) IntraMuscular once  insulin lispro (ADMELOG) corrective regimen sliding scale   SubCutaneous three times a day before meals  insulin lispro (ADMELOG) corrective regimen sliding scale   SubCutaneous at bedtime  lactulose Syrup 10 Gram(s) Oral two times a day  metoprolol tartrate 100 milliGRAM(s) Oral two times a day  multivitamin/minerals 1 Tablet(s) Oral daily  nystatin Powder 1 Application(s) Topical every 12 hours  pantoprazole    Tablet 40 milliGRAM(s) Oral two times a day  rivaroxaban 20 milliGRAM(s) Oral with dinner  zinc sulfate 220 milliGRAM(s) Oral daily    MEDICATIONS  (PRN):  acetaminophen     Tablet .. 650 milliGRAM(s) Oral every 6 hours PRN Temp greater or equal to 38C (100.4F), Mild Pain (1 - 3)  dextrose Oral Gel 15 Gram(s) Oral once PRN Blood Glucose LESS THAN 70 milliGRAM(s)/deciliter  melatonin 3 milliGRAM(s) Oral at bedtime PRN Insomnia  polyethylene glycol 3350 17 Gram(s) Oral daily PRN Constipation    Allergies    No Known Allergies    Intolerances    LABS:    RADIOLOGY & ADDITIONAL TESTS:  Reviewed    ******************   ******************  Authored By: Kentrell Delgado MD, PGY1  MS Teams Preferred  ******************  INTERVAL HPI/OVERNIGHT EVENTS:  Pt seen and examined at bedside. No acute overnight events or complaints.    Brief Daily Plan:  - f/u sw on wound vacuum for dc, and HHA + VNS  - Spoke with daughter Aaliyah who said HHA was approved for 4hrs a day for 6 days a week.    VITAL SIGNS:  T(F): 98.1 (02-21-25 @ 04:32)  HR: 70 (02-21-25 @ 04:32)  BP: 166/74 (02-21-25 @ 04:32)  RR: 18 (02-21-25 @ 04:32)  SpO2: 97% (02-21-25 @ 04:32)  Wt(kg): --    CAPILLARY BLOOD GLUCOSE    POCT Blood Glucose.: 212 mg/dL (20 Feb 2025 21:40)    PHYSICAL EXAM:    GENERAL: NAD, chronically ill appearing, pale   HEAD: Atraumatic, Normocephalic  EYES: EOMI, PERRLA, conjunctiva and sclera clear  ENMT: Moist mucous membranes  NECK: Supple, trachea midline   NERVOUS SYSTEM:  Alert & Oriented X3, Good eye contact and conversational.   CHEST/LUNG: Clear to auscultation bilaterally on anterior exam; No rales, rhonchi, wheezing, or rubs  HEART: Regular rate and rhythm; No murmurs, rubs, or gallops  ABDOMEN: Soft, Nontender, Nondistended; Bowel sounds present  EXTREMITIES:  2+ Peripheral Pulses, nonpitting edema b/l (unchanged from prior)   SKIN: Sacral Ulcer     MEDICATIONS  (STANDING):  amLODIPine   Tablet 10 milliGRAM(s) Oral daily  ascorbic acid 500 milliGRAM(s) Oral daily  atorvastatin 20 milliGRAM(s) Oral at bedtime  Dakins Solution - 1/4 Strength 1 Application(s) Topical daily  dextrose 5%. 1000 milliLiter(s) (50 mL/Hr) IV Continuous <Continuous>  dextrose 5%. 1000 milliLiter(s) (100 mL/Hr) IV Continuous <Continuous>  dextrose 50% Injectable 25 Gram(s) IV Push once  dextrose 50% Injectable 12.5 Gram(s) IV Push once  dextrose 50% Injectable 25 Gram(s) IV Push once  fluconAZOLE   Tablet 200 milliGRAM(s) Oral daily  glucagon  Injectable 1 milliGRAM(s) IntraMuscular once  influenza  Vaccine (HIGH DOSE) 0.5 milliLiter(s) IntraMuscular once  insulin lispro (ADMELOG) corrective regimen sliding scale   SubCutaneous three times a day before meals  insulin lispro (ADMELOG) corrective regimen sliding scale   SubCutaneous at bedtime  lactulose Syrup 10 Gram(s) Oral two times a day  metoprolol tartrate 100 milliGRAM(s) Oral two times a day  multivitamin/minerals 1 Tablet(s) Oral daily  nystatin Powder 1 Application(s) Topical every 12 hours  pantoprazole    Tablet 40 milliGRAM(s) Oral two times a day  rivaroxaban 20 milliGRAM(s) Oral with dinner  zinc sulfate 220 milliGRAM(s) Oral daily    MEDICATIONS  (PRN):  acetaminophen     Tablet .. 650 milliGRAM(s) Oral every 6 hours PRN Temp greater or equal to 38C (100.4F), Mild Pain (1 - 3)  dextrose Oral Gel 15 Gram(s) Oral once PRN Blood Glucose LESS THAN 70 milliGRAM(s)/deciliter  melatonin 3 milliGRAM(s) Oral at bedtime PRN Insomnia  polyethylene glycol 3350 17 Gram(s) Oral daily PRN Constipation    Allergies    No Known Allergies    Intolerances    LABS:    RADIOLOGY & ADDITIONAL TESTS:  Reviewed    ******************

## 2025-02-21 NOTE — PROVIDER CONTACT NOTE (OTHER) - RECOMMENDATIONS
Provider notified, will continue to educate patient and encourage family to speak to patient as well.
notify provider
Administer fluids as ordered.
Administer fluids as ordered.
Provider notified
provider notified.
Patient may need more long term access such as picc line for iv meds
Provider notified

## 2025-02-21 NOTE — PROVIDER CONTACT NOTE (OTHER) - ASSESSMENT
Patient is A&Ox4, no distress, no dizziness, VSS, see in flow sheets.
Upper extremities are edematous   Multiple nurses on the unit attempted iv access, unsuccessful
Refused lunch and dinner FS. Also continues to refuse tele monitor placement. RN endorsed importance of medication indication and compliance and pt continues to refuse.
education provided, pt refuses regardless
pt a&o 4 and refusing care at this time. Refused breakfast fingerstick as well as AM lab draw. Also, continues to refuse tele monitor placement. RN endorsed importance of medication indication and compliance and pt continues to refuse.
Pt is A&O x4
VSS
pt a&o 3-4 Refusing use of nystatin powder as ordered for wound care. RN endorsed indication of use of nystatin as well as information on plan of care for eventual wound vac placement. Pt continues to refuse.
Patient is A&Ox4, no distress, no dizziness, VSS, see in flow sheets.

## 2025-02-22 LAB — GLUCOSE BLDC GLUCOMTR-MCNC: 120 MG/DL — HIGH (ref 70–99)

## 2025-02-22 PROCEDURE — 99232 SBSQ HOSP IP/OBS MODERATE 35: CPT | Mod: GC

## 2025-02-22 RX ADMIN — ATORVASTATIN CALCIUM 20 MILLIGRAM(S): 80 TABLET, FILM COATED ORAL at 21:10

## 2025-02-22 RX ADMIN — Medication 200 MILLIGRAM(S): at 12:24

## 2025-02-22 RX ADMIN — Medication 40 MILLIGRAM(S): at 09:23

## 2025-02-22 RX ADMIN — METOPROLOL SUCCINATE 100 MILLIGRAM(S): 50 TABLET, EXTENDED RELEASE ORAL at 17:26

## 2025-02-22 RX ADMIN — Medication 1 TABLET(S): at 12:25

## 2025-02-22 RX ADMIN — Medication 650 MILLIGRAM(S): at 21:09

## 2025-02-22 RX ADMIN — Medication 40 MILLIGRAM(S): at 17:27

## 2025-02-22 RX ADMIN — METOPROLOL SUCCINATE 100 MILLIGRAM(S): 50 TABLET, EXTENDED RELEASE ORAL at 09:23

## 2025-02-22 RX ADMIN — Medication 650 MILLIGRAM(S): at 22:09

## 2025-02-22 RX ADMIN — AMLODIPINE BESYLATE 10 MILLIGRAM(S): 10 TABLET ORAL at 09:23

## 2025-02-22 RX ADMIN — RIVAROXABAN 20 MILLIGRAM(S): 10 TABLET, FILM COATED ORAL at 17:26

## 2025-02-22 RX ADMIN — Medication 220 MILLIGRAM(S): at 12:25

## 2025-02-22 RX ADMIN — SODIUM HYPOCHLORITE 1 APPLICATION(S): 0.12 SOLUTION TOPICAL at 12:23

## 2025-02-22 RX ADMIN — Medication 500 MILLIGRAM(S): at 12:25

## 2025-02-22 NOTE — PROGRESS NOTE ADULT - PROBLEM SELECTOR PLAN 1
Patient with no hx of afib, went into afib w/RVR. HR increased to 160s, suspicion patient might have had a bleed, although patient denies bloody stool and vomiting. Was given 5mg Lopressor.  - concern for delirium from embolism as below  - 2/17 CT head negative for signs of stroke.   - 2/18 discussed risks of stroke with AFIB when not on anticoagulation vs the risks of placing the pt on AC and increasing bleed risk with daughter Ling. She understands the risks of the AC and would like to proceed with anticoagulation if it was okay with the GI team considering she was here GI bleed.  - 2/19: GI team is okay with AC given that hgb has been stable in the 11s and no new onset of blood in stool.   - [id number] score: 5 | HAS-BLED score: 3    Plan:  - cw maintenance dose Xarelto for decreasing stroke risk 2/19   - ctm for events  - cw Metoprolol Tartrate 100mg BID which is equivalent to her home dose BB

## 2025-02-22 NOTE — PROGRESS NOTE ADULT - SUBJECTIVE AND OBJECTIVE BOX
PROGRESS NOTE:     Patient is a 80y old  Female who presents with a chief complaint of UGIB (21 Feb 2025 13:10)      ---------------------------------------------------  Martin Colon  PGY-2, Internal Medicine  Available on [employer] Teams  Pager: [phone] (RASHID), or [phone] (NS)  ---------------------------------------------------    INTERVAL / OVERNIGHT EVENTS:  No acute events overnight.     SUBJECTIVE:  Patient examined at bedside with no acute complaints.     BRIEF DAILY PLAN:  - Follow up [facility] set up    MEDICATIONS  (STANDING):  amLODIPine   Tablet 10 milliGRAM(s) Oral daily  ascorbic acid 500 milliGRAM(s) Oral daily  atorvastatin 20 milliGRAM(s) Oral at bedtime  Dakins Solution - 1/4 Strength 1 Application(s) Topical daily  dextrose 5%. 1000 milliLiter(s) (50 mL/Hr) IV Continuous <Continuous>  dextrose 5%. 1000 milliLiter(s) (100 mL/Hr) IV Continuous <Continuous>  dextrose 50% Injectable 25 Gram(s) IV Push once  dextrose 50% Injectable 12.5 Gram(s) IV Push once  dextrose 50% Injectable 25 Gram(s) IV Push once  fluconAZOLE   Tablet 200 milliGRAM(s) Oral daily  glucagon  Injectable 1 milliGRAM(s) IntraMuscular once  influenza  Vaccine (HIGH DOSE) 0.5 milliLiter(s) IntraMuscular once  insulin lispro (ADMELOG) corrective regimen sliding scale   SubCutaneous three times a day before meals  insulin lispro (ADMELOG) corrective regimen sliding scale   SubCutaneous at bedtime  lactulose Syrup 10 Gram(s) Oral two times a day  metoprolol tartrate 100 milliGRAM(s) Oral two times a day  multivitamin/minerals 1 Tablet(s) Oral daily  nystatin Powder 1 Application(s) Topical every 12 hours  pantoprazole    Tablet 40 milliGRAM(s) Oral two times a day  rivaroxaban 20 milliGRAM(s) Oral with dinner  zinc sulfate 220 milliGRAM(s) Oral daily    MEDICATIONS  (PRN):  acetaminophen     Tablet .. 650 milliGRAM(s) Oral every 6 hours PRN Temp greater or equal to 38C (100.4F), Mild Pain (1 - 3)  dextrose Oral Gel 15 Gram(s) Oral once PRN Blood Glucose LESS THAN 70 milliGRAM(s)/deciliter  melatonin 3 milliGRAM(s) Oral at bedtime PRN Insomnia  polyethylene glycol 3350 17 Gram(s) Oral daily PRN Constipation      CAPILLARY BLOOD GLUCOSE      POCT Blood Glucose.: 120 mg/dL (22 Feb 2025 08:15)  POCT Blood Glucose.: 270 mg/dL (21 Feb 2025 21:17)    I&O's Summary    21 Feb 2025 07:01  -  22 Feb 2025 07:00  --------------------------------------------------------  IN: 1140 mL / OUT: 750 mL / NET: 390 mL        VITALS:   T(C): 36.6 (02-21-25 @ 21:22), Max: 36.6 (02-21-25 @ 21:22)  HR: 70 (02-21-25 @ 21:22) (70 - 92)  BP: 144/65 (02-21-25 @ 21:22) (117/68 - 148/71)  RR: 18 (02-21-25 @ 21:22) (18 - 18)  SpO2: 95% (02-21-25 @ 21:22) (95% - 95%)    GENERAL: NAD, chronically ill appearing, pale   HEAD: Atraumatic, Normocephalic  EYES: EOMI, PERRLA, conjunctiva and sclera clear  ENMT: Moist mucous membranes  NECK: Supple, trachea midline   NERVOUS SYSTEM:  Alert & Oriented X3, Good eye contact and conversational.   CHEST/LUNG: Clear to auscultation bilaterally on anterior exam; No rales, rhonchi, wheezing, or rubs  HEART: Regular rate and rhythm; No murmurs, rubs, or gallops  ABDOMEN: Soft, Nontender, Nondistended; Bowel sounds present  EXTREMITIES:  2+ Peripheral Pulses, nonpitting edema b/l (unchanged from prior)   SKIN: Sacral Ulcer

## 2025-02-22 NOTE — PROGRESS NOTE ADULT - ATTENDING COMMENTS
Patient seen and examined. Plan as discussed w/ Dr. Colon: continue AC as initiated, monitoring H&H, hemodynamics, and for signs/sx of bleeding; patient planned for discharge on Monday with wound vac and MLTC/HHA services.

## 2025-02-22 NOTE — PROGRESS NOTE ADULT - ASSESSMENT
81 y/o F with PMHx, HTN, lymphedema, T2DM, sacral decubitus ulcer s/p debridement (2024) and recent adm for sacral OM who presented to the ED for coffee-ground emesis and melena x 1 day, admitted for acute blood loss anemia 2/2 to UGIB. Course c/b flu positive, Afib rvr, and possible delirium with concern for stroke. CT head did not show any acute changes. Pending wound vac and HHA approval prior to DC. AC for decreasing stroke risk.

## 2025-02-23 LAB — GLUCOSE BLDC GLUCOMTR-MCNC: 146 MG/DL — HIGH (ref 70–99)

## 2025-02-23 PROCEDURE — 99232 SBSQ HOSP IP/OBS MODERATE 35: CPT | Mod: GC

## 2025-02-23 RX ADMIN — NYSTATIN 1 APPLICATION(S): 100000 CREAM TOPICAL at 20:00

## 2025-02-23 RX ADMIN — Medication 220 MILLIGRAM(S): at 11:35

## 2025-02-23 RX ADMIN — Medication 40 MILLIGRAM(S): at 06:30

## 2025-02-23 RX ADMIN — NYSTATIN 1 APPLICATION(S): 100000 CREAM TOPICAL at 17:33

## 2025-02-23 RX ADMIN — RIVAROXABAN 20 MILLIGRAM(S): 10 TABLET, FILM COATED ORAL at 17:32

## 2025-02-23 RX ADMIN — Medication 40 MILLIGRAM(S): at 17:32

## 2025-02-23 RX ADMIN — NYSTATIN 1 APPLICATION(S): 100000 CREAM TOPICAL at 06:30

## 2025-02-23 RX ADMIN — Medication 200 MILLIGRAM(S): at 11:35

## 2025-02-23 RX ADMIN — ATORVASTATIN CALCIUM 20 MILLIGRAM(S): 80 TABLET, FILM COATED ORAL at 21:13

## 2025-02-23 RX ADMIN — METOPROLOL SUCCINATE 100 MILLIGRAM(S): 50 TABLET, EXTENDED RELEASE ORAL at 06:30

## 2025-02-23 RX ADMIN — METOPROLOL SUCCINATE 100 MILLIGRAM(S): 50 TABLET, EXTENDED RELEASE ORAL at 17:32

## 2025-02-23 RX ADMIN — AMLODIPINE BESYLATE 10 MILLIGRAM(S): 10 TABLET ORAL at 06:30

## 2025-02-23 RX ADMIN — Medication 1 TABLET(S): at 11:35

## 2025-02-23 RX ADMIN — SODIUM HYPOCHLORITE 1 APPLICATION(S): 0.12 SOLUTION TOPICAL at 11:35

## 2025-02-23 RX ADMIN — Medication 500 MILLIGRAM(S): at 11:35

## 2025-02-23 NOTE — PROGRESS NOTE ADULT - PROBLEM SELECTOR PLAN 2
Previous adm (1/8-16) for sacral OM, rec for PICC line on dc for IV abx however pt refused and discharged on Augmentin s/p 6 week course   - Gen surg performed bedside superficial wound debridement; wound looks clean.   - EKG 2/19- QTC interval 425  - s/p abx 2/19 with Augmentin  - 2/19 air mattress requested     PLAN:   - 2/19 started fluconazole 200 mg qd for possible sacral fungal moisture dermatitis  - f/u wound care recs -> local care currently  - pending wound vac approval per social work Patient with no hx of afib, went into afib w/RVR. HR increased to 160s, suspicion patient might have had a bleed, although patient denies bloody stool and vomiting. Was given 5mg Lopressor.  - concern for delirium from embolism as below  - 2/17 CT head negative for signs of stroke.   - 2/18 discussed risks of stroke with AFIB when not on anticoagulation vs the risks of placing the pt on AC and increasing bleed risk with daughter Ling. She understands the risks of the AC and would like to proceed with anticoagulation if it was okay with the GI team considering she was here GI bleed.  - 2/19: GI team is okay with AC given that hgb has been stable in the 11s and no new onset of blood in stool.   - [id number] score: 5 | HAS-BLED score: 3    Plan:  - cw maintenance dose Xarelto for decreasing stroke risk 2/19   - ctm for events  - cw Metoprolol Tartrate 100mg BID which is equivalent to her home dose BB

## 2025-02-23 NOTE — PROGRESS NOTE ADULT - ATTENDING COMMENTS
Patient seen and examined. Plan as discussed w/ Dr. Delgado: continue AC as initiated, monitoring H&H, hemodynamics, and for signs/sx of bleeding; patient planned for discharge on Monday with wound vac and MLTC/HHA services.

## 2025-02-23 NOTE — PROGRESS NOTE ADULT - SUBJECTIVE AND OBJECTIVE BOX
******************  Authored By: Kentrell Delgado MD, PGY1  MS Teams Preferred  ******************  INTERVAL HPI/OVERNIGHT EVENTS:  Pt seen and examined at bedside. No acute overnight events or complaints.    Brief Daily Plan:  -    VITAL SIGNS:  T(F): 98.3 (02-22-25 @ 20:56)  HR: 67 (02-22-25 @ 20:56)  BP: 146/66 (02-22-25 @ 20:56)  RR: 18 (02-22-25 @ 20:56)  SpO2: 93% (02-22-25 @ 20:56)  Wt(kg): --    CAPILLARY BLOOD GLUCOSE    POCT Blood Glucose.: 120 mg/dL (22 Feb 2025 08:15)    PHYSICAL EXAM:    GENERAL: NAD, chronically ill appearing, pale   HEAD: Atraumatic, Normocephalic  EYES: EOMI, PERRLA, conjunctiva and sclera clear  ENMT: Moist mucous membranes  NECK: Supple, trachea midline   NERVOUS SYSTEM:  Alert & Oriented X3, Good eye contact and conversational.   CHEST/LUNG: Clear to auscultation bilaterally on anterior exam; No rales, rhonchi, wheezing, or rubs  HEART: Regular rate and rhythm; No murmurs, rubs, or gallops  ABDOMEN: Soft, Nontender, Nondistended; Bowel sounds present  EXTREMITIES:  2+ Peripheral Pulses, nonpitting edema b/l (unchanged from prior)   SKIN: Sacral Ulcer     MEDICATIONS  (STANDING):  amLODIPine   Tablet 10 milliGRAM(s) Oral daily  ascorbic acid 500 milliGRAM(s) Oral daily  atorvastatin 20 milliGRAM(s) Oral at bedtime  Dakins Solution - 1/4 Strength 1 Application(s) Topical daily  dextrose 5%. 1000 milliLiter(s) (50 mL/Hr) IV Continuous <Continuous>  dextrose 5%. 1000 milliLiter(s) (100 mL/Hr) IV Continuous <Continuous>  dextrose 50% Injectable 25 Gram(s) IV Push once  dextrose 50% Injectable 12.5 Gram(s) IV Push once  dextrose 50% Injectable 25 Gram(s) IV Push once  fluconAZOLE   Tablet 200 milliGRAM(s) Oral daily  glucagon  Injectable 1 milliGRAM(s) IntraMuscular once  influenza  Vaccine (HIGH DOSE) 0.5 milliLiter(s) IntraMuscular once  insulin lispro (ADMELOG) corrective regimen sliding scale   SubCutaneous three times a day before meals  insulin lispro (ADMELOG) corrective regimen sliding scale   SubCutaneous at bedtime  lactulose Syrup 10 Gram(s) Oral two times a day  metoprolol tartrate 100 milliGRAM(s) Oral two times a day  multivitamin/minerals 1 Tablet(s) Oral daily  nystatin Powder 1 Application(s) Topical every 12 hours  pantoprazole    Tablet 40 milliGRAM(s) Oral two times a day  rivaroxaban 20 milliGRAM(s) Oral with dinner  zinc sulfate 220 milliGRAM(s) Oral daily    MEDICATIONS  (PRN):  acetaminophen     Tablet .. 650 milliGRAM(s) Oral every 6 hours PRN Temp greater or equal to 38C (100.4F), Mild Pain (1 - 3)  dextrose Oral Gel 15 Gram(s) Oral once PRN Blood Glucose LESS THAN 70 milliGRAM(s)/deciliter  melatonin 3 milliGRAM(s) Oral at bedtime PRN Insomnia  polyethylene glycol 3350 17 Gram(s) Oral daily PRN Constipation    Allergies    No Known Allergies    Intolerances    LABS:    RADIOLOGY & ADDITIONAL TESTS:  Reviewed    ******************   ******************  Authored By: Kentrell Delgado MD, PGY1  MS Teams Preferred  ******************  INTERVAL HPI/OVERNIGHT EVENTS:  Pt seen and examined at bedside. No acute overnight events or complaints.    Brief Daily Plan:  - continue with treatment for the rash on her back     VITAL SIGNS:  T(F): 98.3 (02-22-25 @ 20:56)  HR: 67 (02-22-25 @ 20:56)  BP: 146/66 (02-22-25 @ 20:56)  RR: 18 (02-22-25 @ 20:56)  SpO2: 93% (02-22-25 @ 20:56)  Wt(kg): --    CAPILLARY BLOOD GLUCOSE    POCT Blood Glucose.: 120 mg/dL (22 Feb 2025 08:15)    PHYSICAL EXAM:    GENERAL: NAD, chronically ill appearing, pale   HEAD: Atraumatic, Normocephalic  EYES: EOMI, PERRLA, conjunctiva and sclera clear  ENMT: Moist mucous membranes  NECK: Supple, trachea midline   NERVOUS SYSTEM:  Alert & Oriented X3, Good eye contact and conversational.   CHEST/LUNG: Clear to auscultation bilaterally on anterior exam; No rales, rhonchi, wheezing, or rubs  HEART: Regular rate and rhythm; No murmurs, rubs, or gallops  ABDOMEN: Soft, Nontender, Nondistended; Bowel sounds present  EXTREMITIES:  2+ Peripheral Pulses, nonpitting edema b/l (unchanged from prior)   SKIN: Sacral Ulcer     MEDICATIONS  (STANDING):  amLODIPine   Tablet 10 milliGRAM(s) Oral daily  ascorbic acid 500 milliGRAM(s) Oral daily  atorvastatin 20 milliGRAM(s) Oral at bedtime  Dakins Solution - 1/4 Strength 1 Application(s) Topical daily  dextrose 5%. 1000 milliLiter(s) (50 mL/Hr) IV Continuous <Continuous>  dextrose 5%. 1000 milliLiter(s) (100 mL/Hr) IV Continuous <Continuous>  dextrose 50% Injectable 25 Gram(s) IV Push once  dextrose 50% Injectable 12.5 Gram(s) IV Push once  dextrose 50% Injectable 25 Gram(s) IV Push once  fluconAZOLE   Tablet 200 milliGRAM(s) Oral daily  glucagon  Injectable 1 milliGRAM(s) IntraMuscular once  influenza  Vaccine (HIGH DOSE) 0.5 milliLiter(s) IntraMuscular once  insulin lispro (ADMELOG) corrective regimen sliding scale   SubCutaneous three times a day before meals  insulin lispro (ADMELOG) corrective regimen sliding scale   SubCutaneous at bedtime  lactulose Syrup 10 Gram(s) Oral two times a day  metoprolol tartrate 100 milliGRAM(s) Oral two times a day  multivitamin/minerals 1 Tablet(s) Oral daily  nystatin Powder 1 Application(s) Topical every 12 hours  pantoprazole    Tablet 40 milliGRAM(s) Oral two times a day  rivaroxaban 20 milliGRAM(s) Oral with dinner  zinc sulfate 220 milliGRAM(s) Oral daily    MEDICATIONS  (PRN):  acetaminophen     Tablet .. 650 milliGRAM(s) Oral every 6 hours PRN Temp greater or equal to 38C (100.4F), Mild Pain (1 - 3)  dextrose Oral Gel 15 Gram(s) Oral once PRN Blood Glucose LESS THAN 70 milliGRAM(s)/deciliter  melatonin 3 milliGRAM(s) Oral at bedtime PRN Insomnia  polyethylene glycol 3350 17 Gram(s) Oral daily PRN Constipation    Allergies    No Known Allergies    Intolerances    LABS:    RADIOLOGY & ADDITIONAL TESTS:  Reviewed    ******************

## 2025-02-23 NOTE — PROGRESS NOTE ADULT - ASSESSMENT
81 y/o F with PMHx, HTN, lymphedema, T2DM, sacral decubitus ulcer s/p debridement (2024) and recent adm for sacral OM who presented to the ED for coffee-ground emesis and melena x 1 day, admitted for acute blood loss anemia 2/2 to UGIB. Course c/b flu positive, Afib rvr, and possible delirium with concern for stroke. CT head did not show any acute changes. Pending wound vac and HHA approval prior to DC. AC for decreasing stroke risk.  81 y/o F with PMHx, HTN, lymphedema, T2DM, sacral decubitus ulcer s/p debridement (2024) and recent adm for sacral OM who presented to the ED for coffee-ground emesis and melena x 1 day, admitted for acute blood loss anemia 2/2 to UGIB. Course c/b flu positive, Afib rvr, and possible delirium with concern for stroke. CT head did not show any acute changes. AC for decreasing stroke risk. Treating fungal rash to the back.

## 2025-02-23 NOTE — PROGRESS NOTE ADULT - PROBLEM SELECTOR PLAN 1
Patient with no hx of afib, went into afib w/RVR. HR increased to 160s, suspicion patient might have had a bleed, although patient denies bloody stool and vomiting. Was given 5mg Lopressor.  - concern for delirium from embolism as below  - 2/17 CT head negative for signs of stroke.   - 2/18 discussed risks of stroke with AFIB when not on anticoagulation vs the risks of placing the pt on AC and increasing bleed risk with daughter Ling. She understands the risks of the AC and would like to proceed with anticoagulation if it was okay with the GI team considering she was here GI bleed.  - 2/19: GI team is okay with AC given that hgb has been stable in the 11s and no new onset of blood in stool.   - [id number] score: 5 | HAS-BLED score: 3    Plan:  - cw maintenance dose Xarelto for decreasing stroke risk 2/19   - ctm for events  - cw Metoprolol Tartrate 100mg BID which is equivalent to her home dose BB Previous adm (1/8-16) for sacral OM, rec for PICC line on dc for IV abx however pt refused and discharged on Augmentin s/p 6 week course   - Gen surg performed bedside superficial wound debridement; wound looks clean.   - EKG 2/19- QTC interval 425  - s/p abx 2/19 with Augmentin  - 2/19 air mattress requested     PLAN:   - pt refused nystatin powder for a few days, developed fungal rash to back, will continue nystatin powder  - 2/19 started fluconazole 200 mg qd for possible sacral fungal moisture dermatitis  - f/u wound care recs -> local care currently  - pending wound vac approval per social work

## 2025-02-24 ENCOUNTER — TRANSCRIPTION ENCOUNTER (OUTPATIENT)
Age: 80
End: 2025-02-24

## 2025-02-24 VITALS
TEMPERATURE: 98 F | SYSTOLIC BLOOD PRESSURE: 161 MMHG | RESPIRATION RATE: 18 BRPM | OXYGEN SATURATION: 93 % | HEART RATE: 86 BPM | DIASTOLIC BLOOD PRESSURE: 74 MMHG

## 2025-02-24 PROBLEM — M46.28 OSTEOMYELITIS OF VERTEBRA, SACRAL AND SACROCOCCYGEAL REGION: Chronic | Status: ACTIVE | Noted: 2025-02-11

## 2025-02-24 LAB — GLUCOSE BLDC GLUCOMTR-MCNC: 169 MG/DL — HIGH (ref 70–99)

## 2025-02-24 PROCEDURE — 85025 COMPLETE CBC W/AUTO DIFF WBC: CPT

## 2025-02-24 PROCEDURE — 86870 RBC ANTIBODY IDENTIFICATION: CPT

## 2025-02-24 PROCEDURE — 99233 SBSQ HOSP IP/OBS HIGH 50: CPT

## 2025-02-24 PROCEDURE — 96374 THER/PROPH/DIAG INJ IV PUSH: CPT

## 2025-02-24 PROCEDURE — 93005 ELECTROCARDIOGRAM TRACING: CPT

## 2025-02-24 PROCEDURE — 87637 SARSCOV2&INF A&B&RSV AMP PRB: CPT

## 2025-02-24 PROCEDURE — 87641 MR-STAPH DNA AMP PROBE: CPT

## 2025-02-24 PROCEDURE — 86850 RBC ANTIBODY SCREEN: CPT

## 2025-02-24 PROCEDURE — 82435 ASSAY OF BLOOD CHLORIDE: CPT

## 2025-02-24 PROCEDURE — 85018 HEMOGLOBIN: CPT

## 2025-02-24 PROCEDURE — 84484 ASSAY OF TROPONIN QUANT: CPT

## 2025-02-24 PROCEDURE — 86901 BLOOD TYPING SEROLOGIC RH(D): CPT

## 2025-02-24 PROCEDURE — 87040 BLOOD CULTURE FOR BACTERIA: CPT

## 2025-02-24 PROCEDURE — 85730 THROMBOPLASTIN TIME PARTIAL: CPT

## 2025-02-24 PROCEDURE — 71045 X-RAY EXAM CHEST 1 VIEW: CPT

## 2025-02-24 PROCEDURE — P9040: CPT

## 2025-02-24 PROCEDURE — 86905 BLOOD TYPING RBC ANTIGENS: CPT

## 2025-02-24 PROCEDURE — 96375 TX/PRO/DX INJ NEW DRUG ADDON: CPT

## 2025-02-24 PROCEDURE — 99238 HOSP IP/OBS DSCHRG MGMT 30/<: CPT | Mod: GC

## 2025-02-24 PROCEDURE — 82010 KETONE BODYS QUAN: CPT

## 2025-02-24 PROCEDURE — 74177 CT ABD & PELVIS W/CONTRAST: CPT | Mod: MC

## 2025-02-24 PROCEDURE — 36430 TRANSFUSION BLD/BLD COMPNT: CPT

## 2025-02-24 PROCEDURE — P9016: CPT

## 2025-02-24 PROCEDURE — 86860 RBC ANTIBODY ELUTION: CPT

## 2025-02-24 PROCEDURE — 97605 NEG PRS WND THER DME<=50SQCM: CPT

## 2025-02-24 PROCEDURE — 80053 COMPREHEN METABOLIC PANEL: CPT

## 2025-02-24 PROCEDURE — 86900 BLOOD TYPING SEROLOGIC ABO: CPT

## 2025-02-24 PROCEDURE — 0241U: CPT

## 2025-02-24 PROCEDURE — 83605 ASSAY OF LACTIC ACID: CPT

## 2025-02-24 PROCEDURE — 82272 OCCULT BLD FECES 1-3 TESTS: CPT

## 2025-02-24 PROCEDURE — 84100 ASSAY OF PHOSPHORUS: CPT

## 2025-02-24 PROCEDURE — 86922 COMPATIBILITY TEST ANTIGLOB: CPT

## 2025-02-24 PROCEDURE — 80048 BASIC METABOLIC PNL TOTAL CA: CPT

## 2025-02-24 PROCEDURE — 87640 STAPH A DNA AMP PROBE: CPT

## 2025-02-24 PROCEDURE — 70450 CT HEAD/BRAIN W/O DYE: CPT | Mod: MC

## 2025-02-24 PROCEDURE — 99285 EMERGENCY DEPT VISIT HI MDM: CPT

## 2025-02-24 PROCEDURE — 85014 HEMATOCRIT: CPT

## 2025-02-24 PROCEDURE — 97602 WOUND(S) CARE NON-SELECTIVE: CPT

## 2025-02-24 PROCEDURE — 85027 COMPLETE CBC AUTOMATED: CPT

## 2025-02-24 PROCEDURE — 84295 ASSAY OF SERUM SODIUM: CPT

## 2025-02-24 PROCEDURE — 83735 ASSAY OF MAGNESIUM: CPT

## 2025-02-24 PROCEDURE — 82947 ASSAY GLUCOSE BLOOD QUANT: CPT

## 2025-02-24 PROCEDURE — 86880 COOMBS TEST DIRECT: CPT

## 2025-02-24 PROCEDURE — 0225U NFCT DS DNA&RNA 21 SARSCOV2: CPT

## 2025-02-24 PROCEDURE — 94640 AIRWAY INHALATION TREATMENT: CPT

## 2025-02-24 PROCEDURE — 80202 ASSAY OF VANCOMYCIN: CPT

## 2025-02-24 PROCEDURE — 84132 ASSAY OF SERUM POTASSIUM: CPT

## 2025-02-24 PROCEDURE — 82962 GLUCOSE BLOOD TEST: CPT

## 2025-02-24 PROCEDURE — 85610 PROTHROMBIN TIME: CPT

## 2025-02-24 PROCEDURE — 82803 BLOOD GASES ANY COMBINATION: CPT

## 2025-02-24 PROCEDURE — 83690 ASSAY OF LIPASE: CPT

## 2025-02-24 PROCEDURE — 82330 ASSAY OF CALCIUM: CPT

## 2025-02-24 PROCEDURE — 97606 NEG PRS WND THER DME>50 SQCM: CPT

## 2025-02-24 PROCEDURE — 36415 COLL VENOUS BLD VENIPUNCTURE: CPT

## 2025-02-24 RX ADMIN — Medication 500 MILLIGRAM(S): at 11:39

## 2025-02-24 RX ADMIN — NYSTATIN 1 APPLICATION(S): 100000 CREAM TOPICAL at 06:09

## 2025-02-24 RX ADMIN — SODIUM HYPOCHLORITE 1 APPLICATION(S): 0.12 SOLUTION TOPICAL at 11:39

## 2025-02-24 RX ADMIN — Medication 220 MILLIGRAM(S): at 11:39

## 2025-02-24 RX ADMIN — Medication 1 TABLET(S): at 11:39

## 2025-02-24 RX ADMIN — Medication 40 MILLIGRAM(S): at 06:08

## 2025-02-24 RX ADMIN — METOPROLOL SUCCINATE 100 MILLIGRAM(S): 50 TABLET, EXTENDED RELEASE ORAL at 06:08

## 2025-02-24 RX ADMIN — AMLODIPINE BESYLATE 10 MILLIGRAM(S): 10 TABLET ORAL at 06:08

## 2025-02-24 NOTE — CHART NOTE - NSCHARTNOTEFT_GEN_A_CORE
Spoke with daughter Dorinda at [phone] regarding the patient's discharge. We attempted to get STAT CBC prior to the patient leaving, but the patient deferred blood draw considering she was a difficult stick for many days. She also refused a blood draw from the phlebotomists as well. Discussed with daughter to have post-hospitalization follow up with her primary care doctor Dr. Heaton. Daughter demonstrated an understanding, agreed with the plan, and all questions were answered. Patient will need to follow within a week in order to repeat her blood work to check her hemoglobin levels while on the Xarelto, and to make sure that she is okay after her hospital stay.

## 2025-02-24 NOTE — PROGRESS NOTE ADULT - SUBJECTIVE AND OBJECTIVE BOX
[facility]-- WOUND TEAM -- FOLLOW UP NOTE  --------------------------------------------------------------------------------    24 hour events/subjective:    alert  afebrile  tolerating po w/o n/v  incontinent  encouraged mobility/ assist w/ turning  improved pain, no odor/ excess drainage     Pt has been allowing Nystatin Crusting         rash and pain now improving  Reminded pt importance of different tx and needs time to work  dc planning w/ home care      Diet:  Diet, DASH/TLC:   Sodium & Cholesterol Restricted (02-16-25 @ 11:25)      ROS: General/ SKIN/ MSK/ GI see HPI  all other systems negative      ALLERGIES & MEDICATIONS  --------------------------------------------------------------------------------      No Known Allergies      STANDING INPATIENT MEDICATIONS  amLODIPine   Tablet 10 milliGRAM(s) Oral daily  ascorbic acid 500 milliGRAM(s) Oral daily  atorvastatin 20 milliGRAM(s) Oral at bedtime  Dakins Solution - 1/4 Strength 1 Application(s) Topical daily  dextrose 5%. 1000 milliLiter(s) IV Continuous <Continuous>  dextrose 5%. 1000 milliLiter(s) IV Continuous <Continuous>  dextrose 50% Injectable 25 Gram(s) IV Push once  dextrose 50% Injectable 12.5 Gram(s) IV Push once  dextrose 50% Injectable 25 Gram(s) IV Push once  glucagon  Injectable 1 milliGRAM(s) IntraMuscular once  influenza  Vaccine (HIGH DOSE) 0.5 milliLiter(s) IntraMuscular once  insulin lispro (ADMELOG) corrective regimen sliding scale   SubCutaneous three times a day before meals  insulin lispro (ADMELOG) corrective regimen sliding scale   SubCutaneous at bedtime  lactulose Syrup 10 Gram(s) Oral two times a day  metoprolol tartrate 100 milliGRAM(s) Oral two times a day  multivitamin/minerals 1 Tablet(s) Oral daily  nystatin Powder 1 Application(s) Topical every 12 hours  pantoprazole    Tablet 40 milliGRAM(s) Oral two times a day  rivaroxaban 20 milliGRAM(s) Oral with dinner  zinc sulfate 220 milliGRAM(s) Oral daily      PRN INPATIENT MEDICATION  acetaminophen     Tablet .. 650 milliGRAM(s) Oral every 6 hours PRN  dextrose Oral Gel 15 Gram(s) Oral once PRN  melatonin 3 milliGRAM(s) Oral at bedtime PRN  polyethylene glycol 3350 17 Gram(s) Oral daily PRN        VITALS/PHYSICAL EXAM  --------------------------------------------------------------------------------  T(C): 37.1 (02-24-25 @ 04:45), Max: 37.1 (02-23-25 @ 21:18)  HR: 88 (02-24-25 @ 04:45) (70 - 88)  BP: 113/56 (02-24-25 @ 04:45) (113/56 - 126/63)  RR: 18 (02-24-25 @ 04:45) (18 - 18)  SpO2: 95% (02-24-25 @ 04:45) (95% - 95%)  Wt(kg): --        NAD,   A&Ox3, MO, frail,  WD/ WN/ WG  [facility] P500 bed   HEENT:  NC/AT, EOMI, sclera clear, mucosa moist, throat clear, trachea midline, neck supple  Respiratory: nonlabored w/ equal chest rise  Gastrointestinal: soft NT/ND   : (+) purewick cath  Neurology:  weakened strength & sensation grossly intact  Psych: appropriate, anxious  Musculoskeletal: FROM, no deformities/ contractures  Vascular: BLE equally warm,  no cyanosis, clubbing, nor acute ischemia         BLE edema equal         BLE DP/PT pulses palpable  Skin: thin, dry, pale, frail,  ecchymosis w/o hematoma  Sacral Stage 4 pressure injury  moist red granular tissue w/ serosanguinous drainage     9.5cm x 9cm x 3.5cm     no exposed bone     periwound skin up the back into the flanks, down into posterior thighs & anteriorly into thigh/ groin & pannus skin fold regions as well as b/l axilla       w/ duller erythema and improved flaky skin w/ satellite lesions  Under Bilateral breast/ pannus/ groin linear also improved erythematous skin changes w/o blistering or weeping  No odor, erythema, increased warmth, tenderness, induration, fluctuance, nor crepitus        CAPILLARY BLOOD GLUCOSE  POCT Blood Glucose.: 169 mg/dL (24 Feb 2025 07:48)        A1C with Estimated Average Glucose Result: 7.3 % (01-09-25 @ 10:04)

## 2025-02-24 NOTE — PROGRESS NOTE ADULT - ASSESSMENT
79 y/o F with PMHx, HTN, lymphedema, T2DM, sacral decubitus ulcer s/p debridement (2024) and recent adm for sacral OM who presented to the ED for coffee-ground emesis and melena x 1 day, admitted for acute blood loss anemia 2/2 to UGIB. Course c/b flu positive, Afib rvr, and possible delirium with concern for stroke. CT head did not show any acute changes. Pending wound vac and HHA approval prior to DC. Also whether or not to AC for decreasing stroke risk vs increasing risk of GI bleed.       Wound Consult requested to assist w/ management of:  Sacral Stage 4 pressure injury  Fungal Moisture Associated Dermatitis Back/ Flank/ Buttock/ Thighs/ Groin/ Pannus/axilla- improving  Moisture Dermatitis Under Breasts    Sacral wound- continue to Pack w/ 1/4 strength packing BID     Upon dc home Wash w/ 1/4 strength Dakins and     Nystatin Crusting to periwound skin        & Pack w/ Aquacel dressing qd      HOME CARE RN to reassess to place VAC at home       Buttocks/ Sacrum Crusting w/ Nystatin + CAVILON BID and prn soiling        Continue w/ attends under pads and Pericare maintenance w/ purewick care as per protocol  Bilateral Groin, Pannus, & Breast skin Folds/axillas- after Cleaning= Tuck in INTERDry AG QD  A/P CT as noted above  Abx per Medicine/ ID      Pt started on Oral Antifungal- monitor for improvement  Moisturize intact skin w/ SWEEN cream BID  Nutrition Consult for optimization        encourage high quality protein, josephine/ prosource, MVI & Vit C to promote wound healing  Hyperglycemia - ADA diet and FS w/ ISS, consider HgA1c- pt non compliant "diet controlled"  Continue turning and positioning w/ offloading assistive devices as per protocol  Waffle Cushion to chair when oob to chair  Continue w/ low air loss pressure redistribution bed surface   Pt will need Group 2 mattress on hospital bed and ROHO cushion for wheel chair upon discharge home  Care as per medicine, will follow w/ you  Upon discharge f/u as outpatient at [facility] [address] [place] [address] [phone]  D/w team, RN, & attng  Thank you for this consult  Yolanda Ramos PA-C CWS [id number]  Nights/ Weekends/ Holidays please call:  General Surgery Consult pager ([phone]) for emergencies  Wound PT for multilayer leg wrapping or VAC issues (x [phone])   I spent 50minutes face to face w/ this pt of which more than 50% of the time was spent counseling & coordinating care of this pt.

## 2025-02-24 NOTE — PROGRESS NOTE ADULT - ASSESSMENT
81 y/o F with PMHx, HTN, lymphedema, T2DM, sacral decubitus ulcer s/p debridement (2024) and recent adm for sacral OM who presented to the ED for coffee-ground emesis and melena x 1 day, admitted for acute blood loss anemia 2/2 to UGIB. Course c/b flu positive, Afib rvr, and possible delirium with concern for stroke. CT head did not show any acute changes. AC for decreasing stroke risk. Treating fungal rash to the back.

## 2025-02-24 NOTE — PROGRESS NOTE ADULT - PROBLEM SELECTOR PLAN 3
Multiple episodes of melena and coffee-ground emesis x 1 day, associated w/ hypotension and acute drop in Hgb. Underwent EGD w/ three nonbleeding duodenal ulcers. CT neg active GI bleed.   - s/p Protonix gtt (ending 2/15)    PLAN:   - CBC qd  - Protonix 40 mg BID PO   - f/u GI recs -> H pylori serology tests (reordered)  - maintain active T&S, transfuse for Hgb >8 Multiple episodes of melena and coffee-ground emesis x 1 day, associated w/ hypotension and acute drop in Hgb. Underwent EGD w/ three nonbleeding duodenal ulcers. CT neg active GI bleed.   - s/p Protonix gtt (ending 2/15)    PLAN:   - CBC qd  - Protonix 40 mg BID PO   - maintain active T&S, transfuse for Hgb >8 Multiple episodes of melena and coffee-ground emesis x 1 day, associated w/ hypotension and acute drop in Hgb. Underwent EGD w/ three nonbleeding duodenal ulcers. CT neg active GI bleed.   - s/p Protonix gtt (ending 2/15)    PLAN:   - CBC qd  - Protonix 40 mg BID PO

## 2025-02-24 NOTE — PROGRESS NOTE ADULT - PROVIDER SPECIALTY LIST ADULT
Gastroenterology
Wound Care
Infectious Disease
Infectious Disease
Wound Care
Infectious Disease
Wound Care
Gastroenterology
Internal Medicine
Wound Care
Internal Medicine

## 2025-02-24 NOTE — DISCHARGE NOTE NURSING/CASE MANAGEMENT/SOCIAL WORK - NSDCFUADDAPPT_GEN_ALL_CORE_FT
APPTS ARE READY TO BE MADE: [X] YES    Best Family or Patient Contact (if needed):    Additional Information about above appointments (if needed):    1: PCP  2: GI  3: ID  4. Wound care     Other comments or requests:

## 2025-02-24 NOTE — DISCHARGE NOTE NURSING/CASE MANAGEMENT/SOCIAL WORK - FINANCIAL ASSISTANCE
[facility] provides services at a reduced cost to those who are determined to be eligible through [facility]’s financial assistance program. Information regarding [facility]’s financial assistance program can be found by going to [email or web address] or by calling [phone].

## 2025-02-24 NOTE — PROGRESS NOTE ADULT - PROBLEM SELECTOR PLAN 6
Likely multifactorial 2/2 inflammatory response in setting of GIB, chronic sacral OM, influenza A.   CXR w/ clear lungs, blood cx x2 neg, U/A, urine cx negative.   - s/p cefepime 2/11-2/16   - s/p tamiflu 2/11-2/15 for Flu A    PLAN:   - s/p Augmentin course  - GI PCR if continued diarrhea   - trend fever curve, WBC Decreasing trend   Likely multifactorial 2/2 inflammatory response in setting of GIB, chronic sacral OM, influenza A.   CXR w/ clear lungs, blood cx x2 neg, U/A, urine cx negative.   - s/p cefepime 2/11-2/16   - s/p tamiflu 2/11-2/15 for Flu A    PLAN:   - s/p Augmentin course  - afebrile

## 2025-02-24 NOTE — PROGRESS NOTE ADULT - PROBLEM SELECTOR PLAN 2
Patient with no hx of afib, went into afib w/RVR. HR increased to 160s, suspicion patient might have had a bleed, although patient denies bloody stool and vomiting. Was given 5mg Lopressor.  - concern for delirium from embolism as below  - 2/17 CT head negative for signs of stroke.   - 2/18 discussed risks of stroke with AFIB when not on anticoagulation vs the risks of placing the pt on AC and increasing bleed risk with daughter Ling. She understands the risks of the AC and would like to proceed with anticoagulation if it was okay with the GI team considering she was here GI bleed.  - 2/19: GI team is okay with AC given that hgb has been stable in the 11s and no new onset of blood in stool.   - [id number] score: 5 | HAS-BLED score: 3    Plan:  - cw maintenance dose Xarelto for decreasing stroke risk 2/19   - ctm for eventsd  - cw Metoprolol Tartrate 100mg BID which is equivalent to her home dose BB

## 2025-02-24 NOTE — PROGRESS NOTE ADULT - PROBLEM SELECTOR PLAN 7
Patient has not had a bowel movement in over 7 days. Could be causing her Afib    Plan:  - Miralax daily  - cw Lactulose 10g BID  - cw Magnesium Citrate   - Consider enema if above fails okay now     Plan:  - Miralax daily  - cw Lactulose 10g BID  - cw Magnesium Citrate   - Consider enema if above fails

## 2025-02-24 NOTE — PROGRESS NOTE ADULT - SUBJECTIVE AND OBJECTIVE BOX
******************  Authored By: Kentrell Delgado MD, PGY1  MS Teams Preferred  ******************  INTERVAL HPI/OVERNIGHT EVENTS:  Pt seen and examined at bedside. No acute overnight events or complaints.    Brief Daily Plan:  - dc home today    VITAL SIGNS:  T(F): 98.8 (02-24-25 @ 04:45)  HR: 88 (02-24-25 @ 04:45)  BP: 113/56 (02-24-25 @ 04:45)  RR: 18 (02-24-25 @ 04:45)  SpO2: 95% (02-24-25 @ 04:45)  Wt(kg): --    CAPILLARY BLOOD GLUCOSE      POCT Blood Glucose.: 146 mg/dL (23 Feb 2025 08:27)      PHYSICAL EXAM:    GENERAL: NAD, chronically ill appearing, pale   HEAD: Atraumatic, Normocephalic  EYES: EOMI, PERRLA, conjunctiva and sclera clear  ENMT: Moist mucous membranes  NECK: Supple, trachea midline   NERVOUS SYSTEM:  Alert & Oriented X3, Good eye contact and conversational.   CHEST/LUNG: Clear to auscultation bilaterally on anterior exam; No rales, rhonchi, wheezing, or rubs  HEART: Regular rate and rhythm; No murmurs, rubs, or gallops  ABDOMEN: Soft, Nontender, Nondistended; Bowel sounds present  EXTREMITIES:  2+ Peripheral Pulses, nonpitting edema b/l (unchanged from prior)   SKIN: Sacral Ulcer, fungal rash on back     MEDICATIONS  (STANDING):  amLODIPine   Tablet 10 milliGRAM(s) Oral daily  ascorbic acid 500 milliGRAM(s) Oral daily  atorvastatin 20 milliGRAM(s) Oral at bedtime  Dakins Solution - 1/4 Strength 1 Application(s) Topical daily  dextrose 5%. 1000 milliLiter(s) (100 mL/Hr) IV Continuous <Continuous>  dextrose 5%. 1000 milliLiter(s) (50 mL/Hr) IV Continuous <Continuous>  dextrose 50% Injectable 25 Gram(s) IV Push once  dextrose 50% Injectable 12.5 Gram(s) IV Push once  dextrose 50% Injectable 25 Gram(s) IV Push once  glucagon  Injectable 1 milliGRAM(s) IntraMuscular once  influenza  Vaccine (HIGH DOSE) 0.5 milliLiter(s) IntraMuscular once  insulin lispro (ADMELOG) corrective regimen sliding scale   SubCutaneous three times a day before meals  insulin lispro (ADMELOG) corrective regimen sliding scale   SubCutaneous at bedtime  lactulose Syrup 10 Gram(s) Oral two times a day  metoprolol tartrate 100 milliGRAM(s) Oral two times a day  multivitamin/minerals 1 Tablet(s) Oral daily  nystatin Powder 1 Application(s) Topical every 12 hours  pantoprazole    Tablet 40 milliGRAM(s) Oral two times a day  rivaroxaban 20 milliGRAM(s) Oral with dinner  zinc sulfate 220 milliGRAM(s) Oral daily    MEDICATIONS  (PRN):  acetaminophen     Tablet .. 650 milliGRAM(s) Oral every 6 hours PRN Temp greater or equal to 38C (100.4F), Mild Pain (1 - 3)  dextrose Oral Gel 15 Gram(s) Oral once PRN Blood Glucose LESS THAN 70 milliGRAM(s)/deciliter  melatonin 3 milliGRAM(s) Oral at bedtime PRN Insomnia  polyethylene glycol 3350 17 Gram(s) Oral daily PRN Constipation    Allergies    No Known Allergies    Intolerances    LABS:    RADIOLOGY & ADDITIONAL TESTS:  Reviewed    ******************   ******************  Authored By: Kentrell Delgado MD, PGY1  MS Teams Preferred  ******************  INTERVAL HPI/OVERNIGHT EVENTS:  Pt seen and examined at bedside. No acute overnight events or complaints.    Brief Daily Plan:  - dc home today  - No wound vac when going home, will follow up after fungal rash resolved     VITAL SIGNS:  T(F): 98.8 (02-24-25 @ 04:45)  HR: 88 (02-24-25 @ 04:45)  BP: 113/56 (02-24-25 @ 04:45)  RR: 18 (02-24-25 @ 04:45)  SpO2: 95% (02-24-25 @ 04:45)  Wt(kg): --    CAPILLARY BLOOD GLUCOSE      POCT Blood Glucose.: 146 mg/dL (23 Feb 2025 08:27)      PHYSICAL EXAM:    GENERAL: NAD, well appearing, pale   HEAD: Atraumatic, Normocephalic  EYES: EOMI, PERRLA, conjunctiva and sclera clear  ENMT: Moist mucous membranes  NECK: Supple, trachea midline   NERVOUS SYSTEM:  Alert & Oriented X3, Good eye contact and conversational.   CHEST/LUNG: Clear to auscultation bilaterally on anterior exam; No rales, rhonchi, wheezing, or rubs  HEART: Regular rate and rhythm; No murmurs, rubs, or gallops  ABDOMEN: Soft, Nontender, Nondistended; Bowel sounds present  EXTREMITIES:  2+ Peripheral Pulses, nonpitting edema b/l (unchanged from prior)   SKIN: Sacral Ulcer, fungal rash on back     MEDICATIONS  (STANDING):  amLODIPine   Tablet 10 milliGRAM(s) Oral daily  ascorbic acid 500 milliGRAM(s) Oral daily  atorvastatin 20 milliGRAM(s) Oral at bedtime  Dakins Solution - 1/4 Strength 1 Application(s) Topical daily  dextrose 5%. 1000 milliLiter(s) (100 mL/Hr) IV Continuous <Continuous>  dextrose 5%. 1000 milliLiter(s) (50 mL/Hr) IV Continuous <Continuous>  dextrose 50% Injectable 25 Gram(s) IV Push once  dextrose 50% Injectable 12.5 Gram(s) IV Push once  dextrose 50% Injectable 25 Gram(s) IV Push once  glucagon  Injectable 1 milliGRAM(s) IntraMuscular once  influenza  Vaccine (HIGH DOSE) 0.5 milliLiter(s) IntraMuscular once  insulin lispro (ADMELOG) corrective regimen sliding scale   SubCutaneous three times a day before meals  insulin lispro (ADMELOG) corrective regimen sliding scale   SubCutaneous at bedtime  lactulose Syrup 10 Gram(s) Oral two times a day  metoprolol tartrate 100 milliGRAM(s) Oral two times a day  multivitamin/minerals 1 Tablet(s) Oral daily  nystatin Powder 1 Application(s) Topical every 12 hours  pantoprazole    Tablet 40 milliGRAM(s) Oral two times a day  rivaroxaban 20 milliGRAM(s) Oral with dinner  zinc sulfate 220 milliGRAM(s) Oral daily    MEDICATIONS  (PRN):  acetaminophen     Tablet .. 650 milliGRAM(s) Oral every 6 hours PRN Temp greater or equal to 38C (100.4F), Mild Pain (1 - 3)  dextrose Oral Gel 15 Gram(s) Oral once PRN Blood Glucose LESS THAN 70 milliGRAM(s)/deciliter  melatonin 3 milliGRAM(s) Oral at bedtime PRN Insomnia  polyethylene glycol 3350 17 Gram(s) Oral daily PRN Constipation    Allergies    No Known Allergies    Intolerances    LABS:    RADIOLOGY & ADDITIONAL TESTS:  Reviewed    ******************   ******************  Authored By: Kentrell Delgado MD, PGY1  MS Teams Preferred  ******************  INTERVAL HPI/OVERNIGHT EVENTS:  Pt seen and examined at bedside. No acute overnight events or complaints.    Brief Daily Plan:  - dc home today  - No wound vac when going home, will follow up after fungal rash resolved     VITAL SIGNS:  T(F): 98.8 (02-24-25 @ 04:45)  HR: 88 (02-24-25 @ 04:45)  BP: 113/56 (02-24-25 @ 04:45)  RR: 18 (02-24-25 @ 04:45)  SpO2: 95% (02-24-25 @ 04:45)  Wt(kg): --    CAPILLARY BLOOD GLUCOSE      POCT Blood Glucose.: 146 mg/dL (23 Feb 2025 08:27)      PHYSICAL EXAM:    GENERAL: NAD  HEAD: Atraumatic, Normocephalic  EYES: EOMI, PERRLA, conjunctiva and sclera clear  ENMT: Moist mucous membranes  NECK: Supple, trachea midline   NERVOUS SYSTEM:  Alert & Oriented   CHEST/LUNG: Clear to auscultation bilaterally on anterior exam; No rales, rhonchi, wheezing, or rubs  HEART: Regular rate and rhythm; No murmurs, rubs, or gallops  ABDOMEN: Soft, Nontender, Nondistended; Bowel sounds present  EXTREMITIES:nonpitting edema b/l (unchanged from prior)   SKIN: Sacral Ulcer, fungal rash on back     MEDICATIONS  (STANDING):  amLODIPine   Tablet 10 milliGRAM(s) Oral daily  ascorbic acid 500 milliGRAM(s) Oral daily  atorvastatin 20 milliGRAM(s) Oral at bedtime  Dakins Solution - 1/4 Strength 1 Application(s) Topical daily  dextrose 5%. 1000 milliLiter(s) (100 mL/Hr) IV Continuous <Continuous>  dextrose 5%. 1000 milliLiter(s) (50 mL/Hr) IV Continuous <Continuous>  dextrose 50% Injectable 25 Gram(s) IV Push once  dextrose 50% Injectable 12.5 Gram(s) IV Push once  dextrose 50% Injectable 25 Gram(s) IV Push once  glucagon  Injectable 1 milliGRAM(s) IntraMuscular once  influenza  Vaccine (HIGH DOSE) 0.5 milliLiter(s) IntraMuscular once  insulin lispro (ADMELOG) corrective regimen sliding scale   SubCutaneous three times a day before meals  insulin lispro (ADMELOG) corrective regimen sliding scale   SubCutaneous at bedtime  lactulose Syrup 10 Gram(s) Oral two times a day  metoprolol tartrate 100 milliGRAM(s) Oral two times a day  multivitamin/minerals 1 Tablet(s) Oral daily  nystatin Powder 1 Application(s) Topical every 12 hours  pantoprazole    Tablet 40 milliGRAM(s) Oral two times a day  rivaroxaban 20 milliGRAM(s) Oral with dinner  zinc sulfate 220 milliGRAM(s) Oral daily    MEDICATIONS  (PRN):  acetaminophen     Tablet .. 650 milliGRAM(s) Oral every 6 hours PRN Temp greater or equal to 38C (100.4F), Mild Pain (1 - 3)  dextrose Oral Gel 15 Gram(s) Oral once PRN Blood Glucose LESS THAN 70 milliGRAM(s)/deciliter  melatonin 3 milliGRAM(s) Oral at bedtime PRN Insomnia  polyethylene glycol 3350 17 Gram(s) Oral daily PRN Constipation    Allergies    No Known Allergies    Intolerances    LABS:    RADIOLOGY & ADDITIONAL TESTS:  Reviewed    ******************

## 2025-02-24 NOTE — DISCHARGE NOTE NURSING/CASE MANAGEMENT/SOCIAL WORK - NSDCPEFALRISK_GEN_ALL_CORE
For information on Fall & Injury Prevention, visit: [email or web address] OR  [email or web address] OR  https://www.cdc.gov/steadi/patient.html

## 2025-02-24 NOTE — PROGRESS NOTE ADULT - REASON FOR ADMISSION
UGIB

## 2025-02-24 NOTE — PROGRESS NOTE ADULT - ATTENDING COMMENTS
Patient seen and examined.   chart reviewed   last labs reviewed   patient is planned discharge for today   VSS is relatively stable   no clinical evidence of external bleeding   sacral wound without evidence of cellulitis     79 y/o F with PMHx, HTN, lymphedema, T2DM, sacral decubitus ulcer s/p debridement, sacral OM admitted for GI bleed and acute blood loss anemia  c/b flu positive, Afib,  and possible delirium  after EGD and PRBC Hb remained stable in 11   attempted for today but refusing and no clinical evidence of external bleeding and  signs of bleeding   patient planned for discharge with wound vac and follow up with wound care on Wednesday and  this afternoon   CBC and BMP can be done as an out patient

## 2025-02-24 NOTE — DISCHARGE NOTE NURSING/CASE MANAGEMENT/SOCIAL WORK - PATIENT PORTAL LINK FT
You can access the FollowMyHealth Patient Portal offered by [facility] by registering at the following website: [email or web address]. By joining [employer]’s FollowMyHealth portal, you will also be able to view your health information using other applications (apps) compatible with our system.

## 2025-02-24 NOTE — PROGRESS NOTE ADULT - ATTENDING SUPERVISION STATEMENT
Fellow
Fellow
Resident

## 2025-02-24 NOTE — PROGRESS NOTE ADULT - PROBLEM SELECTOR PLAN 1
Previous adm (1/8-16) for sacral OM, rec for PICC line on dc for IV abx however pt refused and discharged on Augmentin s/p 6 week course   - Gen surg performed bedside superficial wound debridement; wound looks clean.   - EKG 2/19- QTC interval 425  - s/p abx 2/19 with Augmentin  - 2/19 air mattress requested     PLAN:   - pt refused nystatin powder for a few days, developed fungal rash to back, will continue nystatin powder  - 2/19 started fluconazole 200 mg qd for possible sacral fungal moisture dermatitis (ending on 2/24)  - f/u wound care recs -> local care currently  - Wound vac has been delivered 2/21 per social work Previous adm (1/8-16) for sacral OM, rec for PICC line on dc for IV abx however pt refused and discharged on Augmentin s/p 6 week course   - Gen surg performed bedside superficial wound debridement; wound looks clean.   - EKG 2/19- QTC interval 425  - s/p abx 2/19 with Augmentin  - 2/19 air mattress requested     PLAN:   - pt refused nystatin powder for a few days, developed fungal rash to back, will continue nystatin powder  - will follow up to restart wound vacuum with wound care   - 2/19 started fluconazole 200 mg qd for possible sacral fungal moisture dermatitis (ending on 2/24)  - f/u wound care recs -> local care currently  - Wound vac has been delivered 2/21 per social work

## 2025-02-24 NOTE — PROGRESS NOTE ADULT - PROBLEM SELECTOR PLAN 5
Hgb 6 on adm, now s/p 5u pRBC since admission.   2/2 to UGI from ulcers.   baseline Hgb ~9.     PLAN:  - transfuse for Hgb > 8  - maintain active T&S  - on AC, family understands risks and benefits as above Hgb 6 on adm, now s/p 5u pRBC since admission.   2/2 to UGI from ulcers.   baseline Hgb ~9.   last Hb is 11 and was remained in this range   no clinical evidence of bleeding   On AC, family understands risks and benefits as above  attempted tp have repeat CBC but very hard stick and patient refusing and not co-operating   and NO black stool or BRBPR reported

## 2025-02-26 ENCOUNTER — APPOINTMENT (OUTPATIENT)
Dept: WOUND CARE | Facility: HOSPITAL | Age: 80
End: 2025-02-26

## 2025-02-28 NOTE — ED PROVIDER NOTE - CHPI ED SYMPTOMS POS
What Type Of Note Output Would You Prefer (Optional)?: Bullet Format
Hpi Title: Evaluation of Skin Lesions
How Severe Are Your Spot(S)?: mild
swelling/DIFFICULTY BEARING WEIGHT

## 2025-03-06 ENCOUNTER — APPOINTMENT (OUTPATIENT)
Dept: WOUND CARE | Facility: HOSPITAL | Age: 80
End: 2025-03-06

## 2025-03-06 VITALS
HEART RATE: 69 BPM | RESPIRATION RATE: 16 BRPM | DIASTOLIC BLOOD PRESSURE: 80 MMHG | TEMPERATURE: 98 F | SYSTOLIC BLOOD PRESSURE: 175 MMHG

## 2025-03-06 DIAGNOSIS — B36.9 SUPERFICIAL MYCOSIS, UNSPECIFIED: ICD-10-CM

## 2025-03-06 DIAGNOSIS — I89.0 LYMPHEDEMA, NOT ELSEWHERE CLASSIFIED: ICD-10-CM

## 2025-03-06 DIAGNOSIS — Z86.39 PERSONAL HISTORY OF OTHER ENDOCRINE, NUTRITIONAL AND METABOLIC DISEASE: ICD-10-CM

## 2025-03-06 DIAGNOSIS — Z78.9 OTHER SPECIFIED HEALTH STATUS: ICD-10-CM

## 2025-03-06 PROCEDURE — 11046 DBRDMT MUSC&/FSCA EA ADDL: CPT

## 2025-03-06 PROCEDURE — 99214 OFFICE O/P EST MOD 30 MIN: CPT | Mod: 25

## 2025-03-06 PROCEDURE — 11043 DBRDMT MUSC&/FSCA 1ST 20/<: CPT

## 2025-03-07 PROBLEM — B36.9 FUNGAL DERMATITIS: Status: ACTIVE | Noted: 2025-03-07

## 2025-03-07 RX ORDER — NYSTATIN 100000 [USP'U]/ML
100000 SUSPENSION ORAL 3 TIMES DAILY
Qty: 105 | Refills: 1 | Status: ACTIVE | COMMUNITY
Start: 2025-03-07 | End: 1900-01-01

## 2025-03-13 RX ORDER — NYSTATIN 100000 U/G
100000 OINTMENT TOPICAL 3 TIMES DAILY
Qty: 1 | Refills: 2 | Status: ACTIVE | COMMUNITY
Start: 2025-03-07 | End: 1900-01-01

## 2025-03-19 RX ORDER — NYSTATIN 100000 [USP'U]/G
100000 CREAM TOPICAL TWICE DAILY
Qty: 1 | Refills: 3 | Status: ACTIVE | COMMUNITY
Start: 2025-03-19 | End: 1900-01-01

## 2025-04-03 ENCOUNTER — APPOINTMENT (OUTPATIENT)
Dept: WOUND CARE | Facility: HOSPITAL | Age: 80
End: 2025-04-03

## 2025-05-15 ENCOUNTER — APPOINTMENT (OUTPATIENT)
Dept: WOUND CARE | Facility: HOSPITAL | Age: 80
End: 2025-05-15
Payer: MEDICARE

## 2025-05-15 DIAGNOSIS — L30.9 DERMATITIS, UNSPECIFIED: ICD-10-CM

## 2025-05-15 PROCEDURE — 99213 OFFICE O/P EST LOW 20 MIN: CPT | Mod: 25

## 2025-05-15 PROCEDURE — 11042 DBRDMT SUBQ TIS 1ST 20SQCM/<: CPT

## 2025-05-15 RX ORDER — NYSTATIN 100000 U/G
100000 OINTMENT TOPICAL 3 TIMES DAILY
Qty: 1 | Refills: 0 | Status: ACTIVE | COMMUNITY
Start: 2025-05-15 | End: 1900-01-01

## 2025-05-28 RX ORDER — CELECOXIB 100 MG/1
100 CAPSULE ORAL
Qty: 60 | Refills: 0 | Status: ACTIVE | COMMUNITY
Start: 2025-05-28 | End: 1900-01-01

## 2025-06-18 ENCOUNTER — APPOINTMENT (OUTPATIENT)
Dept: ORTHOPEDIC SURGERY | Facility: CLINIC | Age: 80
End: 2025-06-18

## 2025-06-26 ENCOUNTER — RX RENEWAL (OUTPATIENT)
Age: 80
End: 2025-06-26

## 2025-07-02 RX ORDER — DICLOFENAC SODIUM 75 MG/1
75 TABLET, DELAYED RELEASE ORAL
Qty: 60 | Refills: 0 | Status: ACTIVE | COMMUNITY
Start: 2025-07-02 | End: 1900-01-01

## 2025-07-07 RX ORDER — DICLOFENAC SODIUM 16.05 MG/ML
1.5 SOLUTION TOPICAL TWICE DAILY
Qty: 1 | Refills: 0 | Status: ACTIVE | COMMUNITY
Start: 2025-07-07 | End: 1900-01-01

## 2025-07-08 NOTE — PROGRESS NOTE ADULT - PROBLEM SELECTOR PLAN 12
Yes Home meds: lipitor 20mg qd   - continue home statin    DVT PPX: none iso acute bleed  DIET: DASH TLC  Dispo: home with PT wound care 3x/week  Delirium: CT head negative 2/17 for acute intracranial changes Home meds: lipitor 20mg qd   - continue home statin    #Need for prophylactic measure   DVT PPX: none iso acute bleed, consider AC  DIET: CC  Dispo: home with PT wound care 3x/week  Delirium: CT head negative 2/17 for acute intracranial changes

## 2025-07-28 ENCOUNTER — APPOINTMENT (OUTPATIENT)
Dept: WOUND CARE | Facility: HOSPITAL | Age: 80
End: 2025-07-28
Payer: MEDICARE

## 2025-07-28 DIAGNOSIS — L89.154 PRESSURE ULCER OF SACRAL REGION, STAGE 4: ICD-10-CM

## 2025-07-28 PROCEDURE — 99213 OFFICE O/P EST LOW 20 MIN: CPT | Mod: 95

## 2025-08-08 ENCOUNTER — RX RENEWAL (OUTPATIENT)
Age: 80
End: 2025-08-08

## (undated) DEVICE — PACK IV START WITH CHG

## (undated) DEVICE — BITE BLOCK ADULT 20 X 27MM (GREEN)

## (undated) DEVICE — SOL INJ NS 0.9% 500ML 2 PORT

## (undated) DEVICE — BRUSH COLONOSCOPY CYTOLOGY

## (undated) DEVICE — TUBING IV SET GRAVITY 3Y 100" MACRO

## (undated) DEVICE — BALLOON US ENDO

## (undated) DEVICE — CATH IV SAFE BC 22G X 1" (BLUE)

## (undated) DEVICE — TUBING SUCTION 20FT

## (undated) DEVICE — TUBING SUCTION CONN 6FT STERILE

## (undated) DEVICE — CLAMP BX HOT RAD JAW 3

## (undated) DEVICE — IRRIGATOR BIO SHIELD

## (undated) DEVICE — SYR LUER LOK 50CC

## (undated) DEVICE — SUCTION YANKAUER NO CONTROL VENT

## (undated) DEVICE — FORCEP RADIAL JAW 4 JUMBO 2.8MM 3.2MM 240CM ORANGE DISP

## (undated) DEVICE — BIOPSY FORCEP RADIAL JAW 4 STANDARD WITH NEEDLE

## (undated) DEVICE — SYR ALLIANCE II INFLATION 60ML

## (undated) DEVICE — CATH IV SAFE BC 20G X 1.16" (PINK)

## (undated) DEVICE — SENSOR O2 FINGER ADULT

## (undated) DEVICE — FOLEY HOLDER STATLOCK 2 WAY ADULT